# Patient Record
Sex: MALE | Race: BLACK OR AFRICAN AMERICAN | NOT HISPANIC OR LATINO | Employment: OTHER | URBAN - METROPOLITAN AREA
[De-identification: names, ages, dates, MRNs, and addresses within clinical notes are randomized per-mention and may not be internally consistent; named-entity substitution may affect disease eponyms.]

---

## 2017-06-21 ENCOUNTER — HISTORICAL (OUTPATIENT)
Dept: ADMINISTRATIVE | Facility: HOSPITAL | Age: 57
End: 2017-06-21

## 2017-06-21 LAB
ABS NEUT (OLG): 3.86 X10(3)/MCL (ref 2.1–9.2)
ALBUMIN SERPL-MCNC: 4.7 GM/DL (ref 3.4–5)
ALBUMIN/GLOB SERPL: 2 RATIO (ref 1–2)
ALP SERPL-CCNC: 131 UNIT/L (ref 20–120)
ALT SERPL-CCNC: 38 UNIT/L
APPEARANCE, UA: CLEAR
AST SERPL-CCNC: 24 UNIT/L
BACTERIA #/AREA URNS AUTO: ABNORMAL /[HPF]
BASOPHILS # BLD AUTO: 0.02 X10(3)/MCL
BASOPHILS NFR BLD AUTO: 0 % (ref 0–1)
BILIRUB SERPL-MCNC: 0.4 MG/DL
BILIRUB UR QL STRIP: NEGATIVE
BILIRUBIN DIRECT+TOT PNL SERPL-MCNC: 0.1 MG/DL
BILIRUBIN DIRECT+TOT PNL SERPL-MCNC: 0.3 MG/DL
BUN SERPL-MCNC: 26 MG/DL (ref 7–25)
CALCIUM SERPL-MCNC: 9.3 MG/DL (ref 8.4–10.3)
CHLORIDE SERPL-SCNC: 105 MMOL/L (ref 96–110)
CO2 SERPL-SCNC: 31 MMOL/L (ref 24–32)
COLOR UR: NORMAL
CREAT SERPL-MCNC: 1.23 MG/DL (ref 0.7–1.4)
CREAT UR-MCNC: 100.8 MG/DL
EOSINOPHIL # BLD AUTO: 0.18 X10(3)/MCL
EOSINOPHIL NFR BLD AUTO: 3 % (ref 0–5)
ERYTHROCYTE [DISTWIDTH] IN BLOOD BY AUTOMATED COUNT: 13.2 % (ref 11.5–14.5)
EST. AVERAGE GLUCOSE BLD GHB EST-MCNC: 111 MG/DL
GLOBULIN SER-MCNC: 3.1 GM/ML (ref 2.3–3.5)
GLUCOSE (UA): NORMAL
GLUCOSE SERPL-MCNC: 80 MG/DL (ref 65–99)
HAV IGM SERPL QL IA: NONREACTIVE
HBA1C MFR BLD: 5.5 % (ref 4.7–5.6)
HBV CORE IGM SERPL QL IA: NONREACTIVE
HBV SURFACE AG SERPL QL IA: NEGATIVE
HCT VFR BLD AUTO: 42.7 % (ref 40–51)
HCV AB SERPL QL IA: REACTIVE
HGB BLD-MCNC: 14.5 GM/DL (ref 13.5–17.5)
HGB UR QL STRIP: NEGATIVE
HYALINE CASTS #/AREA URNS LPF: ABNORMAL /[LPF]
IMM GRANULOCYTES # BLD AUTO: 0.01 10*3/UL
IMM GRANULOCYTES NFR BLD AUTO: 0 %
KETONES UR QL STRIP: NEGATIVE
LEUKOCYTE ESTERASE UR QL STRIP: NEGATIVE
LYMPHOCYTES # BLD AUTO: 1.73 X10(3)/MCL
LYMPHOCYTES NFR BLD AUTO: 28 % (ref 15–40)
MAGNESIUM SERPL-MCNC: 2 MG/DL (ref 1.5–2.6)
MCH RBC QN AUTO: 33.7 PG (ref 26–34)
MCHC RBC AUTO-ENTMCNC: 34 GM/DL (ref 31–37)
MCV RBC AUTO: 99.3 FL (ref 80–100)
MICROALBUMIN UR-MCNC: <2 MG/L (ref 0–19)
MICROALBUMIN/CREAT RATIO PNL UR: <2 MCG/MG CR (ref 0–29)
MONOCYTES # BLD AUTO: 0.45 X10(3)/MCL
MONOCYTES NFR BLD AUTO: 7 % (ref 4–12)
NEUTROPHILS # BLD AUTO: 3.86 X10(3)/MCL
NEUTROPHILS NFR BLD AUTO: 62 X10(3)/MCL
NITRITE UR QL STRIP: NEGATIVE
PH UR STRIP: 6 [PH] (ref 4.5–8)
PLATELET # BLD AUTO: 243 X10(3)/MCL (ref 130–400)
PMV BLD AUTO: 11 FL (ref 7.4–10.4)
POTASSIUM SERPL-SCNC: 4.5 MMOL/L (ref 3.6–5.2)
PROT SERPL-MCNC: 7.8 GM/DL (ref 6–8)
PROT UR QL STRIP: NEGATIVE
PSA SERPL-MCNC: 1 NG/ML (ref 0–4)
RBC # BLD AUTO: 4.3 X10(6)/MCL (ref 4.5–5.9)
RBC #/AREA URNS AUTO: ABNORMAL /[HPF]
SODIUM SERPL-SCNC: 140 MMOL/L (ref 135–146)
SP GR UR STRIP: 1.01 (ref 1–1.03)
SQUAMOUS #/AREA URNS LPF: ABNORMAL /[LPF]
T4 FREE SERPL-MCNC: 1.05 NG/DL (ref 0.6–1.15)
TSH SERPL-ACNC: 2.06 MIU/L (ref 0.5–5)
UROBILINOGEN UR STRIP-ACNC: NORMAL
WBC # SPEC AUTO: 6.2 X10(3)/MCL (ref 4.5–11)
WBC #/AREA URNS AUTO: ABNORMAL /HPF

## 2017-06-26 LAB
COLOR STL: NORMAL
CONSISTENCY STL: NORMAL
HEMOCCULT SP1 STL QL: NEGATIVE

## 2017-06-27 LAB
COLOR STL: NORMAL
CONSISTENCY STL: NORMAL
HEMOCCULT SP2 STL QL: NEGATIVE

## 2017-06-28 LAB
COLOR STL: NORMAL
CONSISTENCY STL: NORMAL

## 2017-06-29 ENCOUNTER — HISTORICAL (OUTPATIENT)
Dept: ADMINISTRATIVE | Facility: HOSPITAL | Age: 57
End: 2017-06-29

## 2017-06-29 LAB
INR PPP: 1.27 (ref 0.9–1.2)
PROTHROMBIN TIME: 15.7 SECOND(S) (ref 11.9–14.4)

## 2017-07-13 ENCOUNTER — HISTORICAL (OUTPATIENT)
Dept: ADMINISTRATIVE | Facility: HOSPITAL | Age: 57
End: 2017-07-13

## 2017-07-13 LAB
INR PPP: 1.31 (ref 0.9–1.2)
PROTHROMBIN TIME: 16.1 SECOND(S) (ref 11.9–14.4)

## 2017-08-08 ENCOUNTER — HISTORICAL (OUTPATIENT)
Dept: INTERNAL MEDICINE | Facility: CLINIC | Age: 57
End: 2017-08-08

## 2017-08-28 ENCOUNTER — HISTORICAL (OUTPATIENT)
Dept: RADIOLOGY | Facility: HOSPITAL | Age: 57
End: 2017-08-28

## 2017-08-29 ENCOUNTER — HISTORICAL (OUTPATIENT)
Dept: INTERNAL MEDICINE | Facility: CLINIC | Age: 57
End: 2017-08-29

## 2017-08-29 LAB
INR PPP: 1.46 (ref 0.9–1.2)
PROTHROMBIN TIME: 17.5 SECOND(S) (ref 11.9–14.4)

## 2018-01-09 ENCOUNTER — HISTORICAL (OUTPATIENT)
Dept: INTERNAL MEDICINE | Facility: CLINIC | Age: 58
End: 2018-01-09

## 2018-01-09 LAB
ABS NEUT (OLG): 3.6 X10(3)/MCL (ref 2.1–9.2)
ALBUMIN SERPL-MCNC: 4.3 GM/DL (ref 3.4–5)
ALBUMIN/GLOB SERPL: 1 RATIO (ref 1–2)
ALP SERPL-CCNC: 140 UNIT/L (ref 45–117)
ALT SERPL-CCNC: 22 UNIT/L (ref 12–78)
AST SERPL-CCNC: 16 UNIT/L (ref 15–37)
BASOPHILS # BLD AUTO: 0.04 X10(3)/MCL
BASOPHILS NFR BLD AUTO: 1 % (ref 0–1)
BILIRUB SERPL-MCNC: 0.6 MG/DL (ref 0.2–1)
BILIRUBIN DIRECT+TOT PNL SERPL-MCNC: 0.2 MG/DL
BILIRUBIN DIRECT+TOT PNL SERPL-MCNC: 0.4 MG/DL
BUN SERPL-MCNC: 14 MG/DL (ref 7–18)
CALCIUM SERPL-MCNC: 9.2 MG/DL (ref 8.5–10.1)
CHLORIDE SERPL-SCNC: 104 MMOL/L (ref 98–107)
CHOLEST SERPL-MCNC: 170 MG/DL
CHOLEST/HDLC SERPL: 3.7 {RATIO} (ref 0–5)
CO2 SERPL-SCNC: 31 MMOL/L (ref 21–32)
CREAT SERPL-MCNC: 1.2 MG/DL (ref 0.6–1.3)
EOSINOPHIL # BLD AUTO: 0.18 X10(3)/MCL
EOSINOPHIL NFR BLD AUTO: 3 % (ref 0–5)
ERYTHROCYTE [DISTWIDTH] IN BLOOD BY AUTOMATED COUNT: 13.6 % (ref 11.5–14.5)
EST. AVERAGE GLUCOSE BLD GHB EST-MCNC: 114 MG/DL
GLOBULIN SER-MCNC: 3.9 GM/ML (ref 2.3–3.5)
GLUCOSE SERPL-MCNC: 85 MG/DL (ref 74–106)
HBA1C MFR BLD: 5.6 % (ref 4.2–6.3)
HCT VFR BLD AUTO: 41.8 % (ref 40–51)
HDLC SERPL-MCNC: 46 MG/DL
HGB BLD-MCNC: 14.2 GM/DL (ref 13.5–17.5)
HIV 1+2 AB+HIV1 P24 AG SERPL QL IA: NONREACTIVE
IMM GRANULOCYTES # BLD AUTO: 0.01 10*3/UL
IMM GRANULOCYTES NFR BLD AUTO: 0 %
INR PPP: 1.17 (ref 0.9–1.2)
LDLC SERPL CALC-MCNC: 105 MG/DL (ref 0–130)
LYMPHOCYTES # BLD AUTO: 1.9 X10(3)/MCL
LYMPHOCYTES NFR BLD AUTO: 30 % (ref 15–40)
MCH RBC QN AUTO: 33.3 PG (ref 26–34)
MCHC RBC AUTO-ENTMCNC: 34 GM/DL (ref 31–37)
MCV RBC AUTO: 98.1 FL (ref 80–100)
MONOCYTES # BLD AUTO: 0.69 X10(3)/MCL
MONOCYTES NFR BLD AUTO: 11 % (ref 4–12)
NEUTROPHILS # BLD AUTO: 3.6 X10(3)/MCL
NEUTROPHILS NFR BLD AUTO: 56 X10(3)/MCL
PLATELET # BLD AUTO: 260 X10(3)/MCL (ref 130–400)
PMV BLD AUTO: 10.6 FL (ref 7.4–10.4)
POTASSIUM SERPL-SCNC: 4.1 MMOL/L (ref 3.5–5.1)
PROT SERPL-MCNC: 8.2 GM/DL (ref 6.4–8.2)
PROTHROMBIN TIME: 14.7 SECOND(S) (ref 11.9–14.4)
RBC # BLD AUTO: 4.26 X10(6)/MCL (ref 4.5–5.9)
SODIUM SERPL-SCNC: 142 MMOL/L (ref 136–145)
TRIGL SERPL-MCNC: 95 MG/DL
VLDLC SERPL CALC-MCNC: 19 MG/DL
WBC # SPEC AUTO: 6.4 X10(3)/MCL (ref 4.5–11)

## 2018-01-23 ENCOUNTER — HISTORICAL (OUTPATIENT)
Dept: INTERNAL MEDICINE | Facility: CLINIC | Age: 58
End: 2018-01-23

## 2018-01-23 LAB
INR PPP: 1.46 (ref 0.9–1.2)
PROTHROMBIN TIME: 17.5 SECOND(S) (ref 11.9–14.4)

## 2018-02-06 ENCOUNTER — HISTORICAL (OUTPATIENT)
Dept: INTERNAL MEDICINE | Facility: CLINIC | Age: 58
End: 2018-02-06

## 2018-02-06 LAB
ALBUMIN SERPL-MCNC: 3.8 GM/DL (ref 3.4–5)
ALBUMIN/GLOB SERPL: 1 RATIO (ref 1–2)
ALP SERPL-CCNC: 138 UNIT/L (ref 45–117)
ALT SERPL-CCNC: 29 UNIT/L (ref 12–78)
AST SERPL-CCNC: 21 UNIT/L (ref 15–37)
BILIRUB SERPL-MCNC: 0.4 MG/DL (ref 0.2–1)
BILIRUBIN DIRECT+TOT PNL SERPL-MCNC: 0.1 MG/DL
BILIRUBIN DIRECT+TOT PNL SERPL-MCNC: 0.3 MG/DL
BUN SERPL-MCNC: 18 MG/DL (ref 7–18)
CALCIUM SERPL-MCNC: 8.8 MG/DL (ref 8.5–10.1)
CHLORIDE SERPL-SCNC: 108 MMOL/L (ref 98–107)
CO2 SERPL-SCNC: 29 MMOL/L (ref 21–32)
CREAT SERPL-MCNC: 1.2 MG/DL (ref 0.6–1.3)
GLOBULIN SER-MCNC: 3.4 GM/ML (ref 2.3–3.5)
GLUCOSE SERPL-MCNC: 118 MG/DL (ref 74–106)
INR PPP: 1.5 (ref 0.9–1.2)
POTASSIUM SERPL-SCNC: 3.9 MMOL/L (ref 3.5–5.1)
PROT SERPL-MCNC: 7.2 GM/DL (ref 6.4–8.2)
PROTHROMBIN TIME: 17.2 SECOND(S) (ref 11.9–14.4)
SODIUM SERPL-SCNC: 142 MMOL/L (ref 136–145)

## 2018-02-20 ENCOUNTER — HISTORICAL (OUTPATIENT)
Dept: INTERNAL MEDICINE | Facility: CLINIC | Age: 58
End: 2018-02-20

## 2018-02-20 LAB
INR PPP: 1.58 (ref 0.9–1.2)
PROTHROMBIN TIME: 17.9 SECOND(S) (ref 11.9–14.4)

## 2018-03-27 LAB
ABS NEUT (OLG): 3.87 X10(3)/MCL (ref 2.1–9.2)
ALBUMIN SERPL-MCNC: 3.8 GM/DL (ref 3.4–5)
ALBUMIN/GLOB SERPL: 1 RATIO (ref 1–2)
ALP SERPL-CCNC: 130 UNIT/L (ref 45–117)
ALT SERPL-CCNC: 41 UNIT/L (ref 12–78)
AST SERPL-CCNC: 23 UNIT/L (ref 15–37)
BASOPHILS # BLD AUTO: 0.05 X10(3)/MCL
BASOPHILS NFR BLD AUTO: 1 %
BILIRUB SERPL-MCNC: 0.4 MG/DL (ref 0.2–1)
BILIRUBIN DIRECT+TOT PNL SERPL-MCNC: 0.1 MG/DL
BILIRUBIN DIRECT+TOT PNL SERPL-MCNC: 0.3 MG/DL
BUN SERPL-MCNC: 17 MG/DL (ref 7–18)
CALCIUM SERPL-MCNC: 8.3 MG/DL (ref 8.5–10.1)
CHLORIDE SERPL-SCNC: 110 MMOL/L (ref 98–107)
CO2 SERPL-SCNC: 30 MMOL/L (ref 21–32)
CREAT SERPL-MCNC: 1.4 MG/DL (ref 0.6–1.3)
EOSINOPHIL # BLD AUTO: 0.16 X10(3)/MCL
EOSINOPHIL NFR BLD AUTO: 2 %
ERYTHROCYTE [DISTWIDTH] IN BLOOD BY AUTOMATED COUNT: 13.3 % (ref 11.5–14.5)
FERRITIN SERPL-MCNC: 295.1 NG/ML (ref 26–388)
FOLATE SERPL-MCNC: 19.8 NG/ML (ref 3.1–17.5)
GLOBULIN SER-MCNC: 4.2 GM/ML (ref 2.3–3.5)
GLUCOSE SERPL-MCNC: 102 MG/DL (ref 74–106)
HCT VFR BLD AUTO: 39.2 % (ref 40–51)
HGB BLD-MCNC: 13.3 GM/DL (ref 13.5–17.5)
IMM GRANULOCYTES # BLD AUTO: 0.01 10*3/UL
IMM GRANULOCYTES NFR BLD AUTO: 0 %
INR PPP: 4.65 (ref 0.9–1.2)
IRON SATN MFR SERPL: 46.9 % (ref 15–50)
IRON SERPL-MCNC: 83 MCG/DL (ref 65–175)
LYMPHOCYTES # BLD AUTO: 2.58 X10(3)/MCL
LYMPHOCYTES NFR BLD AUTO: 36 % (ref 13–40)
MCH RBC QN AUTO: 33.4 PG (ref 26–34)
MCHC RBC AUTO-ENTMCNC: 33.9 GM/DL (ref 31–37)
MCV RBC AUTO: 98.5 FL (ref 80–100)
MONOCYTES # BLD AUTO: 0.6 X10(3)/MCL
MONOCYTES NFR BLD AUTO: 8 % (ref 4–12)
NEUTROPHILS # BLD AUTO: 3.87 X10(3)/MCL
NEUTROPHILS NFR BLD AUTO: 53 X10(3)/MCL
PLATELET # BLD AUTO: 273 X10(3)/MCL (ref 130–400)
PMV BLD AUTO: 10.2 FL (ref 7.4–10.4)
POTASSIUM SERPL-SCNC: 3.9 MMOL/L (ref 3.5–5.1)
PROT SERPL-MCNC: 8 GM/DL (ref 6.4–8.2)
PROTHROMBIN TIME: 41.9 SECOND(S) (ref 11.9–14.4)
RBC # BLD AUTO: 3.98 X10(6)/MCL (ref 4.5–5.9)
SODIUM SERPL-SCNC: 143 MMOL/L (ref 136–145)
TIBC SERPL-MCNC: 177 MCG/DL (ref 250–450)
TRANSFERRIN SERPL-MCNC: 212 MG/DL (ref 200–360)
VIT B12 SERPL-MCNC: 242 PG/ML (ref 193–986)
WBC # SPEC AUTO: 7.3 X10(3)/MCL (ref 4.5–11)

## 2018-03-28 ENCOUNTER — HISTORICAL (OUTPATIENT)
Dept: INTERNAL MEDICINE | Facility: CLINIC | Age: 58
End: 2018-03-28

## 2018-04-03 LAB
INR PPP: 1.36 (ref 0.9–1.2)
PROTHROMBIN TIME: 15.9 SECOND(S) (ref 11.9–14.4)

## 2018-04-17 ENCOUNTER — HISTORICAL (OUTPATIENT)
Dept: INTERNAL MEDICINE | Facility: CLINIC | Age: 58
End: 2018-04-17

## 2018-04-17 LAB
INR PPP: 1.25
INR PPP: 1.25 (ref 0.9–1.2)
PROTHROMBIN TIME: 14.9 SECOND(S) (ref 11.9–14.4)

## 2018-06-20 ENCOUNTER — HISTORICAL (OUTPATIENT)
Dept: INTERNAL MEDICINE | Facility: CLINIC | Age: 58
End: 2018-06-20

## 2018-06-20 LAB
ABS NEUT (OLG): 4.48 X10(3)/MCL (ref 2.1–9.2)
ALBUMIN SERPL-MCNC: 4 GM/DL (ref 3.4–5)
ALBUMIN/GLOB SERPL: 1 RATIO (ref 1–2)
ALP SERPL-CCNC: 136 UNIT/L (ref 45–117)
ALT SERPL-CCNC: 27 UNIT/L (ref 12–78)
AST SERPL-CCNC: 19 UNIT/L (ref 15–37)
BASOPHILS # BLD AUTO: 0.05 X10(3)/MCL
BASOPHILS NFR BLD AUTO: 1 %
BILIRUB SERPL-MCNC: 0.3 MG/DL (ref 0.2–1)
BILIRUBIN DIRECT+TOT PNL SERPL-MCNC: 0.1 MG/DL
BILIRUBIN DIRECT+TOT PNL SERPL-MCNC: 0.2 MG/DL
BUN SERPL-MCNC: 13 MG/DL (ref 7–18)
CALCIUM SERPL-MCNC: 8.5 MG/DL (ref 8.5–10.1)
CHLORIDE SERPL-SCNC: 110 MMOL/L (ref 98–107)
CO2 SERPL-SCNC: 29 MMOL/L (ref 21–32)
CREAT SERPL-MCNC: 1.4 MG/DL (ref 0.6–1.3)
EOSINOPHIL # BLD AUTO: 0.14 X10(3)/MCL
EOSINOPHIL NFR BLD AUTO: 2 %
ERYTHROCYTE [DISTWIDTH] IN BLOOD BY AUTOMATED COUNT: 13.9 % (ref 11.5–14.5)
GLOBULIN SER-MCNC: 3.1 GM/ML (ref 2.3–3.5)
GLUCOSE SERPL-MCNC: 98 MG/DL (ref 74–106)
HCT VFR BLD AUTO: 41.5 % (ref 40–51)
HGB BLD-MCNC: 13.6 GM/DL (ref 13.5–17.5)
IMM GRANULOCYTES # BLD AUTO: 0.02 10*3/UL
IMM GRANULOCYTES NFR BLD AUTO: 0 %
LYMPHOCYTES # BLD AUTO: 1.62 X10(3)/MCL
LYMPHOCYTES NFR BLD AUTO: 24 % (ref 13–40)
MCH RBC QN AUTO: 33.6 PG (ref 26–34)
MCHC RBC AUTO-ENTMCNC: 32.8 GM/DL (ref 31–37)
MCV RBC AUTO: 102.5 FL (ref 80–100)
MONOCYTES # BLD AUTO: 0.58 X10(3)/MCL
MONOCYTES NFR BLD AUTO: 8 % (ref 4–12)
NEUTROPHILS # BLD AUTO: 4.48 X10(3)/MCL
NEUTROPHILS NFR BLD AUTO: 65 X10(3)/MCL
PLATELET # BLD AUTO: 219 X10(3)/MCL (ref 130–400)
PMV BLD AUTO: 10.8 FL (ref 7.4–10.4)
POTASSIUM SERPL-SCNC: 4.2 MMOL/L (ref 3.5–5.1)
PROT SERPL-MCNC: 7.1 GM/DL (ref 6.4–8.2)
RBC # BLD AUTO: 4.05 X10(6)/MCL (ref 4.5–5.9)
SODIUM SERPL-SCNC: 145 MMOL/L (ref 136–145)
WBC # SPEC AUTO: 6.9 X10(3)/MCL (ref 4.5–11)

## 2018-07-03 ENCOUNTER — HISTORICAL (OUTPATIENT)
Dept: INTERNAL MEDICINE | Facility: CLINIC | Age: 58
End: 2018-07-03

## 2018-07-16 ENCOUNTER — HISTORICAL (OUTPATIENT)
Dept: RESPIRATORY THERAPY | Facility: HOSPITAL | Age: 58
End: 2018-07-16

## 2018-08-07 ENCOUNTER — HISTORICAL (OUTPATIENT)
Dept: CARDIOLOGY | Facility: CLINIC | Age: 58
End: 2018-08-07

## 2018-08-10 ENCOUNTER — HISTORICAL (OUTPATIENT)
Dept: CARDIOLOGY | Facility: HOSPITAL | Age: 58
End: 2018-08-10

## 2018-08-10 LAB
ABS NEUT (OLG): 4.66 X10(3)/MCL (ref 2.1–9.2)
APTT PPP: 30.1 SECOND(S) (ref 23.3–37)
BASOPHILS # BLD AUTO: 0.03 X10(3)/MCL
BASOPHILS NFR BLD AUTO: 0 %
BUN SERPL-MCNC: 31 MG/DL (ref 7–18)
CALCIUM SERPL-MCNC: 6.3 MG/DL (ref 8.5–10.1)
CHLORIDE SERPL-SCNC: 109 MMOL/L (ref 98–107)
CO2 SERPL-SCNC: 29 MMOL/L (ref 21–32)
CREAT SERPL-MCNC: 1.5 MG/DL (ref 0.6–1.3)
CREAT/UREA NIT SERPL: 21
EOSINOPHIL # BLD AUTO: 0.16 X10(3)/MCL
EOSINOPHIL NFR BLD AUTO: 2 %
ERYTHROCYTE [DISTWIDTH] IN BLOOD BY AUTOMATED COUNT: 13.4 % (ref 11.5–14.5)
GLUCOSE SERPL-MCNC: 89 MG/DL (ref 74–106)
HCT VFR BLD AUTO: 35.8 % (ref 40–51)
HGB BLD-MCNC: 12 GM/DL (ref 13.5–17.5)
IMM GRANULOCYTES # BLD AUTO: 0.02 10*3/UL
IMM GRANULOCYTES NFR BLD AUTO: 0 %
INR PPP: 1.17 (ref 0.9–1.2)
LYMPHOCYTES # BLD AUTO: 1.75 X10(3)/MCL
LYMPHOCYTES NFR BLD AUTO: 24 % (ref 13–40)
MCH RBC QN AUTO: 33.8 PG (ref 26–34)
MCHC RBC AUTO-ENTMCNC: 33.5 GM/DL (ref 31–37)
MCV RBC AUTO: 100.8 FL (ref 80–100)
MONOCYTES # BLD AUTO: 0.61 X10(3)/MCL
MONOCYTES NFR BLD AUTO: 8 % (ref 4–12)
NEUTROPHILS # BLD AUTO: 4.66 X10(3)/MCL
NEUTROPHILS NFR BLD AUTO: 64 X10(3)/MCL
PLATELET # BLD AUTO: 213 X10(3)/MCL (ref 130–400)
PMV BLD AUTO: 10.5 FL (ref 7.4–10.4)
POTASSIUM SERPL-SCNC: 4 MMOL/L (ref 3.5–5.1)
PROTHROMBIN TIME: 14.1 SECOND(S) (ref 11.9–14.4)
RBC # BLD AUTO: 3.55 X10(6)/MCL (ref 4.5–5.9)
SODIUM SERPL-SCNC: 145 MMOL/L (ref 136–145)
WBC # SPEC AUTO: 7.2 X10(3)/MCL (ref 4.5–11)

## 2018-11-13 ENCOUNTER — HISTORICAL (OUTPATIENT)
Dept: RADIOLOGY | Facility: HOSPITAL | Age: 58
End: 2018-11-13

## 2018-12-13 ENCOUNTER — HISTORICAL (OUTPATIENT)
Dept: INTERNAL MEDICINE | Facility: CLINIC | Age: 58
End: 2018-12-13

## 2019-02-07 ENCOUNTER — HISTORICAL (OUTPATIENT)
Dept: INTERNAL MEDICINE | Facility: CLINIC | Age: 59
End: 2019-02-07

## 2019-02-07 LAB
ABS NEUT (OLG): 2.65 X10(3)/MCL (ref 2.1–9.2)
ALBUMIN SERPL-MCNC: 4.3 GM/DL (ref 3.4–5)
ALBUMIN/GLOB SERPL: 1.2 RATIO (ref 1.1–2)
ALP SERPL-CCNC: 131 UNIT/L (ref 45–117)
ALT SERPL-CCNC: 22 UNIT/L (ref 12–78)
APPEARANCE, UA: CLEAR
AST SERPL-CCNC: 15 UNIT/L (ref 15–37)
BACTERIA #/AREA URNS AUTO: ABNORMAL /[HPF]
BASOPHILS # BLD AUTO: 0.05 X10(3)/MCL
BASOPHILS NFR BLD AUTO: 1 %
BILIRUB SERPL-MCNC: 0.6 MG/DL (ref 0.2–1)
BILIRUB UR QL STRIP: NEGATIVE
BILIRUBIN DIRECT+TOT PNL SERPL-MCNC: 0.2 MG/DL
BILIRUBIN DIRECT+TOT PNL SERPL-MCNC: 0.4 MG/DL
BUN SERPL-MCNC: 18 MG/DL (ref 7–18)
CALCIUM SERPL-MCNC: 8.9 MG/DL (ref 8.5–10.1)
CHLORIDE SERPL-SCNC: 104 MMOL/L (ref 98–107)
CHOLEST SERPL-MCNC: 163 MG/DL
CHOLEST/HDLC SERPL: 2.9 {RATIO} (ref 0–5)
CO2 SERPL-SCNC: 30 MMOL/L (ref 21–32)
COLOR UR: NORMAL
CREAT SERPL-MCNC: 1.4 MG/DL (ref 0.6–1.3)
CREAT UR-MCNC: 160 MG/DL
EOSINOPHIL # BLD AUTO: 0.17 10*3/UL
EOSINOPHIL NFR BLD AUTO: 3 %
ERYTHROCYTE [DISTWIDTH] IN BLOOD BY AUTOMATED COUNT: 12.9 % (ref 11.5–14.5)
EST. AVERAGE GLUCOSE BLD GHB EST-MCNC: 128 MG/DL
GLOBULIN SER-MCNC: 3.5 GM/ML (ref 2.3–3.5)
GLUCOSE (UA): NORMAL
GLUCOSE SERPL-MCNC: 57 MG/DL (ref 74–106)
HBA1C MFR BLD: 6.1 % (ref 4.2–6.3)
HCT VFR BLD AUTO: 43.2 % (ref 40–51)
HDLC SERPL-MCNC: 56 MG/DL
HGB BLD-MCNC: 14.5 GM/DL (ref 13.5–17.5)
HGB UR QL STRIP: NEGATIVE
HYALINE CASTS #/AREA URNS LPF: ABNORMAL /[LPF]
IMM GRANULOCYTES # BLD AUTO: 0.01 10*3/UL
IMM GRANULOCYTES NFR BLD AUTO: 0 %
KETONES UR QL STRIP: NEGATIVE
LDLC SERPL CALC-MCNC: 89 MG/DL (ref 0–130)
LEUKOCYTE ESTERASE UR QL STRIP: NEGATIVE
LYMPHOCYTES # BLD AUTO: 2.28 X10(3)/MCL
LYMPHOCYTES NFR BLD AUTO: 41 % (ref 13–40)
MCH RBC QN AUTO: 33 PG (ref 26–34)
MCHC RBC AUTO-ENTMCNC: 33.6 GM/DL (ref 31–37)
MCV RBC AUTO: 98.2 FL (ref 80–100)
MICROALBUMIN UR-MCNC: 10.3 MG/L (ref 0–19)
MICROALBUMIN/CREAT RATIO PNL UR: 6.4 MCG/MG CR (ref 0–29)
MONOCYTES # BLD AUTO: 0.45 X10(3)/MCL
MONOCYTES NFR BLD AUTO: 8 % (ref 4–12)
NEUTROPHILS # BLD AUTO: 2.65 X10(3)/MCL
NEUTROPHILS NFR BLD AUTO: 47 X10(3)/MCL
NITRITE UR QL STRIP: NEGATIVE
PH UR STRIP: 5.5 [PH] (ref 4.5–8)
PLATELET # BLD AUTO: 244 X10(3)/MCL (ref 130–400)
PMV BLD AUTO: 10.6 FL (ref 7.4–10.4)
POTASSIUM SERPL-SCNC: 3.4 MMOL/L (ref 3.5–5.1)
PROT SERPL-MCNC: 7.8 GM/DL (ref 6.4–8.2)
PROT UR QL STRIP: NEGATIVE
PSA SERPL-MCNC: 1.6 NG/ML
RBC # BLD AUTO: 4.4 X10(6)/MCL (ref 4.5–5.9)
RBC #/AREA URNS AUTO: ABNORMAL /[HPF]
SODIUM SERPL-SCNC: 138 MMOL/L (ref 136–145)
SP GR UR STRIP: 1.01 (ref 1–1.03)
SQUAMOUS #/AREA URNS LPF: ABNORMAL /[LPF]
T4 FREE SERPL-MCNC: 1.28 NG/DL (ref 0.76–1.46)
TRIGL SERPL-MCNC: 88 MG/DL
TSH SERPL-ACNC: 3.21 MIU/L (ref 0.36–3.74)
UROBILINOGEN UR STRIP-ACNC: NORMAL
VLDLC SERPL CALC-MCNC: 18 MG/DL
WBC # SPEC AUTO: 5.6 X10(3)/MCL (ref 4.5–11)
WBC #/AREA URNS AUTO: ABNORMAL /HPF

## 2019-03-19 ENCOUNTER — HISTORICAL (OUTPATIENT)
Dept: ADMINISTRATIVE | Facility: HOSPITAL | Age: 59
End: 2019-03-19

## 2019-08-02 ENCOUNTER — HISTORICAL (OUTPATIENT)
Dept: INTERNAL MEDICINE | Facility: CLINIC | Age: 59
End: 2019-08-02

## 2019-08-02 LAB
EST. AVERAGE GLUCOSE BLD GHB EST-MCNC: 105 MG/DL
HBA1C MFR BLD: 5.3 % (ref 4.2–6.3)

## 2019-08-23 ENCOUNTER — HISTORICAL (OUTPATIENT)
Dept: ADMINISTRATIVE | Facility: HOSPITAL | Age: 59
End: 2019-08-23

## 2019-08-23 LAB
ABS NEUT (OLG): 2.56 X10(3)/MCL (ref 2.1–9.2)
ALBUMIN SERPL-MCNC: 4.5 GM/DL (ref 3.4–5)
ALBUMIN/GLOB SERPL: 1.2 RATIO (ref 1.1–2)
ALP SERPL-CCNC: 156 UNIT/L (ref 45–117)
ALT SERPL-CCNC: 21 UNIT/L (ref 12–78)
AST SERPL-CCNC: 14 UNIT/L (ref 15–37)
BASOPHILS # BLD AUTO: 0.03 X10(3)/MCL
BASOPHILS NFR BLD AUTO: 1 %
BILIRUB SERPL-MCNC: 0.4 MG/DL (ref 0.2–1)
BILIRUBIN DIRECT+TOT PNL SERPL-MCNC: 0.2 MG/DL
BILIRUBIN DIRECT+TOT PNL SERPL-MCNC: 0.2 MG/DL
BUN SERPL-MCNC: 18 MG/DL (ref 7–18)
CALCIUM SERPL-MCNC: 9.1 MG/DL (ref 8.5–10.1)
CHLORIDE SERPL-SCNC: 107 MMOL/L (ref 98–107)
CO2 SERPL-SCNC: 30 MMOL/L (ref 21–32)
CREAT SERPL-MCNC: 1.2 MG/DL (ref 0.6–1.3)
CRP SERPL-MCNC: <0.3 MG/DL
EOSINOPHIL # BLD AUTO: 0.14 X10(3)/MCL
EOSINOPHIL NFR BLD AUTO: 3 %
ERYTHROCYTE [DISTWIDTH] IN BLOOD BY AUTOMATED COUNT: 13.4 % (ref 11.5–14.5)
ERYTHROCYTE [SEDIMENTATION RATE] IN BLOOD: 2 MM/HR (ref 0–15)
GLOBULIN SER-MCNC: 3.8 GM/ML (ref 2.3–3.5)
GLUCOSE SERPL-MCNC: 68 MG/DL (ref 74–106)
HBV CORE AB SERPL QL IA: NONREACTIVE
HBV CORE IGM SERPL QL IA: NONREACTIVE
HBV SURFACE AG SERPL QL IA: NEGATIVE
HCT VFR BLD AUTO: 45.1 % (ref 40–51)
HCV AB SERPL QL IA: REACTIVE
HGB BLD-MCNC: 14.7 GM/DL (ref 13.5–17.5)
IMM GRANULOCYTES # BLD AUTO: 0.01 10*3/UL
IMM GRANULOCYTES NFR BLD AUTO: 0 %
LYMPHOCYTES # BLD AUTO: 1.87 X10(3)/MCL
LYMPHOCYTES NFR BLD AUTO: 36 % (ref 13–40)
MCH RBC QN AUTO: 33.6 PG (ref 26–34)
MCHC RBC AUTO-ENTMCNC: 32.6 GM/DL (ref 31–37)
MCV RBC AUTO: 103.2 FL (ref 80–100)
MONOCYTES # BLD AUTO: 0.52 X10(3)/MCL
MONOCYTES NFR BLD AUTO: 10 % (ref 0–24)
NEG CONT SPOT COUNT: NORMAL
NEUTROPHILS # BLD AUTO: 2.56 X10(3)/MCL
NEUTROPHILS NFR BLD AUTO: 50 X10(3)/MCL
PANEL A SPOT COUNT: 2
PANEL B SPOT COUNT: 2
PLATELET # BLD AUTO: 253 X10(3)/MCL (ref 130–400)
PMV BLD AUTO: 10.3 FL (ref 7.4–10.4)
POS CONT SPOT COUNT: NORMAL
POTASSIUM SERPL-SCNC: 3.6 MMOL/L (ref 3.5–5.1)
PROT SERPL-MCNC: 8.3 GM/DL (ref 6.4–8.2)
RBC # BLD AUTO: 4.37 X10(6)/MCL (ref 4.5–5.9)
SODIUM SERPL-SCNC: 142 MMOL/L (ref 136–145)
T-SPOT.TB: NORMAL
WBC # SPEC AUTO: 5.1 X10(3)/MCL (ref 4.5–11)

## 2019-10-02 ENCOUNTER — HISTORICAL (OUTPATIENT)
Dept: RADIOLOGY | Facility: HOSPITAL | Age: 59
End: 2019-10-02

## 2019-12-12 ENCOUNTER — HISTORICAL (OUTPATIENT)
Dept: INTERNAL MEDICINE | Facility: CLINIC | Age: 59
End: 2019-12-12

## 2019-12-12 LAB
ABS NEUT (OLG): 3.47 X10(3)/MCL (ref 2.1–9.2)
ALBUMIN SERPL-MCNC: 3.8 GM/DL (ref 3.4–5)
ALBUMIN/GLOB SERPL: 1.2 RATIO (ref 1.1–2)
ALP SERPL-CCNC: 119 UNIT/L (ref 45–117)
ALT SERPL-CCNC: 22 UNIT/L (ref 12–78)
AST SERPL-CCNC: 14 UNIT/L (ref 15–37)
BASOPHILS # BLD AUTO: 0 X10(3)/MCL (ref 0–0.2)
BASOPHILS NFR BLD AUTO: 1 %
BILIRUB SERPL-MCNC: 0.4 MG/DL (ref 0.2–1)
BILIRUBIN DIRECT+TOT PNL SERPL-MCNC: 0.1 MG/DL (ref 0–0.2)
BILIRUBIN DIRECT+TOT PNL SERPL-MCNC: 0.3 MG/DL
BUN SERPL-MCNC: 23 MG/DL (ref 7–18)
CALCIUM SERPL-MCNC: 8.7 MG/DL (ref 8.5–10.1)
CHLORIDE SERPL-SCNC: 112 MMOL/L (ref 98–107)
CO2 SERPL-SCNC: 29 MMOL/L (ref 21–32)
CREAT SERPL-MCNC: 1.2 MG/DL (ref 0.6–1.3)
CRP SERPL-MCNC: <0.3 MG/DL
EOSINOPHIL # BLD AUTO: 0.2 X10(3)/MCL (ref 0–0.9)
EOSINOPHIL NFR BLD AUTO: 4 %
ERYTHROCYTE [DISTWIDTH] IN BLOOD BY AUTOMATED COUNT: 13.7 % (ref 11.5–14.5)
ERYTHROCYTE [SEDIMENTATION RATE] IN BLOOD: 2 MM/HR (ref 0–15)
GLOBULIN SER-MCNC: 3.3 GM/ML (ref 2.3–3.5)
GLUCOSE SERPL-MCNC: 101 MG/DL (ref 74–106)
HCT VFR BLD AUTO: 43.1 % (ref 40–51)
HGB BLD-MCNC: 14 GM/DL (ref 13.5–17.5)
IMM GRANULOCYTES # BLD AUTO: 0.02 10*3/UL
IMM GRANULOCYTES NFR BLD AUTO: 0 %
LYMPHOCYTES # BLD AUTO: 1.6 X10(3)/MCL (ref 0.6–4.6)
LYMPHOCYTES NFR BLD AUTO: 28 %
MCH RBC QN AUTO: 33.7 PG (ref 26–34)
MCHC RBC AUTO-ENTMCNC: 32.5 GM/DL (ref 31–37)
MCV RBC AUTO: 103.9 FL (ref 80–100)
MONOCYTES # BLD AUTO: 0.4 X10(3)/MCL (ref 0.1–1.3)
MONOCYTES NFR BLD AUTO: 7 %
NEUTROPHILS # BLD AUTO: 3.47 X10(3)/MCL (ref 2.1–9.2)
NEUTROPHILS NFR BLD AUTO: 61 %
PLATELET # BLD AUTO: 224 X10(3)/MCL (ref 130–400)
PMV BLD AUTO: 10.4 FL (ref 7.4–10.4)
POTASSIUM SERPL-SCNC: 4.2 MMOL/L (ref 3.5–5.1)
PROT SERPL-MCNC: 7.1 GM/DL (ref 6.4–8.2)
RBC # BLD AUTO: 4.15 X10(6)/MCL (ref 4.5–5.9)
SODIUM SERPL-SCNC: 143 MMOL/L (ref 136–145)
WBC # SPEC AUTO: 5.7 X10(3)/MCL (ref 4.5–11)

## 2020-02-11 ENCOUNTER — HISTORICAL (OUTPATIENT)
Dept: RADIOLOGY | Facility: HOSPITAL | Age: 60
End: 2020-02-11

## 2020-03-17 ENCOUNTER — HISTORICAL (OUTPATIENT)
Dept: INTERNAL MEDICINE | Facility: CLINIC | Age: 60
End: 2020-03-17

## 2020-03-17 LAB
ABS NEUT (OLG): 3.76 X10(3)/MCL (ref 2.1–9.2)
ALBUMIN SERPL-MCNC: 3.5 GM/DL (ref 3.4–5)
ALBUMIN/GLOB SERPL: 1.1 RATIO (ref 1.1–2)
ALP SERPL-CCNC: 152 UNIT/L (ref 45–117)
ALT SERPL-CCNC: 18 UNIT/L (ref 12–78)
APPEARANCE, UA: CLEAR
AST SERPL-CCNC: 11 UNIT/L (ref 15–37)
BACTERIA #/AREA URNS AUTO: ABNORMAL /HPF
BASOPHILS # BLD AUTO: 0 X10(3)/MCL (ref 0–0.2)
BASOPHILS NFR BLD AUTO: 1 %
BILIRUB SERPL-MCNC: 0.4 MG/DL (ref 0.2–1)
BILIRUB UR QL STRIP: NEGATIVE
BILIRUBIN DIRECT+TOT PNL SERPL-MCNC: 0.2 MG/DL
BILIRUBIN DIRECT+TOT PNL SERPL-MCNC: 0.2 MG/DL (ref 0–0.2)
BUN SERPL-MCNC: 19 MG/DL (ref 7–18)
CALCIUM SERPL-MCNC: 8 MG/DL (ref 8.5–10.1)
CHLORIDE SERPL-SCNC: 113 MMOL/L (ref 98–107)
CHOLEST SERPL-MCNC: 137 MG/DL
CHOLEST/HDLC SERPL: 2.2 {RATIO} (ref 0–5)
CO2 SERPL-SCNC: 29 MMOL/L (ref 21–32)
COLOR UR: YELLOW
CREAT SERPL-MCNC: 1.1 MG/DL (ref 0.6–1.3)
EOSINOPHIL # BLD AUTO: 0.2 X10(3)/MCL (ref 0–0.9)
EOSINOPHIL NFR BLD AUTO: 4 %
ERYTHROCYTE [DISTWIDTH] IN BLOOD BY AUTOMATED COUNT: 13.7 % (ref 11.5–14.5)
EST. AVERAGE GLUCOSE BLD GHB EST-MCNC: 103 MG/DL
GLOBULIN SER-MCNC: 3.1 GM/ML (ref 2.3–3.5)
GLUCOSE (UA): NEGATIVE
GLUCOSE SERPL-MCNC: 70 MG/DL (ref 74–106)
HBA1C MFR BLD: 5.2 % (ref 4.2–6.3)
HCT VFR BLD AUTO: 40.4 % (ref 40–51)
HDLC SERPL-MCNC: 62 MG/DL (ref 40–59)
HGB BLD-MCNC: 13.3 GM/DL (ref 13.5–17.5)
HGB UR QL STRIP: NEGATIVE
HYALINE CASTS #/AREA URNS LPF: ABNORMAL /LPF
IMM GRANULOCYTES # BLD AUTO: 0.01 10*3/UL
IMM GRANULOCYTES NFR BLD AUTO: 0 %
KETONES UR QL STRIP: ABNORMAL
LDLC SERPL CALC-MCNC: 63 MG/DL
LEUKOCYTE ESTERASE UR QL STRIP: NEGATIVE
LYMPHOCYTES # BLD AUTO: 1.6 X10(3)/MCL (ref 0.6–4.6)
LYMPHOCYTES NFR BLD AUTO: 25 %
MCH RBC QN AUTO: 33.8 PG (ref 26–34)
MCHC RBC AUTO-ENTMCNC: 32.9 GM/DL (ref 31–37)
MCV RBC AUTO: 102.8 FL (ref 80–100)
MONOCYTES # BLD AUTO: 0.5 X10(3)/MCL (ref 0.1–1.3)
MONOCYTES NFR BLD AUTO: 8 %
NEUTROPHILS # BLD AUTO: 3.76 X10(3)/MCL (ref 2.1–9.2)
NEUTROPHILS NFR BLD AUTO: 61 %
NITRITE UR QL STRIP: NEGATIVE
PH UR STRIP: 6 [PH] (ref 4.5–8)
PLATELET # BLD AUTO: 222 X10(3)/MCL (ref 130–400)
PMV BLD AUTO: 10.2 FL (ref 7.4–10.4)
POTASSIUM SERPL-SCNC: 4.1 MMOL/L (ref 3.5–5.1)
PROT SERPL-MCNC: 6.6 GM/DL (ref 6.4–8.2)
PROT UR QL STRIP: NEGATIVE
PSA SERPL-MCNC: 1.1 NG/ML
RBC # BLD AUTO: 3.93 X10(6)/MCL (ref 4.5–5.9)
RBC #/AREA URNS AUTO: ABNORMAL /HPF
SODIUM SERPL-SCNC: 144 MMOL/L (ref 136–145)
SP GR UR STRIP: 1.02 (ref 1–1.03)
SQUAMOUS #/AREA URNS LPF: ABNORMAL /LPF
T4 FREE SERPL-MCNC: 1.15 NG/DL (ref 0.76–1.46)
TRIGL SERPL-MCNC: 59 MG/DL
TSH SERPL-ACNC: 2.23 MIU/L (ref 0.36–3.74)
UROBILINOGEN UR STRIP-ACNC: NORMAL
VLDLC SERPL CALC-MCNC: 12 MG/DL
WBC # SPEC AUTO: 6.1 X10(3)/MCL (ref 4.5–11)
WBC #/AREA URNS AUTO: ABNORMAL /HPF

## 2020-08-31 ENCOUNTER — HISTORICAL (OUTPATIENT)
Dept: INTERNAL MEDICINE | Facility: CLINIC | Age: 60
End: 2020-08-31

## 2020-08-31 LAB
ABS NEUT (OLG): 3.78 X10(3)/MCL (ref 2.1–9.2)
ALBUMIN SERPL-MCNC: 4.2 GM/DL (ref 3.4–5)
ALBUMIN/GLOB SERPL: 1.1 RATIO (ref 1.1–2)
ALP SERPL-CCNC: 133 UNIT/L (ref 45–117)
ALT SERPL-CCNC: 23 UNIT/L (ref 12–78)
APPEARANCE, UA: CLEAR
AST SERPL-CCNC: 17 UNIT/L (ref 15–37)
BACTERIA #/AREA URNS AUTO: ABNORMAL /HPF
BASOPHILS # BLD AUTO: 0 X10(3)/MCL (ref 0–0.2)
BASOPHILS NFR BLD AUTO: 1 %
BILIRUB SERPL-MCNC: 0.3 MG/DL (ref 0.2–1)
BILIRUB UR QL STRIP: NEGATIVE
BILIRUBIN DIRECT+TOT PNL SERPL-MCNC: 0.1 MG/DL (ref 0–0.2)
BILIRUBIN DIRECT+TOT PNL SERPL-MCNC: 0.2 MG/DL
BUN SERPL-MCNC: 22 MG/DL (ref 7–18)
CALCIUM SERPL-MCNC: 8.8 MG/DL (ref 8.5–10.1)
CHLORIDE SERPL-SCNC: 108 MMOL/L (ref 98–107)
CO2 SERPL-SCNC: 24 MMOL/L (ref 21–32)
COLOR UR: NORMAL
CREAT SERPL-MCNC: 1.5 MG/DL (ref 0.6–1.3)
CREAT UR-MCNC: 173 MG/DL
EOSINOPHIL # BLD AUTO: 0.2 X10(3)/MCL (ref 0–0.9)
EOSINOPHIL NFR BLD AUTO: 3 %
ERYTHROCYTE [DISTWIDTH] IN BLOOD BY AUTOMATED COUNT: 13.5 % (ref 11.5–14.5)
EST. AVERAGE GLUCOSE BLD GHB EST-MCNC: 126 MG/DL
GLOBULIN SER-MCNC: 3.9 GM/ML (ref 2.3–3.5)
GLUCOSE (UA): NEGATIVE
GLUCOSE SERPL-MCNC: 99 MG/DL (ref 74–106)
HBA1C MFR BLD: 6 % (ref 4.2–6.3)
HCT VFR BLD AUTO: 42.5 % (ref 40–51)
HGB BLD-MCNC: 14.1 GM/DL (ref 13.5–17.5)
HGB UR QL STRIP: NEGATIVE
HYALINE CASTS #/AREA URNS LPF: ABNORMAL /LPF
IMM GRANULOCYTES # BLD AUTO: 0.02 10*3/UL
IMM GRANULOCYTES NFR BLD AUTO: 0 %
KETONES UR QL STRIP: NEGATIVE
LEUKOCYTE ESTERASE UR QL STRIP: NEGATIVE
LYMPHOCYTES # BLD AUTO: 3 X10(3)/MCL (ref 0.6–4.6)
LYMPHOCYTES NFR BLD AUTO: 39 %
MCH RBC QN AUTO: 32.8 PG (ref 26–34)
MCHC RBC AUTO-ENTMCNC: 33.2 GM/DL (ref 31–37)
MCV RBC AUTO: 98.8 FL (ref 80–100)
MICROALBUMIN UR-MCNC: 16.3 MG/L (ref 0–19)
MICROALBUMIN/CREAT RATIO PNL UR: 9.4 MCG/MG CR (ref 0–29)
MONOCYTES # BLD AUTO: 0.6 X10(3)/MCL (ref 0.1–1.3)
MONOCYTES NFR BLD AUTO: 8 %
NEUTROPHILS # BLD AUTO: 3.78 X10(3)/MCL (ref 2.1–9.2)
NEUTROPHILS NFR BLD AUTO: 49 %
NITRITE UR QL STRIP: NEGATIVE
PH UR STRIP: 5 [PH] (ref 4.5–8)
PLATELET # BLD AUTO: 290 X10(3)/MCL (ref 130–400)
PMV BLD AUTO: 10.6 FL (ref 7.4–10.4)
POTASSIUM SERPL-SCNC: 3.7 MMOL/L (ref 3.5–5.1)
PROT SERPL-MCNC: 8.1 GM/DL (ref 6.4–8.2)
PROT UR QL STRIP: NEGATIVE
RBC # BLD AUTO: 4.3 X10(6)/MCL (ref 4.5–5.9)
RBC #/AREA URNS AUTO: ABNORMAL /HPF
SODIUM SERPL-SCNC: 140 MMOL/L (ref 136–145)
SP GR UR STRIP: 1.02 (ref 1–1.03)
SQUAMOUS #/AREA URNS LPF: ABNORMAL /LPF
UROBILINOGEN UR STRIP-ACNC: NORMAL
WBC # SPEC AUTO: 7.7 X10(3)/MCL (ref 4.5–11)
WBC #/AREA URNS AUTO: ABNORMAL /HPF

## 2020-09-02 ENCOUNTER — HISTORICAL (OUTPATIENT)
Dept: RESPIRATORY THERAPY | Facility: HOSPITAL | Age: 60
End: 2020-09-02

## 2020-09-22 ENCOUNTER — HISTORICAL (OUTPATIENT)
Dept: ADMINISTRATIVE | Facility: HOSPITAL | Age: 60
End: 2020-09-22

## 2020-09-22 LAB — MAGNESIUM SERPL-MCNC: 2.4 MG/DL (ref 1.6–2.6)

## 2020-10-13 ENCOUNTER — HOSPITAL ENCOUNTER (OUTPATIENT)
Dept: MEDSURG UNIT | Facility: HOSPITAL | Age: 60
End: 2020-10-15
Attending: INTERNAL MEDICINE | Admitting: INTERNAL MEDICINE

## 2020-10-13 LAB
ABS NEUT (OLG): 7.72 X10(3)/MCL (ref 2.1–9.2)
ALBUMIN SERPL-MCNC: 4.1 GM/DL (ref 3.4–5)
ALBUMIN/GLOB SERPL: 1.1 RATIO (ref 1.1–2)
ALP SERPL-CCNC: 149 UNIT/L (ref 45–117)
ALT SERPL-CCNC: 25 UNIT/L (ref 12–78)
AST SERPL-CCNC: 9 UNIT/L (ref 15–37)
BASOPHILS # BLD AUTO: 0 X10(3)/MCL (ref 0–0.2)
BASOPHILS NFR BLD AUTO: 0 %
BILIRUB SERPL-MCNC: 0.5 MG/DL (ref 0.2–1)
BILIRUBIN DIRECT+TOT PNL SERPL-MCNC: 0.1 MG/DL (ref 0–0.2)
BILIRUBIN DIRECT+TOT PNL SERPL-MCNC: 0.4 MG/DL
BUN SERPL-MCNC: 17 MG/DL (ref 7–18)
CALCIUM SERPL-MCNC: 8.8 MG/DL (ref 8.5–10.1)
CHLORIDE SERPL-SCNC: 111 MMOL/L (ref 98–107)
CK MB SERPL-MCNC: 1.5 NG/ML (ref 1–3.6)
CK SERPL-CCNC: 81 UNIT/L (ref 39–308)
CO2 SERPL-SCNC: 28 MMOL/L (ref 21–32)
CREAT SERPL-MCNC: 1.2 MG/DL (ref 0.6–1.3)
EOSINOPHIL # BLD AUTO: 0.2 X10(3)/MCL (ref 0–0.9)
EOSINOPHIL NFR BLD AUTO: 3 %
ERYTHROCYTE [DISTWIDTH] IN BLOOD BY AUTOMATED COUNT: 13.6 % (ref 11.5–14.5)
GLOBULIN SER-MCNC: 3.9 GM/ML (ref 2.3–3.5)
GLUCOSE SERPL-MCNC: 104 MG/DL (ref 74–106)
HCO3 UR-SCNC: 22.2 MMOL/L (ref 22–26)
HCO3 UR-SCNC: 24.9 MMOL/L (ref 22–26)
HCT VFR BLD AUTO: 43.8 % (ref 40–51)
HGB BLD-MCNC: 14.2 GM/DL (ref 13.5–17.5)
IMM GRANULOCYTES # BLD AUTO: 0.02 10*3/UL
IMM GRANULOCYTES NFR BLD AUTO: 0 %
LYMPHOCYTES # BLD AUTO: 1.2 X10(3)/MCL (ref 0.6–4.6)
LYMPHOCYTES NFR BLD AUTO: 12 %
MCH RBC QN AUTO: 32.7 PG (ref 26–34)
MCHC RBC AUTO-ENTMCNC: 32.4 GM/DL (ref 31–37)
MCV RBC AUTO: 100.9 FL (ref 80–100)
MONOCYTES # BLD AUTO: 0.6 X10(3)/MCL (ref 0.1–1.3)
MONOCYTES NFR BLD AUTO: 6 %
NEUTROPHILS # BLD AUTO: 7.72 X10(3)/MCL (ref 2.1–9.2)
NEUTROPHILS NFR BLD AUTO: 79 %
O2 HGB ARTERIAL: 90.3 % (ref 94–100)
O2 HGB ARTERIAL: 90.3 % (ref 94–100)
PCO2 BLDA: 35 MMHG (ref 35–45)
PCO2 BLDA: 44 MMHG (ref 35–45)
PH SMN: 7.36 [PH] (ref 7.35–7.45)
PH SMN: 7.41 [PH] (ref 7.35–7.45)
PLATELET # BLD AUTO: 248 X10(3)/MCL (ref 130–400)
PMV BLD AUTO: 10.7 FL (ref 7.4–10.4)
PO2 BLDA: 60 MMHG (ref 80–100)
PO2 BLDA: 61 MMHG (ref 80–100)
POC ALLENS TEST: POSITIVE
POC ALLENS TEST: POSITIVE
POC BE: -0.8 (ref -2–2)
POC BE: -1.8 (ref -2–2)
POC CO HGB: 2 %
POC CO HGB: 2.3 %
POC CO2: 23.3 MMOL/L (ref 22–26)
POC CO2: 26.3 MMOL/L (ref 22–26)
POC MET HGB: 0.2 % (ref 0.4–1.5)
POC MET HGB: 0.9 % (ref 0.4–1.5)
POC SAMPLESOURCE: ABNORMAL
POC SAMPLESOURCE: ABNORMAL
POC SATURATED O2: 92.7 %
POC SATURATED O2: 93 %
POC SITE: ABNORMAL
POC SITE: ABNORMAL
POC THB: 14.6 GM/DL (ref 12–16)
POC THB: 15.3 GM/DL (ref 12–16)
POC TREATMENT: ABNORMAL
POC TREATMENT: ABNORMAL
POTASSIUM SERPL-SCNC: 4 MMOL/L (ref 3.5–5.1)
PROT SERPL-MCNC: 8 GM/DL (ref 6.4–8.2)
RBC # BLD AUTO: 4.34 X10(6)/MCL (ref 4.5–5.9)
SARS-COV-2 AG RESP QL IA.RAPID: NEGATIVE
SETTING 1 ABG: ABNORMAL
SETTING 1 ABG: ABNORMAL
SODIUM SERPL-SCNC: 142 MMOL/L (ref 136–145)
TROPONIN I SERPL-MCNC: <0.015 NG/ML (ref 0–0.05)
WBC # SPEC AUTO: 9.8 X10(3)/MCL (ref 4.5–11)

## 2020-10-14 LAB
ABS NEUT (OLG): 12.01 X10(3)/MCL (ref 2.1–9.2)
ALBUMIN SERPL-MCNC: 3.7 GM/DL (ref 3.4–5)
ALBUMIN/GLOB SERPL: 1 RATIO (ref 1.1–2)
ALP SERPL-CCNC: 121 UNIT/L (ref 45–117)
ALT SERPL-CCNC: 19 UNIT/L (ref 12–78)
ANISOCYTOSIS BLD QL SMEAR: ABNORMAL
AST SERPL-CCNC: 7 UNIT/L (ref 15–37)
BASOPHILS NFR BLD MANUAL: 0 %
BILIRUB SERPL-MCNC: 0.4 MG/DL (ref 0.2–1)
BILIRUBIN DIRECT+TOT PNL SERPL-MCNC: 0.2 MG/DL
BILIRUBIN DIRECT+TOT PNL SERPL-MCNC: 0.2 MG/DL (ref 0–0.2)
BUN SERPL-MCNC: 18 MG/DL (ref 7–18)
CALCIUM SERPL-MCNC: 8.9 MG/DL (ref 8.5–10.1)
CHLORIDE SERPL-SCNC: 110 MMOL/L (ref 98–107)
CHOLEST SERPL-MCNC: 145 MG/DL
CHOLEST/HDLC SERPL: 2.5 {RATIO} (ref 0–5)
CO2 SERPL-SCNC: 25 MMOL/L (ref 21–32)
CREAT SERPL-MCNC: 1.1 MG/DL (ref 0.6–1.3)
EOSINOPHIL NFR BLD MANUAL: 0 %
ERYTHROCYTE [DISTWIDTH] IN BLOOD BY AUTOMATED COUNT: 13.5 % (ref 11.5–14.5)
EST. AVERAGE GLUCOSE BLD GHB EST-MCNC: 123 MG/DL
GLOBULIN SER-MCNC: 3.7 GM/ML (ref 2.3–3.5)
GLUCOSE SERPL-MCNC: 149 MG/DL (ref 74–106)
GRANULOCYTES NFR BLD MANUAL: 94 % (ref 43–75)
HBA1C MFR BLD: 5.9 % (ref 4.2–6.3)
HCT VFR BLD AUTO: 39.5 % (ref 40–51)
HDLC SERPL-MCNC: 57 MG/DL (ref 40–59)
HGB BLD-MCNC: 13.2 GM/DL (ref 13.5–17.5)
LDLC SERPL CALC-MCNC: 82 MG/DL
LYMPHOCYTES NFR BLD MANUAL: 4 % (ref 20.5–51.1)
MCH RBC QN AUTO: 33.3 PG (ref 26–34)
MCHC RBC AUTO-ENTMCNC: 33.4 GM/DL (ref 31–37)
MCV RBC AUTO: 99.7 FL (ref 80–100)
MONOCYTES NFR BLD MANUAL: 1 % (ref 2–9)
NEUTS BAND NFR BLD MANUAL: 1 % (ref 0–10)
PLATELET # BLD AUTO: 241 X10(3)/MCL (ref 130–400)
PLATELET # BLD EST: NORMAL 10*3/UL
PMV BLD AUTO: 11 FL (ref 7.4–10.4)
POLYCHROMASIA BLD QL SMEAR: ABNORMAL
POTASSIUM SERPL-SCNC: 3.9 MMOL/L (ref 3.5–5.1)
PROT SERPL-MCNC: 7.4 GM/DL (ref 6.4–8.2)
RBC # BLD AUTO: 3.96 X10(6)/MCL (ref 4.5–5.9)
RBC MORPH BLD: ABNORMAL
SODIUM SERPL-SCNC: 141 MMOL/L (ref 136–145)
TRIGL SERPL-MCNC: 32 MG/DL
VLDLC SERPL CALC-MCNC: 6 MG/DL
WBC # SPEC AUTO: 13.1 X10(3)/MCL (ref 4.5–11)

## 2020-10-15 LAB
ABS NEUT (OLG): 15.11 X10(3)/MCL (ref 2.1–9.2)
ALBUMIN SERPL-MCNC: 3.4 GM/DL (ref 3.4–5)
ALBUMIN/GLOB SERPL: 1 RATIO (ref 1.1–2)
ALP SERPL-CCNC: 126 UNIT/L (ref 45–117)
ALT SERPL-CCNC: 19 UNIT/L (ref 12–78)
AST SERPL-CCNC: 13 UNIT/L (ref 15–37)
BASOPHILS # BLD AUTO: 0 X10(3)/MCL (ref 0–0.2)
BASOPHILS NFR BLD AUTO: 0 %
BILIRUB SERPL-MCNC: 0.2 MG/DL (ref 0.2–1)
BILIRUBIN DIRECT+TOT PNL SERPL-MCNC: 0.1 MG/DL
BILIRUBIN DIRECT+TOT PNL SERPL-MCNC: 0.1 MG/DL (ref 0–0.2)
BUN SERPL-MCNC: 26 MG/DL (ref 7–18)
CALCIUM SERPL-MCNC: 9 MG/DL (ref 8.5–10.1)
CHLORIDE SERPL-SCNC: 110 MMOL/L (ref 98–107)
CO2 SERPL-SCNC: 27 MMOL/L (ref 21–32)
CREAT SERPL-MCNC: 1.1 MG/DL (ref 0.6–1.3)
ERYTHROCYTE [DISTWIDTH] IN BLOOD BY AUTOMATED COUNT: 14 % (ref 11.5–14.5)
GLOBULIN SER-MCNC: 3.5 GM/ML (ref 2.3–3.5)
GLUCOSE SERPL-MCNC: 140 MG/DL (ref 74–106)
HCT VFR BLD AUTO: 37.5 % (ref 40–51)
HGB BLD-MCNC: 12.2 GM/DL (ref 13.5–17.5)
IMM GRANULOCYTES # BLD AUTO: 0.12 10*3/UL
IMM GRANULOCYTES NFR BLD AUTO: 1 %
LYMPHOCYTES # BLD AUTO: 0.7 X10(3)/MCL (ref 0.6–4.6)
LYMPHOCYTES NFR BLD AUTO: 4 %
MCH RBC QN AUTO: 33.2 PG (ref 26–34)
MCHC RBC AUTO-ENTMCNC: 32.5 GM/DL (ref 31–37)
MCV RBC AUTO: 102.2 FL (ref 80–100)
MONOCYTES # BLD AUTO: 0.3 X10(3)/MCL (ref 0.1–1.3)
MONOCYTES NFR BLD AUTO: 2 %
NEUTROPHILS # BLD AUTO: 15.11 X10(3)/MCL (ref 2.1–9.2)
NEUTROPHILS NFR BLD AUTO: 93 %
PLATELET # BLD AUTO: 239 X10(3)/MCL (ref 130–400)
PMV BLD AUTO: 10.9 FL (ref 7.4–10.4)
POTASSIUM SERPL-SCNC: 4.5 MMOL/L (ref 3.5–5.1)
PROT SERPL-MCNC: 6.9 GM/DL (ref 6.4–8.2)
RBC # BLD AUTO: 3.67 X10(6)/MCL (ref 4.5–5.9)
SODIUM SERPL-SCNC: 142 MMOL/L (ref 136–145)
WBC # SPEC AUTO: 16.3 X10(3)/MCL (ref 4.5–11)

## 2020-11-25 ENCOUNTER — HISTORICAL (OUTPATIENT)
Dept: RADIOLOGY | Facility: HOSPITAL | Age: 60
End: 2020-11-25

## 2020-12-02 ENCOUNTER — HISTORICAL (OUTPATIENT)
Dept: INTERNAL MEDICINE | Facility: CLINIC | Age: 60
End: 2020-12-02

## 2020-12-02 LAB
ABS NEUT (OLG): 3.26 X10(3)/MCL (ref 2.1–9.2)
ALBUMIN SERPL-MCNC: 4.4 GM/DL (ref 3.4–4.8)
ALBUMIN/GLOB SERPL: 1.4 RATIO (ref 1.1–2)
ALP SERPL-CCNC: 106 UNIT/L (ref 40–150)
ALT SERPL-CCNC: 15 UNIT/L (ref 0–55)
AST SERPL-CCNC: 15 UNIT/L (ref 5–34)
BASOPHILS # BLD AUTO: 0 X10(3)/MCL (ref 0–0.2)
BASOPHILS NFR BLD AUTO: 1 %
BILIRUB SERPL-MCNC: 0.7 MG/DL
BILIRUBIN DIRECT+TOT PNL SERPL-MCNC: 0.2 MG/DL (ref 0–0.5)
BILIRUBIN DIRECT+TOT PNL SERPL-MCNC: 0.5 MG/DL (ref 0–0.8)
BUN SERPL-MCNC: 21 MG/DL (ref 8.4–25.7)
CALCIUM SERPL-MCNC: 9.3 MG/DL (ref 8.8–10)
CHLORIDE SERPL-SCNC: 104 MMOL/L (ref 98–107)
CO2 SERPL-SCNC: 27 MMOL/L (ref 23–31)
CREAT SERPL-MCNC: 1.32 MG/DL (ref 0.73–1.18)
EOSINOPHIL # BLD AUTO: 0.3 X10(3)/MCL (ref 0–0.9)
EOSINOPHIL NFR BLD AUTO: 4 %
ERYTHROCYTE [DISTWIDTH] IN BLOOD BY AUTOMATED COUNT: 13.1 % (ref 11.5–14.5)
GLOBULIN SER-MCNC: 3.1 GM/DL (ref 2.4–3.5)
GLUCOSE SERPL-MCNC: 113 MG/DL (ref 82–115)
HCT VFR BLD AUTO: 43.9 % (ref 40–51)
HGB BLD-MCNC: 14.4 GM/DL (ref 13.5–17.5)
IMM GRANULOCYTES # BLD AUTO: 0.01 10*3/UL
IMM GRANULOCYTES NFR BLD AUTO: 0 %
LYMPHOCYTES # BLD AUTO: 2 X10(3)/MCL (ref 0.6–4.6)
LYMPHOCYTES NFR BLD AUTO: 32 %
MCH RBC QN AUTO: 32.7 PG (ref 26–34)
MCHC RBC AUTO-ENTMCNC: 32.8 GM/DL (ref 31–37)
MCV RBC AUTO: 99.8 FL (ref 80–100)
MONOCYTES # BLD AUTO: 0.6 X10(3)/MCL (ref 0.1–1.3)
MONOCYTES NFR BLD AUTO: 10 %
NEUTROPHILS # BLD AUTO: 3.26 X10(3)/MCL (ref 2.1–9.2)
NEUTROPHILS NFR BLD AUTO: 52 %
PLATELET # BLD AUTO: 280 X10(3)/MCL (ref 130–400)
PMV BLD AUTO: 10.7 FL (ref 7.4–10.4)
POTASSIUM SERPL-SCNC: 4 MMOL/L (ref 3.5–5.1)
PROT SERPL-MCNC: 7.5 GM/DL (ref 5.8–7.6)
RBC # BLD AUTO: 4.4 X10(6)/MCL (ref 4.5–5.9)
SODIUM SERPL-SCNC: 139 MMOL/L (ref 136–145)
WBC # SPEC AUTO: 6.2 X10(3)/MCL (ref 4.5–11)

## 2021-03-02 ENCOUNTER — HISTORICAL (OUTPATIENT)
Dept: RADIOLOGY | Facility: HOSPITAL | Age: 61
End: 2021-03-02

## 2021-03-02 LAB
ABS NEUT (OLG): 4.41 X10(3)/MCL (ref 2.1–9.2)
ALBUMIN SERPL-MCNC: 4.4 GM/DL (ref 3.4–4.8)
ALBUMIN/GLOB SERPL: 1.4 RATIO (ref 1.1–2)
ALP SERPL-CCNC: 131 UNIT/L (ref 40–150)
ALT SERPL-CCNC: 16 UNIT/L (ref 0–55)
AST SERPL-CCNC: 16 UNIT/L (ref 5–34)
BASOPHILS # BLD AUTO: 0 X10(3)/MCL (ref 0–0.2)
BASOPHILS NFR BLD AUTO: 1 %
BILIRUB SERPL-MCNC: 0.6 MG/DL
BILIRUBIN DIRECT+TOT PNL SERPL-MCNC: 0.3 MG/DL (ref 0–0.5)
BILIRUBIN DIRECT+TOT PNL SERPL-MCNC: 0.3 MG/DL (ref 0–0.8)
BUN SERPL-MCNC: 22.9 MG/DL (ref 8.4–25.7)
BUN SERPL-MCNC: 23.5 MG/DL (ref 8.4–25.7)
CALCIUM SERPL-MCNC: 9.4 MG/DL (ref 8.8–10)
CALCIUM SERPL-MCNC: 9.4 MG/DL (ref 8.8–10)
CHLORIDE SERPL-SCNC: 106 MMOL/L (ref 98–107)
CHLORIDE SERPL-SCNC: 107 MMOL/L (ref 98–107)
CHOLEST SERPL-MCNC: 166 MG/DL
CHOLEST/HDLC SERPL: 3 {RATIO} (ref 0–5)
CO2 SERPL-SCNC: 27 MMOL/L (ref 23–31)
CO2 SERPL-SCNC: 27 MMOL/L (ref 23–31)
CREAT SERPL-MCNC: 1.94 MG/DL (ref 0.73–1.18)
CREAT SERPL-MCNC: 1.95 MG/DL (ref 0.73–1.18)
CREAT/UREA NIT SERPL: 12
EOSINOPHIL # BLD AUTO: 0.2 X10(3)/MCL (ref 0–0.9)
EOSINOPHIL NFR BLD AUTO: 3 %
ERYTHROCYTE [DISTWIDTH] IN BLOOD BY AUTOMATED COUNT: 13.4 % (ref 11.5–14.5)
EST. AVERAGE GLUCOSE BLD GHB EST-MCNC: 108.3 MG/DL
GLOBULIN SER-MCNC: 3.2 GM/DL (ref 2.4–3.5)
GLUCOSE SERPL-MCNC: 117 MG/DL (ref 82–115)
GLUCOSE SERPL-MCNC: 117 MG/DL (ref 82–115)
HBA1C MFR BLD: 5.4 %
HCT VFR BLD AUTO: 45.7 % (ref 40–51)
HDLC SERPL-MCNC: 48 MG/DL (ref 35–60)
HGB BLD-MCNC: 15.1 GM/DL (ref 13.5–17.5)
IMM GRANULOCYTES # BLD AUTO: 0.02 10*3/UL
IMM GRANULOCYTES NFR BLD AUTO: 0 %
LDLC SERPL CALC-MCNC: 96 MG/DL (ref 50–140)
LYMPHOCYTES # BLD AUTO: 2.1 X10(3)/MCL (ref 0.6–4.6)
LYMPHOCYTES NFR BLD AUTO: 28 %
MCH RBC QN AUTO: 32.9 PG (ref 26–34)
MCHC RBC AUTO-ENTMCNC: 33 GM/DL (ref 31–37)
MCV RBC AUTO: 99.6 FL (ref 80–100)
MONOCYTES # BLD AUTO: 0.6 X10(3)/MCL (ref 0.1–1.3)
MONOCYTES NFR BLD AUTO: 8 %
NEUTROPHILS # BLD AUTO: 4.41 X10(3)/MCL (ref 2.1–9.2)
NEUTROPHILS NFR BLD AUTO: 60 %
PLATELET # BLD AUTO: 284 X10(3)/MCL (ref 130–400)
PMV BLD AUTO: 10.3 FL (ref 7.4–10.4)
POTASSIUM SERPL-SCNC: 4.8 MMOL/L (ref 3.5–5.1)
POTASSIUM SERPL-SCNC: 4.8 MMOL/L (ref 3.5–5.1)
PROT SERPL-MCNC: 7.6 GM/DL (ref 5.8–7.6)
RBC # BLD AUTO: 4.59 X10(6)/MCL (ref 4.5–5.9)
SODIUM SERPL-SCNC: 143 MMOL/L (ref 136–145)
SODIUM SERPL-SCNC: 144 MMOL/L (ref 136–145)
T4 FREE SERPL-MCNC: 1.16 NG/DL (ref 0.7–1.48)
TRIGL SERPL-MCNC: 109 MG/DL (ref 34–140)
TSH SERPL-ACNC: 2.27 UIU/ML (ref 0.35–4.94)
VLDLC SERPL CALC-MCNC: 22 MG/DL
WBC # SPEC AUTO: 7.3 X10(3)/MCL (ref 4.5–11)

## 2021-05-10 ENCOUNTER — HISTORICAL (OUTPATIENT)
Dept: NEPHROLOGY | Facility: CLINIC | Age: 61
End: 2021-05-10

## 2021-05-10 LAB
APPEARANCE, UA: CLEAR
BACTERIA #/AREA URNS AUTO: ABNORMAL /HPF
BILIRUB UR QL STRIP: NEGATIVE
COLOR UR: YELLOW
CREAT UR-MCNC: 176.5 MG/DL (ref 58–161)
DEPRECATED CALCIDIOL+CALCIFEROL SERPL-MC: 22.6 NG/ML (ref 30–80)
GLUCOSE (UA): NEGATIVE
HGB UR QL STRIP: NEGATIVE
HYALINE CASTS #/AREA URNS LPF: ABNORMAL /LPF
KETONES UR QL STRIP: NEGATIVE
LEUKOCYTE ESTERASE UR QL STRIP: NEGATIVE
NITRITE UR QL STRIP: NEGATIVE
PH UR STRIP: 5.5 [PH] (ref 4.5–8)
PROT UR QL STRIP: NEGATIVE
PROT UR STRIP-MCNC: 7.5 MG/DL
PROT/CREAT UR-RTO: 42.5 MG/GM CR
RBC #/AREA URNS AUTO: ABNORMAL /HPF
SP GR UR STRIP: 1.02 (ref 1–1.03)
SQUAMOUS #/AREA URNS LPF: ABNORMAL /LPF
URATE SERPL-MCNC: 9.2 MG/DL (ref 3.5–7.2)
UROBILINOGEN UR STRIP-ACNC: 2 MG/DL
WBC #/AREA URNS AUTO: ABNORMAL /HPF

## 2021-05-17 ENCOUNTER — HISTORICAL (OUTPATIENT)
Dept: ADMINISTRATIVE | Facility: HOSPITAL | Age: 61
End: 2021-05-17

## 2021-05-17 LAB
ALBUMIN SERPL-MCNC: 4.4 GM/DL (ref 3.4–4.8)
ALBUMIN/GLOB SERPL: 1.3 RATIO (ref 1.1–2)
ALP SERPL-CCNC: 146 UNIT/L (ref 40–150)
ALT SERPL-CCNC: 14 UNIT/L (ref 0–55)
AST SERPL-CCNC: 15 UNIT/L (ref 5–34)
BILIRUB SERPL-MCNC: 0.5 MG/DL
BILIRUBIN DIRECT+TOT PNL SERPL-MCNC: 0.2 MG/DL (ref 0–0.8)
BILIRUBIN DIRECT+TOT PNL SERPL-MCNC: 0.3 MG/DL (ref 0–0.5)
BUN SERPL-MCNC: 15.5 MG/DL (ref 8.4–25.7)
CALCIUM SERPL-MCNC: 9.7 MG/DL (ref 8.8–10)
CHLORIDE SERPL-SCNC: 105 MMOL/L (ref 98–107)
CO2 SERPL-SCNC: 27 MMOL/L (ref 23–31)
CREAT SERPL-MCNC: 1.45 MG/DL (ref 0.73–1.18)
GLOBULIN SER-MCNC: 3.4 GM/DL (ref 2.4–3.5)
GLUCOSE SERPL-MCNC: 87 MG/DL (ref 82–115)
POTASSIUM SERPL-SCNC: 3.9 MMOL/L (ref 3.5–5.1)
PROT SERPL-MCNC: 7.8 GM/DL (ref 5.8–7.6)
SODIUM SERPL-SCNC: 141 MMOL/L (ref 136–145)
URATE SERPL-MCNC: 8.2 MG/DL (ref 3.5–7.2)

## 2021-06-11 ENCOUNTER — HISTORICAL (OUTPATIENT)
Dept: RADIOLOGY | Facility: HOSPITAL | Age: 61
End: 2021-06-11

## 2021-08-25 ENCOUNTER — HISTORICAL (OUTPATIENT)
Dept: NEPHROLOGY | Facility: CLINIC | Age: 61
End: 2021-08-25

## 2021-08-25 LAB
ALBUMIN SERPL-MCNC: 4.3 GM/DL (ref 3.4–4.8)
ALBUMIN/GLOB SERPL: 1.3 RATIO (ref 1.1–2)
ALP SERPL-CCNC: 119 UNIT/L (ref 40–150)
ALT SERPL-CCNC: 17 UNIT/L (ref 0–55)
APPEARANCE, UA: CLEAR
AST SERPL-CCNC: 19 UNIT/L (ref 5–34)
BACTERIA #/AREA URNS AUTO: ABNORMAL /HPF
BILIRUB SERPL-MCNC: 0.4 MG/DL
BILIRUB UR QL STRIP: NEGATIVE
BILIRUBIN DIRECT+TOT PNL SERPL-MCNC: 0.2 MG/DL (ref 0–0.5)
BILIRUBIN DIRECT+TOT PNL SERPL-MCNC: 0.2 MG/DL (ref 0–0.8)
BUN SERPL-MCNC: 10.7 MG/DL (ref 8.4–25.7)
CALCIUM SERPL-MCNC: 9.7 MG/DL (ref 8.8–10)
CHLORIDE SERPL-SCNC: 108 MMOL/L (ref 98–107)
CO2 SERPL-SCNC: 23 MMOL/L (ref 23–31)
COLOR UR: NORMAL
CREAT SERPL-MCNC: 1.45 MG/DL (ref 0.73–1.18)
CREAT UR-MCNC: 130.9 MG/DL (ref 58–161)
CREAT UR-MCNC: 130.9 MG/DL (ref 58–161)
EST. AVERAGE GLUCOSE BLD GHB EST-MCNC: 99.7 MG/DL
GLOBULIN SER-MCNC: 3.3 GM/DL (ref 2.4–3.5)
GLUCOSE (UA): NEGATIVE
GLUCOSE SERPL-MCNC: 84 MG/DL (ref 82–115)
HBA1C MFR BLD: 5.1 %
HGB UR QL STRIP: NEGATIVE
HYALINE CASTS #/AREA URNS LPF: ABNORMAL /LPF
KETONES UR QL STRIP: NEGATIVE
LEUKOCYTE ESTERASE UR QL STRIP: NEGATIVE
MICROALBUMIN UR-MCNC: 9 MG/L
MICROALBUMIN/CREAT RATIO PNL UR: 6.9 MG/GM CR (ref 0–30)
NITRITE UR QL STRIP: NEGATIVE
PH UR STRIP: 5 [PH] (ref 4.5–8)
POTASSIUM SERPL-SCNC: 3.8 MMOL/L (ref 3.5–5.1)
PROT SERPL-MCNC: 7.6 GM/DL (ref 5.8–7.6)
PROT UR QL STRIP: NEGATIVE
PROT UR STRIP-MCNC: <6.8 MG/DL
PROT/CREAT UR-RTO: <51.9 MG/GM CR
PSA SERPL-MCNC: 1.87 NG/ML
RBC #/AREA URNS AUTO: ABNORMAL /HPF
SODIUM SERPL-SCNC: 143 MMOL/L (ref 136–145)
SP GR UR STRIP: 1.01 (ref 1–1.03)
SQUAMOUS #/AREA URNS LPF: ABNORMAL /LPF
UROBILINOGEN UR STRIP-ACNC: NORMAL
WBC #/AREA URNS AUTO: ABNORMAL /HPF

## 2021-10-01 ENCOUNTER — HISTORICAL (OUTPATIENT)
Dept: RADIOLOGY | Facility: HOSPITAL | Age: 61
End: 2021-10-01

## 2021-12-08 ENCOUNTER — HISTORICAL (OUTPATIENT)
Dept: RADIOLOGY | Facility: HOSPITAL | Age: 61
End: 2021-12-08

## 2022-03-02 ENCOUNTER — HISTORICAL (OUTPATIENT)
Dept: INTERNAL MEDICINE | Facility: CLINIC | Age: 62
End: 2022-03-02

## 2022-03-02 LAB
BUN SERPL-MCNC: 17.8 MG/DL (ref 8.4–25.7)
CALCIUM SERPL-MCNC: 9 MG/DL (ref 8.7–10.5)
CHLORIDE SERPL-SCNC: 109 MMOL/L (ref 98–107)
CO2 SERPL-SCNC: 23 MMOL/L (ref 23–31)
CREAT SERPL-MCNC: 1.33 MG/DL (ref 0.73–1.18)
CREAT/UREA NIT SERPL: 13
DEPRECATED CALCIDIOL+CALCIFEROL SERPL-MC: 29.7 NG/ML (ref 30–80)
EST. AVERAGE GLUCOSE BLD GHB EST-MCNC: 102.5 MG/DL
GLUCOSE SERPL-MCNC: 118 MG/DL (ref 82–115)
HBA1C MFR BLD: 5.2 %
HEMOLYSIS INTERF INDEX SERPL-ACNC: 6
ICTERIC INTERF INDEX SERPL-ACNC: 0
LIPEMIC INTERF INDEX SERPL-ACNC: 10
POTASSIUM SERPL-SCNC: 3.7 MMOL/L (ref 3.5–5.1)
SODIUM SERPL-SCNC: 140 MMOL/L (ref 136–145)

## 2022-03-08 LAB
LEFT EYE DM RETINOPATHY: NEGATIVE
RIGHT EYE DM RETINOPATHY: NEGATIVE

## 2022-04-10 ENCOUNTER — HISTORICAL (OUTPATIENT)
Dept: ADMINISTRATIVE | Facility: HOSPITAL | Age: 62
End: 2022-04-10
Payer: MEDICAID

## 2022-04-12 ENCOUNTER — HISTORICAL (OUTPATIENT)
Dept: WOUND CARE | Facility: HOSPITAL | Age: 62
End: 2022-04-12

## 2022-04-12 LAB
BUN SERPL-MCNC: 16.8 MG/DL (ref 8.4–25.7)
CALCIUM SERPL-MCNC: 9.6 MG/DL (ref 8.7–10.5)
CHLORIDE SERPL-SCNC: 105 MMOL/L (ref 98–107)
CO2 SERPL-SCNC: 27 MMOL/L (ref 23–31)
CREAT SERPL-MCNC: 1.62 MG/DL (ref 0.73–1.18)
CREAT/UREA NIT SERPL: 10
GLUCOSE SERPL-MCNC: 91 MG/DL (ref 82–115)
HEMOLYSIS INTERF INDEX SERPL-ACNC: 4
HEMOLYSIS INTERF INDEX SERPL-ACNC: 4
ICTERIC INTERF INDEX SERPL-ACNC: 0
LIPEMIC INTERF INDEX SERPL-ACNC: 20
MAGNESIUM SERPL-MCNC: 2.3 MG/DL (ref 1.6–2.6)
POTASSIUM SERPL-SCNC: 3.8 MMOL/L (ref 3.5–5.1)
SODIUM SERPL-SCNC: 140 MMOL/L (ref 136–145)

## 2022-04-25 VITALS
BODY MASS INDEX: 28.22 KG/M2 | DIASTOLIC BLOOD PRESSURE: 76 MMHG | HEIGHT: 73 IN | OXYGEN SATURATION: 100 % | WEIGHT: 212.94 LBS | SYSTOLIC BLOOD PRESSURE: 105 MMHG

## 2022-04-28 ENCOUNTER — HISTORICAL (OUTPATIENT)
Dept: RADIOLOGY | Facility: HOSPITAL | Age: 62
End: 2022-04-28
Payer: MEDICAID

## 2022-04-28 ENCOUNTER — HISTORICAL (OUTPATIENT)
Dept: ADMINISTRATIVE | Facility: HOSPITAL | Age: 62
End: 2022-04-28
Payer: MEDICAID

## 2022-04-30 NOTE — ED PROVIDER NOTES
Patient:   Ezra Zhang            MRN: [id number]            FIN: [id number]               Age:   59 years     Sex:  Male     :  1960   Associated Diagnoses:   COPD with acute exacerbation; Hypoxemia; Hypertension; Tobacco abuse   Author:   Quita ROSALES, Khari Newman      Basic Information   Time seen: Date & time 10/13/2020 11:22:00.   History source: Patient.   Arrival mode: Private vehicle.   History limitation: None.      History of Present Illness   The patient presents with difficulty breathing.  He has an underlying history of COPD, coronary disease, previous MI, implantable AICD/ pacer.  He arrives by private vehicle, his sister has driven to the ER for worsening dyspnea.  Recent complaints of sinus drip and congestion with clear postnasal drip, now increasing dyspnea at rest despite reported good compliance with home medications, metered-dose inhalers, and nebulizers.  He is not on oxygen at home.  At the time of presentation to triage, room air oxygen saturation 85% with moderate respiratory distress and tight expiratory wheezing, taken straight to patient room and evaluation and treatment begun promptly.  During the initial evaluation, his oxygen saturation is seen to gradually improved to 95% on supplemental O2.  He reports no recent use of steroids and no recent hospitalization for COPD, although he has been hospitalized for COPD in the past. No other complaints..        Review of Systems   Constitutional symptoms:  Negative except as documented in HPI, no fever, no chills, no weakness.    Skin symptoms:  Negative except as documented in HPI, no rash, no breakdown.    Eye symptoms:  Negative except as documented in HPI, no recent vision problems, no pain.    ENMT symptoms:  Sinus pain, Mild sinus pain/ congestion/ clear post nasal drip reported, no ear pain, no sore throat.    Respiratory symptoms:  Shortness of breath, wheezing, See HPI.    Cardiovascular symptoms:  Negative except  as documented in HPI, no chest pain, no palpitations, no syncope.    Gastrointestinal symptoms:  Negative except as documented in HPI, no abdominal pain, no nausea, no vomiting, no diarrhea.    Genitourinary symptoms:  Negative except as documented in HPI, no dysuria, no hematuria.    Musculoskeletal symptoms:  Negative except as documented in HPI, no Muscle pain, no Joint pain.    Neurologic symptoms:  Negative except as documented in HPI, no altered level of consciousness, no weakness.    Psychiatric symptoms:  Negative except as documented in HPI, no anxiety, no depression.    Endocrine symptoms:  Negative except as documented in HPI, no polyuria, no polydipsia.    Hematologic/Lymphatic symptoms:  Negative except as documented in HPI, bleeding tendency negative, bruising tendency negative.    Allergy/immunologic symptoms:  Negative except as documented in HPI, no recurrent infections, no impaired immunity.              Additional review of systems information: All other systems reviewed and otherwise negative, All systems reviewed as documented in chart.      Health Status   Allergies:    Allergic Reactions (Selected)  No Known Medication Allergies,    Allergies (1) Active Reaction  No Known Medication Allergies None Documented  .   Medications:  (Selected)   Prescriptions  Prescribed  Accucheck blood glucose meter kit: Accucheck blood glucose meter kit, See Instructions, Check CBG once daily and keep log., # 1 kit(s), 0 Refill(s), Pharmacy: [facility], 188, cm, Height/Length Dosing, 06/22/20 8:25:00 CDT, 87.6, kg, Weight Dosing, 06/22/20 8:25:00 CDT  Accucheck blood glucose test strips and lancets: Accucheck blood glucose test strips and lancets, See Instructions, Check CBG once daily and keep log., # 100 EA, 11 Refill(s), Pharmacy: [facility], 188, cm, Height/Length Dosing, 06/22/20 8:25:00 CDT, 87.6, kg, Weight Dosing, 06/22/20 8:25:00 CDT...  DuoNeb 0.5 mg-2.5 mg/3 mL inhalation solution: 3 mL,  INH, QID, # 60 EA, 6 Refill(s), Pharmacy: [facility], 188, cm, Height/Length Dosing, 06/22/20 8:25:00 CDT, 87.6, kg, Weight Dosing, 06/22/20 8:25:00 CDT  Eliquis 5 mg oral tablet: 5 mg = 1 tab(s), Oral, BID, # 60 tab(s), 11 Refill(s), Pharmacy: [facility], 188, cm, Height/Length Dosing, 09/22/20 9:46:00 CDT, 91.8, kg, Weight Dosing, 09/22/20 9:46:00 CDT  Flonase 50 mcg/inh nasal spray: 1 spray(s), Nasal, BID, # 16 gm, 6 Refill(s), Pharmacy: [facility], 188, cm, Height/Length Dosing, 09/02/20 7:50:00 CDT, 89, kg, Weight Dosing, 09/02/20 7:50:00 CDT  Nebulizer: Nebulizer, See Instructions, Nebulizer for COPD and SOB., # 1 EA, 0 Refill(s)  Promethazine with Codeine 6.25 mg-10 mg/5 mL oral syrup: 5 mL, Oral, q4hr, PRN PRN for cough, # 240 mL, 1 Refill(s), Pharmacy: [facility], 188, cm, Height/Length Dosing, 09/02/20 7:50:00 CDT, 89, kg, Weight Dosing, 09/02/20 7:50:00 CDT  Rolator: Rolator, See Instructions, For HFrEF with EF of 25%. Shortness of breath., # 1 EA, 0 Refill(s)  Ventolin HFA 90 mcg/inh inhalation aerosol: 1 puff(s), INH, q6hr, PRN PRN for wheezing, # 1 EA, 6 Refill(s), Pharmacy: [facility], 188, cm, Height/Length Dosing, 06/22/20 8:25:00 CDT, 87.6, kg, Weight Dosing, 06/22/20 8:25:00 CDT  Zyrtec 10 mg oral tablet: 10 mg = 1 tab(s), Oral, Daily, # 30 tab(s), 6 Refill(s), Pharmacy: [facility], 188, cm, Height/Length Dosing, 09/02/20 7:50:00 CDT, 89, kg, Weight Dosing, 09/02/20 7:50:00 CDT  acetaminophen/butalbital/caffeine 325 mg-50 mg-40 mg oral tablet: 2 tab(s), Oral, q6hr, PRN PRN as needed, not to exceed 6 tablets/day  not to exceed 4000 mg acetaminophen per day, # 30 tab(s), 0 Refill(s), Pharmacy: [facility]  albuterol-ipratropium 2.5 mg-0.5 mg/3 mL inhalation solution: See Instructions, USE ONE AMPULE IN NEBULIZER THREE TIMES DAILY AS NEEDED FOR SHORTNESS OF BREATH OR WHEEZING, # 180 mL, 3 Refill(s), Pharmacy: [facility], 188, cm, Height/Length  Dosing, 09/02/20 7:50:00 CDT, 89, kg, Weight Dosing, 09/02/20 7:50...  amiodarone 200 mg oral Tab: 200 mg = 1 tab(s), Oral, Daily, # 30 tab(s), 6 Refill(s), Pharmacy: [facility], 188, cm, Height/Length Dosing, 09/22/20 9:46:00 CDT, 91.8, kg, Weight Dosing, 09/22/20 9:46:00 CDT  atorvastatin 10 mg oral tablet: 10 mg = 1 tab(s), Oral, Daily, # 30 tab(s), 6 Refill(s), Pharmacy: [facility], 188, cm, Height/Length Dosing, 09/22/20 9:46:00 CDT, 91.8, kg, Weight Dosing, 09/22/20 9:46:00 CDT  formoterol-mometasone 5 mcg-200 mcg/inh inhalation aerosol: 2 puff(s), INH, BID, # 13 gm, 11 Refill(s), Pharmacy: [facility], 188, cm, Height/Length Dosing, 09/02/20 7:50:00 CDT, 89, kg, Weight Dosing, 09/02/20 7:50:00 CDT  furosemide 20 mg oral tablet: 20 mg = 1 tab(s), Oral, Daily, Increase to 40 mg po daily if wt gain > 2 lbs per day then resume 20 mg dosage daily., # 90 tab(s), 3 Refill(s), Pharmacy: [facility], 188, cm, Height/Length Dosing, 09/22/20 9:46:00 CDT, 91.8, kg, Weight Dosing, 0...  glipiZIDE 5 mg oral tablet: 5 mg = 1 tab(s), Oral, Daily, # 30 tab(s), 11 Refill(s), Pharmacy: [facility]  lisinopril 5 mg oral tablet: 5 mg = 1 tab(s), Oral, Daily, # 30 tab(s), 3 Refill(s), Pharmacy: [facility], 188, cm, Height/Length Dosing, 09/02/20 7:50:00 CDT, 89, kg, Weight Dosing, 09/02/20 7:50:00 CDT  metoprolol succinate 25 mg oral tablet extended release: 25 mg = 1 tab(s), Oral, Daily, # 30 tab(s), 6 Refill(s), Pharmacy: [facility], 188, cm, Height/Length Dosing, 09/22/20 9:46:00 CDT, 91.8, kg, Weight Dosing, 09/22/20 9:46:00 CDT  nitroglycerin 0.4 mg sublingual TAB: See Instructions, PRN chest pain, 1 tab(s) SL q5min 3 dose(s)  If chest pain not relieved see medical attention., # 50 tab(s), 3 Refill(s), Pharmacy: [facility]  Documented Medications  Documented  betamethasone-clotrimazole 0.05%-1% topical cream: 1 [place], TOP, BID  hydroxychloroquine 200 mg oral  tablet: 200 mg = 1 tab(s), Oral, Daily.      Past Medical/ Family/ Social History   Medical history:    Active  Hypertension (28998791): Onset on 1/1/2009 at 48 years.  COPD (09206042): Onset on 1/1/2008 at 47 years.  Diabetes mellitus (730226989): Onset on 1/1/2008 at 47 years.  Resolved  Emphysema ([id number]):  Resolved.  Afib ([id number]):  Resolved.  Hepatitis C ([id number]):  Resolved.  Bronchitis ([id number]):  Resolved..   Surgical history:    Transesophageal Echo (for Surgery) (None) on 2/1/2017 at 56 Years.  Comments:  2/1/2017 8:46 Dennise Barboza RN  auto-populated from documented surgical case  Venogram Left Extremity (None) on 3/2/2015 at 54 Years.  Comments:  3/5/2015 8:20 Dennise Barboza RN  auto-populated from documented surgical case  Insertion Single/Dual Jeanine Pulse Generator  Leads (None) on 3/2/2015 at 54 Years.  Comments:  3/9/2015 9:25 Mesha Atwood RN  auto-populated from documented surgical case  Coronary Artery Angiography (None) on 9/19/2014 at 53 Years.  Comments:  12/30/2014 9:50 Dennise Barboza RN  auto-populated from documented surgical case  Arm ([id number]).  Angiogram ([id number])..   Family history:    Congestive heart disease.  Mother  Cataract.  Mother  Brother  Hypertension.  Father  Hyperglycemia.  Brother  Glaucoma.  Mother  Brother  Peripheral vascular disease.  Mother  .   Social history:    Social & Psychosocial Habits    Alcohol  06/22/2020  Use: Current    Type: Beer    Frequency: 1-2 times per month    Employment/School  06/22/2020  Status: Unemployed    Exercise  06/22/2020  Times per week: 1-2 times/week    Exercise type: Walking    Home/Environment  06/22/2020  Lives with: Alone    Living situation: Home/Independent    Home equipment: Glucose monitoring, Walker/Cane, nebulizer machine    Nutrition/Health  06/22/2020  Home Diet Low sodium    Appetite Good    Sexual  01/02/2020  What is your current gender identity? (Check all that apply) Identifies  as male    Substance Use  06/22/2020  Use: Never    Tobacco  09/02/2020  Use: 5-9 cigarettes (between 1    Patient Wants Consult For Cessation Counseling No    10/08/2020  Use: 4 or less cigarettes(less    Type: Cigarettes    Patient Wants Consult For Cessation Counseling No    10/13/2020  Use: 4 or less cigarettes(less    Patient Wants Consult For Cessation Counseling No    Abuse/Neglect  09/02/2020  SHX Any signs of abuse or neglect No    Feels unsafe at home: No    Safe place to go: Yes    10/08/2020  SHX Any signs of abuse or neglect No    Feels unsafe at home: No    Safe place to go: Yes    10/13/2020  SHX Any signs of abuse or neglect No    Feels unsafe at home: No    Safe place to go: Yes    Spiritual/Cultural  08/02/2019  Religion Preference [employer]    Financial/Legal Situation  09/02/2020  Sources of Income None      09/02/2020  Branch of  Never in   .   Problem list:    Active Problems (14)  Afib   Allergic rhinitis   CHF (congestive heart failure)(  Confirmed  )   Chronic back pain   Chronic systolic congestive heart failure, NYHA class 1   CKD (chronic kidney disease)   COPD   Diabetes mellitus   Hx of hepatitis C   Hypertension   Recurrent sinusitis   Rheumatoid arthritis   SOB (shortness of breath)(  Confirmed  )   Tobacco user   .      Physical Examination               Vital Signs   Vital Signs   10/13/2020 11:28 CDT     SpO2                      95 %                             Oxygen Therapy            Nasal cannula                             Oxygen Flow Rate          2 L/min    10/13/2020 11:15 CDT     Temperature Oral          36.5 DegC                             Temperature Oral (calculated)             97.70 DegF                             Peripheral Pulse Rate     85 bpm                             Respiratory Rate          27 br/min  HI                             SpO2                      85 %                             Oxygen Therapy            Room air                              Systolic Blood Pressure   182 mmHg  HI                             Diastolic Blood Pressure  114 mmHg  HI  .      No qualifying data available.               Per nurse's notes.   Oxygen saturation.   General:  Alert, moderate distress.    Skin:  Warm, dry, normal for ethnicity.    Head:  Normocephalic, atraumatic.    Neck:  Supple, trachea midline, no tenderness.    Eye:  Pupils are equal, round and reactive to light, extraocular movements are intact, normal conjunctiva.    Ears, nose, mouth and throat:  Oral mucosa moist, no pharyngeal erythema or exudate.    Cardiovascular:  Regular rate and rhythm, No murmur, Normal peripheral perfusion, No edema, Left upper chest AICD/ pacer site looks normal.    Respiratory:  Breath sounds are equal, Symmetrical chest wall expansion, Moderate dyspnea with mild tachypnea, moderate respiratory distress and moderately prolonged expiratory phase.  Diffuse, tight, symmetric bilateral expiratory wheezing.  Mild diffuse symmetric inspiratory wheezing as well.  No rales..    Chest wall:  No tenderness, No deformity.    Back:  Nontender, Normal range of motion, Normal alignment.    Musculoskeletal:  Normal ROM, normal strength, no tenderness, no swelling, no deformity.    Gastrointestinal:  Soft, Nontender, Non distended, Normal bowel sounds.    Neurological:  Alert and oriented to person, place, time, and situation, No focal neurological deficit observed, CN II-XII intact, normal motor observed, normal speech observed.    Lymphatics:  No lymphadenopathy.   Psychiatric:  Cooperative, appropriate mood & affect, normal judgment.       Medical Decision Making   Differential Diagnosis:  Pneumonia, bronchitis, congestive heart failure, chronic obstructive pulmonary disease, pulmonary edema.    Documents reviewed:  Emergency department nurses' notes, emergency department records, prior records.    Orders  Launch Orders   Laboratory:  ABG Adult RT (Order): Stat collect,  Arterial Blood, 10/13/2020 11:25 CDT, Once, Stop date 10/13/2020 11:25 CDT  SARS-CoV-2 by PCR (Order): Now collect, Nasopharyngeal Swab, 10/13/2020 11:25 CDT, Nurse collect  Troponin-I (Order): Stat collect, 10/13/2020 11:23 CDT, Blood, Lab Collect, 10/13/2020 11:23 CDT  CPK (Order): Stat collect, 10/13/2020 11:23 CDT, Blood, Lab Collect, 10/13/2020 11:23 CDT  CMP (Order): Stat collect, 10/13/2020 11:23 CDT, Blood, Lab Collect, 10/13/2020 11:23 CDT  CK MB (Order): Stat collect, 10/13/2020 11:23 CDT, Blood, Lab Collect, 10/13/2020 11:23 CDT  CBC w/ Auto Diff (Order): Stat collect, 10/13/2020 11:23 CDT, Blood, Once, Stop date 10/13/2020 11:23 CDT, Lab Collect, 10/13/2020 11:23 CDT  BNP-Pro (Order): Stat collect, 10/13/2020 11:23 CDT, Blood, Lab Collect, 10/13/2020 11:23 CDT  Patient Care:  Saline Lock Insert (Order): 10/13/2020 11:23 CDT  Pulse Oximetry (Order): 10/13/2020 11:23 CDT  Cardiac Monitoring (Order): 10/13/2020 11:23 CDT, Constant Order  Pharmacy:  Solumedrol IV push / IM (Order): 125 mg, form: Injection, IV, Once, first dose 10/13/2020 11:25 CDT, stop date 10/13/2020 11:25 CDT, STAT  DuoNeb (Order): 3 mL, form: Soln, NEB, Once, first dose 10/13/2020 11:24 CDT, stop date 10/13/2020 11:24 CDT  DuoNeb (Order): 3 mL, form: Soln, NEB, Once, first dose 10/13/2020 11:24 CDT, stop date 10/13/2020 11:24 CDT  DuoNeb (Order): 3 mL, form: Soln, NEB, Once, first dose 10/13/2020 11:24 CDT, stop date 10/13/2020 11:24 CDT  aspirin 325 mg oral tablet (Order): 325 mg, form: Tab, Oral, Once, first dose 10/13/2020 11:23 CDT, stop date 10/13/2020 11:23 CDT, STAT, 4 chew tab = 5 grains  Radiology:  CXR 1 View (Order): Stat, 10/13/2020 11:23 CDT, Dyspnea, None, Patient Bed, Patient Has IV?, Rad Type, Not Scheduled  Cardiology:  EKG (Order): 10/13/2020 11:23 CDT, Stat, Patient Bed, Patient Has IV, Standard Precautions, NOW, -1, -1, 10/13/2020 11:23 CDT  Respiratory Therapy:  Oxygen Therapy (Order): 10/13/2020 11:23 CDT, 3, Nasal  Cannula, CM Oxygen, Launch Orders   Consults:  [facility] ED Consult Internal Medicine (Order): 10/13/2020 13:20 CDT, COPD admit.    Cardiac monitor:  Time 10/13/2020 11:27:00, Rate 85, normal sinus rhythm.    Electrocardiogram:  Time 10/13/2020 11:20:00, rate 85, normal sinus rhythm, No ST-T changes, no ectopy, normal DE & QRS intervals, EP Interp.    Results review:      Reexamination/ Reevaluation   Time: 10/13/2020 12:27:00 .   Vital signs   results included from flowsheet : Vital Signs   10/13/2020 12:00 CDT     Peripheral Pulse Rate     83 bpm                             Heart Rate Monitored      82 bpm                             Respiratory Rate          25 br/min  HI                             SpO2                      93 %                             Oxygen Therapy            Nasal cannula                             Oxygen Flow Rate          2 L/min                             Systolic Blood Pressure   144 mmHg  HI                             Diastolic Blood Pressure  110 mmHg  HI                             Mean Arterial Pressure, Cuff              121 mmHg    10/13/2020 11:42 CDT     Peripheral Pulse Rate     83 bpm                             Respiratory Rate          30 br/min  HI                             SpO2                      96 %    10/13/2020 11:28 CDT     SpO2                      95 %                             Oxygen Therapy            Nasal cannula                             Oxygen Flow Rate          2 L/min    10/13/2020 11:24 CDT     Peripheral Pulse Rate     83 bpm                             Heart Rate Monitored      83 bpm                             Respiratory Rate          25 br/min  HI                             SpO2                      94 %                             Oxygen Therapy            Nasal cannula                             Oxygen Flow Rate          2 L/min                             Systolic Blood Pressure   190 mmHg  HI                             Diastolic Blood  Pressure  139 mmHg  HI     Course: improving.   Assessment: exam unchanged.   Notes: He reports mild improvement in symptoms, still maintains sinus rhythm, blood pressure improving gradually, expiratory wheezing and lung exam not much changed.  Await further data, anticipate admission.  Oxygen increased from 2 to 3 L nasal cannula., Counseled re: quitting smoking..      Impression and Plan   Diagnosis   COPD with acute exacerbation (FWN44-WB J44.1)   Hypoxemia (DAS44-LI R09.02)   Hypertension (OZM35-DA I10)   Tobacco abuse (XWR48-VD Z72.0)      Calls-Consults   -  10/13/2020 13:20:00 , Discussed with Dr. Trevizo - Int. Med. to see and admit.    Plan   Disposition: Admit time  10/13/2020 13:20:00, Place in Observation Telemetry Unit, Int. med..
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
## 2022-05-03 NOTE — HISTORICAL [facility] CERNER
This is a historical note converted from [facility] Antonio. Formatting and pictures may have been removed.  Please reference Jose Antonio for original formatting and attached multimedia. Admit and Discharge Dates  Admit Date: 10/13/2020  Discharge Date: 10/15/2020  Physicians  Attending Physician - Fidencio ROSALES, Dayami MARTINEZ  Admitting Physician - Fidencio ROSALES, Dayami MARTINEZ  Primary Care Physician - Shaw NP, Dayami MARTINEZ  Discharge Diagnosis  1.?COPD with acute exacerbation?J44.1  2.?SOB - Shortness of breath?[id number]  3.?Hypoxemia?R09.02  4.?CHF (congestive heart failure)(  Confirmed  )?I50.22  Allergic rhinitis?J30.9  Hypertension?I10  Tobacco abuse?Z72.0  Surgical Procedures  No procedures recorded for this visit.  Immunizations  10/15/2020 - influenza virus vaccine, inactivated?  Admission Information  59-year-old gentleman with a past medical history significant for severe COPD not on home oxygen, atrial fibrillation s/p LASHELL cardioversion (February 2017), heart failure with reduced ejection fraction (EF 30%, 2017) s/p AICD, HTN, DMII, CKD stage III and RA. ?Patient presents to [facility] ED for worsening shortness of breath ~ 4 days. ?Since patient lost power due to [place], he has been unable to give himself neb treatments at home; administers 3-5 times daily at home at baseline. Furthermore, his home is hot without air conditioning, and this makes it harder for him to breathe. ?Patient reports not being on home oxygen, never been intubated for COPD exacerbation. ?Confesses being admitted to the hospital for COPD exacerbations every year. Professes medication compliance, and following up regularly with his PCP ([facility] NP) and cardiologist. He endorses sinus congestion/sinus pain, postnasal drip, cough with white/clear sputum production and shortness of breath. Denies fevers, chills, nausea, vomiting, diarrhea, chest pain, heart palpitations, myalgias.  Hospital Course  Patient was admitted for hypoxic respiratory  distress and COPD exacerbation.? PFTs from September 2020 showed severe obstruction with significant bronchodilator response.? Chest x-ray was unremarkable on admission and is described below.? Leukocytosis increased throughout admission secondary to steroid use, patient remained afebrile and denied fevers chills or purulent sputum production.? He received DuoNebs every 4 hours scheduled and as needed and received Advair twice daily.? Methylprednisolone 80 mg was given every 8 hours and doxycycline 100 mg twice daily was started.? He required nasal cannula during admission anywhere from 2 to 4 L.? ABGs on 3 L nasal cannula showed pH 7.41, PCO2 35, PO2 60, bicarb 22.2, 32% FiO2.? PT evaluated the patient on the 14th and he was satting 97% on room air while sitting, on room air standing during ambulation session he was satting 85%.? On 2 L of nasal cannula standing during ambulation with 23% with recovery 97% after negative ambulation session.? Prescription was sent for home oxygen in case management was working on obtaining it until patient was walked again on day of discharge and patient ambulated on room air for 300 feet and maintain sats at 95% with no signs of fatigue or shortness of breath per PT.? Case management was notified that patient no longer requires home oxygen.? For patients cough, postnasal drip, sinus headache, congestion who gave Zyrtec and guaifenesin and Tylenol.? For his atrial fibrillation heart failure hypertension hyperlipidemia a repeat echo was performed which showed EF of 55% and continued home medications including lisinopril, metoprolol, Eliquis, amiodarone, Lasix, atorvastatin.? For diabetes we held his home glipizide and we started low-dose insulin sliding scale.? With CBG checks AC at bedtime.? A1c was checked and it was 5.9 on 10/14/2020.? Glucose goal was 140-180 while inpatient and range from 140s to 160s overnight on day prior to discharge.? Creatinine was monitored and nephrotoxic  drugs and medications were avoided for his CKD.? He is on a diabetic, cardiac, low-sodium diet with daily multivitamins.? On his home Eliquis 5 mg twice daily for DVT prophylaxis.? He received 3 days of doxycycline 100 mg twice daily inpatient and was prescribed 2 more days at time of discharge.? Was also prescribed a Medrol Dosepak at discharge.  Significant Findings  Hypotensive and tachypneic on admission.? WBC trended up secondary to methylprednisolone use.  ?  Chest x-ray 1 view on October 13, 2020 showed no acute thoracic abnormality.? No osseous abnormality.? Vasculature unremarkable.? Trachea midline no consolidation or significant effusion.  ?  Echocardiogram on 10/14/2020 showed EF 55%.? Defibrillator wire in right ventricle.  Time Spent on discharge  Greater than 30 minutes  Objective  Vitals & Measurements  T:?36.4? ?C (Oral)? TMIN:?36.3? ?C (Oral)? TMAX:?36.6? ?C (Oral)? HR:?76(Peripheral)? HR:?85(Monitored)? RR:?20? BP:?115/75? SpO2:?99%?  Physical Exam  Gen: well-nourished, well-developed, in no acute distress, overweight?-American male laying with?head of bed elevated, wearing nasal cannula, speaking full sentences  HEENT: Normocephalic. ?Atraumatic. ?PERRL, EMOI. Oral mucosa benign. Mucus membranes moist.  Neck: No JVD or carotid bruits. No thyromegaly. No lymphadenopathy.  Heart: RRR, no murmurs, gallops, clicks or rubs.  Lungs:?Wheezing improved from yesterday.?No crackles. Wearing nasal cannula. ?Normal work of breathing. Chest rise symmetrical on inspiration. ?No accessory muscle use.  Abd: Soft, non-tender, non-distended and without guarding. No organomegaly. No obvious masses. Bowel sounds present x4 quadrants.  Extremities: Radial and pedal pulses 2+ bilaterally, no LE edema.  MSK: No obvious deformities. Moves all extremities purposefully.  Neuro: Responds well to commands. Alert and oriented x3. ?5/5 strength?in bilateral upper and lower extremity.? Cranial nerves grossly intact.  ?Sensation grossly intact.  Skin: Warm, dry and without rashes.  Patient Discharge Condition  Patient was clinically, medically, and hemodynamically stable at time of discharge.  Discharge Disposition  Patient was discharged to home.? He was prescribed a Medrol Dosepak,?guaifenesin?as needed for cough/congestion, doxycycline 100 mg twice daily?for 2 days,?and Advair.  ?  [facility] Pulm clinic 1-2 weeks  [facility] IM postwards; Firm [facility] Salvador Tran MD, Oct 19, 2020  Report to Emergency Department if symptoms return or worsen   Discharge Medication Reconciliation  Prescribed  doxycycline (doxycycline hyclate 100 mg oral capsule)?100 mg, Oral, BID  fluticasone-salmeterol (fluticasone-salmeterol 100 mcg-50 mcg inhalation powder)?1 puff(s), INH, BID  guaiFENesin (guaiFENesin 400 mg oral tablet)?See Instructions, PRN for congestion  methylPREDNISolone (Medrol Dosepak 4 mg oral tablet)?1 tab(s), Oral, Daily  Continue  Misc Prescription (Accucheck blood glucose meter kit)?See Instructions  Misc Prescription (Accucheck blood glucose test strips and lancets)?See Instructions  Misc Prescription (Home Oxygen and Concentrator)?See Instructions  Misc Prescription (Nebulizer)?See Instructions  Misc Prescription (Rolator)?See Instructions  albuterol (Ventolin HFA 90 mcg/inh inhalation aerosol)?1 puff(s), INH, q6hr, PRN for wheezing  albuterol-ipratropium (DuoNeb 0.5 mg-2.5 mg/3 mL inhalation solution)?3 mL, INH, QID  amiodarone (amiodarone 200 mg oral Tab)?200 mg, Oral, Daily  apixaban (Eliquis 5 mg oral tablet)?5 mg, Oral, BID  atorvastatin (atorvastatin 10 mg oral tablet)?10 mg, Oral, Daily  cetirizine (Zyrtec 10 mg oral tablet)?10 mg, Oral, Daily  codeine-promethazine (codeine-promethazine 10 mg-6.25 mg/5 mL oral syrup)?5 mL, Oral, q6hr, PRN as needed for cough  fluticasone nasal (Flonase 50 mcg/inh nasal spray)?1 spray(s), Nasal, BID  formoterol-mometasone (formoterol-mometasone 5 mcg-200 mcg/inh inhalation aerosol)?2 puff(s), INH,  BID  furosemide (furosemide 20 mg oral tablet)?20 mg, Oral, Daily  glipiZIDE (glipiZIDE 5 mg oral tablet)?5 mg, Oral, Daily  lisinopril (lisinopril 5 mg oral tablet)?5 mg, Oral, Daily  metoprolol (metoprolol succinate 25 mg oral tablet extended release)?25 mg, Oral, Daily  nitroglycerin (nitroglycerin 0.4 mg sublingual TAB)?See Instructions, PRN chest pain  Discontinue  acetaminophen/butalbital/caffeine (acetaminophen/butalbital/caffeine 325 mg-50 mg-40 mg oral tablet)?2 tab(s), Oral, q6hr, PRN as needed  betamethasone-clotrimazole topical (betamethasone-clotrimazole 0.05%-1% topical cream)?1 [place], TOP, BID  codeine-promethazine (Promethazine with Codeine 6.25 mg-10 mg/5 mL oral syrup)?5 mL, Oral, q4hr, PRN for cough  hydroxychloroquine (hydroxychloroquine 200 mg oral tablet)?200 mg, Oral, Daily  Education and Orders Provided  Heart Failure  Chronic Obstructive Pulmonary Disease, Easy-to-Read  Home Oxygen Use, Adult  Discharge - 10/15/20 13:00:00 CDT, Home?  Follow up  [facility] Pulm clinic 1-2 weeks  [facility] IM postwards; [facility] Salvador Tran MD, Oct 19, 2020  Report to Emergency Department if symptoms return or worsen      I was present with the Resident during discharge day management.  ???  [x ] I discussed the case with the Resident and agree with the discharge plan as above.  [ ] I discussed the case with the Resident and agree with the discharge plan as above except:  ???  Time spent on discharge [35 ] minutes

## 2022-05-03 NOTE — HISTORICAL [facility]ERNER
This is a historical note converted from Cerc[employer]. Formatting and pictures may have been removed.  Please reference Cercandi for original formatting and attached multimedia. Chief Complaint  New Patient  History of Present Illness  ????? Mr. Zhang is a 57 y/o male who presents for Rheumatology consultation w reported hx of RA, dx 2016. He has a positive RF of 59 by [facility], and negative JOLLY. He has a PMH of Asthma/COPD, CHF, DM2, Hep C--tx in 1987, HLD, HTN, A-fib, and CKD. He reports his joint symptoms started ~2008-9. Reports shoulders, hands, wrists, elbows are worst. Has chronic back pain which is creating?difficulty?in differentiating his pattern of joint pain. He cannot say any time of day that is worse than the other, cannot quantify morning stiffness. Reports pain everywhere, all day long. Is having sleeping difficulties.?Has tried hot and cold compresses as well as?prednisone tapers and cannot say if it gave?any relief.?Reports joints?swell and get painful for a few days--grabbing his right wrist.?He does use a rolling walker at times due to the radiculopathy from the back. Had recent xrays of the spine that show degenerative changes and has been referred for PT-to begin next Tuesday.  ?   ROS: joint pain and chronic stiffness, cough w/ COPD, fatigue. Denies eye inflammation, shortness of breath, denies fever, chills, night sweats, weight loss, rash, Raynauds, sicca, lymphadenopathy, seizures, blood clots, ulcers, sun sensitivity.  ?   Social Hx: Social ETOH, TBO two cigarettes on the weekends. Denies drug use.  FH AI dz: mom and niece w/ RA.  Review of Systems  General: Denies chills and fever  Ophthalmologic: Denies discharge. Denies flashes of light in visual field  ENT: Denies decreased sense of smell. Denies nosebleeds.?  Endocrine: Denies excessive sweating. Denies weight loss  Respiratory: Denies hemoptysis. Denies tuberculosis.  Cardiovascular: Denies cyanosis. Denies rheumatic  fever.  Gastrointestinal: Denies difficulty swallowing. Denies hematemesis.  Hematology: Denies bleeding problems. Denies recent transfusion.  Peripheral vascular: Denies absent pulses in feet. Denies ulceration of the feet  Skin: Denies blistering of skin. Denies skin cancer.  Neurologic denies seizures. Denies stroke.  Psychiatric: Denies eating disorder. Denies loss of appetite.?  Physical Exam  Vitals & Measurements  T:?36.6? ?C (Oral)? HR:?57(Peripheral)? BP:?91/62?  HT:?188?cm? WT:?77.3?kg? BMI:?21.87?  General examination: Alert, pleasant, in no acute distress.  Head: Normocephalic, atraumatic.  Eyes: Conjunctiva clear, upper eyelids normal, lower eyelids normal.  Oral cavity: Mucosa moist, no lesions, fair dentition.  Throat: No exudate, no erythema.  Neck/thyroid: Neck supple, no lymphadenopathy, no thyroid nodules  Skin: No suspicious lesions, warm and dry.  Heart: S1, S2 normal, regular rate and rhythm. No JVD.  Lungs: Clear to auscultation, no accessory muscle use.  Musculoskeletal:?Bony changes in the?MCPs.?FROM. No synovitis. No deformities. Ambulates well without walker. Soft tissue sensitivities.  Extremities: No clubbing or cyanosis.  Neurologic: Alert and oriented, cranial nerves II through XII grossly intact. Muscle strength grossly intact, 5/5 throughout. Sensation grossly intact.  Psych: Cognitive function intact, mood/affect full range.?  ?  (11/13/2018 07:16 CST CT Thorax W/O Cont. LD Lung Ca Screening)  ?  Radiology Report  CT Lung Cancer Screening  ?  REASON FOR EXAM: [id number]  ?  TECHNIQUE: Low dose noncontrast CT imaging of the chest. Axial,  coronal and sagittal images are reviewed. Dose length product is 87  mGycm. Automatic exposure control, adjustment of mA/kV or iterative  reconstruction technique was used to reduce radiation.  ?  COMPARISON: No relevant studies available at the time of dictation.  ?  FINDINGS:  ?  Lung nodules: No suspicious nodules are seen.?  ?  Lung parenchyma:  There is mild centrilobular emphysema. There is some  scarring at the right lung base.  ?  Pleura: No effusions.  ?  Heart: No cardiomegaly. Left-sided AICD noted.  ?  Lymph nodes: No adenopathy.  ?  Upper Abdomen: No acute findings.  ?  Bones: There are mild degenerative changes of the spine.  ?  IMPRESSION:?  ?  Lung-RADS 1: Negative?  ?  Continue annual screening with LDCT in 12 months.  ?  Mild centrilobular emphysema.  ?  ? [1] (02/13/2019 08:52 CST XR Chest 2 Views)  History:  Dyspnea  ?  Reference:  18 April 2018  ?  Findings:  Frontal and lateral views of the chest were obtained. The heart is not  enlarged. There is stable left subclavian AICD. Lungs appear clear.  There is no pneumothorax or significant effusion.  ?  Impression:?  No acute cardiopulmonary abnormality.  ?  ?  Signature Line  Electronically Signed By: Khari Chao MD  Date/Time Signed: 02/13/2019 09:02  ?  ? [2] (03/19/2019 10:49 CDT XR Spine Lumbar 2 or 3 Views)  ?  Radiology Report  Lumbar spine 3 views.  ?  HISTORY: Back pain.  ?  FINDINGS: Examination reveals 5 nonrib-bearing vertebral bodies there  is a rotatory scoliosis with maximum curvature at the level of L3.  ?  Significant degenerative changes identified with narrowing and a  vacuum disc phenomenon at the L5-S1 level some narrowing is also  identified at L3/L4 marginal osteophytes are identified at multiple  levels.  ?  There are some degenerative changes of the posterior elements at L4/L5  and L5-S1 no acute fractures or dislocations identified no other bony  pathology seen.  ?  IMPRESSION: Degenerative changes  ?  ?  PFT? 7/16/18: Very severe airflow obstruction w/good response to bronchodilators. Elevated TLC suggests hyperinflation. Mild gas exchange impairment.  DLCO 26.3, predicted 67%  FEV1 1.19, predicted 32%  FVC 2.63, predicted 50%  TLC 9.55, predticed 125%  Assessment/Plan  1. Positive RF.?Patient presents w/reported hx of RA dx?~2016?w/elevated RF 59 (<15) by  [facility]. He reports generalized joint pain and stiffness that started 2008-9. Does admit to hands, wrists, elbows and shoulders being worst when he is able to somewhat differentiate from chronic back pain and radiculopathy. Has tried prednisone, heat, and cold and cannot say they gave much relief. Will check labs w/ [facility] and [facility] and check films of the hands and feet. Also, CBC, CMP, ESR, and CRP. He has multiple comorbidities such as CHF, CKD that will?limit use of MTX and TNFi.??  2. Chronic back pain w/ radiculopathy. Recent films show degenerative changes. Has been referred for PT--to begin next week.  3.?CHF.?EF 25% 2/1/17.  4. CKD. Scr 1.40-1.50. Will monitor.  5.?HTN. Controlled on current regimen. Continue.  6.?Afib. S/P?LASHELL and cardioversion 2/2017 on Eliquis. S/P ICD placement.  7. COPD.?PFT? 7/16/18: Very severe airflow obstruction w/good response to bronchodilators. Elevated TLC suggests hyperinflation. Mild gas exchange impairment.  8. DM. A1c 5.3 on 8/2/19.  9. Hx of Hep C, 1987. Reportedly treated. Will check PCR.  10. TBO. Report intermittent use. Encouraged cessation.  11. High Risk Medication Use. Tspot, Hep serologies w/Hep C PCR. CXR.  12. Vaccine. Tdap 2016, pneuovax 2012, flur 2015. Update at next visit.  ?  Labs and Xrays today. RTC 2 months.  > Today I spent 60 minutes in face-to-face time with the patient, 50% in direct counseling and coordination of care. We discussed the potential diagnoses, therapeutic options, treatment plan and what to expect in the future from this condition.  Referrals  Clinic Follow up, *Est. 10/24/19 8:00:00 CDT, Order for future visit, Arthritis, [facility]   Problem List/Past Medical History  Ongoing  Allergic rhinitis  CHF (congestive heart failure)(  Confirmed  )  Chronic systolic congestive heart failure, NYHA class 1  COPD  Diabetes mellitus  Hypertension  Recurrent sinusitis  SOB (shortness of breath)(  Confirmed  )  Tobacco  user  Historical  Afib  Bronchitis  Emphysema  Hepatitis C  Procedure/Surgical History  Insertion of Defibrillator Generator into Chest Subcutaneous Tissue and Fascia, Open Approach (08/10/2018)  Removal of Cardiac Rhythm Related Device from Trunk Subcutaneous Tissue and Fascia, Open Approach (08/10/2018)  Removal of implantable defibrillator pulse generator with replacement of implantable defibrillator pulse generator; single lead system (08/10/2018)  Transesophageal Echo (for Surgery) (None) (02/01/2017)  Ultrasonography of Heart with Aorta, Transesophageal (02/01/2017)  CARDIOVERTER-DEFIBRILLATOR, DUAL CHAMBER (IMPLANTABLE) (03/02/2015)  Implantation or replacement of automatic cardioverter/defibrillator, total system [AICD] (03/02/2015)  Insertion of Antimicrobial Envelope (03/02/2015)  Insertion or replacement of permanent implantable defibrillator system, with transvenous lead(s), single or dual chamber (03/02/2015)  Insertion Single/Dual Jeanine Pulse Generator Leads (None) (03/02/2015)  PACEMAKER, DUAL CHAMBER, RATE-RESPONSIVE (IMPLANTABLE) (03/02/2015)  Venogram Left Extremity (None) (03/02/2015)  Cardioversion, elective, electrical conversion of arrhythmia; external. (12/22/2014)  Other electric countershock of heart (12/22/2014)  Coronary arteriography using a single catheter (09/19/2014)  Coronary Artery Angiography (None) (09/19/2014)  Left heart cardiac catheterization (09/19/2014)  Angiogram  Arm   Medications  acetaminophen/butalbital/caffeine 325 mg-50 mg-40 mg oral tablet, 2 tab(s), Oral, q6hr, PRN,? ?Not taking  albuterol-ipratropium 2.5 mg-0.5 mg/3 mL inhalation solution, See Instructions, 3 refills  amiodarone 200 mg oral Tab, 200 mg= 1 tab(s), Oral, Daily, 2 refills  atorvastatin 10 mg oral tablet, 10 mg= 1 tab(s), Oral, Daily, 11 refills  Coreg 12.5 mg oral tablet, 12.5 mg= 1 tab(s), Oral, BIDWMeal, 3 refills  DuoNeb 0.5 mg-2.5 mg/3 mL inhalation solution, 3 mL, INH, QID, 4 refills  Eliquis 5 mg  oral tablet, 5 mg= 1 tab(s), Oral, BID, 11 refills  Flonase 50 mcg/inh nasal spray, 1 spray(s), Nasal, BID, 6 refills  formoterol-mometasone 5 mcg-200 mcg/inh inhalation aerosol, 2 puff(s), INH, BID, 11 refills  furosemide 20 mg oral tablet, 20 mg= 1 tab(s), Oral, Daily, 3 refills  glipiZIDE 5 mg oral tablet, 5 mg= 1 tab(s), Oral, Daily, 11 refills  ipratropium 500 mcg/2.5 mL inhalation solution, 500 mcg= 2.5 mL, NEB, TID, PRN, 6 refills  lisinopril 5 mg oral tablet, 5 mg= 1 tab(s), Oral, Daily, 11 refills  Nebulizer, See Instructions  nitroglycerin 0.4 mg sublingual TAB, See Instructions, PRN, 3 refills  Promethazine with Codeine 6.25 mg-10 mg/5 mL oral syrup, 5 mL, Oral, q4hr, PRN  Rolator, See Instructions  Ventolin HFA 90 mcg/inh inhalation aerosol, 1 puff(s), INH, q6hr, PRN, 3 refills  Zyrtec 10 mg oral tablet, 10 mg= 1 tab(s), Oral, Daily, 6 refills  Allergies  No Known Medication Allergies  Social History  Abuse/Neglect  No, No, Yes, 08/02/2019  Alcohol  Never, 12/21/2015  Employment/School  Unemployed, Highest education level: High school., 03/27/2015  Exercise  Exercise frequency: 5-6 times/week. Self assessment: Poor condition. Exercise type: Walking., 03/27/2015  Home/Environment  Lives with Mother. Living situation: Home/Independent. Home equipment: Glucose monitoring, Walker/Cane, Wheelchair. Alcohol abuse in household: No. Substance abuse in household: No. Smoker in household: Yes. Injuries/Abuse/Neglect in household: No. Feels unsafe at home: No. Safe place to go: Yes. Agency(s)/Others notified: No. Family/Friends available for support: Yes. Concern for family members at home: Yes. Major illness in household: Yes. Financial concerns: No., 02/20/2017  Nutrition/Health  Diabetic, Wants to lose weight: No. Sleeping concerns: Yes. Feels highly stressed: Yes., 03/27/2015  Spiritual/Cultural  Holiness, 08/02/2019  Substance Use  Never, 02/07/2019  Never, 12/21/2015  Tobacco  5-9 cigarettes (between 1/4 to  1/2 pack)/day in last 30 days, No, 08/23/2019  Family History  Cataract.: Mother and Brother.  Congestive heart disease.: Mother.  Glaucoma.: Mother and Brother.  Hyperglycemia.: Brother.  Hypertension.: Father.  Peripheral vascular disease.: Mother.  Immunizations  Vaccine Date Status Comments   tetanus/diphtheria/pertussis, acel(Tdap) 12/21/2016 Given    influenza virus vaccine, inactivated 12/21/2015 Given TOLERATED WELL   pneumococcal vacc 10/11/2012 Recorded    Health Maintenance  Health Maintenance  ???Pending?(in the next year)  ??? ??OverDue  ??? ? ? ?COPD Maintenance-Spirometry due??and every?  ??? ? ? ?Coronary Artery Disease Maintenance-Lipid Lowering Therapy due??and every?  ??? ? ? ?Functional Assessment due??01/01/19??and every 1??year(s)  ??? ? ? ?HF-LVEF due??02/01/19??and every 2??year(s)  ??? ??Due?  ??? ? ? ?COPD Maintenance-Pulmonary Rehab Education due??08/23/19??and every 1??year(s)  ??? ? ? ?HF-Ejection Fraction Past 13 Months if Hospitalized due??08/23/19??and every 1??year(s)  ??? ? ? ?HF-Fluid Restriction/Low Sodium Diet Education due??08/23/19??Variable frequency  ??? ? ? ?Influenza Vaccine due??08/23/19??and every?  ??? ??Due In Future?  ??? ? ? ?Diabetes Maintenance-Foot Exam not due until??10/05/19??and every 1??year(s)  ??? ? ? ?Lung Cancer Screening not due until??11/13/19??and every 1??year(s)  ??? ? ? ?Colorectal Screening not due until??12/13/19??and every 1??year(s)  ??? ? ? ?Alcohol Misuse Screening not due until??01/01/20??and every 1??year(s)  ??? ? ? ?Obesity Screening not due until??01/01/20??and every 1??year(s)  ??? ? ? ?Smoking Cessation not due until??01/01/20??and every 1??year(s)  ??? ? ? ?Diabetes Maintenance-Fasting Lipid Profile not due until??02/07/20??and every 1??year(s)  ??? ? ? ?Diabetes Maintenance-Microalbumin not due until??02/07/20??and every 1??year(s)  ??? ? ? ?Diabetes Maintenance-Urine Dipstick not due until??02/07/20??and every 1??year(s)  ??? ? ?  ?Hypertension Management-Education not due until??02/07/20??and every 1??year(s)  ??? ? ? ?Hypertension Management-BMP not due until??02/13/20??and every 1??year(s)  ??? ? ? ?Diabetes Maintenance-Serum Creatinine not due until??02/13/20??and every 1??year(s)  ??? ? ? ?COPD Management-Oxygen Assessment not due until??02/13/20??and every 1??year(s)  ??? ? ? ?Diabetes Maintenance-Eye Exam not due until??02/18/20??and every 1??year(s)  ??? ? ? ?Depression Screening not due until??08/01/20??and every 1??year(s)  ??? ? ? ?Diabetes Maintenance-HgbA1c not due until??08/01/20??and every 1??year(s)  ??? ? ? ?HF-Heart Failure Education not due until??08/01/20??and every 1??year(s)  ??? ? ? ?COPD Management-COPD Medications Prescribed not due until??08/02/20??and every 1??year(s)  ??? ? ? ?Aspirin Therapy for CVD Prevention not due until??08/02/20??and every 1??year(s)  ??? ? ? ?HF-ACEI/ARB Prescribed if Clinically Indicated not due until??08/13/20??and every 1??year(s)  ??? ? ? ?Blood Pressure Screening not due until??08/22/20??and every 1??year(s)  ??? ? ? ?Body Mass Index Check not due until??08/22/20??and every 1??year(s)  ??? ? ? ?Hypertension Management-Blood Pressure not due until??08/22/20??and every 1??year(s)  ???Satisfied?(in the past 1 year)  ??? ??Satisfied?  ??? ? ? ?ADL Screening on??08/23/19.??Satisfied by Rossana Barros LPN  ??? ? ? ?Alcohol Misuse Screening on??02/07/19.??Satisfied by Dayami Squires NP  ??? ? ? ?Aspirin Therapy for CVD Prevention on??08/02/19.??Satisfied by Dayami Squires NP??Reason: Expectation Satisfied Elsewhere  ??? ? ? ?Blood Pressure Screening on??08/23/19.??Satisfied by Rossana Barros LPN  ??? ? ? ?Body Mass Index Check on??08/23/19.??Satisfied by Rossana Barros LPN  ??? ? ? ?COPD Management-COPD Medications Prescribed on??08/02/19.??Satisfied by Dayami Squires NP  ??? ? ? ?COPD Management-Oxygen Assessment on??02/13/19.??Satisfied by Tony HAMMER, Em Perez  ??? ? ?  ?Colorectal Screening on??12/13/18.??Satisfied by Yamilet Min  ??? ? ? ?Coronary Artery Disease Maintenance-Lipid Lowering Therapy on??02/07/19.??Satisfied by Dayami Squires NP.  ??? ? ? ?Depression Screening on??08/02/19.??Satisfied by Allison Nelson LPN  ??? ? ? ?Diabetes Maintenance-HgbA1c on??08/02/19.??Satisfied by Gwendolyn James  ??? ? ? ?Diabetes Maintenance-Eye Exam on??02/18/19.??Satisfied by Tom Cerda MD  ??? ? ? ?Diabetes Maintenance-Serum Creatinine on??02/13/19.??Satisfied by Areli Luciano  ??? ? ? ?Diabetes Maintenance-Fasting Lipid Profile on??02/07/19.??Satisfied by Areli Luciano  ??? ? ? ?Diabetes Maintenance-Microalbumin on??02/07/19.??Satisfied by Areli Luciano  ??? ? ? ?Diabetes Maintenance-Urine Dipstick on??02/07/19.??Satisfied by Glenis Zelaya  ??? ? ? ?Diabetes Maintenance-Foot Exam on??10/05/18.??Satisfied by Dayami Squires NP.  ??? ? ? ?Diabetes Screening on??08/02/19.??Satisfied by Gwendolyn James  ??? ? ? ?HF-ACEI/ARB Prescribed if Clinically Indicated on??08/13/19.??Satisfied by Gaviota Campos  ??? ? ? ?HF-Beta Blocker Prescribed if Clinically Indicated on??05/10/19.??Satisfied by Gaviota Campos  ??? ? ? ?Hypertension Management-Blood Pressure on??08/23/19.??Satisfied by Rossana Barros LPN  ??? ? ? ?Hypertension Management-BMP on??02/13/19.??Satisfied by Areli Luciano  ??? ? ? ?Hypertension Management-Education on??02/07/19.??Satisfied by Dayami Squires NP.??Reason: Expectation Satisfied Elsewhere  ??? ? ? ?Influenza Vaccine on??10/05/18.??Satisfied by Allison Nelson LPN  ??? ? ? ?Lipid Screening on??02/07/19.??Satisfied by Areli Luciano  ??? ? ? ?Lung Cancer Screening on??11/13/18.??Satisfied by Juan Easton RT, Facundo JOHNS  ??? ? ? ?Obesity Screening on??08/23/19.??Satisfied by Rossana Barros LPN  ??? ? ? ?Smoking Cessation on??08/02/19.??Satisfied by Shaw BLACK, Dayami SALAS??Reason: Expectation  Satisfied Elsewhere  ??? ? ? ?Smoking Cessation (Coronary Artery Disease) on??09/18/18.??Satisfied by Ana Doan RN  ??? ? ? ?Smoking Cessation (Diabetes) on??09/18/18.??Satisfied by Ana Doan RN  ??? ??Refused?  ??? ? ? ?Influenza Vaccine on??10/05/18.??Recorded by Shaw BLACK, Dayami SALAS??Reason: Patient Refuses  ?     [1]?CT Thorax W/O Cont. LD Lung Ca Screening; Khari Chao MD 11/13/2018 07:16 CST  [2]?XR Chest 2 Views; Khari Chao MD 02/13/2019 08:52 CST

## 2022-05-03 NOTE — HISTORICAL [facility] CERNER
This is a historical note converted from [employer]. Formatting and pictures may have been removed.  Please reference [employer] for original formatting and attached multimedia. Chief Complaint  had device check this morning here for f/u last seen 8/2019 denies chest pain has DONALDSON  no questions  History of Present Illness  This is a 59-year-old male with?a?past medical history of atrial fibrillation status post LASHELL and cardioversion in February 2017 on Eliquis, nonischemic cardiomyopathy with EF 25%?s/p ICD, COPD, hepatitis C, diabetes mellitus type II, and CKD stage III presents for?follow up and ongoing care. ??Patient had his ICD generator changed in August 2018 due to recall.?The patient was last seen in clinic in August 2019 in which he endorsed stable dyspnea upon exertion? and occasional COPD flareups. Device interrogation today 9.22.20 revealed?34 episodes of NSVT?since April 2019, with normal function device with no changes noted.? Today the patient presents in clinic with continued?complaints of stable dyspnea upon exertion that has not progressed or worsened.? He also continues to endorse?occasional COPD flareups, and?intermittent allergy symptoms. ?He denies any exertional chest pain, orthopnea, PND?peripheral edema, dizziness or syncope.? He states that overall?he has been feeling okay since his last clinic visit.? Denies any ADL limitation.? He reports compliance with current medication regimen. ?He continues to smoke approximately 2 packs of cigarettes per week and states he is not interested in quitting at this time. ?He is also requesting cardiac refills.  ?  Left heart cath- no CAD (2014)  Review of Systems  Constitutional: negative for fever,chills, sweats, weakness, fatigue, decreased activity?????  Eye: negative  ENMT: negative  Respiratory:?DONALDSON, occasional?COPD flare up?  Cardiovascular: negative  Gastrointestinal: negative?for nausea, vomiting, abdominal pain, constipation, diarrhea  Genitourinary:  negative  Endocrine: negative  Musculoskeletal: negative  Integumentary: negative  Neurologic: negative  Psychiatric: negative  All Other ROS: negative  Physical Exam  Vitals & Measurements  T:?36.8? ?C (Oral)? HR:?59(Peripheral)? RR:?20? BP:?117/83? SpO2:?93%? HT:?188.00?cm? WT:?91.8?kg? WT:?91.8?kg? WT:?91.800?kg?  General: alert and oriented/no acute distress  Eye: EOMI/normal conjunctiva  HENT: normocephalic/moist oral mucosa  Neck: supple/nontender/no carotid bruit/no JVD  Respiratory: lungs CTA/nonlabored respirations/BS equal/symmetrical expansion/no chest wall tenderness  Cardiovascular: normal rate/normal rhythm/no murmur/normal peripheral perfusion/no edema  Gastrointestinal: soft/nontender/nondistended  Musculoskeletal: normal ROM/normal strength  Integumentary: warm/dry/pink/intact  Neurologic: alert/oriented/normal sensory/no focal deficits  Psychiatric: cooperative/appropriate mood and affect/normal judgment  Assessment/Plan  Chronic Systolic HF (EF 30% per Echo 2017)/ NICMO s/p ICD NYHA Class II - s/p ICD implant  Reports stable DONALDSON that has not worsened. Denies CP or ADL limitations  Device interrogation -revealed?34 episodes of NSVT?since April 2019, with normal function device with no changes noted. (9.22.20)  Euvolemic, warm, and dry on exam today  Continue GDMT: Lisinopril, and Lasix. ?Discontinue carvedilol. Start?Metoprolol succinate?25 mg p.o. daily for better rate control and episodes of NSVT  Nurse visit in 2 weeks for BP/HR  Mag level today  Counseled on the importance of a 2 g sodium diet  Continue routine ICD interrogations  ?   Atrial Fibrillation - s/p LASHELL/cardioversion 2/17  -EKG today -sinus bradycardia.  Carvedilol discontinued. ?Start metoprolol succinate  Continue Amiodarone 200mg daily - continue Amio monitoring - thyroid function normal (March 2020), CT Chest completed 2019. Instructed to keep routine eye exams  Continue with Eliquis?for CVA?prophylaxis. ?Denies any adverse  bleeding issues  ?   HTN  Well controlled? - continue?, Lisinopril, and Lasix.  Counseled on low-sodium diet  ?   Tobacco Use  Continues to smoke approximately 2 packs cigarettes per week and is not interested in quitting at this time. ?Patient reports failed success with smoking cessation program  Counseled on the importance of smoking cessation  ?   Mag level today  Nurse visit in 2 weeks BP/HR  Follow up in Cardiology Clinic in 6 months with CMP/FLP  Continue with routine ICD interrogations  Continue to follow up with PCP as directed   Problem List/Past Medical History  Ongoing  Afib  Allergic rhinitis  CHF (congestive heart failure)(  Confirmed  )  Chronic back pain  Chronic systolic congestive heart failure, NYHA class 1  CKD (chronic kidney disease)  COPD  Diabetes mellitus  Hx of hepatitis C  Hypertension  Recurrent sinusitis  Rheumatoid arthritis  SOB (shortness of breath)(  Confirmed  )  Tobacco user  Historical  Afib  Bronchitis  Emphysema  Hepatitis C  Procedure/Surgical History  Insertion of Defibrillator Generator into Chest Subcutaneous Tissue and Fascia, Open Approach (08/10/2018)  Removal of Cardiac Rhythm Related Device from Trunk Subcutaneous Tissue and Fascia, Open Approach (08/10/2018)  Removal of implantable defibrillator pulse generator with replacement of implantable defibrillator pulse generator; single lead system (08/10/2018)  Transesophageal Echo (for Surgery) (None) (02/01/2017)  Ultrasonography of Heart with Aorta, Transesophageal (02/01/2017)  CARDIOVERTER-DEFIBRILLATOR, DUAL CHAMBER (IMPLANTABLE) (03/02/2015)  Implantation or replacement of automatic cardioverter/defibrillator, total system [AICD] (03/02/2015)  Insertion of Antimicrobial Envelope (03/02/2015)  Insertion or replacement of permanent implantable defibrillator system, with transvenous lead(s), single or dual chamber (03/02/2015)  Insertion Single/Dual Jeanine Pulse Generator Leads (None) (03/02/2015)  PACEMAKER, DUAL  CHAMBER, RATE-RESPONSIVE (IMPLANTABLE) (03/02/2015)  Venogram Left Extremity (None) (03/02/2015)  Cardioversion, elective, electrical conversion of arrhythmia; external. (12/22/2014)  Other electric countershock of heart (12/22/2014)  Coronary arteriography using a single catheter (09/19/2014)  Coronary Artery Angiography (None) (09/19/2014)  Left heart cardiac catheterization (09/19/2014)  Angiogram  Arm   Medications  Accucheck blood glucose meter kit, See Instructions  Accucheck blood glucose test strips and lancets, See Instructions, 11 refills  acetaminophen/butalbital/caffeine 325 mg-50 mg-40 mg oral tablet, 2 tab(s), Oral, q6hr, PRN,? ?Not taking  albuterol-ipratropium 2.5 mg-0.5 mg/3 mL inhalation solution, See Instructions, 3 refills  amiodarone 200 mg oral Tab, 200 mg= 1 tab(s), Oral, Daily, 6 refills  atorvastatin 10 mg oral tablet, 10 mg= 1 tab(s), Oral, Daily, 6 refills,? ?Unable to obtain  betamethasone-clotrimazole 0.05%-1% topical cream, 1 [place], TOP, BID,? ?Not taking  Coreg 12.5 mg oral tablet, 12.5 mg= 1 tab(s), Oral, BIDWMeal, 6 refills  DuoNeb 0.5 mg-2.5 mg/3 mL inhalation solution, 3 mL, INH, QID, 6 refills  Eliquis 5 mg oral tablet, 5 mg= 1 tab(s), Oral, BID, 11 refills  Flonase 50 mcg/inh nasal spray, 1 spray(s), Nasal, BID, 6 refills  formoterol-mometasone 5 mcg-200 mcg/inh inhalation aerosol, 2 puff(s), INH, BID, 11 refills  furosemide 20 mg oral tablet, 20 mg= 1 tab(s), Oral, Daily, 3 refills  glipiZIDE 5 mg oral tablet, 5 mg= 1 tab(s), Oral, Daily, 11 refills  hydroxychloroquine 200 mg oral tablet, 200 mg= 1 tab(s), Oral, Daily,? ?Not taking  lisinopril 5 mg oral tablet, 5 mg= 1 tab(s), Oral, Daily, 3 refills  Nebulizer, See Instructions  nitroglycerin 0.4 mg sublingual TAB, See Instructions, PRN, 3 refills  Promethazine with Codeine 6.25 mg-10 mg/5 mL oral syrup, 5 mL, Oral, q4hr, PRN, 1 refills  Rolator, See Instructions  Ventolin HFA 90 mcg/inh inhalation aerosol, 1 puff(s), INH, q6hr,  PRN, 6 refills  Zyrtec 10 mg oral tablet, 10 mg= 1 tab(s), Oral, Daily, 6 refills  Allergies  No Known Medication Allergies  Social History  Abuse/Neglect  No, No, Yes, 09/02/2020  No, No, Yes, 06/22/2020  Alcohol  Current, Beer, 1-2 times per month, 06/22/2020  Employment/School  Unemployed, 06/22/2020  Exercise  Exercise frequency: 1-2 times/week. Exercise type: Walking., 06/22/2020  Financial/Legal Situation  None, 09/02/2020  Home/Environment  Lives with Alone. Living situation: Home/Independent. Glucose monitoring, Walker/Cane, nebulizer machine, 06/22/2020    Never in , 09/02/2020  Nutrition/Health  Low sodium, Good, 06/22/2020  Sexual  Gender Identity Identifies as male., 01/02/2020  Spiritual/Cultural  Buddhist, 08/02/2019  Substance Use  Never, 06/22/2020  Tobacco  5-9 cigarettes (between 1/4 to 1/2 pack)/day in last 30 days, No, 09/02/2020  4 or less cigarettes(less than 1/4 pack)/day in last 30 days, Cigarettes, No, 06/22/2020  Family History  Cataract.: Mother and Brother.  Congestive heart disease.: Mother.  Glaucoma.: Mother and Brother.  Hyperglycemia.: Brother.  Hypertension.: Father.  Peripheral vascular disease.: Mother.  Health Maintenance  Health Maintenance  ???Pending?(in the next year)  ??? ??OverDue  ??? ? ? ?COPD Maintenance-Spirometry due??and every?  ??? ? ? ?Diabetes Maintenance-Microalbumin due??and every?  ??? ? ? ?Influenza Vaccine due??and every?  ??? ? ? ?HF-LVEF due??02/01/18??and every 1??year(s)  ??? ? ? ?Lung Cancer Screening due??11/13/19??and every 1??year(s)  ??? ? ? ?Functional Assessment due??01/02/20??and every 1??year(s)  ??? ??Due?  ??? ? ? ?COPD Maintenance-Pulmonary Rehab Education due??09/22/20??and every 1??year(s)  ??? ? ? ?HF-Ejection Fraction Past 13 Months if Hospitalized due??09/22/20??and every 1??year(s)  ??? ? ? ?HF-Fluid Restriction/Low Sodium Diet Education due??09/22/20??and every 1??year(s)  ??? ? ? ?Zoster Vaccine due??09/22/20??and  every?  ??? ??Due In Future?  ??? ? ? ?Colorectal Screening not due until??12/11/20??and every 1??year(s)  ??? ? ? ?Diabetes Maintenance-Foot Exam not due until??12/19/20??and every 1??year(s)  ??? ? ? ?Diabetes Maintenance-Eye Exam not due until??01/01/21??and every 1??year(s)  ??? ? ? ?Obesity Screening not due until??01/01/21??and every 1??year(s)  ??? ? ? ?Smoking Cessation not due until??01/01/21??and every 1??year(s)  ??? ? ? ?Alcohol Misuse Screening not due until??01/02/21??and every 1??year(s)  ??? ? ? ?Diabetes Maintenance-Fasting Lipid Profile not due until??03/17/21??and every 1??year(s)  ??? ? ? ?Hypertension Management-Education not due until??06/22/21??and every 1??year(s)  ??? ? ? ?Diabetes Maintenance-HgbA1c not due until??08/31/21??and every 1??year(s)  ??? ? ? ?Hypertension Management-BMP not due until??08/31/21??and every 1??year(s)  ??? ? ? ?Diabetes Maintenance-Serum Creatinine not due until??08/31/21??and every 1??year(s)  ??? ? ? ?Depression Screening not due until??09/02/21??and every 1??year(s)  ??? ? ? ?HF-Heart Failure Education not due until??09/02/21??and every 1??year(s)  ??? ? ? ?ADL Screening not due until??09/02/21??and every 1??year(s)  ??? ? ? ?HF-ACEI/ARB Prescribed if Clinically Indicated not due until??09/02/21??and every 1??year(s)  ??? ? ? ?COPD Management-COPD Medications Prescribed not due until??09/02/21??and every 1??year(s)  ??? ? ? ?Aspirin Therapy for CVD Prevention not due until??09/02/21??and every 1??year(s)  ???Satisfied?(in the past 1 year)  ??? ??Satisfied?  ??? ? ? ?ADL Screening on??09/02/20.??Satisfied by Omar DOWNEY, Allison Doherty  ??? ? ? ?Alcohol Misuse Screening on??06/22/20.??Satisfied by Dayami Squires NP  ??? ? ? ?Aspirin Therapy for CVD Prevention on??09/02/20.??Satisfied by Dayami Squires NP??Reason: Expectation Satisfied Elsewhere  ??? ? ? ?Blood Pressure Screening on??09/22/20.??Satisfied by Ramesh HAMMER, Madhuri KNOX  ??? ? ? ?Body Mass Index  Check on??09/22/20.??Satisfied by Madhuri Chandler RN  ??? ? ? ?COPD Maintenance-Spirometry on??09/02/20.??Satisfied by Jessie Jang  ??? ? ? ?COPD Management-Oxygen Assessment on??09/22/20.??Satisfied by Madhuri Chandler RN  ??? ? ? ?COPD Management-COPD Medications Prescribed on??09/02/20.??Satisfied by Dayami Squires NP  ??? ? ? ?Colorectal Screening on??12/12/19.??Satisfied by Renea Tillman.  ??? ? ? ?Depression Screening on??09/02/20.??Satisfied by Allison Nelson LPN  ??? ? ? ?Diabetes Maintenance-HgbA1c on??08/31/20.??Satisfied by Stacy Bowers  ??? ? ? ?Diabetes Maintenance-Microalbumin on??08/31/20.??Satisfied by Stacy Bowers  ??? ? ? ?Diabetes Maintenance-Serum Creatinine on??08/31/20.??Satisfied by Stacy Bowers  ??? ? ? ?Diabetes Maintenance-Fasting Lipid Profile on??03/17/20.??Satisfied by Veronica Crane  ??? ? ? ?Diabetes Maintenance-Foot Exam on??12/20/19.??Satisfied by Dayami Squires NP  ??? ? ? ?Diabetes Maintenance-Eye Exam on??10/01/19.??Satisfied by Polly Mcpherson  ??? ? ? ?Diabetes Screening on??08/31/20.??Satisfied by Stacy Bowers  ??? ? ? ?HF-ACEI/ARB Prescribed if Clinically Indicated on??09/02/20.??Satisfied by Dayami Squires NP  ??? ? ? ?HF-Beta Blocker Prescribed if Clinically Indicated on??06/18/20.??Satisfied by Gaviota Campos  ??? ? ? ?Hypertension Management-Blood Pressure on??09/22/20.??Satisfied by Madhuri Chandler RN  ??? ? ? ?Hypertension Management-BMP on??08/31/20.??Satisfied by Stacy Bowers  ??? ? ? ?Hypertension Management-Education on??06/22/20.??Satisfied by Shaw BLACK, Dayami MARTINEZ??Reason: Expectation Satisfied Elsewhere  ??? ? ? ?Influenza Vaccine on??12/20/19.??Satisfied by Allison Nelson LPN  ??? ? ? ?Lipid Screening on??03/17/20.??Satisfied by Veronica Crane  ??? ? ? ?Obesity Screening on??09/22/20.??Satisfied by Ramesh HAMMER, Madhuri KNOX  ??? ? ? ?Smoking  Cessation on??06/22/20.??Satisfied by Shaw BLACK, Dayami MARTINEZ??Reason: Expectation Satisfied Elsewhere  ??? ??Refused?  ??? ? ? ?Zoster Vaccine on??09/02/20.??Recorded by Shaw BLACK, Dayami MARTINEZ??Reason: Patient Refuses  ?

## 2022-05-03 NOTE — HISTORICAL [facility]ER
This is a historical note converted from [employer]. Formatting and pictures may have been removed.  Please reference [employer] for original formatting and attached multimedia. Chief Complaint  sinus allergies  History of Present Illness  57 y/o male here for f/u. Pt has hx Asthma/COPD, CHF, DM2, Hep C, HLD, HTN, A-fib, CKD. Pt followed in Cardio . [1] Pt states he has been having HAs to frontal sinus area X 3 weeks. States he has Flonase nasal spray but only uses it as needed. Pt states he has been having chronic sinus issues for years.  Review of Systems  All negative except: See HPI  Physical Exam  Vitals & Measurements  T:?36.6? ?C ?(Oral)? HR:?51?(Peripheral)? BP:?129/88? HT:?188?cm? HT:?188?cm? WT:?88?kg? WT:?88?kg? BMI:?24.9?  General: Alert and oriented, No acute distress.  Eye: Pupils are equal, round and reactive to light, Extraocular movements are intact.  HENT: Normocephalic.  Neck: Supple, Non-tender, No carotid bruit, No lymphadenopathy.  Respiratory: Lungs are clear to auscultation, Respirations are non-labored, Breath sounds are equal, Symmetrical chest wall expansion.  Cardiovascular: Normal rate, Regular rhythm, No murmur.  Gastrointestinal: Soft, Non-tender, Non-distended, Normal bowel sounds.  Musculoskeletal: Normal range of motion.  Integumentary: Warm, Dry, Intact.  Neurologic: No focal deficits, Cranial Nerves II-XII are grossly intact.  Assessment/Plan  A-fib  ??Pt followed in Cardio . Keep appts. [2]  Ordered:  Clinic Follow up, *Est. 12/21/17 3:00:00 CST, in 6 months with DALTON Squires, Order for future visit, A-fib  Anemia  COPD with asthma  Chronic kidney disease (CKD)  Diabetes mellitus type 2, controlled  Elevated serum creatinine  HLD (hyperlipidemia)  HTN (hyperte...  Office/Outpatient Visit Level 4 Established [id number] PC, A-fib  Anemia  COPD with asthma  Chronic kidney disease (CKD)  Diabetes mellitus type 2, controlled, [facility], 06/21/17 9:26:00 CDT  ?  Anemia  ?Resolved.  Will monitor. [3]  Ordered:  CBC w/ Auto Diff, Routine collect, *Est. 07/05/17 3:00:00 CDT, Blood, Order for future visit, *Est. Stop date 07/05/17 3:00:00 CDT, Lab Collect, Anemia, 2, week(s), In Approximately, 06/21/17 9:07:00 CDT  Clinic Follow up, *Est. 12/21/17 3:00:00 CST, in 6 months with DALTON Squires, Order for future visit, A-fib  Anemia  COPD with asthma  Chronic kidney disease (CKD)  Diabetes mellitus type 2, controlled  Elevated serum creatinine  HLD (hyperlipidemia)  HTN (hyperte...  Office/Outpatient Visit Level 4 Established [id number] PC, A-fib  Anemia  COPD with asthma  Chronic kidney disease (CKD)  Diabetes mellitus type 2, controlled, [facility] Int [facility] C, 06/21/17 9:26:00 CDT  ?  Chronic kidney disease (CKD)  ??Low protein low salt diet, avoid NSAIDs, drink plenty of water, maintain good BP control to prevent progression. [4]  Ordered:  Clinic Follow up, *Est. 12/21/17 3:00:00 CST, in 6 months with DALTON Squires, Order for future visit, A-fib  Anemia  COPD with asthma  Chronic kidney disease (CKD)  Diabetes mellitus type 2, controlled  Elevated serum creatinine  HLD (hyperlipidemia)  HTN (hyperte...  Office/Outpatient Visit Level 4 Established [id number] PC, A-fib  Anemia  COPD with asthma  Chronic kidney disease (CKD)  Diabetes mellitus type 2, controlled, [facility] Int Med C, 06/21/17 9:26:00 CDT  ?  COPD with asthma  ??PT UTD with PFT done 1-18-16 mild obstruction with positive response to BD, MVV is c/w FEV1, DLCO WNL.. PT UTD with CXR- done 3-18-17 WNL. Cont meds as prescribed. [5]  Ordered:  Clinic Follow up, *Est. 12/21/17 3:00:00 CST, in 6 months with DALTON Squires, Order for future visit, A-fib  Anemia  COPD with asthma  Chronic kidney disease (CKD)  Diabetes mellitus type 2, controlled  Elevated serum creatinine  HLD (hyperlipidemia)  HTN (hyperte...  Office/Outpatient Visit Level 4 Established 10780 PC, A-fib  Anemia  COPD with asthma  Chronic kidney disease (CKD)  Diabetes  mellitus type 2, controlled, [facility] Int Med C, 06/21/17 9:26:00 CDT  ?  Diabetes mellitus type 2, controlled  ?ADA diet and exercise. A1c 5.8. Cont DM med as prescribed. PT UTD with DM eye exam- done 4-21-17. PT UTD with pneumovax. DM foot exam done 12-21-16.  Ordered:  Clinic Follow up, *Est. 12/21/17 3:00:00 CST, in 6 months with DALTON Squires, Order for future visit, A-fib  Anemia  COPD with asthma  Chronic kidney disease (CKD)  Diabetes mellitus type 2, controlled  Elevated serum creatinine  HLD (hyperlipidemia)  HTN (hyperte...  Hemoglobin A1C [facility], Routine collect, *Est. 07/05/17 3:00:00 CDT, Blood, Order for future visit, *Est. Stop date 07/05/17 3:00:00 CDT, Lab Collect, Diabetes mellitus type 2, controlled, 2, week(s), In Approximately, 06/21/17 9:07:00 CDT  Microalbumin Level Urine, Routine collect, Urine, Order for future visit, *Est. 07/05/17 3:00:00 CDT, *Est. Stop date 07/05/17 3:00:00 CDT, Nurse collect, Diabetes mellitus type 2, controlled, day(s), 2, week(s), In Approximately, 06/21/17 9:07:00 CDT  Office/Outpatient Visit Level 4 Established 17621 PC, A-fib  Anemia  COPD with asthma  Chronic kidney disease (CKD)  Diabetes mellitus type 2, controlled, [facility] Int Med C, 06/21/17 9:26:00 CDT  ?  Elevated serum creatinine  ?Resolved. Will monitor. Drink plenty of water. Maintain good BP control. [6]  Ordered:  Clinic Follow up, *Est. 12/21/17 3:00:00 CST, in 6 months with DALTON Squires, Order for future visit, A-fib  Anemia  COPD with asthma  Chronic kidney disease (CKD)  Diabetes mellitus type 2, controlled  Elevated serum creatinine  HLD (hyperlipidemia)  HTN (hyperte...  ?  Headache  ?RX Amoxicillin 875 mg 1 tab po BID X 7 days. CT sinus in 1 week. If abnormal will refer to ENT.  Ordered:  CT Sinus 1 Plane, Routine, *Est. 07/05/17 3:00:00 CDT, Headaches, None, Stretcher, Patient Has IV?, Patient on Oxygen?, Rad Type, Order for future visit, Headache, Schedule this test, [facility]  [facility], 2, week(s), In Approximately, *Est. 07/05/17 3:00:00...  ?  Hepatitis C  ?HCV undetected. Abd US done 8/ 2016- WNL. [7]  Ordered:  Clinic Follow up, *Est. 12/21/17 3:00:00 CST, in 6 months with DALTON Squires, Order for future visit, A-fib  Anemia  COPD with asthma  Chronic kidney disease (CKD)  Diabetes mellitus type 2, controlled  Elevated serum creatinine  HLD (hyperlipidemia)  HTN (hyperte...  Comprehensive Metabolic Panel, Routine collect, *Est. 07/05/17 3:00:00 CDT, Blood, Order for future visit, *Est. Stop date 07/05/17 3:00:00 CDT, Lab Collect, Hepatitis C  HLD (hyperlipidemia), 2, week(s), In Approximately, 06/21/17 9:07:00 CDT  Hepatitis C Virus RNA Quant [id number], Routine collect, *Est. 07/05/17 3:00:00 CDT, Blood, Order for future visit, *Est. Stop date 07/05/17 3:00:00 CDT, Lab Collect, Hepatitis C, 2, week(s), In Approximately, 06/21/17 9:07:00 CDT  Hepatitis Panel [facility], Routine collect, *Est. 07/05/17 3:00:00 CDT, Blood, Order for future visit, *Est. Stop date 07/05/17 3:00:00 CDT, Lab Collect, Hepatitis C, 2, week(s), In Approximately, 06/21/17 9:07:00 CDT  ?  HLD (hyperlipidemia)  ?Low fat diet and exercise. Cont med as prescribed. [8]  Ordered:  Clinic Follow up, *Est. 12/21/17 3:00:00 CST, in 6 months with DALTON Squires, Order for future visit, A-fib  Anemia  COPD with asthma  Chronic kidney disease (CKD)  Diabetes mellitus type 2, controlled  Elevated serum creatinine  HLD (hyperlipidemia)  HTN (hyperte...  Comprehensive Metabolic Panel, Routine collect, *Est. 07/05/17 3:00:00 CDT, Blood, Order for future visit, *Est. Stop date 07/05/17 3:00:00 CDT, Lab Collect, Hepatitis C  HLD (hyperlipidemia), 2, week(s), In Approximately, 06/21/17 9:07:00 CDT  ?  HTN (hypertension)  ?Low fat low salt diet and exercise. Cont med as prescribed. [9]  Ordered:  Clinic Follow up, *Est. 12/21/17 3:00:00 CST, in 6 months with DALTON Squires, Order for future visit, A-fib  Anemia   COPD with asthma  Chronic kidney disease (CKD)  Diabetes mellitus type 2, controlled  Elevated serum creatinine  HLD (hyperlipidemia)  HTN (hyperte...  Urinalysis with Microscopic if Indicated, Routine collect, Urine, Order for future visit, *Est. 07/05/17 3:00:00 CDT, *Est. Stop date 07/05/17 3:00:00 CDT, Nurse collect, HTN (hypertension), day(s), 2, week(s), In Approximately, 06/21/17 9:07:00 CDT  ?  Joint pain  ?Encouraged Tylenol OTC as prescribed per package prn pain. [10]  Ordered:  Clinic Follow up, *Est. 12/21/17 3:00:00 CST, in 6 months with DALTON Squires, Order for future visit, A-fib  Anemia  COPD with asthma  Chronic kidney disease (CKD)  Diabetes mellitus type 2, controlled  Elevated serum creatinine  HLD (hyperlipidemia)  HTN (hyperte...  ?  Well adult exam  ?Labs in 2 weeks. FOBT X 3 NA.  Ordered:  Occult Blood Stool #2 Screening, Routine collect, *Est. 07/21/17 3:00:00 CDT, Stool, Order for future visit, *Est. Stop date 07/21/17 3:00:00 CDT, Nurse collect, Well adult exam, day(s), 1, month(s), In Approximately, 06/21/17 9:07:00 CDT  Occult Blood Stool #3 Screening, Routine collect, *Est. 07/21/17 3:00:00 CDT, Stool, Order for future visit, *Est. Stop date 07/21/17 3:00:00 CDT, Nurse collect, Well adult exam, day(s), 1, month(s), In Approximately, 06/21/17 9:07:00 CDT  Occult Blood Stool Screening Test, Routine collect, *Est. 07/21/17 3:00:00 CDT, Stool, Order for future visit, *Est. Stop date 07/21/17 3:00:00 CDT, Nurse collect, Well adult exam, day(s), 1, month(s), In Approximately, 06/21/17 9:08:00 CDT  Prostatic Specific Antigen Screening Test, Routine collect, *Est. 07/05/17 3:00:00 CDT, Blood, Order for future visit, *Est. Stop date 07/05/17 3:00:00 CDT, Lab Collect, Well adult exam, 2, week(s), In Approximately, 06/21/17 9:07:00 CDT  ?  Orders:  amoxicillin, 875 mg = 1 tab(s), Oral, BID, X 7 day(s), # 14 tab(s), 0 Refill(s), Pharmacy: [id number]  RTC in 6 months.   Problem  List/Past Medical History  CHF (congestive heart failure)(  Confirmed  )  COPD  Diabetes mellitus  Hypertension  SOB (shortness of breath)(  Confirmed  )  Historical  Afib  Bronchitis  Emphysema  Hepatitis C  Procedure/Surgical History  Transesophageal Echo (for Surgery) (None) (02/01/2017), Ultrasonography of Heart with Aorta, Transesophageal (02/01/2017), CARDIOVERTER-DEFIBRILLATOR, DUAL CHAMBER (IMPLANTABLE) (03/02/2015), Implantation or replacement of automatic cardioverter/defibrillator, total system [AICD] (03/02/2015), Insertion of Antimicrobial Envelope (03/02/2015), Insertion or replacement of permanent implantable defibrillator system, with transvenous lead(s), single or dual chamber (03/02/2015), Insertion Single/Dual Jeanine Pulse Generator Leads (None) (03/02/2015), PACEMAKER, DUAL CHAMBER, RATE-RESPONSIVE (IMPLANTABLE) (03/02/2015), Venogram Left Extremity (None) (03/02/2015), Cardioversion, elective, electrical conversion of arrhythmia; external. (12/22/2014), Other electric countershock of heart (12/22/2014), Coronary arteriography using a single catheter (09/19/2014), Coronary Artery Angiography (None) (09/19/2014), Left heart cardiac catheterization (09/19/2014), Angiogram, Arm.  Medications  amiodarone 200 mg oral Tab, 200 mg, 1 tab(s), Oral, BID  atorvastatin 10 mg oral tablet, 10 mg, 1 tab(s), Oral, Daily  Coreg 12.5 mg oral tablet, 12.5 mg, 1 tab(s), Oral, BIDWMeal, 11 refills  Dulera 100 mcg-5 mcg/inh inhalation aerosol, 2 puff(s), INH, BID, 6 refills  DuoNeb inhalation solution, 3 mL, INH, TID, PRN, 3 refills  furosemide 40 mg oral tablet, 20 mg, 0.5 tab(s), Oral, Daily, 3 refills  glipiZIDE 5 mg oral tablet, 5 mg, 1 tab(s), Oral, Daily  ipratropium 0.02% inhalation solution, 500 mcg, 2.5 mL, NEB, TID, PRN, 6 refills  LISINOPRIL 2.5MG TAB, 2.5 mg, 1 tab(s), Oral, Daily  lisinopril 5 mg oral tablet, 5 mg, 1 tab(s), Oral, Daily, 3 refills  loratadine 10 mg oral capsule, 10 mg, 1 cap(s), Oral,  Daily, 3 refills,? ?Not taking  Nebulizer, See Instructions  nitroglycerin 0.4 mg sublingual TAB, See Instructions, PRN, 3 refills  Rolator, See Instructions  Ventolin HFA 90 mcg/inh inhalation aerosol, 1 puff(s), INH, q6hr, PRN, 6 refills  warfarin 2.5 mg oral tablet, 2.5 mg, 1 tab(s), Oral, At Bedtime  warfarin 2.5 mg oral tablet, 2.5 mg, 1 tab(s), Oral, At Bedtime, 1 refills  Allergies  No Known Medication Allergies  Social History  Alcohol  Never  Employment/School  Unemployed, Highest education level: High school.  Exercise  Exercise frequency: 5-6 times/week. Self assessment: Poor condition. Exercise type: Walking.  Home/Environment  Lives with Mother. Living situation: Home/Independent. Home equipment: Glucose monitoring, Walker/Cane, Wheelchair. Alcohol abuse in household: No. Substance abuse in household: No. Smoker in household: Yes. Injuries/Abuse/Neglect in household: No. Feels unsafe at home: No. Safe place to go: Yes. Agency(s)/Others notified: No. Family/Friends available for support: Yes. Concern for family members at home: Yes. Major illness in household: Yes. Financial concerns: No.  Nutrition/Health  Diabetic, Wants to lose weight: No. Sleeping concerns: Yes. Feels highly stressed: Yes.  Substance Abuse  Never  Tobacco  Former smoker, Cigarettes, Previous treatment: None. Ready to change: No. Household tobacco concerns: No.  Family History  Cataract.: Mother and Brother.  Congestive heart disease.: Mother.  Glaucoma.: Mother and Brother.  Hyperglycemia.: Brother.  Hypertension.: Father.  Peripheral vascular disease.: Mother.     [1]?Office Visit Note; Dayami Squires NP 12/21/2016 07:36 CST  [2]?Office Visit Note; Dayami Squires NP 12/21/2016 07:36 CST  [3]?Office Visit Note; Dayami Squires NP 12/21/2016 07:36 CST  [4]?Office Visit Note; Dayami Squires NP 12/21/2016 07:36 CST  [5]?Office Visit Note; Dayami Squires NP 12/21/2016 07:36 CST  [6]?Office Visit Note; Shaw BLACK,  Dayami SALAS 12/21/2016 07:36 CST  [7]?Office Visit Note; Shaw BLACK, Dayami SALAS 12/21/2016 07:36 CST  [8]?Office Visit Note; Shaw BLACK, Dayami SALAS 12/21/2016 07:36 CST  [9]?Office Visit Note; Dayami Squires NP 12/21/2016 07:36 CST  [10]?Office Visit Note; Shaw BLACK, Dayami SALAS 12/21/2016 07:36 CST

## 2022-05-18 ENCOUNTER — TELEPHONE (OUTPATIENT)
Dept: NEPHROLOGY | Facility: CLINIC | Age: 62
End: 2022-05-18
Payer: MEDICAID

## 2022-05-19 RX ORDER — LISINOPRIL 5 MG/1
5 TABLET ORAL DAILY
Qty: 90 TABLET | Refills: 3 | Status: SHIPPED | OUTPATIENT
Start: 2022-05-19 | End: 2022-06-08 | Stop reason: SDUPTHER

## 2022-06-07 PROBLEM — N32.89 BLADDER WALL THICKENING: Status: ACTIVE | Noted: 2022-06-07

## 2022-06-07 PROBLEM — K42.9 UMBILICAL HERNIA: Status: ACTIVE | Noted: 2022-06-07

## 2022-06-07 PROBLEM — N28.1 RENAL CYST, RIGHT: Status: ACTIVE | Noted: 2022-06-07

## 2022-06-07 PROBLEM — B07.0 PLANTAR WART: Status: ACTIVE | Noted: 2022-06-07

## 2022-06-07 PROBLEM — Z13.6 SCREENING FOR HYPERTENSION: Status: ACTIVE | Noted: 2022-06-07

## 2022-06-07 PROBLEM — M54.10 RADICULOPATHY: Status: ACTIVE | Noted: 2022-06-07

## 2022-06-07 PROBLEM — E78.5 HLD (HYPERLIPIDEMIA): Status: ACTIVE | Noted: 2022-06-07

## 2022-06-07 PROBLEM — R79.89 ELEVATED SERUM CREATININE: Status: ACTIVE | Noted: 2022-06-07

## 2022-06-07 PROBLEM — M25.50 JOINT PAIN: Status: ACTIVE | Noted: 2022-06-07

## 2022-06-07 PROBLEM — B18.2 CHRONIC HEPATITIS C VIRUS INFECTION: Status: ACTIVE | Noted: 2022-06-07

## 2022-06-07 PROBLEM — Z71.6 TOBACCO ABUSE COUNSELING: Status: ACTIVE | Noted: 2022-06-07

## 2022-06-07 PROBLEM — M06.9 RHEUMATOID ARTHRITIS: Status: ACTIVE | Noted: 2022-06-07

## 2022-06-07 PROBLEM — I48.91 A-FIB: Status: ACTIVE | Noted: 2022-06-07

## 2022-06-07 PROBLEM — Z00.00 WELL ADULT EXAM: Status: ACTIVE | Noted: 2022-06-07

## 2022-06-07 PROBLEM — N13.30 HYDROURETERONEPHROSIS: Status: ACTIVE | Noted: 2022-06-07

## 2022-06-07 PROBLEM — M54.9 CHRONIC BACK PAIN: Status: ACTIVE | Noted: 2022-06-07

## 2022-06-07 PROBLEM — J44.89 ASTHMA WITH COPD: Status: ACTIVE | Noted: 2022-06-07

## 2022-06-07 PROBLEM — J30.9 AR (ALLERGIC RHINITIS): Status: ACTIVE | Noted: 2022-06-07

## 2022-06-07 PROBLEM — D64.9 ANEMIA: Status: ACTIVE | Noted: 2022-06-07

## 2022-06-07 PROBLEM — G89.29 CHRONIC BACK PAIN: Status: ACTIVE | Noted: 2022-06-07

## 2022-06-07 PROBLEM — R51.9 HA (HEADACHE): Status: ACTIVE | Noted: 2022-06-07

## 2022-06-07 PROBLEM — E11.9 CONTROLLED TYPE 2 DIABETES MELLITUS WITHOUT COMPLICATION, WITHOUT LONG-TERM CURRENT USE OF INSULIN: Status: ACTIVE | Noted: 2022-06-07

## 2022-06-07 PROBLEM — N18.9 CKD (CHRONIC KIDNEY DISEASE): Status: ACTIVE | Noted: 2022-06-07

## 2022-06-08 ENCOUNTER — OFFICE VISIT (OUTPATIENT)
Dept: INTERNAL MEDICINE | Facility: CLINIC | Age: 62
End: 2022-06-08
Payer: MEDICAID

## 2022-06-08 VITALS
SYSTOLIC BLOOD PRESSURE: 120 MMHG | RESPIRATION RATE: 18 BRPM | HEART RATE: 76 BPM | TEMPERATURE: 98 F | WEIGHT: 206 LBS | DIASTOLIC BLOOD PRESSURE: 81 MMHG | HEIGHT: 73 IN | BODY MASS INDEX: 27.3 KG/M2

## 2022-06-08 DIAGNOSIS — D64.9 ANEMIA, UNSPECIFIED TYPE: ICD-10-CM

## 2022-06-08 DIAGNOSIS — E11.9 CONTROLLED TYPE 2 DIABETES MELLITUS WITHOUT COMPLICATION, WITHOUT LONG-TERM CURRENT USE OF INSULIN: ICD-10-CM

## 2022-06-08 DIAGNOSIS — J44.89 ASTHMA WITH COPD: ICD-10-CM

## 2022-06-08 DIAGNOSIS — Z00.00 WELL ADULT EXAM: ICD-10-CM

## 2022-06-08 DIAGNOSIS — Z12.11 COLON CANCER SCREENING: Primary | ICD-10-CM

## 2022-06-08 DIAGNOSIS — Z71.6 TOBACCO ABUSE COUNSELING: ICD-10-CM

## 2022-06-08 PROCEDURE — 1159F MED LIST DOCD IN RCRD: CPT | Mod: CPTII,,, | Performed by: NURSE PRACTITIONER

## 2022-06-08 PROCEDURE — 3008F BODY MASS INDEX DOCD: CPT | Mod: CPTII,,, | Performed by: NURSE PRACTITIONER

## 2022-06-08 PROCEDURE — 1160F RVW MEDS BY RX/DR IN RCRD: CPT | Mod: CPTII,,, | Performed by: NURSE PRACTITIONER

## 2022-06-08 PROCEDURE — 3079F PR MOST RECENT DIASTOLIC BLOOD PRESSURE 80-89 MM HG: ICD-10-PCS | Mod: CPTII,,, | Performed by: NURSE PRACTITIONER

## 2022-06-08 PROCEDURE — 4010F ACE/ARB THERAPY RXD/TAKEN: CPT | Mod: CPTII,,, | Performed by: NURSE PRACTITIONER

## 2022-06-08 PROCEDURE — 99214 OFFICE O/P EST MOD 30 MIN: CPT | Mod: S$PBB,,, | Performed by: NURSE PRACTITIONER

## 2022-06-08 PROCEDURE — 99215 OFFICE O/P EST HI 40 MIN: CPT | Mod: PBBFAC | Performed by: NURSE PRACTITIONER

## 2022-06-08 PROCEDURE — 4010F PR ACE/ARB THEARPY RXD/TAKEN: ICD-10-PCS | Mod: CPTII,,, | Performed by: NURSE PRACTITIONER

## 2022-06-08 PROCEDURE — 3079F DIAST BP 80-89 MM HG: CPT | Mod: CPTII,,, | Performed by: NURSE PRACTITIONER

## 2022-06-08 PROCEDURE — 1160F PR REVIEW ALL MEDS BY PRESCRIBER/CLIN PHARMACIST DOCUMENTED: ICD-10-PCS | Mod: CPTII,,, | Performed by: NURSE PRACTITIONER

## 2022-06-08 PROCEDURE — 3074F PR MOST RECENT SYSTOLIC BLOOD PRESSURE < 130 MM HG: ICD-10-PCS | Mod: CPTII,,, | Performed by: NURSE PRACTITIONER

## 2022-06-08 PROCEDURE — 99214 PR OFFICE/OUTPT VISIT, EST, LEVL IV, 30-39 MIN: ICD-10-PCS | Mod: S$PBB,,, | Performed by: NURSE PRACTITIONER

## 2022-06-08 PROCEDURE — 3074F SYST BP LT 130 MM HG: CPT | Mod: CPTII,,, | Performed by: NURSE PRACTITIONER

## 2022-06-08 PROCEDURE — 1159F PR MEDICATION LIST DOCUMENTED IN MEDICAL RECORD: ICD-10-PCS | Mod: CPTII,,, | Performed by: NURSE PRACTITIONER

## 2022-06-08 PROCEDURE — 3008F PR BODY MASS INDEX (BMI) DOCUMENTED: ICD-10-PCS | Mod: CPTII,,, | Performed by: NURSE PRACTITIONER

## 2022-06-08 RX ORDER — LISINOPRIL 5 MG/1
5 TABLET ORAL DAILY
Qty: 90 TABLET | Refills: 3 | Status: SHIPPED | OUTPATIENT
Start: 2022-06-08 | End: 2023-01-03 | Stop reason: SDUPTHER

## 2022-06-08 RX ORDER — METOPROLOL SUCCINATE 50 MG/1
50 TABLET, EXTENDED RELEASE ORAL
COMMUNITY
Start: 2022-04-05 | End: 2022-07-19 | Stop reason: SDUPTHER

## 2022-06-08 RX ORDER — FLUTICASONE PROPIONATE AND SALMETEROL XINAFOATE 115; 21 UG/1; UG/1
AEROSOL, METERED RESPIRATORY (INHALATION)
COMMUNITY
Start: 2022-01-09 | End: 2022-06-08 | Stop reason: SDUPTHER

## 2022-06-08 RX ORDER — CETIRIZINE HYDROCHLORIDE 10 MG/1
10 TABLET ORAL DAILY
COMMUNITY
Start: 2022-05-17 | End: 2022-07-11 | Stop reason: SDUPTHER

## 2022-06-08 RX ORDER — FLUTICASONE PROPIONATE AND SALMETEROL XINAFOATE 115; 21 UG/1; UG/1
2 AEROSOL, METERED RESPIRATORY (INHALATION) EVERY 12 HOURS
Qty: 12 G | Refills: 3 | Status: SHIPPED | OUTPATIENT
Start: 2022-06-08 | End: 2022-09-13 | Stop reason: SDUPTHER

## 2022-06-08 RX ORDER — PROMETHAZINE HYDROCHLORIDE AND CODEINE PHOSPHATE 6.25; 1 MG/5ML; MG/5ML
5 SOLUTION ORAL EVERY 6 HOURS
COMMUNITY
Start: 2022-04-01 | End: 2022-06-08 | Stop reason: SDUPTHER

## 2022-06-08 RX ORDER — IPRATROPIUM BROMIDE AND ALBUTEROL SULFATE 2.5; .5 MG/3ML; MG/3ML
SOLUTION RESPIRATORY (INHALATION) 4 TIMES DAILY
COMMUNITY
Start: 2022-05-17 | End: 2022-08-26 | Stop reason: SDUPTHER

## 2022-06-08 RX ORDER — BLOOD SUGAR DIAGNOSTIC
STRIP MISCELLANEOUS
COMMUNITY
Start: 2022-03-08

## 2022-06-08 RX ORDER — APIXABAN 5 MG/1
5 TABLET, FILM COATED ORAL 2 TIMES DAILY
COMMUNITY
Start: 2022-02-18 | End: 2023-02-16 | Stop reason: SDUPTHER

## 2022-06-08 RX ORDER — PROMETHAZINE HYDROCHLORIDE AND CODEINE PHOSPHATE 6.25; 1 MG/5ML; MG/5ML
5 SOLUTION ORAL EVERY 6 HOURS PRN
Qty: 240 ML | Refills: 1 | Status: SHIPPED | OUTPATIENT
Start: 2022-06-08 | End: 2022-07-11 | Stop reason: SDUPTHER

## 2022-06-08 RX ORDER — CLOTRIMAZOLE AND BETAMETHASONE DIPROPIONATE 10; .64 MG/G; MG/G
2 CREAM TOPICAL 2 TIMES DAILY
COMMUNITY
Start: 2022-02-18

## 2022-06-08 RX ORDER — AMIODARONE HYDROCHLORIDE 200 MG/1
200 TABLET ORAL DAILY
COMMUNITY
Start: 2022-05-17 | End: 2022-10-04

## 2022-06-08 RX ORDER — GLIPIZIDE 5 MG/1
5 TABLET ORAL DAILY
COMMUNITY
Start: 2022-03-08 | End: 2023-06-11 | Stop reason: SDUPTHER

## 2022-06-08 RX ORDER — FLUTICASONE PROPIONATE 50 MCG
SPRAY, SUSPENSION (ML) NASAL
COMMUNITY
Start: 2021-09-08 | End: 2022-06-08 | Stop reason: SDUPTHER

## 2022-06-08 RX ORDER — FLUTICASONE PROPIONATE 50 MCG
2 SPRAY, SUSPENSION (ML) NASAL DAILY
Qty: 18 G | Refills: 6 | Status: SHIPPED | OUTPATIENT
Start: 2022-06-08 | End: 2022-12-13 | Stop reason: SDUPTHER

## 2022-06-08 RX ORDER — TAMSULOSIN HYDROCHLORIDE 0.4 MG/1
1 CAPSULE ORAL DAILY
COMMUNITY
Start: 2022-03-14 | End: 2023-05-31

## 2022-06-08 RX ORDER — ATORVASTATIN CALCIUM 10 MG/1
10 TABLET, FILM COATED ORAL DAILY
COMMUNITY
Start: 2022-02-18 | End: 2022-07-19 | Stop reason: SDUPTHER

## 2022-06-08 RX ORDER — FUROSEMIDE 20 MG/1
TABLET ORAL
COMMUNITY
Start: 2022-04-26 | End: 2022-07-11 | Stop reason: SDUPTHER

## 2022-06-08 RX ORDER — LANCETS
EACH MISCELLANEOUS DAILY
COMMUNITY
Start: 2022-03-08

## 2022-06-08 NOTE — PROGRESS NOTES
[facility]  [facility]     Patient Name: Ezra Zhang   : 1960  MRN:[id number]     Review of patient's allergies indicates:  No Known Allergies   Medication List with Changes/Refills   Current Medications    ACCU-CHEK FASTCLIX LANCET DRUM MISC    Apply topically once daily.    ACCU-CHEK GUIDE TEST STRIPS STRP    USE AS DIRECTED once daily (keep log)    ALBUTEROL-IPRATROPIUM (DUO-NEB) 2.5 MG-0.5 MG/3 ML NEBULIZER SOLUTION    Take by nebulization 4 (four) times daily.    AMIODARONE (PACERONE) 200 MG TAB    Take 200 mg by mouth once daily.    ATORVASTATIN (LIPITOR) 10 MG TABLET    Take 10 mg by mouth once daily.    CETIRIZINE (ZYRTEC) 10 MG TABLET    Take 10 mg by mouth once daily.    CLOTRIMAZOLE-BETAMETHASONE 1-0.05% (LOTRISONE) CREAM    Apply 2 g topically 2 (two) times daily.    ELIQUIS 5 MG TAB    Take 5 mg by mouth 2 (two) times daily.    FLUTICASONE PROPION-SALMETEROL 115-21 MCG/DOSE (ADVAIR HFA) 115-21 MCG/ACTUATION HFAA INHALER    Inhale into the lungs.    FLUTICASONE PROPIONATE (FLONASE ALLERGY RELIEF) 50 MCG/ACTUATION NASAL SPRAY    by Nasal route.    FUROSEMIDE (LASIX) 20 MG TABLET    Take by mouth.    GLIPIZIDE (GLUCOTROL) 5 MG TABLET    Take 5 mg by mouth once daily.    LANCETS-BLO[facility]IPS [facility]      Accucheck blood glucose test strips and lancets, See Instructions, Check CBG once daily and keep log., # 100 EA, 11 Refill(s), Pharmacy: [facility], 185, cm, Height/Length Dosing, 22 8:10:00 CST, 96, kg, Weight Dosing, 22 8:10:00 CST    LISINOPRIL (PRINIVIL,ZESTRIL) 5 MG TABLET    Take 1 tablet (5 mg total) by mouth once daily.    METOPROLOL SUCCINATE (TOPROL-XL) 50 MG 24 HR TABLET    Take 50 mg by mouth.    PROMETHAZINE-CODEINE 6.25-10 MG/5 ML (PHENERGAN WITH CODEINE) 6.25-10 MG/5 ML SYRUP    Take 5 mLs by mouth every 6 (six) hours.    TAMSULOSIN (FLOMAX) 0.4 MG CAP    Take 1 capsule by mouth once daily.        CC:  Chief Complaint    Patient presents with    Follow-up        HPI  62 y/o male for f/u. Pt has hx Asthma/COPD, CHF, DM2, Hep C, HLD, HTN, A-fib, CKD, Hx Hep C. Pt followed in Cardio cl. Pt did not have labs prior to appt.           ROS  Review of Systems   Constitutional: Negative.    HENT: Negative.    Eyes: Negative.    Respiratory: Negative.    Cardiovascular: Negative.    Gastrointestinal: Negative.    Endocrine: Negative.    Genitourinary: Negative.    Musculoskeletal: Negative.    Integumentary:  Negative.   Allergic/Immunologic: Negative.    Neurological: Negative.    Hematological: Negative.    Psychiatric/Behavioral: Negative.         Physical Examination:  Vitals:    06/08/22 0751   BP: 120/81   Pulse: 76   Resp: 18   Temp: 97.9 °F (36.6 °C)          Physical Exam  Vitals reviewed.   Constitutional:       Appearance: Normal appearance. He is normal weight.   HENT:      Head: Normocephalic.   Cardiovascular:      Rate and Rhythm: Normal rate and regular rhythm.      Pulses: Normal pulses.      Heart sounds: Normal heart sounds.   Pulmonary:      Effort: Pulmonary effort is normal.      Breath sounds: Normal breath sounds.   Abdominal:      General: Abdomen is flat.      Palpations: Abdomen is soft.   Musculoskeletal:         General: Normal range of motion.      Cervical back: Normal range of motion.   Skin:     General: Skin is warm and dry.   Neurological:      Mental Status: He is alert.   Psychiatric:         Mood and Affect: Mood normal.            Labs/Imaging:  Reviewed    Assessment/Plan:  1. Anemia, unspecified type  Labs in 2 weeks.     2. Controlled type 2 diabetes mellitus without complication, without long-term current use of insulin  ADA diet and exercise. A1c 5.2 controlled. Cont DM meds as prescribed. DM foot 3-8-22. DM eye 4-1-22. Urine microalbumin 8-25-21.      3. Well adult exam  Labs in 2 weeks.     4. Colon cancer screening  Cologuard in 2 weeks.    5. Tobacco abuse counseling   Encouraged smoking  cessation. Education provided. Pt last CT Thorax done in 2018- pt was followed by lung CA screening program but has not had further appt. Refer to Lung CA screening program for eval and mgmt.     6. Asthma with COPD  PFT and CXR due in 3 months. Cont med as prescribed. Avoid triggers.       RTC in 1 month telemed for lab results.

## 2022-07-11 ENCOUNTER — OFFICE VISIT (OUTPATIENT)
Dept: INTERNAL MEDICINE | Facility: CLINIC | Age: 62
End: 2022-07-11
Payer: MEDICAID

## 2022-07-11 VITALS — HEART RATE: 70 BPM | DIASTOLIC BLOOD PRESSURE: 88 MMHG | SYSTOLIC BLOOD PRESSURE: 133 MMHG

## 2022-07-11 DIAGNOSIS — B19.20 HEPATITIS C VIRUS INFECTION WITHOUT HEPATIC COMA, UNSPECIFIED CHRONICITY: ICD-10-CM

## 2022-07-11 DIAGNOSIS — E11.9 CONTROLLED TYPE 2 DIABETES MELLITUS WITHOUT COMPLICATION, WITHOUT LONG-TERM CURRENT USE OF INSULIN: ICD-10-CM

## 2022-07-11 DIAGNOSIS — Z13.6 SCREENING FOR HYPERTENSION: ICD-10-CM

## 2022-07-11 DIAGNOSIS — J44.89 ASTHMA WITH COPD: Primary | ICD-10-CM

## 2022-07-11 DIAGNOSIS — Z12.5 PROSTATE CANCER SCREENING: ICD-10-CM

## 2022-07-11 PROCEDURE — 3079F DIAST BP 80-89 MM HG: CPT | Mod: CPTII,95,, | Performed by: NURSE PRACTITIONER

## 2022-07-11 PROCEDURE — 99214 OFFICE O/P EST MOD 30 MIN: CPT | Mod: 95,,, | Performed by: NURSE PRACTITIONER

## 2022-07-11 PROCEDURE — 1159F MED LIST DOCD IN RCRD: CPT | Mod: CPTII,95,, | Performed by: NURSE PRACTITIONER

## 2022-07-11 PROCEDURE — 1159F PR MEDICATION LIST DOCUMENTED IN MEDICAL RECORD: ICD-10-PCS | Mod: CPTII,95,, | Performed by: NURSE PRACTITIONER

## 2022-07-11 PROCEDURE — 1160F RVW MEDS BY RX/DR IN RCRD: CPT | Mod: CPTII,95,, | Performed by: NURSE PRACTITIONER

## 2022-07-11 PROCEDURE — 4010F ACE/ARB THERAPY RXD/TAKEN: CPT | Mod: CPTII,95,, | Performed by: NURSE PRACTITIONER

## 2022-07-11 PROCEDURE — 4010F PR ACE/ARB THEARPY RXD/TAKEN: ICD-10-PCS | Mod: CPTII,95,, | Performed by: NURSE PRACTITIONER

## 2022-07-11 PROCEDURE — 3075F PR MOST RECENT SYSTOLIC BLOOD PRESS GE 130-139MM HG: ICD-10-PCS | Mod: CPTII,95,, | Performed by: NURSE PRACTITIONER

## 2022-07-11 PROCEDURE — 1160F PR REVIEW ALL MEDS BY PRESCRIBER/CLIN PHARMACIST DOCUMENTED: ICD-10-PCS | Mod: CPTII,95,, | Performed by: NURSE PRACTITIONER

## 2022-07-11 PROCEDURE — 3079F PR MOST RECENT DIASTOLIC BLOOD PRESSURE 80-89 MM HG: ICD-10-PCS | Mod: CPTII,95,, | Performed by: NURSE PRACTITIONER

## 2022-07-11 PROCEDURE — 3075F SYST BP GE 130 - 139MM HG: CPT | Mod: CPTII,95,, | Performed by: NURSE PRACTITIONER

## 2022-07-11 PROCEDURE — 99214 PR OFFICE/OUTPT VISIT, EST, LEVL IV, 30-39 MIN: ICD-10-PCS | Mod: 95,,, | Performed by: NURSE PRACTITIONER

## 2022-07-11 RX ORDER — CETIRIZINE HYDROCHLORIDE 10 MG/1
10 TABLET ORAL DAILY
Qty: 30 TABLET | Refills: 3 | Status: SHIPPED | OUTPATIENT
Start: 2022-07-11 | End: 2022-09-13 | Stop reason: SDUPTHER

## 2022-07-11 RX ORDER — FUROSEMIDE 20 MG/1
20 TABLET ORAL DAILY
Qty: 30 TABLET | Refills: 1 | Status: SHIPPED | OUTPATIENT
Start: 2022-07-11 | End: 2022-09-26 | Stop reason: SDUPTHER

## 2022-07-11 RX ORDER — PROMETHAZINE HYDROCHLORIDE AND CODEINE PHOSPHATE 6.25; 1 MG/5ML; MG/5ML
5 SOLUTION ORAL EVERY 6 HOURS PRN
Qty: 240 ML | Refills: 1 | Status: SHIPPED | OUTPATIENT
Start: 2022-07-11 | End: 2022-09-13 | Stop reason: SDUPTHER

## 2022-07-11 NOTE — PROGRESS NOTES
Internal Medicine Clinic  [facility]lin     Patient Name: Ezra Zhang   : 1960  MRN:[id number]     Review of patient's allergies indicates:  No Known Allergies   Medication List with Changes/Refills   Current Medications    ACCU-CHEK FASTCLIX LANCET DRUM MISC    Apply topically once daily.    ACCU-CHEK GUIDE TEST STRIPS STRP    USE AS DIRECTED once daily (keep log)    ALBUTEROL-IPRATROPIUM (DUO-NEB) 2.5 MG-0.5 MG/3 ML NEBULIZER SOLUTION    Take by nebulization 4 (four) times daily.    AMIODARONE (PACERONE) 200 MG TAB    Take 200 mg by mouth once daily.    ATORVASTATIN (LIPITOR) 10 MG TABLET    Take 10 mg by mouth once daily.    CETIRIZINE (ZYRTEC) 10 MG TABLET    Take 10 mg by mouth once daily.    CLOTRIMAZOLE-BETAMETHASONE 1-0.05% (LOTRISONE) CREAM    Apply 2 g topically 2 (two) times daily.    ELIQUIS 5 MG TAB    Take 5 mg by mouth 2 (two) times daily.    FLUTICASONE PROPION-SALMETEROL 115-21 MCG/DOSE (ADVAIR HFA) 115-21 MCG/ACTUATION HFAA INHALER    Inhale 2 puffs into the lungs every 12 (twelve) hours.    FLUTICASONE PROPIONATE (FLONASE ALLERGY RELIEF) 50 MCG/ACTUATION NASAL SPRAY    2 sprays (100 mcg total) by Each Nostril route Daily.    FUROSEMIDE (LASIX) 20 MG TABLET    Take by mouth.    GLIPIZIDE (GLUCOTROL) 5 MG TABLET    Take 5 mg by mouth once daily.    LANC[facility]      Accucheck blood glucose test strips and lancets, See Instructions, Check CBG once daily and keep log., # 100 EA, 11 Refill(s), Pharmacy: [facility], 185, cm, Height/Length Dosing, 22 8:10:00 CST, 96, kg, Weight Dosing, 22 8:10:00 CST    LISINOPRIL (PRINIVIL,ZESTRIL) 5 MG TABLET    Take 1 tablet (5 mg total) by mouth once daily.    METOPROLOL SUCCINATE (TOPROL-XL) 50 MG 24 HR TABLET    Take 50 mg by mouth.    PROMETHAZINE-CODEINE 6.25-10 MG/5 ML (PHENERGAN WITH CODEINE) 6.25-10 MG/5 ML SYRUP    Take 5 mLs by mouth every 6 (six) hours as needed for Cough.    TAMSULOSIN  (FLOMAX) 0.4 MG CAP    Take 1 capsule by mouth once daily.        CC:  Chief Complaint   Patient presents with    Follow-up    Medication Refill     Lasix, Zyrtec, Cough syrup        HPI  62 y/o male for f/u. Pt has hx Asthma/COPD, CHF, DM2, Hep C, HLD, HTN, A-fib, CKD, Hx Hep C. Pt followed in Cardio cl. Pt did not have labs prior to appt- 2nd time.           ROS  Review of Systems   Constitutional: Negative.    HENT: Negative.    Eyes: Negative.    Respiratory: Negative.    Cardiovascular: Negative.    Gastrointestinal: Negative.    Endocrine: Negative.    Genitourinary: Negative.    Musculoskeletal: Negative.    Integumentary:  Negative.   Allergic/Immunologic: Negative.    Neurological: Negative.    Hematological: Negative.    Psychiatric/Behavioral: Negative.         Physical Examination:  Vitals:    07/11/22 1348   BP: 133/88   Pulse: 70          Physical Exam  Pulmonary:      Effort: Pulmonary effort is normal.   Neurological:      Mental Status: He is alert.   Psychiatric:         Mood and Affect: Mood normal.            Labs/Imaging:  Reviewed    Assessment/Plan:  1. Asthma with COPD  PFT done 9-2-20 FEV1/FVC is reduced, TLC is normal, DLCO is reduced, Significant BD response, severe obstruction. PT UTD with CXR- done 10-13-20 WNL. Cont meds as prescribed. Pt UTD with pneumovax.    2. Controlled type 2 diabetes mellitus without complication, without long-term current use of insulin   ADA diet and exercise. A1c 5.2 up from 5.1. Cont DM med as prescribed. A1c in 2 weeks. DM eye exam- done 3-8-22. PT UTD with pneumovax. DM foot exam done3-8-22. Urine Microalbumin 8-25-21. Pt has bilat great toe callous laterally- pt has appt next month with [facility]- keep appt.        RTC in 1 month telemed for lab results. This is 2nd time pt is schedule due to not having labs done prior to appt.     Established Patient - Audio Only Telehealth Visit     The patient location is: home  The chief complaint  leading to consultation is: follow up  Visit type: Virtual visit with audio only (telephone)  Total time spent with patient: 34 minutes       The reason for the audio only service rather than synchronous audio and video virtual visit was related to technical difficulties or patient preference/necessity.     Each patient to whom I provide medical services by telemedicine is:  (1) informed of the relationship between the physician and patient and the respective role of any other health care provider with respect to management of the patient; and (2) notified that they may decline to receive medical services by telemedicine and may withdraw from such care at any time. Patient verbally consented to receive this service via voice-only telephone call.       HPI: See Above   Assessment and plan:  See Above                      This service was not originating from a related E/M service provided within the previous 7 days nor will  to an E/M service or procedure within the next 24 hours or my soonest available appointment.  Prevailing standard of care was able to be met in this audio-only visit.

## 2022-07-17 LAB — NONINV COLON CA DNA+OCC BLD SCRN STL QL: NEGATIVE

## 2022-07-19 ENCOUNTER — CLINICAL SUPPORT (OUTPATIENT)
Dept: CARDIOLOGY | Facility: CLINIC | Age: 62
End: 2022-07-19
Payer: MEDICAID

## 2022-07-19 ENCOUNTER — OFFICE VISIT (OUTPATIENT)
Dept: CARDIOLOGY | Facility: CLINIC | Age: 62
End: 2022-07-19
Payer: MEDICAID

## 2022-07-19 VITALS
RESPIRATION RATE: 20 BRPM | WEIGHT: 210.56 LBS | HEIGHT: 73 IN | BODY MASS INDEX: 27.91 KG/M2 | SYSTOLIC BLOOD PRESSURE: 124 MMHG | HEART RATE: 59 BPM | TEMPERATURE: 98 F | DIASTOLIC BLOOD PRESSURE: 84 MMHG | OXYGEN SATURATION: 100 %

## 2022-07-19 DIAGNOSIS — I48.91 ATRIAL FIBRILLATION, UNSPECIFIED TYPE: ICD-10-CM

## 2022-07-19 DIAGNOSIS — N18.9 CHRONIC KIDNEY DISEASE, UNSPECIFIED CKD STAGE: ICD-10-CM

## 2022-07-19 DIAGNOSIS — R53.83 FATIGUE, UNSPECIFIED TYPE: ICD-10-CM

## 2022-07-19 DIAGNOSIS — F17.200 SMOKING: ICD-10-CM

## 2022-07-19 DIAGNOSIS — I10 HYPERTENSION, UNSPECIFIED TYPE: ICD-10-CM

## 2022-07-19 DIAGNOSIS — E78.5 HYPERLIPIDEMIA, UNSPECIFIED HYPERLIPIDEMIA TYPE: ICD-10-CM

## 2022-07-19 DIAGNOSIS — I48.0 PAROXYSMAL ATRIAL FIBRILLATION: Primary | ICD-10-CM

## 2022-07-19 DIAGNOSIS — E11.9 CONTROLLED TYPE 2 DIABETES MELLITUS WITHOUT COMPLICATION, WITHOUT LONG-TERM CURRENT USE OF INSULIN: ICD-10-CM

## 2022-07-19 DIAGNOSIS — I42.8 NON-ISCHEMIC CARDIOMYOPATHY: ICD-10-CM

## 2022-07-19 PROBLEM — Z13.6 SCREENING FOR HYPERTENSION: Status: RESOLVED | Noted: 2022-06-07 | Resolved: 2022-07-19

## 2022-07-19 PROBLEM — Z71.6 TOBACCO ABUSE COUNSELING: Status: RESOLVED | Noted: 2022-06-07 | Resolved: 2022-07-19

## 2022-07-19 PROCEDURE — 99214 OFFICE O/P EST MOD 30 MIN: CPT | Mod: PBBFAC | Performed by: INTERNAL MEDICINE

## 2022-07-19 PROCEDURE — 93282 PRGRMG EVAL IMPLANTABLE DFB: CPT | Mod: PBBFAC

## 2022-07-19 RX ORDER — IBUPROFEN 200 MG
1 TABLET ORAL DAILY
Qty: 28 PATCH | Refills: 2 | Status: ON HOLD | OUTPATIENT
Start: 2022-07-19 | End: 2023-12-03 | Stop reason: HOSPADM

## 2022-07-19 RX ORDER — DIPHENHYDRAMINE HCL 25 MG
4 CAPSULE ORAL
Qty: 170 EACH | Refills: 3 | Status: ON HOLD | OUTPATIENT
Start: 2022-07-19 | End: 2023-12-03 | Stop reason: HOSPADM

## 2022-07-19 RX ORDER — ATORVASTATIN CALCIUM 40 MG/1
40 TABLET, FILM COATED ORAL DAILY
Qty: 90 TABLET | Refills: 3 | Status: SHIPPED | OUTPATIENT
Start: 2022-07-19 | End: 2022-10-04 | Stop reason: SDUPTHER

## 2022-07-19 RX ORDER — METOPROLOL SUCCINATE 50 MG/1
50 TABLET, EXTENDED RELEASE ORAL 2 TIMES DAILY
Qty: 180 TABLET | Refills: 3 | Status: SHIPPED | OUTPATIENT
Start: 2022-07-19 | End: 2023-01-03 | Stop reason: SDUPTHER

## 2022-07-19 NOTE — PROGRESS NOTES
Chief Complaint   Patient presents with    had device check today MD request visit denies chest pain/s       This is a 61-year-old male with atrial fibrillation status post LASHELL and cardioversion in February 2017 on Eliquis, nonischemic cardiomyopathy with recovered EF s/p ICD, COPD, hepatitis C, diabetes mellitus type II, and CKD stage III who presents for routine follow up and ongoing care. Patient had his ICD generator changed in August 2018 due to recall. Device interrogation today revealed normal device function, all testing within normal limits. Episodes appear to be ST/SVT versus SVT. Of note, the patient reports that he has been out of his amiodarone for approximately 2 months. Latest echocardiogram completed in October 2020 revealed an ejection fraction of 55%. (see report below).    Pt presented for device interrogation. Pt's device interrogation showed episodes of atrial fibrillation with RVR. Pt also reporting intermittent fatigue and decreased activity. He also notes palpitations and lies down when he feels it.   He denies shortness of breath on exertion. Does yard work and also denies chest pain.  He is compliant with eliquis and denies bleeding. He is now smoking 1/4-1/3 pack of cig/day.       TTE 10/14/2020: LVEF 55%    Left heart cath- no CAD (2014)      Review of Systems   Constitutional: negative for fever,chills, sweats, weakness, fatigue, decreased activity   Eye: negative for blurry vision, vision change  ENMT: negative for sore throat, nasal congestion  Respiratory: negative for shortness of breath, cough, wheezing  Cardiovascular: negative for chest pain, dyspnea on exertion, orthopnea, PND, lower extremity edema, palpitations, claudication  Gastrointestinal: negative for nausea, vomiting, abdominal pain, constipation, diarrhea, blood in stool  Genitourinary: negative for hematuria, nocturia, dysuria  Endocrine: negative for abnormal thirst, temperature  Musculoskeletal: negative for back  pain, joint pain  Integumentary: negative for abnormal mole  Neurologic: negative for weakness, numbness, headaches, shooting pain  Hematology: negative for easy bruising, easy bleeding  Allergy: negative for watery eyes, sneezing  Psychiatric: negative for loss of interest, anxiety, stress      Physical Exam Vitals & Measurements   Vitals:    07/19/22 0941   BP: 124/84   Pulse: (!) 59   Resp: 20   Temp: 97.9 °F (36.6 °C)       General: alert and oriented/no acute distress  Eye: EOMI/normal conjunctiva/no xanthelasma  HENT: normocephalic/moist oral mucosa  Neck: supple/nontender/no carotid bruit  Respiratory: lungs CTA/nonlabored respirations/BS equal/symmetrical expansion/no  chest wall tenderness  Cardiovascular: tachycardiac, regular/no murmur/normal peripheral perfusion/no  edema/no JVD  Gastrointestinal: soft/nontender  Musculoskeletal: normal ROM  Integumentary: warm/dry/pink/intact  Neurologic: alert/oriented/normal sensory/no focal deficits  Psychiatric: cooperative/appropriate mood and affect/normal judgment      Current Outpatient Medications:     albuterol-ipratropium (DUO-NEB) 2.5 mg-0.5 mg/3 mL nebulizer solution, Take by nebulization 4 (four) times daily., Disp: , Rfl:     amiodarone (PACERONE) 200 MG Tab, Take 200 mg by mouth once daily., Disp: , Rfl:     atorvastatin (LIPITOR) 10 MG tablet, Take 10 mg by mouth once daily., Disp: , Rfl:     cetirizine (ZYRTEC) 10 MG tablet, Take 1 tablet (10 mg total) by mouth once daily., Disp: 30 tablet, Rfl: 3    ELIQUIS 5 mg Tab, Take 5 mg by mouth 2 (two) times daily., Disp: , Rfl:     fluticasone propion-salmeterol 115-21 mcg/dose (ADVAIR HFA) 115-21 mcg/actuation HFAA inhaler, Inhale 2 puffs into the lungs every 12 (twelve) hours., Disp: 12 g, Rfl: 3    fluticasone propionate (FLONASE ALLERGY RELIEF) 50 mcg/actuation nasal spray, 2 sprays (100 mcg total) by Each Nostril route Daily., Disp: 18 g, Rfl: 6    furosemide (LASIX) 20 MG tablet, Take 1 tablet  (20 mg total) by mouth once daily. Refilled per Cardio orders., Disp: 30 tablet, Rfl: 1    glipiZIDE (GLUCOTROL) 5 MG tablet, Take 5 mg by mouth once daily., Disp: , Rfl:     lisinopriL (PRINIVIL,ZESTRIL) 5 MG tablet, Take 1 tablet (5 mg total) by mouth once daily., Disp: 90 tablet, Rfl: 3    metoprolol succinate (TOPROL-XL) 50 MG 24 hr tablet, Take 50 mg by mouth., Disp: , Rfl:     promethazine-codeine 6.25-10 mg/5 ml (PHENERGAN WITH CODEINE) 6.25-10 mg/5 mL syrup, Take 5 mLs by mouth every 6 (six) hours as needed for Cough., Disp: 240 mL, Rfl: 1    tamsulosin (FLOMAX) 0.4 mg Cap, Take 1 capsule by mouth once daily., Disp: , Rfl:     ACCU-CHEK FASTCLIX LANCET DRUM Misc, Apply topically once daily., Disp: , Rfl:     ACCU-CHEK GUIDE TEST STRIPS Strp, USE AS DIRECTED once daily (keep log), Disp: , Rfl:     clotrimazole-betamethasone 1-0.05% (LOTRISONE) cream, Apply 2 g topically 2 (two) times daily., Disp: , Rfl:     lancets-blood glucose strips 30 gauge Cmpk,  Accucheck blood glucose test strips and lancets, See Instructions, Check CBG once daily and keep log., # 100 EA, 11 Refill(s), Pharmacy: [facility], 185, cm, Height/Length Dosing, 03/08/22 8:10:00 CST, 96, kg, Weight Dosing, 03/08/22 8:10:00 CST, Disp: , Rfl:       Assessment/Plan   Chronic Systolic HF (recovered)/ NICMO s/p ICD NYHA Class II - s/p ICD implant  - LVEF- 55% per TTE 10.14.20  - Reports stable DONALDSON but currently complaining of fatigue on certain days, I think it is related to afib with RVR as below  - Euvolemic, warm on exam  - Continue GDMT: Lisinopril 5, Lasix, will uptitrate Metoprolol succinate   - Counseled on the importance of a 2 g sodium diet  - Continue routine ICD interrogations      Paroxysmal Atrial Fibrillation/flutter - s/p LASHELL/cardioversion 2/17  Pt's device interrogation showed episodes of atrial fibrillation with RVR. Pt also reporting intermittent fatigue and decreased activity  EKG today showed Atrial flutter with  HR 135bpm  Will increase toprol to 50mg bid and plan for DCCV (no indication for LASHELL as pt is compliant with eliquis)  Continue amiodarone for now. continue Amio monitoring: CXR wnl in 10/2020. Last eye exam- 4.4.22, Has upcoming Thyroid levels.  - Continue with Eliquis for CVA prophylaxis. Denies any adverse bleeding issues  Will obtain echocardiogram for further evaluation of fatigue    HTN  - At goal /84  - continue current regimen  - Counseled on low-sodium diet    Hyperlipidemia  LDL 96 in 3/2021  Goal < 70 given DM  Will increase lipitor to 40mg    Tobacco Use  was smoking 1-2 cigarettes per day but now smoking 1/4-1/3 pack per day.  Discussed deleterious effects of smoking and how smoking decreases lifespan.  Counseled on importance of smoking cessation  Pt tried smoking cessation program in the past  Will prescribe NRT    Leg pain/numbness  - Reports leg pain/numbness with ambulation or rest  - AMARILYS ordered on last visit, not done yet    COPD  DM   CKD III  - Continue management per PCP          EKG  DCCV  Increase toprol 50mg bid  NRT  Increase lipitor  Continue with routine ICD interrogations  Continue to follow up with PCP as directed

## 2022-08-03 ENCOUNTER — HOSPITAL ENCOUNTER (OUTPATIENT)
Dept: CARDIOLOGY | Facility: HOSPITAL | Age: 62
Discharge: HOME | End: 2022-08-03
Attending: INTERNAL MEDICINE
Payer: MEDICAID

## 2022-08-03 DIAGNOSIS — I42.8 NON-ISCHEMIC CARDIOMYOPATHY: Primary | ICD-10-CM

## 2022-08-03 DIAGNOSIS — I48.0 PAROXYSMAL ATRIAL FIBRILLATION: ICD-10-CM

## 2022-08-03 PROCEDURE — 93005 ELECTROCARDIOGRAM TRACING: CPT

## 2022-08-03 NOTE — NURSING
Pt arrived for cardioversion. EKG performed, result: Sinus Bradycardia. EKG reviewed by Dr. Jackman. Procedure cancelled, follow up scheduled, given to pt.

## 2022-08-04 ENCOUNTER — HOSPITAL ENCOUNTER (OUTPATIENT)
Dept: RADIOLOGY | Facility: HOSPITAL | Age: 62
Discharge: HOME | End: 2022-08-04
Attending: NURSE PRACTITIONER
Payer: MEDICAID

## 2022-08-04 DIAGNOSIS — M79.606 LEG PAIN: ICD-10-CM

## 2022-08-04 PROCEDURE — 93922 UPR/L XTREMITY ART 2 LEVELS: CPT

## 2022-08-06 LAB
LEFT ABI: 1.11
LEFT ARM BP: 152 MMHG
LEFT DORSALIS PEDIS: 156 MMHG
LEFT POSTERIOR TIBIAL: 169 MMHG
LEFT TBI: 0.76
LEFT TOE PRESSURE: 116 MMHG
RIGHT ABI: 1.1
RIGHT ARM BP: 140 MMHG
RIGHT DORSALIS PEDIS: 162 MMHG
RIGHT POSTERIOR TIBIAL: 167 MMHG
RIGHT TBI: 0.82
RIGHT TOE PRESSURE: 125 MMHG

## 2022-08-11 DIAGNOSIS — N18.9 CHRONIC KIDNEY DISEASE, UNSPECIFIED CKD STAGE: Primary | ICD-10-CM

## 2022-08-26 ENCOUNTER — HOSPITAL ENCOUNTER (EMERGENCY)
Facility: HOSPITAL | Age: 62
Discharge: HOME | End: 2022-08-26
Attending: INTERNAL MEDICINE
Payer: MEDICAID

## 2022-08-26 VITALS
RESPIRATION RATE: 21 BRPM | TEMPERATURE: 99 F | SYSTOLIC BLOOD PRESSURE: 145 MMHG | OXYGEN SATURATION: 96 % | DIASTOLIC BLOOD PRESSURE: 101 MMHG | HEART RATE: 85 BPM | BODY MASS INDEX: 28.05 KG/M2 | HEIGHT: 73 IN | WEIGHT: 211.63 LBS

## 2022-08-26 DIAGNOSIS — R05.8 PRODUCTIVE COUGH: Primary | ICD-10-CM

## 2022-08-26 DIAGNOSIS — U07.1 COVID-19 VIRUS INFECTION: ICD-10-CM

## 2022-08-26 DIAGNOSIS — R05.9 COUGH: ICD-10-CM

## 2022-08-26 LAB
FLUAV AG UPPER RESP QL IA.RAPID: NOT DETECTED
FLUBV AG UPPER RESP QL IA.RAPID: NOT DETECTED
SARS-COV-2 RNA RESP QL NAA+PROBE: DETECTED

## 2022-08-26 PROCEDURE — 99284 EMERGENCY DEPT VISIT MOD MDM: CPT | Mod: 25

## 2022-08-26 PROCEDURE — 94640 AIRWAY INHALATION TREATMENT: CPT

## 2022-08-26 PROCEDURE — 87636 SARSCOV2 & INF A&B AMP PRB: CPT | Performed by: INTERNAL MEDICINE

## 2022-08-26 PROCEDURE — 25000242 PHARM REV CODE 250 ALT 637 W/ HCPCS

## 2022-08-26 RX ORDER — PROMETHAZINE HYDROCHLORIDE AND DEXTROMETHORPHAN HYDROBROMIDE 6.25; 15 MG/5ML; MG/5ML
5 SYRUP ORAL EVERY 4 HOURS PRN
Qty: 118 ML | Refills: 0 | Status: SHIPPED | OUTPATIENT
Start: 2022-08-26 | End: 2022-09-05

## 2022-08-26 RX ORDER — IPRATROPIUM BROMIDE AND ALBUTEROL SULFATE 2.5; .5 MG/3ML; MG/3ML
3 SOLUTION RESPIRATORY (INHALATION) EVERY 6 HOURS PRN
Qty: 75 ML | Refills: 6 | Status: SHIPPED | OUTPATIENT
Start: 2022-08-26 | End: 2022-12-13 | Stop reason: SDUPTHER

## 2022-08-26 RX ORDER — IPRATROPIUM BROMIDE AND ALBUTEROL SULFATE 2.5; .5 MG/3ML; MG/3ML
3 SOLUTION RESPIRATORY (INHALATION)
Status: COMPLETED | OUTPATIENT
Start: 2022-08-26 | End: 2022-08-26

## 2022-08-26 RX ADMIN — IPRATROPIUM BROMIDE AND ALBUTEROL SULFATE 3 ML: .5; 3 SOLUTION RESPIRATORY (INHALATION) at 05:08

## 2022-08-26 NOTE — ED PROVIDER NOTES
Encounter Date: 8/26/2022       History     Chief Complaint   Patient presents with    Chills    Headache    Cough     CO COUGH CONGESTION, BODY ACHES , SINUS DRIP FOR A COUPLE OF DAYS , STATES HAS HA CURRENTLY BUT DID NOT HAVE ANY TYLENOL LEFT AT HOME TO TAKE , NAD ,SAT 98 RESP UNLABORED . AUDIBLE  CONGESTION      Ezra Zhang is a 62 yo AAM who presents to the ED today for productive cough and headache. PMHx of asthma w/ COPD, a fib, heart failure, CKD stage 3, chronic Hep C infection, dyslipidemia, DM2, and HTN. Symptoms started yesterday, worsening productive cough w/ persistent nasal drip + frontal pressure headache. Endorses trouble breathing w/ increased effort. Has abdominal muscular soreness due to continuous coughing.  Experienced intermittent chills running down arms and 1 episode of sweating last night. Has not measured temperature. Feels tired and weak but still has good appetite. Pt does not work, hasn't been around large groups, or been around anyone with similar symptoms. 2/2 Initial Covid vaccine done in 4/2021, has not received booster vaccine yet. Denies bloody sputum, chest pain, radiating arm pain, loss of conciousness, diarrhea, nausea, vomiting and blood in BM/urine.     Pt is taking all Rx on current medication list as directed, took all of medications this morning. Was on his last Duo cylinder before he came to the ED today, usually uses 3-4x day if needed, requires refills before his next scheduled PCP apt. Also uses Advair 2-3x day if needed. Being followed by [facility], Cardiology and Nephrology. Pt usually smokes 1.5ppd but, has not smoked in the last 4 days. Was using Chantix but has caused nausea recently and stopped. No known allergies.           The history is provided by the patient.     Review of patient's allergies indicates:  No Known Allergies  Past Medical History:   Diagnosis Date    Atrial fibrillation     CKD (chronic kidney disease)     DM (diabetes mellitus)      Dyslipidemia     Heart failure, unspecified     Smoking      Past Surgical History:   Procedure Laterality Date    CARDIOVERSION      coronary arteriograph      INSERT / REPLACE / REMOVE PACEMAKER      left  heart catherization       Family History   Problem Relation Age of Onset    Heart failure Mother     Cataracts Mother     Heart disease Mother     Glaucoma Mother     Peripheral vascular disease Mother     Hypertension Father      Social History     Tobacco Use    Smoking status: Current Some Day Smoker     Packs/day: 1.50    Smokeless tobacco: Never Used   Substance Use Topics    Alcohol use: Not Currently    Drug use: Never     Review of Systems   Constitutional: Positive for chills, diaphoresis and fatigue. Negative for appetite change.   HENT: Positive for congestion, facial swelling, postnasal drip, rhinorrhea and sinus pressure. Negative for drooling, ear discharge, ear pain, sneezing, sore throat, tinnitus, trouble swallowing and voice change.    Eyes: Negative for photophobia, pain and visual disturbance.   Respiratory: Positive for cough, shortness of breath and wheezing. Negative for apnea, choking, chest tightness and stridor.    Cardiovascular: Negative for chest pain, palpitations and leg swelling.   Gastrointestinal: Negative for abdominal distention, anal bleeding, blood in stool, constipation, diarrhea, nausea and vomiting.   Skin: Negative for rash.   Allergic/Immunologic: Negative for food allergies.   Neurological: Positive for weakness and headaches. Negative for dizziness, syncope, facial asymmetry, light-headedness and numbness.   All other systems reviewed and are negative.      Physical Exam     Initial Vitals [08/26/22 1602]   BP Pulse Resp Temp SpO2   (!) 149/99 84 18 98.8 °F (37.1 °C) 99 %      MAP       --         Physical Exam    Nursing note and vitals reviewed.  Constitutional: He appears well-developed and well-nourished. He is not diaphoretic. He is cooperative.   Non-toxic appearance. He has a sickly appearance. He appears distressed.   HENT:   Head: Normocephalic and atraumatic.   Bilateral periorbital edema, just along lateral aspect. R>L.    Eyes: Conjunctivae are normal. Right eye exhibits normal extraocular motion. Left eye exhibits normal extraocular motion. Pupils are equal.   Neck: Phonation normal. No stridor present. No hepatojugular reflux and no JVD present.   Cardiovascular: Normal rate, regular rhythm, normal heart sounds and normal pulses.   No murmur heard.  Pulmonary/Chest: Accessory muscle usage present. He is in respiratory distress. He has wheezes.   Abdominal: Abdomen is soft. Bowel sounds are normal. There is no rebound and no guarding.   Musculoskeletal:      Cervical back: No edema or rigidity. No muscular tenderness.     Lymphadenopathy:        Head (right side): No occipital adenopathy present.        Head (left side): No occipital adenopathy present.        Right cervical: No superficial cervical and no posterior cervical adenopathy present.       Left cervical: No superficial cervical and no posterior cervical adenopathy present.   Neurological: He is alert and oriented to person, place, and time. No sensory deficit. GCS score is 15. GCS eye subscore is 4. GCS verbal subscore is 5. GCS motor subscore is 6.   Skin: Skin is warm and dry. Capillary refill takes less than 2 seconds. No rash noted.   Psychiatric: His behavior is normal. Judgment and thought content normal. He is not actively hallucinating. He is attentive.         ED Course   Procedures  Labs Reviewed   COVID/FLU A&B PCR - Abnormal; Notable for the following components:       Result Value    SARS-CoV-2 PCR Detected (*)     All other components within normal limits          Imaging Results          X-Ray Chest PA And Lateral (Final result)  Result time 08/26/22 17:17:14    Final result by Ana Mccord MD (08/26/22 17:17:14)                 Impression:      No acute  cardiopulmonary abnormality.      Electronically signed by: Ana Mccord  Date:    08/26/2022  Time:    17:17             Narrative:    EXAMINATION:  XR CHEST PA AND LATERAL    CLINICAL HISTORY:  Cough, unspecified    TECHNIQUE:  Two views of the chest    COMPARISON:  10/13/2020    FINDINGS:  LINES AND TUBES: Cardiac defibrillator device is in place via left subclavian approach with lead overlying the right ventricle.    MEDIASTINUM AND RADHA: Cardiac silhouette is at the upper limits of normal.    LUNGS: No lobar consolidation. No edema.    PLEURA:No pleural effusion. No pneumothorax.    BONES: No acute osseous abnormality.                              X-Rays:   Independently Interpreted Readings:   Other Readings:  CXR: Pacemaker, 1 lead,  visualized. No evidence of consolidation to suggest pneumonia. No pulmonary edema visualized.     Influenza/Covid rapid test results pending.     Medications   albuterol-ipratropium 2.5 mg-0.5 mg/3 mL nebulizer solution 3 mL (3 mLs Nebulization Given 8/26/22 1152)                Attending Attestation:   Physician Attestation Statement for Resident:  As the supervising MD   Physician Attestation Statement: I have personally seen and examined this patient.   I agree with the above history. -:   As the supervising MD I agree with the above PE.    As the supervising MD I agree with the above treatment, course, plan, and disposition.  I have reviewed and agree with the residents interpretation of the following: x-rays and lab data.  I have reviewed the following: old records at this facility.            Attending ED Notes:   I performed a face to face evaluation of the patient Ezra Zhang and my history reveal cough, congestion and chills since yesterday the physical exam was remarkable for mild wheezing.  I reviewed the history, physical exam and diagnostic studies performed by the resident Dr. Land and I concur with the diagnosis and assessment. This case was  initially evaluated by Dr. Sams  under my supervision and I agree with the documentation and plan.    Total teaching time: 12 min        ED Course as of 08/26/22 1846   Fri Aug 26, 2022   1821 SARS-CoV2 (COVID-19) Qualitative PCR(!): Detected [ER]   1827 X-Ray Chest PA And Lateral  No acute cardiopulmonary abnormality. [ER]      ED Course User Index  [ER] GISELL Marie          Pt stable, vaccinated for COVID, Not hypoxic or in distress after neb x 1.   Pt not eligible for Pavlobid due to Amiodarone.       Reassessed pt at this time. Pt states his condition has improved at this time. Discussed with pt all pertinent ED information and results. Discussed pt dx of COVID infection and plan of tx. Gave pt all f/u and return to the ED instructions. All questions and concerns were addressed at this time. Pt expresses understanding of information and instructions, and is comfortable with plan to discharge. Pt is stable for discharge.         Clinical Impression:   Final diagnoses:  [R05.8] Productive cough (Primary)  [U07.1] COVID-19 virus infection          ED Disposition Condition    Discharge Stable        ED Prescriptions     Medication Sig Dispense Start Date End Date Auth. Provider    albuterol-ipratropium (DUO-NEB) 2.5 mg-0.5 mg/3 mL nebulizer solution Take 3 mLs by nebulization every 6 (six) hours as needed for Wheezing. 75 mL 8/26/2022  Victorino Stinson MD    promethazine-dextromethorphan (PROMETHAZINE-DM) 6.25-15 mg/5 mL Syrp Take 5 mLs by mouth every 4 (four) hours as needed (cough). 118 mL 8/26/2022 9/5/2022 Victorino Stinson MD        Follow-up Information     Follow up With Specialties Details Why Contact Info    Dayami Squires, [facility] In 1 week  [address] [facility] [address]  [phone]      [facility] Emergency Medicine Go to  If symptoms worsen [address] [facility] [address] [phone]           Victorino  HECTOR Stinson MD  08/26/22 1840       Victorino Stinson MD  08/26/22 1843       Victorino Stinson MD  08/26/22 1846       Victorino Stinson MD  08/26/22 1846

## 2022-08-31 ENCOUNTER — HOSPITAL ENCOUNTER (OUTPATIENT)
Dept: CARDIOLOGY | Facility: HOSPITAL | Age: 62
Discharge: HOME | End: 2022-08-31
Attending: INTERNAL MEDICINE
Payer: MEDICAID

## 2022-08-31 DIAGNOSIS — N18.9 CHRONIC KIDNEY DISEASE, UNSPECIFIED CKD STAGE: Primary | ICD-10-CM

## 2022-08-31 DIAGNOSIS — I48.0 PAROXYSMAL ATRIAL FIBRILLATION: ICD-10-CM

## 2022-08-31 PROBLEM — I50.22 CHRONIC SYSTOLIC HEART FAILURE: Status: ACTIVE | Noted: 2022-08-31

## 2022-08-31 PROBLEM — J32.9 RECURRENT SINUSITIS: Status: ACTIVE | Noted: 2022-08-31

## 2022-08-31 PROBLEM — R06.02 SHORTNESS OF BREATH: Status: ACTIVE | Noted: 2022-08-31

## 2022-08-31 PROBLEM — Z86.19 HISTORY OF HEPATITIS C: Status: ACTIVE | Noted: 2022-08-31

## 2022-08-31 LAB
AV INDEX (PROSTH): 0.59
AV MEAN GRADIENT: 2 MMHG
AV PEAK GRADIENT: 3 MMHG
AV VALVE AREA: 1.91 CM2
AV VELOCITY RATIO: 0.65
CV ECHO LV RWT: 0.54 CM
DOP CALC AO PEAK VEL: 0.92 M/S
DOP CALC AO VTI: 20.2 CM
DOP CALC LVOT AREA: 3.2 CM2
DOP CALC LVOT DIAMETER: 2.03 CM
DOP CALC LVOT PEAK VEL: 0.6 M/S
DOP CALC LVOT STROKE VOLUME: 38.5 CM3
DOP CALC MV VTI: 12.7 CM
DOP CALCLVOT PEAK VEL VTI: 11.9 CM
E WAVE DECELERATION TIME: 165.64 MSEC
E/A RATIO: 1.27
ECHO LV POSTERIOR WALL: 1.25 CM (ref 0.6–1.1)
EJECTION FRACTION: 60 %
FRACTIONAL SHORTENING: 32 % (ref 28–44)
HR MV ECHO: 58 BPM
INTERVENTRICULAR SEPTUM: 1.21 CM (ref 0.6–1.1)
LEFT ATRIUM SIZE: 4.03 CM
LEFT ATRIUM VOLUME MOD: 83 CM3
LEFT INTERNAL DIMENSION IN SYSTOLE: 3.17 CM (ref 2.1–4)
LEFT VENTRICLE DIASTOLIC VOLUME: 98.9 ML
LEFT VENTRICLE SYSTOLIC VOLUME: 39.91 ML
LEFT VENTRICULAR INTERNAL DIMENSION IN DIASTOLE: 4.63 CM (ref 3.5–6)
LEFT VENTRICULAR MASS: 214.55 G
LV LATERAL E/E' RATIO: 4.2 M/S
LVOT MG: 0.79 MMHG
LVOT MV: 0.42 CM/S
MV MEAN GRADIENT: 0 MMHG
MV PEAK A VEL: 0.33 M/S
MV PEAK E VEL: 0.42 M/S
MV PEAK GRADIENT: 1 MMHG
MV VALVE AREA BY CONTINUITY EQUATION: 3.03 CM2
PISA MRMAX VEL: 4.81 M/S
PISA TR MAX VEL: 2.28 M/S
RA PRESSURE: 3 MMHG
RA WIDTH: 5.1 CM
TDI LATERAL: 0.1 M/S
TR MAX PG: 21 MMHG
TRICUSPID ANNULAR PLANE SYSTOLIC EXCURSION: 2.16 CM
TV REST PULMONARY ARTERY PRESSURE: 24 MMHG

## 2022-08-31 PROCEDURE — 93306 TTE W/DOPPLER COMPLETE: CPT

## 2022-09-06 ENCOUNTER — HOSPITAL ENCOUNTER (OUTPATIENT)
Dept: PULMONOLOGY | Facility: HOSPITAL | Age: 62
Discharge: HOME | End: 2022-09-06
Attending: NURSE PRACTITIONER
Payer: MEDICAID

## 2022-09-06 DIAGNOSIS — J44.89 ASTHMA WITH COPD: ICD-10-CM

## 2022-09-06 LAB
DLCO SINGLE BREATH LLN: 25.2
DLCO SINGLE BREATH PRE REF: 51.1 %
DLCO SINGLE BREATH REF: 32.13
DLCOC SBVA LLN: 2.99
DLCOC SBVA REF: 4.05
DLCOC SINGLE BREATH LLN: 25.2
DLCOC SINGLE BREATH REF: 32.13
DLCOVA LLN: 2.99
DLCOVA PRE REF: 60.7 %
DLCOVA PRE: 2.46 ML/(MIN*MMHG*L) (ref 2.99–5.1)
DLCOVA REF: 4.05
ERV LLN: -16448.73
ERV PRE REF: 60.6 %
ERV REF: 1.27
FEF 25 75 CHG: 27.6 %
FEF 25 75 LLN: 1.09
FEF 25 75 POST REF: 46.1 %
FEF 25 75 PRE REF: 36.1 %
FEF 25 75 REF: 2.7
FET100 CHG: -5.9 %
FEV1 CHG: 9.5 %
FEV1 FVC CHG: 5.9 %
FEV1 FVC LLN: 65
FEV1 FVC POST REF: 78.8 %
FEV1 FVC PRE REF: 74.4 %
FEV1 FVC REF: 77
FEV1 LLN: 2.4
FEV1 POST REF: 71.4 %
FEV1 PRE REF: 65.2 %
FEV1 REF: 3.36
FRCPLETH LLN: 2.88
FRCPLETH PREREF: 134.9 %
FRCPLETH REF: 3.87
FVC CHG: 3.4 %
FVC LLN: 3.23
FVC POST REF: 90.3 %
FVC PRE REF: 87.4 %
FVC REF: 4.38
IVC PRE: 4.61 L (ref 3.23–5.55)
IVC SINGLE BREATH LLN: 3.23
IVC SINGLE BREATH PRE REF: 105.2 %
IVC SINGLE BREATH REF: 4.38
MVV LLN: 129
MVV PRE REF: 49.8 %
MVV REF: 152
PEF CHG: 6.4 %
PEF LLN: 6.39
PEF POST REF: 45.8 %
PEF PRE REF: 43 %
PEF REF: 9.35
POST FEF 25 75: 1.25 L/S (ref 1.09–5.04)
POST FET 100: 7.66 SEC
POST FEV1 FVC: 60.64 % (ref 65.12–87.4)
POST FEV1: 2.4 L (ref 2.4–4.26)
POST FVC: 3.96 L (ref 3.23–5.55)
POST PEF: 4.28 L/S (ref 6.39–12.32)
PRE DLCO: 16.42 ML/(MIN*MMHG) (ref 25.2–39.06)
PRE ERV: 0.77 L (ref -16448.73–16451.27)
PRE FEF 25 75: 0.98 L/S (ref 1.09–5.04)
PRE FET 100: 8.14 SEC
PRE FEV1 FVC: 57.26 % (ref 65.12–87.4)
PRE FEV1: 2.19 L (ref 2.4–4.26)
PRE FRC PL: 5.21 L (ref 2.88–4.85)
PRE FVC: 3.83 L (ref 3.23–5.55)
PRE MVV: 75.66 L/MIN (ref 129.01–174.54)
PRE PEF: 4.02 L/S (ref 6.39–12.32)
PRE RV: 4.45 L (ref 1.92–3.27)
PRE TLC: 8.38 L (ref 6.79–9.09)
RAW LLN: 3.06
RAW PRE REF: 128 %
RAW PRE: 3.92 CMH2O*S/L (ref 3.06–3.06)
RAW REF: 3.06
RV LLN: 1.92
RV PRE REF: 171.2 %
RV REF: 2.6
RVTLC LLN: 29
RVTLC PRE REF: 139.1 %
RVTLC PRE: 53.04 % (ref 29.16–47.12)
RVTLC REF: 38
TLC LLN: 6.79
TLC PRE REF: 105.6 %
TLC REF: 7.94
VA PRE: 6.68 L (ref 7.79–7.79)
VA SINGLE BREATH LLN: 7.79
VA SINGLE BREATH PRE REF: 85.8 %
VA SINGLE BREATH REF: 7.79
VC LLN: 3.23
VC PRE REF: 89.9 %
VC PRE: 3.94 L (ref 3.23–5.55)
VC REF: 4.38

## 2022-09-06 PROCEDURE — 94729 DIFFUSING CAPACITY: CPT

## 2022-09-06 PROCEDURE — 94726 PLETHYSMOGRAPHY LUNG VOLUMES: CPT

## 2022-09-06 PROCEDURE — 94640 AIRWAY INHALATION TREATMENT: CPT

## 2022-09-06 PROCEDURE — 94060 EVALUATION OF WHEEZING: CPT

## 2022-09-06 RX ORDER — ALBUTEROL SULFATE 0.83 MG/ML
2.5 SOLUTION RESPIRATORY (INHALATION)
Status: COMPLETED | OUTPATIENT
Start: 2022-09-06 | End: 2022-09-06

## 2022-09-06 RX ORDER — IPRATROPIUM BROMIDE 0.5 MG/2.5ML
0.5 SOLUTION RESPIRATORY (INHALATION)
Status: COMPLETED | OUTPATIENT
Start: 2022-09-06 | End: 2022-09-06

## 2022-09-06 RX ADMIN — ALBUTEROL SULFATE 2.5 MG: 0.83 SOLUTION RESPIRATORY (INHALATION) at 09:09

## 2022-09-06 RX ADMIN — IPRATROPIUM BROMIDE 0.5 MG: 0.5 SOLUTION RESPIRATORY (INHALATION) at 09:09

## 2022-09-12 ENCOUNTER — LAB VISIT (OUTPATIENT)
Dept: LAB | Facility: HOSPITAL | Age: 62
End: 2022-09-12
Attending: NURSE PRACTITIONER
Payer: MEDICAID

## 2022-09-12 DIAGNOSIS — N18.9 CHRONIC KIDNEY DISEASE, UNSPECIFIED CKD STAGE: ICD-10-CM

## 2022-09-12 PROBLEM — Z00.00 WELL ADULT EXAM: Status: RESOLVED | Noted: 2022-06-07 | Resolved: 2022-09-12

## 2022-09-12 LAB
ALBUMIN SERPL-MCNC: 4.1 GM/DL (ref 3.4–4.8)
ALBUMIN/GLOB SERPL: 1.3 RATIO (ref 1.1–2)
ALP SERPL-CCNC: 124 UNIT/L (ref 40–150)
ALT SERPL-CCNC: 20 UNIT/L (ref 0–55)
AST SERPL-CCNC: 21 UNIT/L (ref 5–34)
BILIRUBIN DIRECT+TOT PNL SERPL-MCNC: 0.5 MG/DL
BUN SERPL-MCNC: 22.5 MG/DL (ref 8.4–25.7)
CALCIUM SERPL-MCNC: 9.3 MG/DL (ref 8.8–10)
CHLORIDE SERPL-SCNC: 107 MMOL/L (ref 98–107)
CO2 SERPL-SCNC: 28 MMOL/L (ref 23–31)
CREAT SERPL-MCNC: 1.79 MG/DL (ref 0.73–1.18)
GFR SERPLBLD CREATININE-BSD FMLA CKD-EPI: 42 MLS/MIN/1.73/M2
GLOBULIN SER-MCNC: 3.1 GM/DL (ref 2.4–3.5)
GLUCOSE SERPL-MCNC: 113 MG/DL (ref 82–115)
POTASSIUM SERPL-SCNC: 4.3 MMOL/L (ref 3.5–5.1)
PROT SERPL-MCNC: 7.2 GM/DL (ref 5.8–7.6)
SODIUM SERPL-SCNC: 143 MMOL/L (ref 136–145)

## 2022-09-12 PROCEDURE — 36415 COLL VENOUS BLD VENIPUNCTURE: CPT

## 2022-09-12 PROCEDURE — 80053 COMPREHEN METABOLIC PANEL: CPT

## 2022-09-13 ENCOUNTER — OFFICE VISIT (OUTPATIENT)
Dept: INTERNAL MEDICINE | Facility: CLINIC | Age: 62
End: 2022-09-13
Payer: MEDICAID

## 2022-09-13 VITALS
WEIGHT: 214 LBS | SYSTOLIC BLOOD PRESSURE: 136 MMHG | BODY MASS INDEX: 28.36 KG/M2 | HEART RATE: 66 BPM | DIASTOLIC BLOOD PRESSURE: 88 MMHG | RESPIRATION RATE: 18 BRPM | HEIGHT: 73 IN | TEMPERATURE: 98 F

## 2022-09-13 DIAGNOSIS — J44.89 ASTHMA WITH COPD: Primary | ICD-10-CM

## 2022-09-13 DIAGNOSIS — Z12.5 PROSTATE CANCER SCREENING: ICD-10-CM

## 2022-09-13 DIAGNOSIS — Z12.11 COLON CANCER SCREENING: ICD-10-CM

## 2022-09-13 DIAGNOSIS — D64.9 ANEMIA, UNSPECIFIED TYPE: ICD-10-CM

## 2022-09-13 DIAGNOSIS — E55.9 VITAMIN D DEFICIENCY: ICD-10-CM

## 2022-09-13 DIAGNOSIS — Z00.00 WELL ADULT EXAM: ICD-10-CM

## 2022-09-13 DIAGNOSIS — N40.1 BENIGN PROSTATIC HYPERPLASIA WITH URINARY HESITANCY: Primary | ICD-10-CM

## 2022-09-13 DIAGNOSIS — E11.9 CONTROLLED TYPE 2 DIABETES MELLITUS WITHOUT COMPLICATION, WITHOUT LONG-TERM CURRENT USE OF INSULIN: ICD-10-CM

## 2022-09-13 DIAGNOSIS — K21.9 GASTROESOPHAGEAL REFLUX DISEASE, UNSPECIFIED WHETHER ESOPHAGITIS PRESENT: ICD-10-CM

## 2022-09-13 DIAGNOSIS — R39.11 BENIGN PROSTATIC HYPERPLASIA WITH URINARY HESITANCY: Primary | ICD-10-CM

## 2022-09-13 PROCEDURE — 1159F PR MEDICATION LIST DOCUMENTED IN MEDICAL RECORD: ICD-10-PCS | Mod: CPTII,,, | Performed by: NURSE PRACTITIONER

## 2022-09-13 PROCEDURE — 99215 OFFICE O/P EST HI 40 MIN: CPT | Mod: PBBFAC | Performed by: NURSE PRACTITIONER

## 2022-09-13 PROCEDURE — 3075F SYST BP GE 130 - 139MM HG: CPT | Mod: CPTII,,, | Performed by: NURSE PRACTITIONER

## 2022-09-13 PROCEDURE — 1159F MED LIST DOCD IN RCRD: CPT | Mod: CPTII,,, | Performed by: NURSE PRACTITIONER

## 2022-09-13 PROCEDURE — 1160F RVW MEDS BY RX/DR IN RCRD: CPT | Mod: CPTII,,, | Performed by: NURSE PRACTITIONER

## 2022-09-13 PROCEDURE — 3066F NEPHROPATHY DOC TX: CPT | Mod: CPTII,,, | Performed by: NURSE PRACTITIONER

## 2022-09-13 PROCEDURE — 3079F PR MOST RECENT DIASTOLIC BLOOD PRESSURE 80-89 MM HG: ICD-10-PCS | Mod: CPTII,,, | Performed by: NURSE PRACTITIONER

## 2022-09-13 PROCEDURE — 4010F PR ACE/ARB THEARPY RXD/TAKEN: ICD-10-PCS | Mod: CPTII,,, | Performed by: NURSE PRACTITIONER

## 2022-09-13 PROCEDURE — 4010F ACE/ARB THERAPY RXD/TAKEN: CPT | Mod: CPTII,,, | Performed by: NURSE PRACTITIONER

## 2022-09-13 PROCEDURE — 3008F PR BODY MASS INDEX (BMI) DOCUMENTED: ICD-10-PCS | Mod: CPTII,,, | Performed by: NURSE PRACTITIONER

## 2022-09-13 PROCEDURE — 3008F BODY MASS INDEX DOCD: CPT | Mod: CPTII,,, | Performed by: NURSE PRACTITIONER

## 2022-09-13 PROCEDURE — 99214 OFFICE O/P EST MOD 30 MIN: CPT | Mod: S$PBB,,, | Performed by: NURSE PRACTITIONER

## 2022-09-13 PROCEDURE — 3075F PR MOST RECENT SYSTOLIC BLOOD PRESS GE 130-139MM HG: ICD-10-PCS | Mod: CPTII,,, | Performed by: NURSE PRACTITIONER

## 2022-09-13 PROCEDURE — 3066F PR DOCUMENTATION OF TREATMENT FOR NEPHROPATHY: ICD-10-PCS | Mod: CPTII,,, | Performed by: NURSE PRACTITIONER

## 2022-09-13 PROCEDURE — 1160F PR REVIEW ALL MEDS BY PRESCRIBER/CLIN PHARMACIST DOCUMENTED: ICD-10-PCS | Mod: CPTII,,, | Performed by: NURSE PRACTITIONER

## 2022-09-13 PROCEDURE — 99214 PR OFFICE/OUTPT VISIT, EST, LEVL IV, 30-39 MIN: ICD-10-PCS | Mod: S$PBB,,, | Performed by: NURSE PRACTITIONER

## 2022-09-13 PROCEDURE — 3079F DIAST BP 80-89 MM HG: CPT | Mod: CPTII,,, | Performed by: NURSE PRACTITIONER

## 2022-09-13 RX ORDER — PROMETHAZINE HYDROCHLORIDE AND CODEINE PHOSPHATE 6.25; 1 MG/5ML; MG/5ML
5 SOLUTION ORAL EVERY 6 HOURS PRN
Qty: 240 ML | Refills: 1 | Status: SHIPPED | OUTPATIENT
Start: 2022-09-13 | End: 2022-12-13 | Stop reason: SDUPTHER

## 2022-09-13 RX ORDER — FLUTICASONE PROPIONATE AND SALMETEROL XINAFOATE 115; 21 UG/1; UG/1
2 AEROSOL, METERED RESPIRATORY (INHALATION) EVERY 12 HOURS
Qty: 12 G | Refills: 6 | Status: SHIPPED | OUTPATIENT
Start: 2022-09-13 | End: 2023-03-14 | Stop reason: SDUPTHER

## 2022-09-13 RX ORDER — FAMOTIDINE 20 MG/1
20 TABLET, FILM COATED ORAL 2 TIMES DAILY
Qty: 60 TABLET | Refills: 11 | Status: SHIPPED | OUTPATIENT
Start: 2022-09-13 | End: 2023-09-18 | Stop reason: SDUPTHER

## 2022-09-13 RX ORDER — CETIRIZINE HYDROCHLORIDE 10 MG/1
10 TABLET ORAL DAILY
Qty: 30 TABLET | Refills: 6 | Status: SHIPPED | OUTPATIENT
Start: 2022-09-13 | End: 2023-03-14 | Stop reason: SDUPTHER

## 2022-09-13 NOTE — PROGRESS NOTES
Chief Complaint: No chief complaint on file.      HPI: Patient is a 61-year-old male referred to Urology for PSA and CT results.  Patient treated in the past for chronic hydroureteronephrosis, renal cysts, BPH patient currently on Flomax 0.4 mg p.o. daily.  On last visit patient complaining of decreased urine stream hesitancy and mild ED. Patient today urine stream improved, denies hesitancy, straining, interruption of stream, dribbling, frequency, nocturia. Creatinine fluctuating now at 1.79, CT shows severe hydroureteronephrosis to the UVJ.     Allergies:  Review of patient's allergies indicates:  No Known Allergies    Medications:  Current Outpatient Medications   Medication Sig Dispense Refill    ACCU-CHEK FASTCLIX LANCET DRUM Misc Apply topically once daily.      ACCU-CHEK GUIDE TEST STRIPS Strp USE AS DIRECTED once daily (keep log)      albuterol-ipratropium (DUO-NEB) 2.5 mg-0.5 mg/3 mL nebulizer solution Take 3 mLs by nebulization every 6 (six) hours as needed for Wheezing. 75 mL 6    amiodarone (PACERONE) 200 MG Tab Take 200 mg by mouth once daily.      atorvastatin (LIPITOR) 40 MG tablet Take 1 tablet (40 mg total) by mouth once daily. 90 tablet 3    cetirizine (ZYRTEC) 10 MG tablet Take 1 tablet (10 mg total) by mouth once daily. 30 tablet 3    clotrimazole-betamethasone 1-0.05% (LOTRISONE) cream Apply 2 g topically 2 (two) times daily.      ELIQUIS 5 mg Tab Take 5 mg by mouth 2 (two) times daily.      fluticasone propion-salmeterol 115-21 mcg/dose (ADVAIR HFA) 115-21 mcg/actuation HFAA inhaler Inhale 2 puffs into the lungs every 12 (twelve) hours. 12 g 3    fluticasone propionate (FLONASE ALLERGY RELIEF) 50 mcg/actuation nasal spray 2 sprays (100 mcg total) by Each Nostril route Daily. 18 g 6    furosemide (LASIX) 20 MG tablet Take 1 tablet (20 mg total) by mouth once daily. Refilled per Cardio orders. 30 tablet 1    glipiZIDE (GLUCOTROL) 5 MG tablet Take 5 mg by mouth once daily.      lancets-blood  glucose strips [facility]   Accucheck blood glucose test strips and lancets, See Instructions, Check CBG once daily and keep log., # 100 EA, 11 Refill(s), Pharmacy: [facility], 185, cm, Height/Length Dosing, 03/08/22 8:10:00 CST, 96, kg, Weight Dosing, 03/08/22 8:10:00 CST      lisinopriL (PRINIVIL,ZESTRIL) 5 MG tablet Take 1 tablet (5 mg total) by mouth once daily. 90 tablet 3    metoprolol succinate (TOPROL-XL) 50 MG 24 hr tablet Take 1 tablet (50 mg total) by mouth 2 (two) times daily. 180 tablet 3    nicotine (NICODERM CQ) 14 mg/24 hr Place 1 patch onto the skin once daily. 28 patch 2    nicotine polacrilex (NICORETTE) 4 MG Gum Take 1 each (4 mg total) by mouth as needed (smoking urge). 170 each 3    promethazine-codeine 6.25-10 mg/5 ml (PHENERGAN WITH CODEINE) 6.25-10 mg/5 mL syrup Take 5 mLs by mouth every 6 (six) hours as needed for Cough. 240 mL 1    tamsulosin (FLOMAX) 0.4 mg Cap Take 1 capsule by mouth once daily.       No current facility-administered medications for this visit.       Review of Systems:  General: No fever, chills, fatigability, or weight loss.  Skin: No rashes, itching, or changes in color or texture of skin.  Chest: Denies DONALDSON, cyanosis, wheezing, cough, and sputum production.  Abdomen: Appetite fine. No weight loss. Denies diarrhea, abdominal pain, hematemesis, or blood in stool.  Musculoskeletal: No joint stiffness or swelling. Denies back pain.  : As above.  All other review of systems negative.    PMH:  Past Medical History:   Diagnosis Date    Atrial fibrillation     CKD (chronic kidney disease)     DM (diabetes mellitus)     Dyslipidemia     Heart failure, unspecified     Smoking        PSH:  Past Surgical History:   Procedure Laterality Date    CARDIOVERSION      coronary arteriograph      INSERT / REPLACE / REMOVE PACEMAKER      left  heart catherization         FamHx:  Family History   Problem Relation Age of Onset    Heart failure Mother     Cataracts Mother      Heart disease Mother     Glaucoma Mother     Peripheral vascular disease Mother     Hypertension Father        SocHx:  Social History     Socioeconomic History    Marital status: Single   Tobacco Use    Smoking status: Some Days     Packs/day: 1.50     Types: Cigarettes    Smokeless tobacco: Never   Substance and Sexual Activity    Alcohol use: Not Currently    Drug use: Never       Physical Exam:  There were no vitals filed for this visit.  General: A&Ox3, no apparent distress, no deformities  Neck: No masses, normal thyroid  Lungs: CTA patrick, no use of accessory muscles  Heart: RRR, no arrhythmias  Abdomen: Soft, NT, ND, no masses, no hernias, no hepatosplenomegaly  Lymphatic: Neck and groin nodes negative  Skin: The skin is warm and dry. No jaundice.  Ext: No c/c/e.          Labs: PSA pending      Imaging: CT Abd./pelvis W W/O on 04/24/2022: No evidence of radiodense urolithiasis, focal renal or intraureteral mass, or other clear cause for hematuria. Grossly stable appearance of chronic severe left ureteral, dilatation, which could be secondary to stable congenital versus acquired abnormality at the level of the UVJ. Multiple bilateral renal cysts are of unchanged size and  appearance, consistent with benign etiology (Bosniak 1); no specific imaging follow-up recommended.      Impression: BPH, Hydrouternephrosis, Renal Cyst      Plan: Refer to [facility] for Eval. & Treatment for severe hydroureteronephrosis to the UVJ. RTC 6 months with PSA, continue Flomax. Instructed patient on Timed voiding every 2-3 hours, double voiding, avoiding bladder irritants, such as alcohol, citrus foods & drinks, caffeine drinks energy drinks, spicy foods, sodas, increase water intake.

## 2022-09-13 NOTE — PROGRESS NOTES
Internal Medicine Clinic  [facility]     Patient Name: Ezra Zhang   : 1960  MRN:[id number]     Review of patient's allergies indicates:  No Known Allergies   Medication List with Changes/Refills   Current Medications    ACCU-CHEK FASTCLIX LANCET DRUM MISC    Apply topically once daily.    ACCU-CHEK GUIDE TEST STRIPS STRP    USE AS DIRECTED once daily (keep log)    ALBUTEROL-IPRATROPIUM (DUO-NEB) 2.5 MG-0.5 MG/3 ML NEBULIZER SOLUTION    Take 3 mLs by nebulization every 6 (six) hours as needed for Wheezing.    AMIODARONE (PACERONE) 200 MG TAB    Take 200 mg by mouth once daily.    ATORVASTATIN (LIPITOR) 40 MG TABLET    Take 1 tablet (40 mg total) by mouth once daily.    CETIRIZINE (ZYRTEC) 10 MG TABLET    Take 1 tablet (10 mg total) by mouth once daily.    CLOTRIMAZOLE-BETAMETHASONE 1-0.05% (LOTRISONE) CREAM    Apply 2 g topically 2 (two) times daily.    ELIQUIS 5 MG TAB    Take 5 mg by mouth 2 (two) times daily.    FLUTICASONE PROPION-SALMETEROL 115-21 MCG/DOSE (ADVAIR HFA) 115-21 MCG/ACTUATION HFAA INHALER    Inhale 2 puffs into the lungs every 12 (twelve) hours.    FLUTICASONE PROPIONATE (FLONASE ALLERGY RELIEF) 50 MCG/ACTUATION NASAL SPRAY    2 sprays (100 mcg total) by Each Nostril route Daily.    FUROSEMIDE (LASIX) 20 MG TABLET    Take 1 tablet (20 mg total) by mouth once daily. Refilled per Cardio orders.    GLIPIZIDE (GLUCOTROL) 5 MG TABLET    Take 5 mg by mouth once daily.    LANCETS-BLOOD GLUCOSE STRIPS 30 [facility]GE [facility]      Accucheck blood glucose test strips and lancets, See Instructions, Check CBG once daily and keep log., # 100 EA, 11 Refill(s), Pharmacy: [facility], 185, cm, Height/Length Dosing, 22 8:10:00 CST, 96, kg, Weight Dosing, 22 8:10:00 CST    LISINOPRIL (PRINIVIL,ZESTRIL) 5 MG TABLET    Take 1 tablet (5 mg total) by mouth once daily.    METOPROLOL SUCCINATE (TOPROL-XL) 50 MG 24 HR TABLET    Take 1 tablet (50 mg total) by mouth 2 (two) times daily.     NICOTINE (NICODERM CQ) 14 MG/24 HR    Place 1 patch onto the skin once daily.    NICOTINE POLACRILEX (NICORETTE) 4 MG GUM    Take 1 each (4 mg total) by mouth as needed (smoking urge).    PROMETHAZINE-CODEINE 6.25-10 MG/5 ML (PHENERGAN WITH CODEINE) 6.25-10 MG/5 ML SYRUP    Take 5 mLs by mouth every 6 (six) hours as needed for Cough.    TAMSULOSIN (FLOMAX) 0.4 MG CAP    Take 1 capsule by mouth once daily.        CC:  Chief Complaint   Patient presents with    lab results        HPI  60 y/o male for f/u lab results. Pt has hx Asthma/COPD, CHF, DM2, Hep C, HLD, HTN, A-fib, CKD, Hx Hep C. Pt followed in Cardio cl.            ROS  Review of Systems   Constitutional: Negative.    HENT: Negative.     Eyes: Negative.    Respiratory: Negative.     Cardiovascular: Negative.    Gastrointestinal: Negative.    Endocrine: Negative.    Genitourinary: Negative.    Musculoskeletal: Negative.    Integumentary:  Negative.   Allergic/Immunologic: Negative.    Neurological: Negative.    Hematological: Negative.    Psychiatric/Behavioral: Negative.        Physical Examination:  Vitals:    09/13/22 0753   BP: 136/88   Pulse: 66   Resp: 18   Temp: 98 °F (36.7 °C)          Physical Exam  Vitals reviewed.   Constitutional:       Appearance: Normal appearance. He is normal weight.   HENT:      Head: Normocephalic.   Cardiovascular:      Rate and Rhythm: Normal rate and regular rhythm.      Pulses: Normal pulses.      Heart sounds: Normal heart sounds.   Pulmonary:      Effort: Pulmonary effort is normal.      Breath sounds: Normal breath sounds.   Abdominal:      General: Abdomen is flat.      Palpations: Abdomen is soft.   Musculoskeletal:         General: Normal range of motion.      Cervical back: Normal range of motion.   Skin:     General: Skin is warm and dry.   Neurological:      Mental Status: He is alert.   Psychiatric:         Mood and Affect: Mood normal.          Labs/Imaging:  Reviewed    Assessment/Plan:  1. Asthma with  COPD  PFT done 9-6-22 results pending.  UTD with CXR- done 8-26-22 WNL. Cont meds as prescribed. Pt UTD with pneumovax.    2. Controlled type 2 diabetes mellitus without complication, without long-term current use of insulin  ADA diet and exercise. A1c 5.2. Cont DM med as prescribed. A1c in 3 months. DM eye exam 3-8-22 no retinopathy. PT UTD with pneumovax. DM foot exam done 3-8-22. Urine Microalbumin 8-25-21. Urine microalbumin in 3 months. Pt followed by [facility] foot and ankle for bilat great toe callous- keep appt.    3. Anemia, unspecified type  Cbc in 3 months.    4. Prostate cancer screening  PSA in 3 months.    5. Colon cancer screening  Cologuard done 7-6-22 neg results- due next 7/2025.    6. Vitamin D deficiency   Vit D level 27.9. Cont Vit D supplement as prescribed.     7. Well adult  Labs in 3 months. PSA in 3 months. Cologuard done 7-6-22 negative results ( due 7/2025).    8. GERD  Pt c/o reflux and request med for symptoms relief. Informed medicaid does not cover PPI. Will RX Pepcid 20 mg 1 tab po BID. Avoid triggers.       RTC in 3 months with labs 1 week prior to appt.

## 2022-09-14 ENCOUNTER — OFFICE VISIT (OUTPATIENT)
Dept: UROLOGY | Facility: CLINIC | Age: 62
End: 2022-09-14
Payer: MEDICAID

## 2022-09-14 VITALS
WEIGHT: 211 LBS | RESPIRATION RATE: 18 BRPM | HEART RATE: 71 BPM | SYSTOLIC BLOOD PRESSURE: 148 MMHG | HEIGHT: 73 IN | OXYGEN SATURATION: 99 % | DIASTOLIC BLOOD PRESSURE: 92 MMHG | BODY MASS INDEX: 27.96 KG/M2 | TEMPERATURE: 98 F

## 2022-09-14 DIAGNOSIS — N40.1 BENIGN PROSTATIC HYPERPLASIA WITH URINARY FREQUENCY: Primary | ICD-10-CM

## 2022-09-14 DIAGNOSIS — R35.0 BENIGN PROSTATIC HYPERPLASIA WITH URINARY FREQUENCY: Primary | ICD-10-CM

## 2022-09-14 DIAGNOSIS — N13.30 HYDROURETERONEPHROSIS: ICD-10-CM

## 2022-09-14 PROCEDURE — 3066F PR DOCUMENTATION OF TREATMENT FOR NEPHROPATHY: ICD-10-PCS | Mod: CPTII,,, | Performed by: NURSE PRACTITIONER

## 2022-09-14 PROCEDURE — 99213 PR OFFICE/OUTPT VISIT, EST, LEVL III, 20-29 MIN: ICD-10-PCS | Mod: S$PBB,,, | Performed by: NURSE PRACTITIONER

## 2022-09-14 PROCEDURE — 1160F PR REVIEW ALL MEDS BY PRESCRIBER/CLIN PHARMACIST DOCUMENTED: ICD-10-PCS | Mod: CPTII,,, | Performed by: NURSE PRACTITIONER

## 2022-09-14 PROCEDURE — 1160F RVW MEDS BY RX/DR IN RCRD: CPT | Mod: CPTII,,, | Performed by: NURSE PRACTITIONER

## 2022-09-14 PROCEDURE — 3077F SYST BP >= 140 MM HG: CPT | Mod: CPTII,,, | Performed by: NURSE PRACTITIONER

## 2022-09-14 PROCEDURE — 3066F NEPHROPATHY DOC TX: CPT | Mod: CPTII,,, | Performed by: NURSE PRACTITIONER

## 2022-09-14 PROCEDURE — 3080F PR MOST RECENT DIASTOLIC BLOOD PRESSURE >= 90 MM HG: ICD-10-PCS | Mod: CPTII,,, | Performed by: NURSE PRACTITIONER

## 2022-09-14 PROCEDURE — 99213 OFFICE O/P EST LOW 20 MIN: CPT | Mod: S$PBB,,, | Performed by: NURSE PRACTITIONER

## 2022-09-14 PROCEDURE — 3008F BODY MASS INDEX DOCD: CPT | Mod: CPTII,,, | Performed by: NURSE PRACTITIONER

## 2022-09-14 PROCEDURE — 4010F ACE/ARB THERAPY RXD/TAKEN: CPT | Mod: CPTII,,, | Performed by: NURSE PRACTITIONER

## 2022-09-14 PROCEDURE — 99215 OFFICE O/P EST HI 40 MIN: CPT | Mod: PBBFAC | Performed by: NURSE PRACTITIONER

## 2022-09-14 PROCEDURE — 1159F PR MEDICATION LIST DOCUMENTED IN MEDICAL RECORD: ICD-10-PCS | Mod: CPTII,,, | Performed by: NURSE PRACTITIONER

## 2022-09-14 PROCEDURE — 1159F MED LIST DOCD IN RCRD: CPT | Mod: CPTII,,, | Performed by: NURSE PRACTITIONER

## 2022-09-14 PROCEDURE — 3008F PR BODY MASS INDEX (BMI) DOCUMENTED: ICD-10-PCS | Mod: CPTII,,, | Performed by: NURSE PRACTITIONER

## 2022-09-14 PROCEDURE — 3077F PR MOST RECENT SYSTOLIC BLOOD PRESSURE >= 140 MM HG: ICD-10-PCS | Mod: CPTII,,, | Performed by: NURSE PRACTITIONER

## 2022-09-14 PROCEDURE — 4010F PR ACE/ARB THEARPY RXD/TAKEN: ICD-10-PCS | Mod: CPTII,,, | Performed by: NURSE PRACTITIONER

## 2022-09-14 PROCEDURE — 3080F DIAST BP >= 90 MM HG: CPT | Mod: CPTII,,, | Performed by: NURSE PRACTITIONER

## 2022-09-14 NOTE — PATIENT INSTRUCTIONS
Pt seen by Destin BLACK. Pt will be referred to [facility]. RTC 6 months after [facility] appointment. Pt education given both written and verbal. TM

## 2022-09-15 ENCOUNTER — TELEPHONE (OUTPATIENT)
Dept: UROLOGY | Facility: CLINIC | Age: 62
End: 2022-09-15
Payer: MEDICAID

## 2022-09-26 ENCOUNTER — OFFICE VISIT (OUTPATIENT)
Dept: NEPHROLOGY | Facility: CLINIC | Age: 62
End: 2022-09-26
Payer: MEDICAID

## 2022-09-26 VITALS
HEART RATE: 67 BPM | BODY MASS INDEX: 27.49 KG/M2 | HEIGHT: 73 IN | WEIGHT: 207.38 LBS | TEMPERATURE: 98 F | OXYGEN SATURATION: 99 % | DIASTOLIC BLOOD PRESSURE: 76 MMHG | RESPIRATION RATE: 18 BRPM | SYSTOLIC BLOOD PRESSURE: 112 MMHG

## 2022-09-26 DIAGNOSIS — N30.00 ACUTE CYSTITIS WITHOUT HEMATURIA: ICD-10-CM

## 2022-09-26 DIAGNOSIS — N13.30 HYDRONEPHROSIS OF LEFT KIDNEY: ICD-10-CM

## 2022-09-26 DIAGNOSIS — N18.32 CKD STAGE G3B/A1, GFR 30-44 AND ALBUMIN CREATININE RATIO <30 MG/G: Primary | ICD-10-CM

## 2022-09-26 DIAGNOSIS — Z23 FLU VACCINE NEED: ICD-10-CM

## 2022-09-26 DIAGNOSIS — N13.30 HYDRONEPHROSIS, UNSPECIFIED HYDRONEPHROSIS TYPE: ICD-10-CM

## 2022-09-26 DIAGNOSIS — I10 PRIMARY HYPERTENSION: ICD-10-CM

## 2022-09-26 DIAGNOSIS — N13.1 HYDRONEPHROSIS WITH URETERAL STRICTURE, NOT ELSEWHERE CLASSIFIED: ICD-10-CM

## 2022-09-26 PROCEDURE — 4010F PR ACE/ARB THEARPY RXD/TAKEN: ICD-10-PCS | Mod: CPTII,,, | Performed by: NURSE PRACTITIONER

## 2022-09-26 PROCEDURE — 3074F SYST BP LT 130 MM HG: CPT | Mod: CPTII,,, | Performed by: NURSE PRACTITIONER

## 2022-09-26 PROCEDURE — 1160F RVW MEDS BY RX/DR IN RCRD: CPT | Mod: CPTII,,, | Performed by: NURSE PRACTITIONER

## 2022-09-26 PROCEDURE — 1159F PR MEDICATION LIST DOCUMENTED IN MEDICAL RECORD: ICD-10-PCS | Mod: CPTII,,, | Performed by: NURSE PRACTITIONER

## 2022-09-26 PROCEDURE — 1159F MED LIST DOCD IN RCRD: CPT | Mod: CPTII,,, | Performed by: NURSE PRACTITIONER

## 2022-09-26 PROCEDURE — 3066F PR DOCUMENTATION OF TREATMENT FOR NEPHROPATHY: ICD-10-PCS | Mod: CPTII,,, | Performed by: NURSE PRACTITIONER

## 2022-09-26 PROCEDURE — 3078F PR MOST RECENT DIASTOLIC BLOOD PRESSURE < 80 MM HG: ICD-10-PCS | Mod: CPTII,,, | Performed by: NURSE PRACTITIONER

## 2022-09-26 PROCEDURE — 3078F DIAST BP <80 MM HG: CPT | Mod: CPTII,,, | Performed by: NURSE PRACTITIONER

## 2022-09-26 PROCEDURE — 3066F NEPHROPATHY DOC TX: CPT | Mod: CPTII,,, | Performed by: NURSE PRACTITIONER

## 2022-09-26 PROCEDURE — 3074F PR MOST RECENT SYSTOLIC BLOOD PRESSURE < 130 MM HG: ICD-10-PCS | Mod: CPTII,,, | Performed by: NURSE PRACTITIONER

## 2022-09-26 PROCEDURE — 99214 PR OFFICE/OUTPT VISIT, EST, LEVL IV, 30-39 MIN: ICD-10-PCS | Mod: S$PBB,,, | Performed by: NURSE PRACTITIONER

## 2022-09-26 PROCEDURE — 3008F BODY MASS INDEX DOCD: CPT | Mod: CPTII,,, | Performed by: NURSE PRACTITIONER

## 2022-09-26 PROCEDURE — 3008F PR BODY MASS INDEX (BMI) DOCUMENTED: ICD-10-PCS | Mod: CPTII,,, | Performed by: NURSE PRACTITIONER

## 2022-09-26 PROCEDURE — 90686 IIV4 VACC NO PRSV 0.5 ML IM: CPT | Mod: PBBFAC

## 2022-09-26 PROCEDURE — 1160F PR REVIEW ALL MEDS BY PRESCRIBER/CLIN PHARMACIST DOCUMENTED: ICD-10-PCS | Mod: CPTII,,, | Performed by: NURSE PRACTITIONER

## 2022-09-26 PROCEDURE — 99215 OFFICE O/P EST HI 40 MIN: CPT | Mod: PBBFAC | Performed by: NURSE PRACTITIONER

## 2022-09-26 PROCEDURE — 4010F ACE/ARB THERAPY RXD/TAKEN: CPT | Mod: CPTII,,, | Performed by: NURSE PRACTITIONER

## 2022-09-26 PROCEDURE — 99214 OFFICE O/P EST MOD 30 MIN: CPT | Mod: S$PBB,,, | Performed by: NURSE PRACTITIONER

## 2022-09-26 RX ORDER — FUROSEMIDE 20 MG/1
20 TABLET ORAL DAILY
Qty: 90 TABLET | Refills: 1 | Status: SHIPPED | OUTPATIENT
Start: 2022-09-26 | End: 2023-01-03 | Stop reason: SDUPTHER

## 2022-09-26 NOTE — PROGRESS NOTES
VSS, patient tolerated Influenza vaccine well, Left deltoid; advised of possible pain and to call clinic with any issues.

## 2022-09-26 NOTE — PROGRESS NOTES
"[facility]  Nephrology Clinic Note    Chief complaint: Chronic Kidney Disease (RTC, took meds, would like influenza vaccine)    History of present illness:   Ezra Zhang is a 61 y.o. Black or  male with past medical history of CKD, DM (diagnosed in 2008), heart failure with reduced ejection fraction, treated hepatitis C, dyslipidemia, atrial fibrillation, left hydronephrosis (followed by Urology Clinic), and smoking. Patient presents for follow-up appointment in nephrology clinic today, denies complaints.    Review of Systems  12 point review of systems conducted, negative except as stated in the history of present illness.    Allergies: Patient has No Known Allergies.     Past Medical History:  has a past medical history of Atrial fibrillation, CKD (chronic kidney disease), DM (diabetes mellitus), Dyslipidemia, Heart failure, unspecified, and Smoking.    Procedure History:  has a past surgical history that includes coronary arteriograph; left  heart catherization; Insert / replace / remove pacemaker; and Cardioversion.    Family History: family history includes Cataracts in his mother; Glaucoma in his mother; Heart disease in his mother; Heart failure in his mother; Hypertension in his father; Peripheral vascular disease in his mother.    Social History:  reports that he has been smoking cigarettes. He has a 30.00 pack-year smoking history. He has never used smokeless tobacco. He reports that he does not currently use alcohol. He reports that he does not use drugs.    Physical exam  /76 (BP Location: Left arm, Patient Position: Sitting, BP Method: Large (Automatic))   Pulse 67   Temp 97.9 °F (36.6 °C) (Oral)   Resp 18   Ht 6' 0.84" (1.85 m)   Wt 94.1 kg (207 lb 6.4 oz)   SpO2 99%   BMI 27.49 kg/m²   General appearance: Patient is in no acute distress.  Skin: No rashes or wounds.  HEENT: PERRLA, EOMI, no scleral icterus, no JVD. Neck is supple.  Chest: " Respirations are unlabored. Lungs sounds are clear.   Heart: S1, S2.   Abdomen: Benign.  : Deferred.  Extremities: No edema, peripheral pulses are palpable.   Neuro: No focal deficits.     Home Medications:    Current Outpatient Medications:     ACCU-CHEK FASTCLIX LANCET DRUM Misc, Apply topically once daily., Disp: , Rfl:     ACCU-CHEK GUIDE TEST STRIPS Strp, USE AS DIRECTED once daily (keep log), Disp: , Rfl:     albuterol-ipratropium (DUO-NEB) 2.5 mg-0.5 mg/3 mL nebulizer solution, Take 3 mLs by nebulization every 6 (six) hours as needed for Wheezing., Disp: 75 mL, Rfl: 6    amiodarone (PACERONE) 200 MG Tab, Take 200 mg by mouth once daily., Disp: , Rfl:     atorvastatin (LIPITOR) 40 MG tablet, Take 1 tablet (40 mg total) by mouth once daily., Disp: 90 tablet, Rfl: 3    cetirizine (ZYRTEC) 10 MG tablet, Take 1 tablet (10 mg total) by mouth once daily., Disp: 30 tablet, Rfl: 6    clotrimazole-betamethasone 1-0.05% (LOTRISONE) cream, Apply 2 g topically 2 (two) times daily., Disp: , Rfl:     ELIQUIS 5 mg Tab, Take 5 mg by mouth 2 (two) times daily., Disp: , Rfl:     famotidine (PEPCID) 20 MG tablet, Take 1 tablet (20 mg total) by mouth 2 (two) times daily., Disp: 60 tablet, Rfl: 11    fluticasone propion-salmeterol 115-21 mcg/dose (ADVAIR HFA) 115-21 mcg/actuation HFAA inhaler, Inhale 2 puffs into the lungs every 12 (twelve) hours., Disp: 12 g, Rfl: 6    fluticasone propionate (FLONASE ALLERGY RELIEF) 50 mcg/actuation nasal spray, 2 sprays (100 mcg total) by Each Nostril route Daily., Disp: 18 g, Rfl: 6    glipiZIDE (GLUCOTROL) 5 MG tablet, Take 5 mg by mouth once daily., Disp: , Rfl:     lancets-blood glucose strips 30 gauge Cmpk,  Accucheck blood glucose test strips and lancets, See Instructions, Check CBG once daily and keep log., # 100 EA, 11 Refill(s), Pharmacy: [facility], 185, cm, Height/Length Dosing, 03/08/22 8:10:00 CST, 96, kg, Weight Dosing, 03/08/22 8:10:00 CST, Disp: , Rfl:     lisinopriL  (PRINIVIL,ZESTRIL) 5 MG tablet, Take 1 tablet (5 mg total) by mouth once daily., Disp: 90 tablet, Rfl: 3    metoprolol succinate (TOPROL-XL) 50 MG 24 hr tablet, Take 1 tablet (50 mg total) by mouth 2 (two) times daily., Disp: 180 tablet, Rfl: 3    nicotine (NICODERM CQ) 14 mg/24 hr, Place 1 patch onto the skin once daily., Disp: 28 patch, Rfl: 2    nicotine polacrilex (NICORETTE) 4 MG Gum, Take 1 each (4 mg total) by mouth as needed (smoking urge)., Disp: 170 each, Rfl: 3    promethazine-codeine 6.25-10 mg/5 ml (PHENERGAN WITH CODEINE) 6.25-10 mg/5 mL syrup, Take 5 mLs by mouth every 6 (six) hours as needed for Cough., Disp: 240 mL, Rfl: 1    tamsulosin (FLOMAX) 0.4 mg Cap, Take 1 capsule by mouth once daily., Disp: , Rfl:     furosemide (LASIX) 20 MG tablet, Take 1 tablet (20 mg total) by mouth once daily. Refilled per Cardio orders., Disp: 90 tablet, Rfl: 1    Laboratory data  Lab Results   Component Value Date     09/12/2022    K 4.3 09/12/2022    CHLORIDE 107 09/12/2022    CO2 28 09/12/2022    BUN 22.5 09/12/2022    CREATININE 1.79 (H) 09/12/2022    GLUCOSE 113 09/12/2022    CALCIUM 9.3 09/12/2022    ALBUMIN 4.1 09/12/2022    MG 2.30 04/12/2022    ALKPHOS 124 09/12/2022    ALT 20 09/12/2022    BILIDIR 0.2 08/25/2021    IBILI 0.20 08/25/2021    DZHSXPSH17OW 27.9 (L) 09/06/2022    HGBA1C 5.2 03/02/2022    HGB 14.5 09/06/2022    HCT 44.2 09/06/2022     09/06/2022    WBC 6.7 09/06/2022    IRON 83 03/27/2018    TIBC 177 (L) 03/27/2018    LABIRON 46.9 03/27/2018    TRANS 212.0 03/27/2018    FERRITIN 295.1 03/27/2018    LABURIC 8.2 (H) 05/17/2021     Lab Results   Component Value Date    HIV Nonreactive 01/09/2018    HEPCAB Reactive (A) 08/23/2019    HEPBSURFAG Negative 08/23/2019    HEPBCAB Nonreactive 08/23/2019      Urine studies:  Lab Results   Component Value Date    COLORUA Light-Yellow (A) 09/12/2022    APPEARANCEUA Clear 09/12/2022    SGUA 1.015 09/12/2022    PHUA 5.5 09/12/2022    PROTEINUA  Negative 09/12/2022    GLUCOSEUA Normal 09/12/2022    KETONESUA Negative 09/12/2022    BLOODUA Negative 09/12/2022    NITRITESUA Negative 09/12/2022    LEUKOCYTESUR Negative 09/12/2022    RBCUA 0-5 09/12/2022    WBCUA 0-5 09/12/2022    BACTERIA None Seen 09/12/2022    SQEPUA Trace (A) 09/12/2022    HYALINECASTS 0-2 (A) 09/12/2022    CREATRANDUR 126.6 09/12/2022    PROTEINURINE 7.8 09/12/2022       Imaging  CT Abdomen and Pelvis W W/O Contrast 4/28/2022:  Images were reviewed in soft tissue, lung, and bone windows.  Exam quality: adequate  Lines/tubes: Right ventricular ICD lead is again partially visualized, unremarkable appearance.  The included lung bases, cardiac chambers, and regional vascular structures are without evidence of acute process or new focal abnormality. No development of pericardial or pleural effusion. Scattered aortoiliac mural calcification is similar to the prior study. The gallbladder and bile ducts, portal vein, and liver parenchyma are  unremarkable. No suspicious abnormality of the pancreas, spleen, or adrenal glands.  Bilateral renal enhancement and contrast excretion are temporally symmetric. Multiple homogeneous, nonenhancing cystic foci are again scattered throughout the bilateral renal parenchyma, with no new or enlarging solid mass or complex cyst identified. There is no evidence of radiodense urolithiasis. As before, there is moderate dilatation of the left central collecting system, as well as similar severely dilated course of the ipsilateral ureter. There is similar gradual tapering to nondilated distal left ureteral appearance just at the level of the UVJ. No significant right hydronephrosis versus or ureteral dilatation is identified. The urinary bladder is mildly dilated, with no focal mural  irregularity or convincing intraluminal filling defect. The prostate is of similar size and diffuse heterogeneity, with no convincing focal lesion identified.    The included lower  esophagus and the stomach are unremarkable. No evidence of high-grade mechanical bowel obstruction or focal gastrointestinal lesion. There is no free intra-abdominal air or fluid. No drainable collections.   Uncomplicated fat-containing umbilical hernia is similar in the interval. There is no evidence of acute or new focal abnormality through the body wall subcutaneous changes tissues or musculature. Degenerative alterations of the included spinal column and bony pelvis are again appreciated, with no acute cortical displacement or evidence  of destructive focal lesion. There are similar early to mid stage changes of bilateral femoral head avascular necrosis, without gross cortical collapse.     IMPRESSION     1. No evidence of radiodense urolithiasis, focal renal or intraureteral mass, or other clear cause for hematuria.    2. Grossly stable appearance of chronic severe left ureteral dilatation, which could be secondary to stable congenital versus acquired abnormality at the level of the UVJ.    3. Multiple bilateral renal cysts are of unchanged size and appearance, consistent with benign etiology (Bosniak 1); no specific imaging follow-up recommended.    4. Additional chronic secondary details discussed above, similar to 2 March, 2021 appearance.  Electronically Signed By: Xavier Wisdom MD  Date/Time Signed: 04/28/2022 16:03    Impression and plan    CKD stage G3b/A1, GFR 30-44 and albumin creatinine ratio <30 mg/g  Renal indices are relatively stable, there is no significant proteinuria. Continue renal sparing activities and periodic monitoring. Continue renal sparing activities:    -Follow low sodium diet (2 grams a day)  -Control high blood pressure ( goal BP < 130/80, please record BP at home every day and bring log to next office visit)  -Exercise at least 30 minutes a day, 5 days a week.  -Maintain healthy weight.  -Decrease or stop alcohol use  -Do not smoke  -Stay well hydrated  -Receive Pneumovax,  Flu, and HBV vaccines if indicated.  -Do not take NSAIDs (Ibuprofen, Naproxen, Aleve, Advil, Toradol, Mobic), may take only Tylenol as needed for pain/headaches.  -Take cholesterol-lowering medications as prescribed (LDL goal <100)    Handout on treatment options for kidney disease provided.  F/U with PCP as scheduled  [facility] with routine labs in 2 months    Flu vaccine need  -     Influenza - Quadrivalent *Preferred* (6 months+) (PF)  Will give flu vaccine today.     Primary hypertension  Blood pressure is at goal, continue current antihypertensive regimen and 2 g a day dietary sodium restriction.    BMI 27.0-27.9,adult  Lifestyle and dietary interventions discussed, patient counseled on weight loss using portion control, non sedentary lifestyle, low-carbohydrate/low fat diet.    Hydronephrosis, unspecified hydronephrosis type  -     NM Renal Scan with LASIX; Future; Expected date: 10/10/2022  Patient has family history of kidney disease and anatomic abnormalities of kidneys and urinary tract.  Heritable disease is likely, he is amenable to genetic testing, will order Renasight panel today.    He is followed by [facility] urology clinic and has been referred to [facility]. Will order MAG3 scan to determine whether left kidney still has any significant residual function.     UTI  Will treat with Cipro.     Other orders  -     furosemide (LASIX) 20 MG tablet; Take 1 tablet (20 mg total) by mouth once daily. Refilled per Cardio orders.  Dispense: 90 tablet; Refill: 1         9/26/2022  Nadira Buchanan NP  [facility]

## 2022-10-03 ENCOUNTER — DOCUMENTATION ONLY (OUTPATIENT)
Dept: ADMINISTRATIVE | Facility: HOSPITAL | Age: 62
End: 2022-10-03
Payer: MEDICAID

## 2022-10-04 ENCOUNTER — OFFICE VISIT (OUTPATIENT)
Dept: CARDIOLOGY | Facility: CLINIC | Age: 62
End: 2022-10-04
Payer: MEDICAID

## 2022-10-04 ENCOUNTER — CLINICAL SUPPORT (OUTPATIENT)
Dept: CARDIOLOGY | Facility: CLINIC | Age: 62
End: 2022-10-04
Payer: MEDICAID

## 2022-10-04 VITALS
TEMPERATURE: 98 F | WEIGHT: 210.13 LBS | HEIGHT: 73 IN | SYSTOLIC BLOOD PRESSURE: 130 MMHG | BODY MASS INDEX: 27.85 KG/M2 | OXYGEN SATURATION: 99 % | DIASTOLIC BLOOD PRESSURE: 85 MMHG | RESPIRATION RATE: 20 BRPM | HEART RATE: 64 BPM

## 2022-10-04 DIAGNOSIS — E11.9 CONTROLLED TYPE 2 DIABETES MELLITUS WITHOUT COMPLICATION, WITHOUT LONG-TERM CURRENT USE OF INSULIN: ICD-10-CM

## 2022-10-04 DIAGNOSIS — I48.0 PAROXYSMAL ATRIAL FIBRILLATION: Primary | ICD-10-CM

## 2022-10-04 DIAGNOSIS — I10 PRIMARY HYPERTENSION: ICD-10-CM

## 2022-10-04 DIAGNOSIS — E78.5 HYPERLIPIDEMIA, UNSPECIFIED HYPERLIPIDEMIA TYPE: ICD-10-CM

## 2022-10-04 DIAGNOSIS — I48.91 ATRIAL FIBRILLATION, UNSPECIFIED TYPE: Primary | ICD-10-CM

## 2022-10-04 DIAGNOSIS — I42.8 NON-ISCHEMIC CARDIOMYOPATHY: ICD-10-CM

## 2022-10-04 PROCEDURE — 99215 OFFICE O/P EST HI 40 MIN: CPT | Mod: PBBFAC | Performed by: INTERNAL MEDICINE

## 2022-10-04 PROCEDURE — 99211 OFF/OP EST MAY X REQ PHY/QHP: CPT | Mod: PBBFAC,27

## 2022-10-04 PROCEDURE — 93282 PRGRMG EVAL IMPLANTABLE DFB: CPT | Mod: PBBFAC

## 2022-10-04 RX ORDER — ATORVASTATIN CALCIUM 80 MG/1
80 TABLET, FILM COATED ORAL DAILY
Qty: 90 TABLET | Refills: 3 | Status: SHIPPED | OUTPATIENT
Start: 2022-10-04 | End: 2024-01-02 | Stop reason: SDUPTHER

## 2022-10-04 NOTE — PROGRESS NOTES
Chief Complaint   Patient presents with    f/u denies chest pain or sob       This is a 61-year-old male with atrial fibrillation status post LASHELL and cardioversion in February 2017 on Eliquis, nonischemic cardiomyopathy with recovered EF s/p ICD, COPD, hepatitis C, diabetes mellitus type II, and CKD stage III who presents for routine follow up and ongoing care. Patient had his ICD generator changed in August 2018 due to recall.     During last visit pt presented for device interrogation that showed episodes of atrial fibrillation with RVR. Pt also reported intermittent fatigue, palpitations and decreased activity.  Metoprolol was increased to 50 mg b.i.d. and decision was to proceed with a cardioversion.  On day of cardioversion August 3, 2022, patient was in sinus rhythm and procedure was canceled.    Today patient reports feeling well he reports better energy.  He continues to do yd work without any chest pain or shortness of breath on exertion.  He denies any palpitations.  He quit smoking following last visit in July 2022.   He is compliant with eliquis and denies bleeding.     Results for orders placed during the hospital encounter of 08/31/22    Echo    Interpretation Summary  · The left ventricle is normal in size with moderate concentric hypertrophy and normal systolic function.  · The estimated ejection fraction is 60%.  · Indeterminate left ventricular diastolic function.  · Normal right ventricular size with normal right ventricular systolic function.  · Moderate left atrial enlargement.  · Mild right atrial enlargement.  · Mild mitral regurgitation.  · There is no pulmonary hypertension.  · The estimated PA systolic pressure is 24 mmHg.  · Normal central venous pressure (3 mmHg).      TTE 10/14/2020: LVEF 55%    Left heart cath- no CAD (2014)      Review of Systems   Constitutional: negative for fever,chills, sweats, weakness, fatigue, decreased activity   Eye: negative for blurry vision, vision  change  ENMT: negative for sore throat, nasal congestion  Respiratory: negative for shortness of breath, cough, wheezing  Cardiovascular: negative for chest pain, dyspnea on exertion, orthopnea, PND, lower extremity edema, palpitations, claudication  Gastrointestinal: negative for nausea, vomiting, abdominal pain, constipation, diarrhea, blood in stool  Genitourinary: negative for hematuria, nocturia, dysuria  Endocrine: negative for abnormal thirst, temperature  Musculoskeletal: negative for back pain, joint pain  Integumentary: negative for abnormal mole  Neurologic: negative for weakness, numbness, headaches, shooting pain  Hematology: negative for easy bruising, easy bleeding  Allergy: negative for watery eyes, sneezing  Psychiatric: negative for loss of interest, anxiety, stress      Physical Exam Vitals & Measurements   Vitals:    10/04/22 0837   BP: 130/85   Pulse: 64   Resp: 20   Temp: 98.1 °F (36.7 °C)       General: alert and oriented/no acute distress  Eye: EOMI/normal conjunctiva/no xanthelasma  HENT: normocephalic/moist oral mucosa  Neck: supple/nontender/no carotid bruit  Respiratory: lungs CTA/nonlabored respirations/BS equal/symmetrical expansion/no  chest wall tenderness  Cardiovascular:  Irregularly irregular/no murmur/normal peripheral perfusion/no  edema/no JVD  Gastrointestinal: soft/nontender  Musculoskeletal: normal ROM  Integumentary: warm/dry/pink/intact  Neurologic: alert/oriented/normal sensory/no focal deficits  Psychiatric: cooperative/appropriate mood and affect/normal judgment      Current Outpatient Medications:     albuterol-ipratropium (DUO-NEB) 2.5 mg-0.5 mg/3 mL nebulizer solution, Take 3 mLs by nebulization every 6 (six) hours as needed for Wheezing., Disp: 75 mL, Rfl: 6    amiodarone (PACERONE) 200 MG Tab, Take 200 mg by mouth once daily., Disp: , Rfl:     atorvastatin (LIPITOR) 40 MG tablet, Take 1 tablet (40 mg total) by mouth once daily., Disp: 90 tablet, Rfl: 3    cetirizine  (ZYRTEC) 10 MG tablet, Take 1 tablet (10 mg total) by mouth once daily., Disp: 30 tablet, Rfl: 6    ELIQUIS 5 mg Tab, Take 5 mg by mouth 2 (two) times daily., Disp: , Rfl:     famotidine (PEPCID) 20 MG tablet, Take 1 tablet (20 mg total) by mouth 2 (two) times daily., Disp: 60 tablet, Rfl: 11    fluticasone propion-salmeterol 115-21 mcg/dose (ADVAIR HFA) 115-21 mcg/actuation HFAA inhaler, Inhale 2 puffs into the lungs every 12 (twelve) hours., Disp: 12 g, Rfl: 6    fluticasone propionate (FLONASE ALLERGY RELIEF) 50 mcg/actuation nasal spray, 2 sprays (100 mcg total) by Each Nostril route Daily., Disp: 18 g, Rfl: 6    furosemide (LASIX) 20 MG tablet, Take 1 tablet (20 mg total) by mouth once daily. Refilled per Cardio orders., Disp: 90 tablet, Rfl: 1    glipiZIDE (GLUCOTROL) 5 MG tablet, Take 5 mg by mouth once daily., Disp: , Rfl:     lisinopriL (PRINIVIL,ZESTRIL) 5 MG tablet, Take 1 tablet (5 mg total) by mouth once daily., Disp: 90 tablet, Rfl: 3    metoprolol succinate (TOPROL-XL) 50 MG 24 hr tablet, Take 1 tablet (50 mg total) by mouth 2 (two) times daily., Disp: 180 tablet, Rfl: 3    nicotine (NICODERM CQ) 14 mg/24 hr, Place 1 patch onto the skin once daily., Disp: 28 patch, Rfl: 2    nicotine polacrilex (NICORETTE) 4 MG Gum, Take 1 each (4 mg total) by mouth as needed (smoking urge)., Disp: 170 each, Rfl: 3    promethazine-codeine 6.25-10 mg/5 ml (PHENERGAN WITH CODEINE) 6.25-10 mg/5 mL syrup, Take 5 mLs by mouth every 6 (six) hours as needed for Cough., Disp: 240 mL, Rfl: 1    tamsulosin (FLOMAX) 0.4 mg Cap, Take 1 capsule by mouth once daily., Disp: , Rfl:     ACCU-CHEK FASTCLIX LANCET DRUM Misc, Apply topically once daily., Disp: , Rfl:     ACCU-CHEK GUIDE TEST STRIPS Strp, USE AS DIRECTED once daily (keep log), Disp: , Rfl:     clotrimazole-betamethasone 1-0.05% (LOTRISONE) cream, Apply 2 g topically 2 (two) times daily., Disp: , Rfl:     lancets-blood glucose strips 30 gauge Cmpk,  Accucheck blood glucose  test strips and lancets, See Instructions, Check CBG once daily and keep log., # 100 EA, 11 Refill(s), Pharmacy: [facility], 185, cm, Height/Length Dosing, 03/08/22 8:10:00 CST, 96, kg, Weight Dosing, 03/08/22 8:10:00 CST, Disp: , Rfl:       Assessment/Plan  Paroxysmal Atrial Fibrillation/flutter - s/p LASHELL/cardioversion 2/17  Pt's device interrogation in July 2022 showed episodes of atrial fibrillation with RVR. Pt also reporting intermittent fatigue and decreased activity.  Toprol was increased to 50 mg b.i.d. and plan was for cardioversion.    On day of procedure patient was in sinus rhythm and procedure was canceled.  Patient is in AFib today but with good rate controlled and he reports feeling much better.  Suggest stopping amiodarone due to known toxicity and as he is in AFib despite it.    Patient reports that in the past he stopped amiodarone and started experiencing right-sided sharp arm pain but he is willing to try to stop it again.  - Continue with Eliquis for CVA prophylaxis. Denies any adverse bleeding issues      Chronic Systolic HF (recovered)/ NICMO s/p ICD NYHA Class II - s/p ICD implant  - LVEF- 55% per TTE 10.14.20  Repeat echo in 8/31/2022 shows preserved EF  - Euvolemic, warm on exam  - Continue GDMT: Lisinopril 5, Lasix,  Metoprolol succinate   - Counseled on the importance of a 2 g sodium diet  - Continue routine ICD interrogations      HTN  - At goal /85  - continue current regimen  - Counseled on low-sodium diet    Hyperlipidemia  LDL 96 in 3/2021 with repeat 88 in 9/2022 after increase lipitor to 80  Goal < 70 given DM  Will increase lipitor to 80mg  FLP in 2 months    Tobacco Use  Pt quit smoking in 7/2022  Continues to use nicotine patch and gum  Congratulated him and encouraged to remain abstinent    Leg pain/numbness  - Reports leg pain/numbness with ambulation or rest  - AMARILYS ordered on last visit, not done yet    COPD  DM   CKD III  - Continue management per PCP      Stop  amio  Increase lipitor  FLP   F/U in 3-4 months  Continue with routine ICD interrogations  Continue to follow up with PCP as directed

## 2022-10-14 ENCOUNTER — HOSPITAL ENCOUNTER (OUTPATIENT)
Dept: RADIOLOGY | Facility: HOSPITAL | Age: 62
Discharge: HOME | End: 2022-10-14
Attending: NURSE PRACTITIONER
Payer: MEDICAID

## 2022-10-14 DIAGNOSIS — N13.1 HYDRONEPHROSIS WITH URETERAL STRICTURE, NOT ELSEWHERE CLASSIFIED: ICD-10-CM

## 2022-10-14 DIAGNOSIS — N13.30 HYDRONEPHROSIS, UNSPECIFIED HYDRONEPHROSIS TYPE: ICD-10-CM

## 2022-10-14 PROCEDURE — 78708 K FLOW/FUNCT IMAGE W/DRUG: CPT | Mod: TC

## 2022-10-14 RX ORDER — FUROSEMIDE 10 MG/ML
INJECTION INTRAMUSCULAR; INTRAVENOUS
Status: DISPENSED
Start: 2022-10-14 | End: 2022-10-14

## 2022-11-25 RX ORDER — AMIODARONE HYDROCHLORIDE 200 MG/1
TABLET ORAL
COMMUNITY
Start: 2022-10-11 | End: 2023-01-03

## 2022-11-28 ENCOUNTER — LAB VISIT (OUTPATIENT)
Dept: LAB | Facility: HOSPITAL | Age: 62
End: 2022-11-28
Attending: NURSE PRACTITIONER
Payer: MEDICAID

## 2022-11-28 DIAGNOSIS — N18.32 CKD STAGE G3B/A1, GFR 30-44 AND ALBUMIN CREATININE RATIO <30 MG/G: ICD-10-CM

## 2022-11-28 LAB
ALBUMIN SERPL-MCNC: 4.3 GM/DL (ref 3.4–4.8)
ALBUMIN/GLOB SERPL: 1.5 RATIO (ref 1.1–2)
ALP SERPL-CCNC: 111 UNIT/L (ref 40–150)
ALT SERPL-CCNC: 51 UNIT/L (ref 0–55)
APPEARANCE UR: CLEAR
AST SERPL-CCNC: 40 UNIT/L (ref 5–34)
BACTERIA #/AREA URNS AUTO: ABNORMAL /HPF
BILIRUB UR QL STRIP.AUTO: NEGATIVE MG/DL
BILIRUBIN DIRECT+TOT PNL SERPL-MCNC: 0.6 MG/DL
BUN SERPL-MCNC: 15.9 MG/DL (ref 8.4–25.7)
CALCIUM SERPL-MCNC: 9.5 MG/DL (ref 8.8–10)
CHLORIDE SERPL-SCNC: 109 MMOL/L (ref 98–107)
CO2 SERPL-SCNC: 23 MMOL/L (ref 23–31)
COLOR UR AUTO: COLORLESS
CREAT SERPL-MCNC: 1.46 MG/DL (ref 0.73–1.18)
CREAT UR-MCNC: 50.1 MG/DL (ref 63–166)
GFR SERPLBLD CREATININE-BSD FMLA CKD-EPI: 54 MLS/MIN/1.73/M2
GLOBULIN SER-MCNC: 2.9 GM/DL (ref 2.4–3.5)
GLUCOSE SERPL-MCNC: 77 MG/DL (ref 82–115)
GLUCOSE UR QL STRIP.AUTO: NORMAL MG/DL
KETONES UR QL STRIP.AUTO: NEGATIVE MG/DL
LEUKOCYTE ESTERASE UR QL STRIP.AUTO: NEGATIVE UNIT/L
NITRITE UR QL STRIP.AUTO: NEGATIVE
PH UR STRIP.AUTO: 5 [PH]
POTASSIUM SERPL-SCNC: 4.5 MMOL/L (ref 3.5–5.1)
PROT SERPL-MCNC: 7.2 GM/DL (ref 5.8–7.6)
PROT UR QL STRIP.AUTO: NEGATIVE MG/DL
PROT UR STRIP-MCNC: 7.1 MG/DL
RBC #/AREA URNS AUTO: ABNORMAL /HPF
RBC UR QL AUTO: NEGATIVE UNIT/L
SODIUM SERPL-SCNC: 141 MMOL/L (ref 136–145)
SP GR UR STRIP.AUTO: 1.01
SQUAMOUS #/AREA URNS LPF: ABNORMAL /HPF
URINE PROTEIN/CREATININE RATIO (OHS): 0.1
UROBILINOGEN UR STRIP-ACNC: NORMAL MG/DL
WBC #/AREA URNS AUTO: ABNORMAL /HPF

## 2022-11-28 PROCEDURE — 80053 COMPREHEN METABOLIC PANEL: CPT

## 2022-11-28 PROCEDURE — 82570 ASSAY OF URINE CREATININE: CPT

## 2022-11-28 PROCEDURE — 81001 URINALYSIS AUTO W/SCOPE: CPT

## 2022-11-28 PROCEDURE — 36415 COLL VENOUS BLD VENIPUNCTURE: CPT

## 2022-11-30 ENCOUNTER — PATIENT MESSAGE (OUTPATIENT)
Dept: ANESTHESIOLOGY | Facility: HOSPITAL | Age: 62
End: 2022-11-30
Payer: MEDICAID

## 2022-11-30 ENCOUNTER — OFFICE VISIT (OUTPATIENT)
Dept: NEPHROLOGY | Facility: CLINIC | Age: 62
End: 2022-11-30
Payer: MEDICAID

## 2022-11-30 ENCOUNTER — LAB VISIT (OUTPATIENT)
Dept: LAB | Facility: HOSPITAL | Age: 62
End: 2022-11-30
Attending: NURSE PRACTITIONER
Payer: MEDICAID

## 2022-11-30 VITALS
HEART RATE: 66 BPM | OXYGEN SATURATION: 100 % | BODY MASS INDEX: 28.98 KG/M2 | SYSTOLIC BLOOD PRESSURE: 138 MMHG | DIASTOLIC BLOOD PRESSURE: 84 MMHG | TEMPERATURE: 98 F | WEIGHT: 218.69 LBS | HEIGHT: 73 IN | RESPIRATION RATE: 18 BRPM

## 2022-11-30 DIAGNOSIS — D64.9 ANEMIA, UNSPECIFIED TYPE: ICD-10-CM

## 2022-11-30 DIAGNOSIS — I48.20 CHRONIC ATRIAL FIBRILLATION: ICD-10-CM

## 2022-11-30 DIAGNOSIS — N18.31 STAGE 3A CHRONIC KIDNEY DISEASE: Primary | ICD-10-CM

## 2022-11-30 DIAGNOSIS — N13.30 HYDRONEPHROSIS OF LEFT KIDNEY: ICD-10-CM

## 2022-11-30 DIAGNOSIS — Z12.5 PROSTATE CANCER SCREENING: ICD-10-CM

## 2022-11-30 DIAGNOSIS — E11.9 CONTROLLED TYPE 2 DIABETES MELLITUS WITHOUT COMPLICATION, WITHOUT LONG-TERM CURRENT USE OF INSULIN: ICD-10-CM

## 2022-11-30 DIAGNOSIS — I10 PRIMARY HYPERTENSION: ICD-10-CM

## 2022-11-30 DIAGNOSIS — I42.8 NON-ISCHEMIC CARDIOMYOPATHY: ICD-10-CM

## 2022-11-30 DIAGNOSIS — E55.9 VITAMIN D DEFICIENCY: ICD-10-CM

## 2022-11-30 LAB
ANION GAP SERPL CALC-SCNC: 8 MEQ/L
APTT PPP: 35.2 SECONDS
BASOPHILS # BLD AUTO: 0.04 X10(3)/MCL (ref 0–0.2)
BASOPHILS NFR BLD AUTO: 0.6 %
BUN SERPL-MCNC: 15.8 MG/DL (ref 8.4–25.7)
CALCIUM SERPL-MCNC: 9.7 MG/DL (ref 8.8–10)
CHLORIDE SERPL-SCNC: 109 MMOL/L (ref 98–107)
CO2 SERPL-SCNC: 27 MMOL/L (ref 23–31)
CREAT SERPL-MCNC: 1.41 MG/DL (ref 0.73–1.18)
CREAT UR-MCNC: 51.5 MG/DL (ref 63–166)
CREAT/UREA NIT SERPL: 11
DEPRECATED CALCIDIOL+CALCIFEROL SERPL-MC: 26.6 NG/ML (ref 30–80)
EOSINOPHIL # BLD AUTO: 0.09 X10(3)/MCL (ref 0–0.9)
EOSINOPHIL NFR BLD AUTO: 1.3 %
ERYTHROCYTE [DISTWIDTH] IN BLOOD BY AUTOMATED COUNT: 14.2 % (ref 11.5–17)
EST. AVERAGE GLUCOSE BLD GHB EST-MCNC: 105.4 MG/DL
GFR SERPLBLD CREATININE-BSD FMLA CKD-EPI: 56 MLS/MIN/1.73/M2
GLUCOSE SERPL-MCNC: 99 MG/DL (ref 82–115)
HBA1C MFR BLD: 5.3 %
HCT VFR BLD AUTO: 43.8 % (ref 42–52)
HGB BLD-MCNC: 14.8 GM/DL (ref 14–18)
IMM GRANULOCYTES # BLD AUTO: 0.01 X10(3)/MCL (ref 0–0.04)
IMM GRANULOCYTES NFR BLD AUTO: 0.1 %
LYMPHOCYTES # BLD AUTO: 2.11 X10(3)/MCL (ref 0.6–4.6)
LYMPHOCYTES NFR BLD AUTO: 30.6 %
MAGNESIUM SERPL-MCNC: 2.2 MG/DL (ref 1.6–2.6)
MCH RBC QN AUTO: 33.8 PG (ref 27–31)
MCHC RBC AUTO-ENTMCNC: 33.8 MG/DL (ref 33–36)
MCV RBC AUTO: 100 FL (ref 80–94)
MICROALBUMIN UR-MCNC: 22.3 UG/ML
MICROALBUMIN/CREAT RATIO PNL UR: 43.3 MG/GM CR (ref 0–30)
MONOCYTES # BLD AUTO: 0.53 X10(3)/MCL (ref 0.1–1.3)
MONOCYTES NFR BLD AUTO: 7.7 %
NEUTROPHILS # BLD AUTO: 4.1 X10(3)/MCL (ref 2.1–9.2)
NEUTROPHILS NFR BLD AUTO: 59.7 %
NRBC BLD AUTO-RTO: 0 %
PLATELET # BLD AUTO: 285 X10(3)/MCL (ref 130–400)
PMV BLD AUTO: 10.6 FL (ref 7.4–10.4)
POTASSIUM SERPL-SCNC: 4.3 MMOL/L (ref 3.5–5.1)
PSA SERPL-MCNC: 1.46 NG/ML
RBC # BLD AUTO: 4.38 X10(6)/MCL (ref 4.7–6.1)
SODIUM SERPL-SCNC: 144 MMOL/L (ref 136–145)
WBC # SPEC AUTO: 6.9 X10(3)/MCL (ref 4.5–11.5)

## 2022-11-30 PROCEDURE — 99214 OFFICE O/P EST MOD 30 MIN: CPT | Mod: S$PBB,,, | Performed by: NURSE PRACTITIONER

## 2022-11-30 PROCEDURE — 3066F PR DOCUMENTATION OF TREATMENT FOR NEPHROPATHY: ICD-10-PCS | Mod: CPTII,,, | Performed by: NURSE PRACTITIONER

## 2022-11-30 PROCEDURE — 83036 HEMOGLOBIN GLYCOSYLATED A1C: CPT

## 2022-11-30 PROCEDURE — 3079F DIAST BP 80-89 MM HG: CPT | Mod: CPTII,,, | Performed by: NURSE PRACTITIONER

## 2022-11-30 PROCEDURE — 85025 COMPLETE CBC W/AUTO DIFF WBC: CPT

## 2022-11-30 PROCEDURE — 1159F MED LIST DOCD IN RCRD: CPT | Mod: CPTII,,, | Performed by: NURSE PRACTITIONER

## 2022-11-30 PROCEDURE — 1160F RVW MEDS BY RX/DR IN RCRD: CPT | Mod: CPTII,,, | Performed by: NURSE PRACTITIONER

## 2022-11-30 PROCEDURE — 3066F NEPHROPATHY DOC TX: CPT | Mod: CPTII,,, | Performed by: NURSE PRACTITIONER

## 2022-11-30 PROCEDURE — 83735 ASSAY OF MAGNESIUM: CPT

## 2022-11-30 PROCEDURE — 1160F PR REVIEW ALL MEDS BY PRESCRIBER/CLIN PHARMACIST DOCUMENTED: ICD-10-PCS | Mod: CPTII,,, | Performed by: NURSE PRACTITIONER

## 2022-11-30 PROCEDURE — 3008F BODY MASS INDEX DOCD: CPT | Mod: CPTII,,, | Performed by: NURSE PRACTITIONER

## 2022-11-30 PROCEDURE — 80048 BASIC METABOLIC PNL TOTAL CA: CPT

## 2022-11-30 PROCEDURE — 82306 VITAMIN D 25 HYDROXY: CPT

## 2022-11-30 PROCEDURE — 3060F POS MICROALBUMINURIA REV: CPT | Mod: CPTII,,, | Performed by: NURSE PRACTITIONER

## 2022-11-30 PROCEDURE — 84153 ASSAY OF PSA TOTAL: CPT

## 2022-11-30 PROCEDURE — 4010F PR ACE/ARB THEARPY RXD/TAKEN: ICD-10-PCS | Mod: CPTII,,, | Performed by: NURSE PRACTITIONER

## 2022-11-30 PROCEDURE — 85730 THROMBOPLASTIN TIME PARTIAL: CPT

## 2022-11-30 PROCEDURE — 4010F ACE/ARB THERAPY RXD/TAKEN: CPT | Mod: CPTII,,, | Performed by: NURSE PRACTITIONER

## 2022-11-30 PROCEDURE — 3008F PR BODY MASS INDEX (BMI) DOCUMENTED: ICD-10-PCS | Mod: CPTII,,, | Performed by: NURSE PRACTITIONER

## 2022-11-30 PROCEDURE — 82043 UR ALBUMIN QUANTITATIVE: CPT

## 2022-11-30 PROCEDURE — 99214 PR OFFICE/OUTPT VISIT, EST, LEVL IV, 30-39 MIN: ICD-10-PCS | Mod: S$PBB,,, | Performed by: NURSE PRACTITIONER

## 2022-11-30 PROCEDURE — 3075F PR MOST RECENT SYSTOLIC BLOOD PRESS GE 130-139MM HG: ICD-10-PCS | Mod: CPTII,,, | Performed by: NURSE PRACTITIONER

## 2022-11-30 PROCEDURE — 3075F SYST BP GE 130 - 139MM HG: CPT | Mod: CPTII,,, | Performed by: NURSE PRACTITIONER

## 2022-11-30 PROCEDURE — 1159F PR MEDICATION LIST DOCUMENTED IN MEDICAL RECORD: ICD-10-PCS | Mod: CPTII,,, | Performed by: NURSE PRACTITIONER

## 2022-11-30 PROCEDURE — 36415 COLL VENOUS BLD VENIPUNCTURE: CPT

## 2022-11-30 PROCEDURE — 3079F PR MOST RECENT DIASTOLIC BLOOD PRESSURE 80-89 MM HG: ICD-10-PCS | Mod: CPTII,,, | Performed by: NURSE PRACTITIONER

## 2022-11-30 PROCEDURE — 99215 OFFICE O/P EST HI 40 MIN: CPT | Mod: PBBFAC | Performed by: NURSE PRACTITIONER

## 2022-11-30 PROCEDURE — 3060F PR POS MICROALBUMINURIA RESULT DOCUMENTED/REVIEW: ICD-10-PCS | Mod: CPTII,,, | Performed by: NURSE PRACTITIONER

## 2022-12-01 ENCOUNTER — TELEPHONE (OUTPATIENT)
Dept: NEPHROLOGY | Facility: CLINIC | Age: 62
End: 2022-12-01
Payer: MEDICAID

## 2022-12-01 DIAGNOSIS — N99.89 URINARY ANASTOMOTIC STRICTURE: Primary | ICD-10-CM

## 2022-12-01 NOTE — TELEPHONE ENCOUNTER
Francisco,   This patient has persistent left hydro, [place] scan on 10/14/22 showed distal ureteral stricture versus partial obstruction. Is this something that has to be addressed here or does the patient need a referral to [facility]?     Thank you!

## 2022-12-13 ENCOUNTER — OFFICE VISIT (OUTPATIENT)
Dept: INTERNAL MEDICINE | Facility: CLINIC | Age: 62
End: 2022-12-13
Payer: MEDICAID

## 2022-12-13 VITALS
DIASTOLIC BLOOD PRESSURE: 84 MMHG | SYSTOLIC BLOOD PRESSURE: 142 MMHG | HEIGHT: 72 IN | WEIGHT: 213 LBS | HEART RATE: 80 BPM | RESPIRATION RATE: 18 BRPM | TEMPERATURE: 98 F | BODY MASS INDEX: 28.85 KG/M2

## 2022-12-13 DIAGNOSIS — J44.89 ASTHMA WITH COPD: ICD-10-CM

## 2022-12-13 DIAGNOSIS — Z00.00 WELL ADULT EXAM: ICD-10-CM

## 2022-12-13 DIAGNOSIS — I10 PRIMARY HYPERTENSION: ICD-10-CM

## 2022-12-13 DIAGNOSIS — M54.16 LUMBAR BACK PAIN WITH RADICULOPATHY AFFECTING LOWER EXTREMITY: ICD-10-CM

## 2022-12-13 DIAGNOSIS — E55.9 VITAMIN D DEFICIENCY: ICD-10-CM

## 2022-12-13 DIAGNOSIS — E78.00 PURE HYPERCHOLESTEROLEMIA: Primary | ICD-10-CM

## 2022-12-13 DIAGNOSIS — E11.9 CONTROLLED TYPE 2 DIABETES MELLITUS WITHOUT COMPLICATION, WITHOUT LONG-TERM CURRENT USE OF INSULIN: ICD-10-CM

## 2022-12-13 PROCEDURE — 3079F PR MOST RECENT DIASTOLIC BLOOD PRESSURE 80-89 MM HG: ICD-10-PCS | Mod: CPTII,,, | Performed by: NURSE PRACTITIONER

## 2022-12-13 PROCEDURE — 4010F PR ACE/ARB THEARPY RXD/TAKEN: ICD-10-PCS | Mod: CPTII,,, | Performed by: NURSE PRACTITIONER

## 2022-12-13 PROCEDURE — 1159F MED LIST DOCD IN RCRD: CPT | Mod: CPTII,,, | Performed by: NURSE PRACTITIONER

## 2022-12-13 PROCEDURE — 3060F PR POS MICROALBUMINURIA RESULT DOCUMENTED/REVIEW: ICD-10-PCS | Mod: CPTII,,, | Performed by: NURSE PRACTITIONER

## 2022-12-13 PROCEDURE — 99214 OFFICE O/P EST MOD 30 MIN: CPT | Mod: S$PBB,,, | Performed by: NURSE PRACTITIONER

## 2022-12-13 PROCEDURE — 4010F ACE/ARB THERAPY RXD/TAKEN: CPT | Mod: CPTII,,, | Performed by: NURSE PRACTITIONER

## 2022-12-13 PROCEDURE — 3008F PR BODY MASS INDEX (BMI) DOCUMENTED: ICD-10-PCS | Mod: CPTII,,, | Performed by: NURSE PRACTITIONER

## 2022-12-13 PROCEDURE — 99214 PR OFFICE/OUTPT VISIT, EST, LEVL IV, 30-39 MIN: ICD-10-PCS | Mod: S$PBB,,, | Performed by: NURSE PRACTITIONER

## 2022-12-13 PROCEDURE — 3008F BODY MASS INDEX DOCD: CPT | Mod: CPTII,,, | Performed by: NURSE PRACTITIONER

## 2022-12-13 PROCEDURE — 3077F PR MOST RECENT SYSTOLIC BLOOD PRESSURE >= 140 MM HG: ICD-10-PCS | Mod: CPTII,,, | Performed by: NURSE PRACTITIONER

## 2022-12-13 PROCEDURE — 1160F RVW MEDS BY RX/DR IN RCRD: CPT | Mod: CPTII,,, | Performed by: NURSE PRACTITIONER

## 2022-12-13 PROCEDURE — 3066F NEPHROPATHY DOC TX: CPT | Mod: CPTII,,, | Performed by: NURSE PRACTITIONER

## 2022-12-13 PROCEDURE — 3077F SYST BP >= 140 MM HG: CPT | Mod: CPTII,,, | Performed by: NURSE PRACTITIONER

## 2022-12-13 PROCEDURE — 99213 OFFICE O/P EST LOW 20 MIN: CPT | Mod: PBBFAC | Performed by: NURSE PRACTITIONER

## 2022-12-13 PROCEDURE — 3060F POS MICROALBUMINURIA REV: CPT | Mod: CPTII,,, | Performed by: NURSE PRACTITIONER

## 2022-12-13 PROCEDURE — 1160F PR REVIEW ALL MEDS BY PRESCRIBER/CLIN PHARMACIST DOCUMENTED: ICD-10-PCS | Mod: CPTII,,, | Performed by: NURSE PRACTITIONER

## 2022-12-13 PROCEDURE — 1159F PR MEDICATION LIST DOCUMENTED IN MEDICAL RECORD: ICD-10-PCS | Mod: CPTII,,, | Performed by: NURSE PRACTITIONER

## 2022-12-13 PROCEDURE — 3066F PR DOCUMENTATION OF TREATMENT FOR NEPHROPATHY: ICD-10-PCS | Mod: CPTII,,, | Performed by: NURSE PRACTITIONER

## 2022-12-13 PROCEDURE — 3079F DIAST BP 80-89 MM HG: CPT | Mod: CPTII,,, | Performed by: NURSE PRACTITIONER

## 2022-12-13 RX ORDER — PROMETHAZINE HYDROCHLORIDE AND CODEINE PHOSPHATE 6.25; 1 MG/5ML; MG/5ML
5 SOLUTION ORAL EVERY 6 HOURS PRN
Qty: 240 ML | Refills: 1 | Status: SHIPPED | OUTPATIENT
Start: 2022-12-13 | End: 2023-03-14 | Stop reason: SDUPTHER

## 2022-12-13 RX ORDER — FLUTICASONE PROPIONATE 50 MCG
2 SPRAY, SUSPENSION (ML) NASAL DAILY
Qty: 18 G | Refills: 6 | Status: SHIPPED | OUTPATIENT
Start: 2022-12-13 | End: 2023-03-14 | Stop reason: SDUPTHER

## 2022-12-13 RX ORDER — IPRATROPIUM BROMIDE AND ALBUTEROL SULFATE 2.5; .5 MG/3ML; MG/3ML
3 SOLUTION RESPIRATORY (INHALATION) EVERY 6 HOURS PRN
Qty: 75 ML | Refills: 6 | Status: SHIPPED | OUTPATIENT
Start: 2022-12-13 | End: 2023-03-14

## 2022-12-13 NOTE — PROGRESS NOTES
[facility]  [facility]     Patient Name: Ezra Zhang   : 1960  MRN:[id number]     Review of patient's allergies indicates:  No Known Allergies   Medication List with Changes/Refills   Current Medications    ACCU-CHEK FASTCLIX LANCET DRUM MISC    Apply topically once daily.    ACCU-CHEK GUIDE TEST STRIPS STRP    USE AS DIRECTED once daily (keep log)    ALBUTEROL-IPRATROPIUM (DUO-NEB) 2.5 MG-0.5 MG/3 ML NEBULIZER SOLUTION    Take 3 mLs by nebulization every 6 (six) hours as needed for Wheezing.    AMIODARONE (PACERONE) 200 MG TAB        ATORVASTATIN (LIPITOR) 80 MG TABLET    Take 1 tablet (80 mg total) by mouth once daily.    CETIRIZINE (ZYRTEC) 10 MG TABLET    Take 1 tablet (10 mg total) by mouth once daily.    CLOTRIMAZOLE-BETAMETHASONE 1-0.05% (LOTRISONE) CREAM    Apply 2 g topically 2 (two) times daily.    ELIQUIS 5 MG TAB    Take 5 mg by mouth 2 (two) times daily.    FAMOTIDINE (PEPCID) 20 MG TABLET    Take 1 tablet (20 mg total) by mouth 2 (two) times daily.    FLUTICASONE PROPION-SALMETEROL 115-21 MCG/DOSE (ADVAIR HFA) 115-21 MCG/ACTUATION HFAA INHALER    Inhale 2 puffs into the lungs every 12 (twelve) hours.    FLUTICASONE PROPIONATE (FLONASE ALLERGY RELIEF) 50 MCG/ACTUATION NASAL SPRAY    2 sprays (100 mcg total) by Each Nostril route Daily.    FUROSEMIDE (LASIX) 20 MG TABLET    Take 1 tablet (20 mg total) by mouth once daily. Refilled per Cardio orders.    GLIPIZIDE (GLUCOTROL) 5 MG TABLET    Take 5 mg by mouth once daily.    LANCETS-BLOOD GLUCOSE STRIPS 30 GAUGE CMPK      Accucheck blood glucose test strips and lancets, See Instructions, Check CBG once daily and keep log., # 100 EA, 11 Refill(s), Pharmacy: [facility], 185, cm, Height/Length Dosing, 22 8:10:00 CST, 96, kg, Weight Dosing, 22 8:10:00 CST    LISINOPRIL (PRINIVIL,ZESTRIL) 5 MG TABLET    Take 1 tablet (5 mg total) by mouth once daily.    METOPROLOL SUCCINATE (TOPROL-XL) 50 MG 24 HR TABLET     Take 1 tablet (50 mg total) by mouth 2 (two) times daily.    NICOTINE (NICODERM CQ) 14 MG/24 HR    Place 1 patch onto the skin once daily.    NICOTINE POLACRILEX (NICORETTE) 4 MG GUM    Take 1 each (4 mg total) by mouth as needed (smoking urge).    PROMETHAZINE-CODEINE 6.25-10 MG/5 ML (PHENERGAN WITH CODEINE) 6.25-10 MG/5 ML SYRUP    Take 5 mLs by mouth every 6 (six) hours as needed for Cough.    TAMSULOSIN (FLOMAX) 0.4 MG CAP    Take 1 capsule by mouth once daily.        CC:  Chief Complaint   Patient presents with    lab results        HPI  63 y/o male for f/u lab results. Pt has hx Asthma/COPD, CHF, DM2, Hep C, HLD, HTN, A-fib, CKD, Hx Hep C. Pt followed in Cardio cl.            ROS  Review of Systems   Constitutional: Negative.    HENT: Negative.     Eyes: Negative.    Respiratory: Negative.     Cardiovascular: Negative.    Gastrointestinal: Negative.    Endocrine: Negative.    Genitourinary: Negative.    Musculoskeletal: Negative.    Integumentary:  Negative.   Allergic/Immunologic: Negative.    Neurological: Negative.    Hematological: Negative.    Psychiatric/Behavioral: Negative.        Physical Examination:  Vitals:    12/13/22 0846   BP: (!) 142/84   Pulse:    Resp:    Temp:           Physical Exam  Vitals reviewed.   Constitutional:       Appearance: Normal appearance. He is normal weight.   HENT:      Head: Normocephalic.   Cardiovascular:      Rate and Rhythm: Normal rate and regular rhythm.      Pulses: Normal pulses.      Heart sounds: Normal heart sounds.   Pulmonary:      Effort: Pulmonary effort is normal.      Breath sounds: Normal breath sounds.   Abdominal:      General: Abdomen is flat.      Palpations: Abdomen is soft.   Musculoskeletal:         General: Normal range of motion.      Cervical back: Normal range of motion.   Skin:     General: Skin is warm and dry.   Neurological:      Mental Status: He is alert.   Psychiatric:         Mood and Affect: Mood normal.           Labs/Imaging:  Reviewed    Assessment/Plan:  1. Pure hypercholesterolemia  Low fat diet and exercise. Cont Atorvastatin as prescribed.     2. Primary hypertension  BP elevated today due to almost getting in MVA this am. BP has been controlled on prior visit. Low fat low salt diet and exercise. Cont Lisinopril, , Metoprolol as prescribed.     3. Vitamin D deficiency  Vit D level in 3 months. Cont OTC Vit D 3 as prescribed.     4. Controlled type 2 diabetes mellitus without complication, without long-term current use of insulin  A1c 5.3. ADA diet and exercise. Cont Glipizide as prescribed. A1c in 6 months. Urine microalbumin 11-30-22, DM eye exam 3-8-22 no retinopathy, DM foot 3-8-22.    5. Well adult exam   Labs 3 months. UTD PSA. UTD Cologuard 7-6-22 neg due 7/2025.     6. Low back pain  Pt attended PT  with slight improvement. Pt states low back pain is worsening and having radiculopathy down bilat legs. Will get MRI L-spine in 2 weeks. Prior XR L-spine done 10-1-21 showing:  Reason For Exam  Radiculopathy    Radiology Report  EXAMINATION  XR Spine Lumbar 2 or 3 Views     INDICATION  Radiculopathy     Comparison: 3/19/2019     FINDINGS  There are 5 nonrib-bearing lumbar-type vertebral bodies. There is a  rightward curvature of the lumbar spine centered at L2 with minimal  retrolisthesis of L2 over L3. The vertebral body heights are  maintained. There is moderate disc height loss throughout the lumbar  spine with multilevel marginal osteophyte formation, slightly  progressed. There is mild facet arthropathy, worse in the lower lumbar  spine. The soft tissues are unremarkable.        IMPRESSION  1.  No acute abnormality identified.  2.  Multilevel degenerative changes of the lumbar spine, slightly  progressed.    Signature Line  Electronically Signed By: Rosenda Mathias MD  Date/Time Signed: 10/01/2021 12:30      This document has an image    Result type: XR Spine Lumbar 2 or 3 Views  Result  date: October 01, 2021 10:48 CDT  Result status: Auth (Verified)  Result title: XR Spine Lumbar 2 or 3 Views  Performed by: Rosenda Mathias MD on October 01, 2021 12:29 CDT  Verified by: Rosenda Mathias MD on October 01, 2021 12:30 CDT  Encounter info: [id number], [facility], Outpatient, 10/1/2021 - 10/1/2021    CT ordered due to pacemaker/Debrillator.         RTC in 3 months with labs 1 week prior to appt.

## 2022-12-19 PROBLEM — Z00.00 WELL ADULT EXAM: Status: RESOLVED | Noted: 2022-06-07 | Resolved: 2022-12-19

## 2023-01-03 ENCOUNTER — OFFICE VISIT (OUTPATIENT)
Dept: CARDIOLOGY | Facility: CLINIC | Age: 63
End: 2023-01-03
Payer: MEDICAID

## 2023-01-03 ENCOUNTER — CLINICAL SUPPORT (OUTPATIENT)
Dept: CARDIOLOGY | Facility: CLINIC | Age: 63
End: 2023-01-03
Payer: MEDICAID

## 2023-01-03 VITALS
BODY MASS INDEX: 27.29 KG/M2 | OXYGEN SATURATION: 100 % | HEART RATE: 101 BPM | TEMPERATURE: 98 F | HEIGHT: 74 IN | RESPIRATION RATE: 18 BRPM | WEIGHT: 212.63 LBS | SYSTOLIC BLOOD PRESSURE: 132 MMHG | DIASTOLIC BLOOD PRESSURE: 98 MMHG

## 2023-01-03 DIAGNOSIS — I48.0 PAROXYSMAL ATRIAL FIBRILLATION: Primary | ICD-10-CM

## 2023-01-03 DIAGNOSIS — Z95.810 AICD (AUTOMATIC CARDIOVERTER/DEFIBRILLATOR) PRESENT: Primary | ICD-10-CM

## 2023-01-03 PROCEDURE — 93282 PRGRMG EVAL IMPLANTABLE DFB: CPT | Mod: PBBFAC | Performed by: INTERNAL MEDICINE

## 2023-01-03 PROCEDURE — 99211 OFF/OP EST MAY X REQ PHY/QHP: CPT | Mod: PBBFAC,27

## 2023-01-03 PROCEDURE — 99215 OFFICE O/P EST HI 40 MIN: CPT | Mod: PBBFAC | Performed by: INTERNAL MEDICINE

## 2023-01-03 RX ORDER — FUROSEMIDE 20 MG/1
20 TABLET ORAL DAILY
Qty: 90 TABLET | Refills: 3 | Status: SHIPPED | OUTPATIENT
Start: 2023-01-03 | End: 2023-02-16 | Stop reason: SDUPTHER

## 2023-01-03 RX ORDER — LISINOPRIL 10 MG/1
10 TABLET ORAL DAILY
Qty: 90 TABLET | Refills: 3 | Status: SHIPPED | OUTPATIENT
Start: 2023-01-03 | End: 2023-02-16 | Stop reason: ALTCHOICE

## 2023-01-03 RX ORDER — METOPROLOL SUCCINATE 100 MG/1
100 TABLET, EXTENDED RELEASE ORAL 2 TIMES DAILY
Qty: 180 TABLET | Refills: 3 | Status: ON HOLD | OUTPATIENT
Start: 2023-01-03 | End: 2023-01-28 | Stop reason: HOSPADM

## 2023-01-03 NOTE — PROGRESS NOTES
Formerly [facility]  Cardiology Clinic            CHIEF COMPLAINT:   Chief Complaint   Patient presents with    3 month follow up      Complaints of occasional SOB and palpitations                    Review of patient's allergies indicates:  No Known Allergies                                       HPI:  Ezra Zhang 62 y.o. male with past medical history of heart failure with reduced ejection fraction with EF of 60%, paroxysmal AFib, hypertension, COPD, diabetes mellitus, and CKD who presents to the clinic for three-month follow-up.  Patient also had device interrogation today which showed 2 episodes of AFib with RVR.  His only complaint today is he has shortness of breath which is chronic however has been more frequent in the past 4-5 months.  No other associated symptoms.  He denies chest pain, fever, chills, nausea, vomiting, diarrhea, cough, numbness, weakness, tingling, jaw pain, diaphoresis, palpitations, and any other symptoms.  Patient reports compliancy with all medications.                                                                                                                                                                                                                                                                                                                                                                                                                                                                                      CARDIAC TESTING:  Results for orders placed during the hospital encounter of 08/31/22    Echo    Interpretation Summary  · The left ventricle is normal in size with moderate concentric hypertrophy and normal systolic function.  · The estimated ejection fraction is 60%.  · Indeterminate left ventricular diastolic function.  · Normal right ventricular size with normal right ventricular systolic function.  · Moderate left atrial enlargement.  · Mild right atrial  enlargement.  · Mild mitral regurgitation.  · There is no pulmonary hypertension.  · The estimated PA systolic pressure is 24 mmHg.  · Normal central venous pressure (3 mmHg).    No results found for this or any previous visit.     No results found for this or any previous visit.       Patient Active Problem List   Diagnosis    A-fib    Anemia    AR (allergic rhinitis)    Asthma with COPD    Bladder wall thickening    Chronic back pain    CKD (chronic kidney disease)    Controlled type 2 diabetes mellitus without complication, without long-term current use of insulin    Elevated serum creatinine    HA (headache)    Chronic hepatitis C virus infection    HLD (hyperlipidemia)    Hydroureteronephrosis    Joint pain    Plantar wart    Rheumatoid arthritis    Radiculopathy    Renal cyst, right    Umbilical hernia    Colon cancer screening    Hypertension    Smoking    Non-ischemic cardiomyopathy    History of hepatitis C    Chronic systolic heart failure    Diabetes mellitus    Recurrent sinusitis    Shortness of breath    Prostate cancer screening    Vitamin D deficiency    GERD (gastroesophageal reflux disease)     Past Surgical History:   Procedure Laterality Date    CARDIOVERSION      coronary arteriograph      INSERT / REPLACE / REMOVE PACEMAKER      left  heart catherization       Social History     Socioeconomic History    Marital status: Single   Tobacco Use    Smoking status: Some Days     Packs/day: 0.25     Years: 20.00     Pack years: 5.00     Types: Cigarettes    Smokeless tobacco: Never    Tobacco comments:     Taking patches   Substance and Sexual Activity    Alcohol use: Yes     Alcohol/week: 1.0 standard drink     Types: 1 Cans of beer per week     Comment: not often    Drug use: Never        Family History   Problem Relation Age of Onset    Heart failure Mother     Cataracts Mother     Heart disease Mother     Glaucoma Mother     Peripheral vascular disease Mother     Hypertension Father           Current Outpatient Medications:     albuterol-ipratropium (DUO-NEB) 2.5 mg-0.5 mg/3 mL nebulizer solution, Take 3 mLs by nebulization every 6 (six) hours as needed for Wheezing., Disp: 75 mL, Rfl: 6    atorvastatin (LIPITOR) 80 MG tablet, Take 1 tablet (80 mg total) by mouth once daily., Disp: 90 tablet, Rfl: 3    cetirizine (ZYRTEC) 10 MG tablet, Take 1 tablet (10 mg total) by mouth once daily., Disp: 30 tablet, Rfl: 6    clotrimazole-betamethasone 1-0.05% (LOTRISONE) cream, Apply 2 g topically 2 (two) times daily., Disp: , Rfl:     ELIQUIS 5 mg Tab, Take 5 mg by mouth 2 (two) times daily., Disp: , Rfl:     famotidine (PEPCID) 20 MG tablet, Take 1 tablet (20 mg total) by mouth 2 (two) times daily., Disp: 60 tablet, Rfl: 11    fluticasone propion-salmeterol 115-21 mcg/dose (ADVAIR HFA) 115-21 mcg/actuation HFAA inhaler, Inhale 2 puffs into the lungs every 12 (twelve) hours., Disp: 12 g, Rfl: 6    fluticasone propionate (FLONASE ALLERGY RELIEF) 50 mcg/actuation nasal spray, 2 sprays (100 mcg total) by Each Nostril route Daily., Disp: 18 g, Rfl: 6    glipiZIDE (GLUCOTROL) 5 MG tablet, Take 5 mg by mouth once daily., Disp: , Rfl:     nicotine (NICODERM CQ) 14 mg/24 hr, Place 1 patch onto the skin once daily., Disp: 28 patch, Rfl: 2    nicotine polacrilex (NICORETTE) 4 MG Gum, Take 1 each (4 mg total) by mouth as needed (smoking urge)., Disp: 170 each, Rfl: 3    promethazine-codeine 6.25-10 mg/5 ml (PHENERGAN WITH CODEINE) 6.25-10 mg/5 mL syrup, Take 5 mLs by mouth every 6 (six) hours as needed for Cough., Disp: 240 mL, Rfl: 1    ACCU-CHEK FASTCLIX LANCET [place] [facility] [place] Apply topically once daily., Disp: , Rfl:     ACCU-CHEK GUIDE TEST STRIPS Strp, USE AS DIRECTED once daily (keep log), Disp: , Rfl:     furosemide (LASIX) 20 MG tablet, Take 1 tablet (20 mg total) by mouth once daily. Refilled per Cardio orders., Disp: 90 tablet, Rfl: 3    lancets-blood glucose strips 30 gauge Cmpk,  Accucheck blood glucose test  "strips and lancets, See Instructions, Check CBG once daily and keep log., # 100 EA, 11 Refill(s), Pharmacy: [facility], 185, cm, Height/Length Dosing, 03/08/22 8:10:00 CST, 96, kg, Weight Dosing, 03/08/22 8:10:00 CST, Disp: , Rfl:     lisinopriL 10 MG tablet, Take 1 tablet (10 mg total) by mouth once daily., Disp: 90 tablet, Rfl: 3    metoprolol succinate (TOPROL-XL) 100 MG 24 hr tablet, Take 1 tablet (100 mg total) by mouth 2 (two) times daily., Disp: 180 tablet, Rfl: 3    tamsulosin (FLOMAX) 0.4 mg Cap, Take 1 capsule by mouth once daily., Disp: , Rfl:      ROS:                                                                                                                                                                             Review of Systems   Constitutional:  Negative for chills and fever.   HENT:  Negative for ear pain and hearing loss.    Eyes:  Negative for redness.   Respiratory:  Positive for shortness of breath. Negative for cough and sputum production.    Cardiovascular:  Negative for chest pain, orthopnea and leg swelling.   Gastrointestinal:  Negative for abdominal pain, nausea and vomiting.   Genitourinary:  Negative for dysuria and hematuria.   Musculoskeletal:  Negative for back pain.   Neurological:  Negative for dizziness and headaches.      Blood pressure (!) 132/98, pulse 101, temperature 98.1 °F (36.7 °C), resp. rate 18, height 6' 2.02" (1.88 m), weight 96.4 kg (212 lb 9.6 oz), SpO2 100 %.   PE:  Physical Exam  Constitutional:       General: He is not in acute distress.     Appearance: Normal appearance. He is not ill-appearing or toxic-appearing.   HENT:      Head: Normocephalic and atraumatic.      Right Ear: Tympanic membrane normal.      Left Ear: Tympanic membrane normal.      Nose: Nose normal.      Mouth/Throat:      Mouth: Mucous membranes are moist.      Pharynx: Oropharynx is clear. No oropharyngeal exudate or posterior oropharyngeal erythema.   Eyes:      Extraocular " Movements: Extraocular movements intact.      Conjunctiva/sclera: Conjunctivae normal.      Pupils: Pupils are equal, round, and reactive to light.   Cardiovascular:      Rate and Rhythm: Tachycardia present. Rhythm irregular.      Pulses: Normal pulses.      Heart sounds: Normal heart sounds. No murmur heard.    No friction rub. No gallop.   Pulmonary:      Effort: Pulmonary effort is normal. No respiratory distress.      Breath sounds: Normal breath sounds. No wheezing or rales.   Abdominal:      General: Bowel sounds are normal. There is no distension.      Palpations: Abdomen is soft.      Tenderness: There is no abdominal tenderness. There is no guarding or rebound.   Musculoskeletal:         General: Normal range of motion.      Cervical back: Normal range of motion and neck supple.      Right lower leg: No edema.      Left lower leg: No edema.   Skin:     General: Skin is warm.      Capillary Refill: Capillary refill takes less than 2 seconds.   Neurological:      General: No focal deficit present.      Mental Status: He is alert and oriented to person, place, and time.          ASSESSMENT/PLAN:  Paroxysmal Atrial Fibrillation/flutter - s/p LASHELL/cardioversion 2/17  Pt's device interrogation today showed episodes of atrial fibrillation with RVR. Pt also reporting intermittent fatigue and decreased activity.  Toprol increased to 100mg  Patient is in AFib today but with good rate controlled and he reports feeling much better  - Continue with Eliquis for CVA prophylaxis. Denies any adverse bleeding issues       Chronic Systolic HF (recovered)/ NICMO s/p ICD NYHA Class II - s/p ICD implant  - LVEF- 60% per ECHO on 8/2022  Repeat echo in 8/31/2022 shows preserved EF  - Euvolemic, warm on exam  - increase lisinopril to 10 mg from 5 mg  - Continue GDMT: Lisinopril , Lasix,  Metoprolol succinate   - Counseled on the importance of a 2 g sodium diet  - Continue routine ICD interrogations    HTN  - BP is elevated at  140/120s; will uptitrate nlood pressure medications  - continue current regimen  - Counseled on low-sodium diet     Hyperlipidemia  Continue Lipitor 80mg    Tobacco Use  Pt quit smoking in 7/2022  Continues to use nicotine patch and gum  Congratulated him and encouraged to remain abstinent    COPD  DM   CKD III  - Continue management per PCP    Aristeo Novoa MD  [facility] Internal Medicine, PGY-1

## 2023-01-03 NOTE — PATIENT INSTRUCTIONS
Return to clinic in 3-4 months  Keep Device appointment  Increase Lisinopril to 10 mg daily  Increase Metoprolol XL to 100 mg 2 x a day

## 2023-01-04 NOTE — PROGRESS NOTES
Cardiology attending addendum  Patient was seen and examined with Dr. Aristeo Novoa. Agree with plan as outlined above. Pt had device interrogation today that showed 2 episodes of NSVT that appear to be Afib with RVR. Overall pt reports feeling better and denies significant fatigue or palpitations. Will increase toprol to 100mg bid.  Pt is smoking a few cigarettes a day and strongly encouraged him to quit. LDL not at goal but pt already maxed out on lipitor and does not want to add zetia.    Mandi Garza MD  Cardiology[facility]

## 2023-01-18 DIAGNOSIS — M54.16 LUMBAR BACK PAIN WITH RADICULOPATHY AFFECTING LEFT LOWER EXTREMITY: Primary | ICD-10-CM

## 2023-01-18 NOTE — PROGRESS NOTES
Pt's insurance denied CT L-spine. Pt needs to complete 6 week of PT first before approval of CT. Refer to [facility] in [place] per pt request for 6 weeks therapy for Lumbar back pain with radiculopathy. Please fax orders to [facility].

## 2023-01-25 ENCOUNTER — HOSPITAL ENCOUNTER (INPATIENT)
Facility: HOSPITAL | Age: 63
LOS: 3 days | Discharge: HOME | DRG: 280 | End: 2023-01-28
Attending: FAMILY MEDICINE | Admitting: INTERNAL MEDICINE
Payer: MEDICAID

## 2023-01-25 DIAGNOSIS — I48.91 ATRIAL FIBRILLATION WITH RVR: ICD-10-CM

## 2023-01-25 DIAGNOSIS — R06.02 SHORTNESS OF BREATH: ICD-10-CM

## 2023-01-25 DIAGNOSIS — I50.9 ACUTE ON CHRONIC CONGESTIVE HEART FAILURE, UNSPECIFIED HEART FAILURE TYPE: ICD-10-CM

## 2023-01-25 DIAGNOSIS — I48.91 ATRIAL FIBRILLATION: Primary | ICD-10-CM

## 2023-01-25 DIAGNOSIS — E11.9 TYPE 2 DIABETES MELLITUS WITHOUT COMPLICATION, WITHOUT LONG-TERM CURRENT USE OF INSULIN: ICD-10-CM

## 2023-01-25 DIAGNOSIS — R06.02 SOB (SHORTNESS OF BREATH): ICD-10-CM

## 2023-01-25 LAB
ALBUMIN SERPL-MCNC: 4.1 G/DL (ref 3.4–4.8)
ALBUMIN/GLOB SERPL: 1.5 RATIO (ref 1.1–2)
ALP SERPL-CCNC: 110 UNIT/L (ref 40–150)
ALT SERPL-CCNC: 50 UNIT/L (ref 0–55)
ANION GAP SERPL CALC-SCNC: 10 MEQ/L
APPEARANCE UR: CLEAR
AST SERPL-CCNC: 26 UNIT/L (ref 5–34)
AV INDEX (PROSTH): 0.54
AV MEAN GRADIENT: 2 MMHG
AV PEAK GRADIENT: 4 MMHG
AV VALVE AREA: 2.43 CM2
AV VELOCITY RATIO: 0.59
BACTERIA #/AREA URNS AUTO: ABNORMAL /HPF
BASOPHILS # BLD AUTO: 0.04 X10(3)/MCL (ref 0–0.2)
BASOPHILS NFR BLD AUTO: 0.5 %
BILIRUB UR QL STRIP.AUTO: NEGATIVE MG/DL
BILIRUBIN DIRECT+TOT PNL SERPL-MCNC: 1.1 MG/DL
BNP BLD-MCNC: 1197.6 PG/ML
BSA FOR ECHO PROCEDURE: 2.27 M2
BUN SERPL-MCNC: 23.7 MG/DL (ref 8.4–25.7)
BUN SERPL-MCNC: 24.2 MG/DL (ref 8.4–25.7)
CALCIUM SERPL-MCNC: 9.4 MG/DL (ref 8.8–10)
CALCIUM SERPL-MCNC: 9.5 MG/DL (ref 8.8–10)
CHLORIDE SERPL-SCNC: 107 MMOL/L (ref 98–107)
CHLORIDE SERPL-SCNC: 110 MMOL/L (ref 98–107)
CO2 SERPL-SCNC: 26 MMOL/L (ref 23–31)
CO2 SERPL-SCNC: 27 MMOL/L (ref 23–31)
COLOR UR AUTO: COLORLESS
CREAT SERPL-MCNC: 1.53 MG/DL (ref 0.73–1.18)
CREAT SERPL-MCNC: 1.63 MG/DL (ref 0.73–1.18)
CREAT/UREA NIT SERPL: 16
CV ECHO LV RWT: 0.37 CM
DOP CALC AO PEAK VEL: 1.03 M/S
DOP CALC AO VTI: 18.6 CM
DOP CALC LVOT AREA: 4.5 CM2
DOP CALC LVOT DIAMETER: 2.4 CM
DOP CALC LVOT PEAK VEL: 0.61 M/S
DOP CALC LVOT STROKE VOLUME: 45.22 CM3
DOP CALC MV VTI: 8.9 CM
DOP CALCLVOT PEAK VEL VTI: 10 CM
E WAVE DECELERATION TIME: 162 MSEC
E/A RATIO: 1.44
E/E' RATIO: 5.91 M/S
ECHO LV POSTERIOR WALL: 1.02 CM (ref 0.6–1.1)
EJECTION FRACTION: 35 %
EOSINOPHIL # BLD AUTO: 0.08 X10(3)/MCL (ref 0–0.9)
EOSINOPHIL NFR BLD AUTO: 0.9 %
ERYTHROCYTE [DISTWIDTH] IN BLOOD BY AUTOMATED COUNT: 14.9 % (ref 11.5–17)
FLUAV AG UPPER RESP QL IA.RAPID: NOT DETECTED
FLUBV AG UPPER RESP QL IA.RAPID: NOT DETECTED
GFR SERPLBLD CREATININE-BSD FMLA CKD-EPI: 47 MLS/MIN/1.73/M2
GFR SERPLBLD CREATININE-BSD FMLA CKD-EPI: 51 MLS/MIN/1.73/M2
GLOBULIN SER-MCNC: 2.8 GM/DL (ref 2.4–3.5)
GLUCOSE SERPL-MCNC: 103 MG/DL (ref 82–115)
GLUCOSE SERPL-MCNC: 117 MG/DL (ref 82–115)
GLUCOSE UR QL STRIP.AUTO: NORMAL MG/DL
HCT VFR BLD AUTO: 45.9 % (ref 42–52)
HGB BLD-MCNC: 15.1 GM/DL (ref 14–18)
HYALINE CASTS #/AREA URNS LPF: ABNORMAL /LPF
IMM GRANULOCYTES # BLD AUTO: 0.03 X10(3)/MCL (ref 0–0.04)
IMM GRANULOCYTES NFR BLD AUTO: 0.3 %
INTERVENTRICULAR SEPTUM: 1.23 CM (ref 0.6–1.1)
KETONES UR QL STRIP.AUTO: NEGATIVE MG/DL
LEFT ATRIUM SIZE: 4.9 CM
LEFT VENTRICLE DIASTOLIC VOLUME INDEX: 66.55 ML/M2
LEFT VENTRICLE DIASTOLIC VOLUME: 149.74 ML
LEFT VENTRICLE MASS INDEX: 112 G/M2
LEFT VENTRICULAR INTERNAL DIMENSION IN DIASTOLE: 5.54 CM (ref 3.5–6)
LEFT VENTRICULAR MASS: 252.44 G
LEUKOCYTE ESTERASE UR QL STRIP.AUTO: NEGATIVE UNIT/L
LV LATERAL E/E' RATIO: 5.91 M/S
LV SEPTAL E/E' RATIO: 5.91 M/S
LVOT MG: 0.84 MMHG
LVOT MV: 0.42 CM/S
LYMPHOCYTES # BLD AUTO: 1.63 X10(3)/MCL (ref 0.6–4.6)
LYMPHOCYTES NFR BLD AUTO: 18.7 %
MAGNESIUM SERPL-MCNC: 2.1 MG/DL (ref 1.6–2.6)
MCH RBC QN AUTO: 33.4 PG
MCHC RBC AUTO-ENTMCNC: 32.9 MG/DL (ref 33–36)
MCV RBC AUTO: 101.5 FL (ref 80–94)
MONOCYTES # BLD AUTO: 0.54 X10(3)/MCL (ref 0.1–1.3)
MONOCYTES NFR BLD AUTO: 6.2 %
MUCOUS THREADS URNS QL MICRO: ABNORMAL /LPF
MV MEAN GRADIENT: 1 MMHG
MV PEAK A VEL: 0.45 M/S
MV PEAK E VEL: 0.65 M/S
MV PEAK GRADIENT: 2 MMHG
MV STENOSIS PRESSURE HALF TIME: 46.98 MS
MV VALVE AREA BY CONTINUITY EQUATION: 5.08 CM2
MV VALVE AREA P 1/2 METHOD: 4.68 CM2
NEUTROPHILS # BLD AUTO: 6.4 X10(3)/MCL (ref 2.1–9.2)
NEUTROPHILS NFR BLD AUTO: 73.4 %
NITRITE UR QL STRIP.AUTO: NEGATIVE
NRBC BLD AUTO-RTO: 0 %
PH UR STRIP.AUTO: 6 [PH]
PISA TR MAX VEL: 1.74 M/S
PLATELET # BLD AUTO: 284 X10(3)/MCL (ref 130–400)
PMV BLD AUTO: 11.1 FL (ref 7.4–10.4)
POCT GLUCOSE: 115 MG/DL (ref 70–110)
POTASSIUM SERPL-SCNC: 3.8 MMOL/L (ref 3.5–5.1)
POTASSIUM SERPL-SCNC: 4.2 MMOL/L (ref 3.5–5.1)
PROT SERPL-MCNC: 6.9 GM/DL (ref 5.8–7.6)
PROT UR QL STRIP.AUTO: NEGATIVE MG/DL
RA PRESSURE: 8 MMHG
RBC # BLD AUTO: 4.52 X10(6)/MCL (ref 4.7–6.1)
RBC #/AREA URNS AUTO: ABNORMAL /HPF
RBC UR QL AUTO: NEGATIVE UNIT/L
RIGHT VENTRICULAR END-DIASTOLIC DIMENSION: 3.44 CM
SARS-COV-2 RNA RESP QL NAA+PROBE: NOT DETECTED
SODIUM SERPL-SCNC: 144 MMOL/L (ref 136–145)
SODIUM SERPL-SCNC: 144 MMOL/L (ref 136–145)
SP GR UR STRIP.AUTO: 1.01
SQUAMOUS #/AREA URNS LPF: ABNORMAL /HPF
TDI LATERAL: 0.11 M/S
TDI SEPTAL: 0.11 M/S
TDI: 0.11 M/S
TR MAX PG: 12 MMHG
TROPONIN I SERPL-MCNC: 0.04 NG/ML (ref 0–0.04)
TROPONIN I SERPL-MCNC: 0.05 NG/ML (ref 0–0.04)
TROPONIN I SERPL-MCNC: 0.05 NG/ML (ref 0–0.04)
TSH SERPL-ACNC: 1.13 UIU/ML (ref 0.35–4.94)
TV REST PULMONARY ARTERY PRESSURE: 20 MMHG
UROBILINOGEN UR STRIP-ACNC: NORMAL MG/DL
WBC # SPEC AUTO: 8.7 X10(3)/MCL (ref 4.5–11.5)
WBC #/AREA URNS AUTO: ABNORMAL /HPF

## 2023-01-25 PROCEDURE — 81001 URINALYSIS AUTO W/SCOPE: CPT | Performed by: STUDENT IN AN ORGANIZED HEALTH CARE EDUCATION/TRAINING PROGRAM

## 2023-01-25 PROCEDURE — 99223 1ST HOSP IP/OBS HIGH 75: CPT | Mod: ,,, | Performed by: INTERNAL MEDICINE

## 2023-01-25 PROCEDURE — 80053 COMPREHEN METABOLIC PANEL: CPT | Performed by: FAMILY MEDICINE

## 2023-01-25 PROCEDURE — 94761 N-INVAS EAR/PLS OXIMETRY MLT: CPT

## 2023-01-25 PROCEDURE — 63600175 PHARM REV CODE 636 W HCPCS: Performed by: STUDENT IN AN ORGANIZED HEALTH CARE EDUCATION/TRAINING PROGRAM

## 2023-01-25 PROCEDURE — 20000000 HC ICU ROOM

## 2023-01-25 PROCEDURE — 99223 PR INITIAL HOSPITAL CARE,LEVL III: ICD-10-PCS | Mod: ,,, | Performed by: INTERNAL MEDICINE

## 2023-01-25 PROCEDURE — 84484 ASSAY OF TROPONIN QUANT: CPT | Performed by: FAMILY MEDICINE

## 2023-01-25 PROCEDURE — 96376 TX/PRO/DX INJ SAME DRUG ADON: CPT

## 2023-01-25 PROCEDURE — 83735 ASSAY OF MAGNESIUM: CPT | Performed by: STUDENT IN AN ORGANIZED HEALTH CARE EDUCATION/TRAINING PROGRAM

## 2023-01-25 PROCEDURE — 94640 AIRWAY INHALATION TREATMENT: CPT

## 2023-01-25 PROCEDURE — 0240U COVID/FLU A&B PCR: CPT | Performed by: STUDENT IN AN ORGANIZED HEALTH CARE EDUCATION/TRAINING PROGRAM

## 2023-01-25 PROCEDURE — 85025 COMPLETE CBC W/AUTO DIFF WBC: CPT | Performed by: FAMILY MEDICINE

## 2023-01-25 PROCEDURE — 93005 ELECTROCARDIOGRAM TRACING: CPT

## 2023-01-25 PROCEDURE — 96361 HYDRATE IV INFUSION ADD-ON: CPT

## 2023-01-25 PROCEDURE — 25000003 PHARM REV CODE 250: Performed by: FAMILY MEDICINE

## 2023-01-25 PROCEDURE — 84484 ASSAY OF TROPONIN QUANT: CPT | Performed by: STUDENT IN AN ORGANIZED HEALTH CARE EDUCATION/TRAINING PROGRAM

## 2023-01-25 PROCEDURE — 25000003 PHARM REV CODE 250: Performed by: STUDENT IN AN ORGANIZED HEALTH CARE EDUCATION/TRAINING PROGRAM

## 2023-01-25 PROCEDURE — 25000242 PHARM REV CODE 250 ALT 637 W/ HCPCS: Performed by: STUDENT IN AN ORGANIZED HEALTH CARE EDUCATION/TRAINING PROGRAM

## 2023-01-25 PROCEDURE — 96374 THER/PROPH/DIAG INJ IV PUSH: CPT

## 2023-01-25 PROCEDURE — 99291 CRITICAL CARE FIRST HOUR: CPT

## 2023-01-25 PROCEDURE — 25000003 PHARM REV CODE 250: Performed by: INTERNAL MEDICINE

## 2023-01-25 PROCEDURE — 84443 ASSAY THYROID STIM HORMONE: CPT | Performed by: STUDENT IN AN ORGANIZED HEALTH CARE EDUCATION/TRAINING PROGRAM

## 2023-01-25 PROCEDURE — 83880 ASSAY OF NATRIURETIC PEPTIDE: CPT | Performed by: FAMILY MEDICINE

## 2023-01-25 RX ORDER — FUROSEMIDE 10 MG/ML
40 INJECTION INTRAMUSCULAR; INTRAVENOUS DAILY
Status: DISCONTINUED | OUTPATIENT
Start: 2023-01-25 | End: 2023-01-28 | Stop reason: HOSPADM

## 2023-01-25 RX ORDER — FAMOTIDINE 20 MG/1
20 TABLET, FILM COATED ORAL 2 TIMES DAILY
Status: DISCONTINUED | OUTPATIENT
Start: 2023-01-25 | End: 2023-01-28 | Stop reason: HOSPADM

## 2023-01-25 RX ORDER — ENOXAPARIN SODIUM 100 MG/ML
40 INJECTION SUBCUTANEOUS EVERY 24 HOURS
Status: DISCONTINUED | OUTPATIENT
Start: 2023-01-25 | End: 2023-01-25

## 2023-01-25 RX ORDER — METOPROLOL TARTRATE 1 MG/ML
5 INJECTION, SOLUTION INTRAVENOUS EVERY 5 MIN PRN
Status: DISCONTINUED | OUTPATIENT
Start: 2023-01-25 | End: 2023-01-28 | Stop reason: HOSPADM

## 2023-01-25 RX ORDER — IBUPROFEN 200 MG
16 TABLET ORAL
Status: DISCONTINUED | OUTPATIENT
Start: 2023-01-25 | End: 2023-01-28 | Stop reason: HOSPADM

## 2023-01-25 RX ORDER — LISINOPRIL 10 MG/1
10 TABLET ORAL DAILY
Status: DISCONTINUED | OUTPATIENT
Start: 2023-01-25 | End: 2023-01-28 | Stop reason: HOSPADM

## 2023-01-25 RX ORDER — METOPROLOL SUCCINATE 25 MG/1
100 TABLET, EXTENDED RELEASE ORAL 2 TIMES DAILY
Status: DISCONTINUED | OUTPATIENT
Start: 2023-01-25 | End: 2023-01-25

## 2023-01-25 RX ORDER — FLUTICASONE FUROATE AND VILANTEROL 100; 25 UG/1; UG/1
1 POWDER RESPIRATORY (INHALATION) DAILY
Status: DISCONTINUED | OUTPATIENT
Start: 2023-01-25 | End: 2023-01-28 | Stop reason: HOSPADM

## 2023-01-25 RX ORDER — TAMSULOSIN HYDROCHLORIDE 0.4 MG/1
1 CAPSULE ORAL DAILY
Status: DISCONTINUED | OUTPATIENT
Start: 2023-01-25 | End: 2023-01-26

## 2023-01-25 RX ORDER — MUPIROCIN 20 MG/G
OINTMENT TOPICAL 2 TIMES DAILY
Status: DISCONTINUED | OUTPATIENT
Start: 2023-01-25 | End: 2023-01-28 | Stop reason: HOSPADM

## 2023-01-25 RX ORDER — IPRATROPIUM BROMIDE AND ALBUTEROL SULFATE 2.5; .5 MG/3ML; MG/3ML
3 SOLUTION RESPIRATORY (INHALATION) EVERY 6 HOURS PRN
Status: DISCONTINUED | OUTPATIENT
Start: 2023-01-25 | End: 2023-01-28 | Stop reason: HOSPADM

## 2023-01-25 RX ORDER — NAPROXEN SODIUM 220 MG/1
324 TABLET, FILM COATED ORAL DAILY
Status: DISCONTINUED | OUTPATIENT
Start: 2023-01-25 | End: 2023-01-28 | Stop reason: HOSPADM

## 2023-01-25 RX ORDER — SODIUM CHLORIDE 0.9 % (FLUSH) 0.9 %
10 SYRINGE (ML) INJECTION
Status: DISCONTINUED | OUTPATIENT
Start: 2023-01-25 | End: 2023-01-28 | Stop reason: HOSPADM

## 2023-01-25 RX ORDER — METOPROLOL TARTRATE 25 MG/1
25 TABLET, FILM COATED ORAL 2 TIMES DAILY
Status: DISCONTINUED | OUTPATIENT
Start: 2023-01-25 | End: 2023-01-26

## 2023-01-25 RX ORDER — GLUCAGON 1 MG
1 KIT INJECTION
Status: DISCONTINUED | OUTPATIENT
Start: 2023-01-25 | End: 2023-01-28 | Stop reason: HOSPADM

## 2023-01-25 RX ORDER — IBUPROFEN 200 MG
1 TABLET ORAL DAILY
Status: DISCONTINUED | OUTPATIENT
Start: 2023-01-25 | End: 2023-01-28 | Stop reason: HOSPADM

## 2023-01-25 RX ORDER — DILTIAZEM HYDROCHLORIDE 5 MG/ML
0.25 INJECTION INTRAVENOUS
Status: COMPLETED | OUTPATIENT
Start: 2023-01-25 | End: 2023-01-25

## 2023-01-25 RX ORDER — INSULIN ASPART 100 [IU]/ML
1-10 INJECTION, SOLUTION INTRAVENOUS; SUBCUTANEOUS
Status: DISCONTINUED | OUTPATIENT
Start: 2023-01-25 | End: 2023-01-28 | Stop reason: HOSPADM

## 2023-01-25 RX ORDER — ATORVASTATIN CALCIUM 40 MG/1
80 TABLET, FILM COATED ORAL DAILY
Status: DISCONTINUED | OUTPATIENT
Start: 2023-01-25 | End: 2023-01-28 | Stop reason: HOSPADM

## 2023-01-25 RX ORDER — IBUPROFEN 200 MG
24 TABLET ORAL
Status: DISCONTINUED | OUTPATIENT
Start: 2023-01-25 | End: 2023-01-28 | Stop reason: HOSPADM

## 2023-01-25 RX ADMIN — DILTIAZEM HYDROCHLORIDE 25 MG: 5 INJECTION INTRAVENOUS at 09:01

## 2023-01-25 RX ADMIN — SODIUM CHLORIDE 1000 ML: 9 INJECTION, SOLUTION INTRAVENOUS at 09:01

## 2023-01-25 RX ADMIN — FUROSEMIDE 40 MG: 10 INJECTION, SOLUTION INTRAMUSCULAR; INTRAVENOUS at 12:01

## 2023-01-25 RX ADMIN — IPRATROPIUM BROMIDE AND ALBUTEROL SULFATE 3 ML: 2.5; .5 SOLUTION RESPIRATORY (INHALATION) at 07:01

## 2023-01-25 RX ADMIN — APIXABAN 5 MG: 2.5 TABLET, FILM COATED ORAL at 08:01

## 2023-01-25 RX ADMIN — METOPROLOL TARTRATE 25 MG: 25 TABLET, FILM COATED ORAL at 08:01

## 2023-01-25 RX ADMIN — MUPIROCIN: 20 OINTMENT TOPICAL at 08:01

## 2023-01-25 RX ADMIN — FAMOTIDINE 20 MG: 20 TABLET, FILM COATED ORAL at 08:01

## 2023-01-25 RX ADMIN — ASPIRIN 81 MG CHEWABLE TABLET 324 MG: 81 TABLET CHEWABLE at 11:01

## 2023-01-25 RX ADMIN — DILTIAZEM HYDROCHLORIDE 5 MG/HR: 5 INJECTION INTRAVENOUS at 11:01

## 2023-01-25 NOTE — PLAN OF CARE
01/25/23 1435   Discharge Assessment   Assessment Type Discharge Planning Assessment   Confirmed/corrected address, phone number and insurance Yes   Confirmed Demographics Correct on Facesheet   Source of Information patient;family   When was your last doctors appointment?   (Dayami Shaw)   Reason For Admission Atrial fibrillation   People in Home alone   Facility Arrived From: Home   Do you expect to return to your current living situation? Yes   Do you have help at home or someone to help you manage your care at home? No   Prior to hospitilization cognitive status: Alert/Oriented   Current cognitive status: Alert/Oriented   Walking or Climbing Stairs ambulation difficulty, requires equipment   Mobility Management RW   Equipment Currently Used at Home walker, rolling;nebulizer   Readmission within 30 days? No   Patient currently being followed by outpatient case management? No   Do you currently have service(s) that help you manage your care at home? No   Do you take prescription medications? Yes  ([facility])   Do you have prescription coverage? Yes   Coverage AmGraematter M/D   Do you have any problems affording any of your prescribed medications? No   Is the patient taking medications as prescribed? yes   Who is going to help you get home at discharge? Sister   How do you get to doctors appointments? family or friend will provide   Are you on dialysis? No   Discharge Plan A Home   DME Needed Upon Discharge  none   Discharge Plan discussed with: Patient;Sibling   Name(s) and Number(s) Cheryl Zhang (Sister)   [phone]   Discharge Barriers Identified None   Physical Activity   On average, how many days per week do you engage in moderate to strenuous exercise (like a brisk walk)? 4 days   On average, how many minutes do you engage in exercise at this level? 30 min   Financial Resource Strain   How hard is it for you to pay for the very basics like food, housing, medical  care, and heating? Not very   Housing Stability   In the last 12 months, was there a time when you were not able to pay the mortgage or rent on time? N   In the last 12 months, how many places have you lived? 1   In the last 12 months, was there a time when you did not have a steady place to sleep or slept in a shelter (including now)? N   Transportation Needs   In the past 12 months, has lack of transportation kept you from medical appointments or from getting medications? no   In the past 12 months, has lack of transportation kept you from meetings, work, or from getting things needed for daily living? No   Food Insecurity   Within the past 12 months, you worried that your food would run out before you got the money to buy more. Sometimes   Within the past 12 months, the food you bought just didn't last and you didn't have money to get more. Never true   Stress   Do you feel stress - tense, restless, nervous, or anxious, or unable to sleep at night because your mind is troubled all the time - these days? Not at all   Social Connections   In a typical week, how many times do you talk on the phone with family, friends, or neighbors? More than 3   How often do you get together with friends or relatives? More than 3   How often do you attend Religion or Voodoo services? More than 4  ([employer])   Do you belong to any clubs or organizations such as Religion groups, unions, fraternal or athletic groups, or school groups? No   How often do you attend meetings of the clubs or organizations you belong to? Never   Are you , , , , never , or living with a partner? Never marrie   Alcohol Use   Q1: How often do you have a drink containing alcohol? Monthly or l   Q2: How many drinks containing alcohol do you have on a typical day when you are drinking? 1 or 2   Q3: How often do you have six or more drinks on one occasion? Never     Pt lives alone; single; no children.  Sister, Cheryl Vicente, is Emergency Contact. [place] referral submitted via [facility] for resource assistance. Provided sister with [place] contact info. Will follow for d/c planning needs.

## 2023-01-25 NOTE — H&P
[facility] - Emergency Dept  Pulmonary Critical Care Note    Patient Name: Ezra Zhang  MRN: [id number]  Admission Date: 1/25/2023  Hospital Length of Stay: 0 days  Code Status: Full Code  Attending Provider: Peewee Raines MD  Primary Care Provider: CARLO Hamlin     Subjective:     HPI: This is a 62-year old male with hx of paroxysmal atrial fibrillation s/p cardioversion in 2017, on Eliquis, COPD Stage II and non-ischemic cardiomyopathy s/p ICD placement who presented to the ED with a complaint of palpitations and abdominal swelling. Patient states he has had these symptoms for the past two weeks. Notes no changes to his medication regiment nor his diet. Says he has been compliant with his lasix but has noticed his abdomen progressively gotten bigger. Admits to associated shortness of breath but denies associated chest pain, nausea, vomiting, fever, chills or cough. Patient clarifies that he came to the ED today because his progressive shortness of breath got unbearable.    In the ED, patient was noted to be in atrial fibrillation with a HR ranging from . Was given a dose of Diltiazem 30 mg and then was started on Diltiazem drip. Last were significant for BNP of 1197.6 and troponin 0.051. Patient was consulted to Internal Medicine and was admitted with a diagnosis of atrial fibrillation with RVR>      Hospital Course/Significant events:      24 Hour Interval History:    Past Medical History  Atrial Fibrillation  CKD Stage III  Diabetes Mellitus, last hemoglobin a1c 5.3  Non-ischemic cardiomyopathy with recovered EF  Hyperlipidemia  Hypertension    Past Surgical History  Cardioversion  ICD placement  [facility]    Social History:  Drinks a 12-pack a week, last drink 3 days ago  History of cigarette use over 50 years  Also remote history of illicit drug use  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      Current Outpatient Medications   Medication Instructions     ACCU-CHEK FASTCLIX LANCET DRUM Misc Topical (Top), Daily    ACCU-CHEK GUIDE TEST STRIPS Strp USE AS DIRECTED once daily (keep log)    albuterol-ipratropium (DUO-NEB) 2.5 mg-0.5 mg/3 mL nebulizer solution 3 mLs, Nebulization, Every 6 hours PRN    atorvastatin (LIPITOR) 80 mg, Oral, Daily    cetirizine (ZYRTEC) 10 mg, Oral, Daily    clotrimazole-betamethasone 1-0.05% (LOTRISONE) cream 2 g, Topical (Top), 2 times daily    ELIQUIS 5 mg, Oral, 2 times daily    famotidine (PEPCID) 20 mg, Oral, 2 times daily    fluticasone propion-salmeterol 115-21 mcg/dose (ADVAIR HFA) 115-21 mcg/actuation HFAA inhaler 2 puffs, Inhalation, Every 12 hours    fluticasone propionate (FLONASE ALLERGY RELIEF) 100 mcg, Each Nostril, Daily    furosemide (LASIX) 20 mg, Oral, Daily, Refilled per Cardio orders.    glipiZIDE (GLUCOTROL) 5 mg, Oral, Daily    lancets-blood glucose strips 30 gauge Cmpk   Accucheck blood glucose test strips and lancets, See Instructions, Check CBG once daily and keep log., # 100 EA, 11 Refill(s), Pharmacy: [facility], 185, cm, Height/Length Dosing, 03/08/22 8:10:00 CST, 96, kg, Weight Dosing, 03/08/22 8:10:00 CST    lisinopriL 10 mg, Oral, Daily    metoprolol succinate (TOPROL-XL) 100 mg, Oral, 2 times daily    nicotine (NICODERM CQ) 14 mg/24 hr 1 patch, Transdermal, Daily    nicotine polacrilex (NICORETTE) 4 mg, Oral, As needed (PRN)    promethazine-codeine 6.25-10 mg/5 ml (PHENERGAN WITH CODEINE) 6.25-10 mg/5 mL syrup 5 mLs, Oral, Every 6 hours PRN    tamsulosin (FLOMAX) 0.4 mg Cap 1 capsule, Oral, Daily       Current Inpatient Medications   apixaban  5 mg Oral BID    aspirin  324 mg Oral Daily    atorvastatin  80 mg Oral Daily    famotidine  20 mg Oral BID    fluticasone furoate-vilanteroL  1 puff Inhalation Daily    furosemide (LASIX) injection  40 mg Intravenous Daily    lisinopriL  10 mg Oral Daily    metoprolol succinate  100 mg Oral BID    nicotine  1 patch Transdermal Daily    tamsulosin  1 capsule Oral  Daily       Current Intravenous Infusions   dilTIAZem 10 mg/hr (01/25/23 1212)         Review of Systems   Constitutional:  Negative for chills and fever.   HENT:  Negative for nosebleeds.    Eyes:  Negative for pain.   Respiratory:  Positive for shortness of breath. Negative for cough.    Cardiovascular:  Positive for palpitations. Negative for chest pain and leg swelling.   Gastrointestinal:  Negative for nausea and vomiting.   Genitourinary:  Negative for frequency.   Musculoskeletal:  Negative for neck pain.   Neurological:  Negative for headaches.        Objective:       Intake/Output Summary (Last 24 hours) at 1/25/2023 1316  Last data filed at 1/25/2023 1314  Gross per 24 hour   Intake 200 ml   Output 1250 ml   Net -1050 ml         Vital Signs (Most Recent):  Temp: 97.9 °F (36.6 °C) (01/25/23 0838)  Pulse: 108 (01/25/23 1312)  Resp: 20 (01/25/23 1233)  BP: (!) 148/113 (01/25/23 1233)  SpO2: 97 % (01/25/23 1233)    Body mass index is 28.02 kg/m².  Weight: 99 kg (218 lb 4.1 oz) Vital Signs (24h Range):  Temp:  [97.9 °F (36.6 °C)] 97.9 °F (36.6 °C)  Pulse:  [] 108  Resp:  [14-20] 20  SpO2:  [95 %-100 %] 97 %  BP: (134-157)/(104-118) 148/113     Physical Exam  Vitals and nursing note reviewed.   Constitutional:       Appearance: Normal appearance. He is not ill-appearing.   HENT:      Head: Normocephalic and atraumatic.      Nose: Nose normal. No congestion.      Mouth/Throat:      Mouth: Mucous membranes are moist.      Pharynx: No oropharyngeal exudate or posterior oropharyngeal erythema.   Eyes:      Extraocular Movements: Extraocular movements intact.      Pupils: Pupils are equal, round, and reactive to light.   Cardiovascular:      Rate and Rhythm: Tachycardia present. Rhythm irregular.      Pulses: Normal pulses.   Pulmonary:      Effort: Pulmonary effort is normal. No respiratory distress.      Breath sounds: Normal breath sounds.   Abdominal:      General: There is distension.      Tenderness:  There is no abdominal tenderness.      Hernia: No hernia is present.   Musculoskeletal:         General: No swelling or tenderness. Normal range of motion.   Skin:     General: Skin is warm and dry.      Capillary Refill: Capillary refill takes 2 to 3 seconds.      Coloration: Skin is not jaundiced.   Neurological:      General: No focal deficit present.      Mental Status: He is alert and oriented to person, place, and time.         Lines/Drains/Airways       Peripheral Intravenous Line  Duration                  Peripheral IV - Single Lumen 01/25/23 0900 20 G Left;Posterior Hand <1 day                    Significant Labs:    Lab Results   Component Value Date    WBC 8.7 01/25/2023    HGB 15.1 01/25/2023    HCT 45.9 01/25/2023    .5 (H) 01/25/2023     01/25/2023         BMP  Lab Results   Component Value Date     01/25/2023    K 4.2 01/25/2023    CO2 26 01/25/2023    BUN 23.7 01/25/2023    CREATININE 1.63 (H) 01/25/2023    CALCIUM 9.4 01/25/2023    EGFRNONAA 46 04/12/2022          Significant Imaging:  CXR: Per my interpretation, trachea is midline, there are no bony abnormalities, cardiac silhouette is enlarged, there are no diaphragmatic abnormalities and there is increased interstitial markings. Question whether these are baseline versus pulmonary edema.      Assessment/Plan:     Assessment  Atrial Fibrillation with RVR. Hx of paroxysmal afib s/p cardioversion in 2017.  CHF Exacerbation. Hx of non-ischemic Cardiomyopathy with recovered ejection fraction and s/p ICD placement  NSTEMI, likely type II  Chronic Kidney Disease Stage III  COPD Stage II, PFT in 2022  Hypertension  Hyperlipidemia      Plan  Continue with Diltiazem infusion. Titrate to HR < 110. Pending magnesium level. Will re-start Eliquis. Once HR is sustained < 110, can transition to po dilt.  Will give Lasix 40 mg IV and monitor I/O closely. Will check repeat BMP in the afternoon. Will repeat echocardiogram and thyroid  study.  Will monitor troponin level and EKG for a total 3 doses.  Renal indices are at baseline. Will continue to monitor.  Resume home inhalers and duoneb PRN.  Resume home anti-hypertensive with the the exception of metoprolol succinate.    DVT Prophylaxis: Eliquis  GI Prophylaxis: None     32 minutes of critical care was time spent personally by me on the following activities: development of treatment plan with patient or surrogate and bedside caregivers, discussions with consultants, evaluation of patient's response to treatment, examination of patient, ordering and performing treatments and interventions, ordering and review of laboratory studies, ordering and review of radiographic studies, pulse oximetry, re-evaluation of patient's condition.  This critical care time did not overlap with that of any other provider or involve time for any procedures.     Eduard Michaels MD  Pulmonary Critical Care Medicine  [facility] - Emergency Dept

## 2023-01-25 NOTE — ED PROVIDER NOTES
"Encounter Date: 1/25/2023       History     Chief Complaint   Patient presents with    Shortness of Breath     PT W CO SOB X 1 WK.  REPORTS MILD INTERMITTENT CP.  EKG OBTAINED.  AFIB W RVR .       63 y/o M with hx of Atrial fib, CKD, COPD, presents to the ED with complaint of sob.  Pt reports he has chronic sob and reports " getting worse over 1 week".  Pt denies chest pain, , headache, fever, chills, weakness, dizziness.  Pt reports " my HR is all over the place"     The history is provided by the patient. No  was used.   Review of patient's allergies indicates:  No Known Allergies  Past Medical History:   Diagnosis Date    Atrial fibrillation     CKD (chronic kidney disease)     DM (diabetes mellitus)     Dyslipidemia     Heart failure, unspecified     Smoking      Past Surgical History:   Procedure Laterality Date    CARDIOVERSION      coronary arteriograph      INSERT / REPLACE / REMOVE PACEMAKER      left  heart catherization       Family History   Problem Relation Age of Onset    Heart failure Mother     Cataracts Mother     Heart disease Mother     Glaucoma Mother     Peripheral vascular disease Mother     Hypertension Father      Social History     Tobacco Use    Smoking status: Some Days     Packs/day: 0.25     Years: 20.00     Pack years: 5.00     Types: Cigarettes    Smokeless tobacco: Never    Tobacco comments:     Taking patches   Substance Use Topics    Alcohol use: Yes     Alcohol/week: 1.0 standard drink     Types: 1 Cans of beer per week     Comment: not often    Drug use: Never     Review of Systems   Constitutional:  Negative for chills, fatigue and fever.   HENT:  Negative for sore throat.    Respiratory:  Positive for shortness of breath. Negative for wheezing and stridor.    Cardiovascular:  Negative for chest pain.   Gastrointestinal:  Negative for diarrhea, nausea and vomiting.   Genitourinary:  Negative for dysuria.   Musculoskeletal:  Negative for back pain and " myalgias.   Skin:  Negative for rash.   Neurological:  Negative for dizziness, tremors, syncope, speech difficulty, weakness and headaches.     Physical Exam     Initial Vitals [01/25/23 0838]   BP Pulse Resp Temp SpO2   (!) 151/106 (!) 132 20 97.9 °F (36.6 °C) 100 %      MAP       --         Physical Exam    Nursing note and vitals reviewed.  Constitutional: He appears well-developed and well-nourished. No distress.   HENT:   Head: Normocephalic and atraumatic.   Eyes: Conjunctivae are normal.   Cardiovascular:  Normal heart sounds and intact distal pulses.           Pulmonary/Chest: Breath sounds normal. No respiratory distress. He has no wheezes.   Abdominal: Abdomen is soft. Bowel sounds are normal. There is no abdominal tenderness. There is no rebound and no guarding.   Musculoskeletal:         General: Normal range of motion.     Neurological: He is alert and oriented to person, place, and time. He has normal strength. No sensory deficit. Gait normal. GCS score is 15. GCS eye subscore is 4. GCS verbal subscore is 5. GCS motor subscore is 6.   Skin: Skin is warm and dry. Capillary refill takes less than 2 seconds.   Psychiatric: He has a normal mood and affect. His behavior is normal. Judgment and thought content normal.       ED Course   Critical Care    Date/Time: 1/25/2023 11:00 AM  Performed by: Ab Canales MD  Authorized by: Ab Canales MD   Total critical care time (exclusive of procedural time) : 30 minutes  Critical care was necessary to treat or prevent imminent or life-threatening deterioration of the following conditions: cardiac failure, circulatory failure and respiratory failure.  Critical care was time spent personally by me on the following activities: development of treatment plan with patient or surrogate, interpretation of cardiac output measurements, evaluation of patient's response to treatment, obtaining history from patient or surrogate, examination of patient, ordering and performing  treatments and interventions, ordering and review of laboratory studies, ordering and review of radiographic studies, pulse oximetry, re-evaluation of patient's condition and review of old charts.      Labs Reviewed   COMPREHENSIVE METABOLIC PANEL - Abnormal; Notable for the following components:       Result Value    Chloride 110 (*)     Glucose Level 117 (*)     Creatinine 1.63 (*)     All other components within normal limits   TROPONIN I - Abnormal; Notable for the following components:    Troponin-I 0.051 (*)     All other components within normal limits   B-TYPE NATRIURETIC PEPTIDE - Abnormal; Notable for the following components:    Natriuretic Peptide 1,197.6 (*)     All other components within normal limits   CBC WITH DIFFERENTIAL - Abnormal; Notable for the following components:    RBC 4.52 (*)     .5 (*)     MCHC 32.9 (*)     MPV 11.1 (*)     All other components within normal limits   CBC W/ AUTO DIFFERENTIAL    Narrative:     The following orders were created for panel order CBC auto differential.  Procedure                               Abnormality         Status                     ---------                               -----------         ------                     CBC with Differential[[id number]]        Abnormal            Final result                 Please view results for these tests on the individual orders.   EXTRA TUBES    Narrative:     The following orders were created for panel order EXTRA TUBES.  Procedure                               Abnormality         Status                     ---------                               -----------         ------                     Light Blue Top Hold[[id number]]                              In process                 Red Top Hold[[id number]]                                     In process                 Gold Top Hold[[id number]]                                    In process                 Pink Top Hold[[id number]]                                     In process                   Please view results for these tests on the individual orders.   LIGHT BLUE TOP HOLD   RED TOP HOLD   GOLD TOP HOLD   PINK TOP HOLD   BASIC METABOLIC PANEL   URINALYSIS, REFLEX TO URINE CULTURE   TROPONIN I   TSH   POCT GLUCOSE MONITORING CONTINUOUS     EKG Readings: (Independently Interpreted)   Initial Reading: No STEMI. Rhythm: Atrial Fibrillation. Heart Rate: 132. Ectopy: No Ectopy. ST Segments: Normal ST Segments. T Waves: Normal.   Other EKG Interpretations: Repeat EKG- 01/25/2023   0925  HR- 110  Rhythm- a fib RVR   Normal st segments   Normal t wave    ECG Results              EKG 12-lead (In process)  Result time 01/25/23 09:28:43      In process by Interface, Lab In [facility] (01/25/23 09:28:43)                   Narrative:    Test Reason : I48.91,    Vent. Rate : 110 BPM     Atrial Rate : 120 BPM     P-R Int : 000 ms          QRS Dur : 076 ms      QT Int : 318 ms       P-R-T Axes : 000 069 027 degrees     QTc Int : 430 ms    Atrial fibrillation with rapid ventricular response with premature  ventricular or aberrantly conducted complexes  Abnormal ECG  When compared with ECG of 25-JAN-2023 08:38,  No significant change was found    Referred By: [email or web address]   SELF           Confirmed By:                                      EKG 12-lead (Shortness of Breath) Age > 50 (In process)  Result time 01/25/23 08:41:39      In process by Interface, Lab In [facility] (01/25/23 08:41:39)                   Narrative:    Test Reason : R06.02,    Vent. Rate : 132 BPM     Atrial Rate : 136 BPM     P-R Int : 000 ms          QRS Dur : 074 ms      QT Int : 340 ms       P-R-T Axes : 000 060 031 degrees     QTc Int : 503 ms    Atrial fibrillation with rapid ventricular response  Abnormal ECG  When compared with ECG of 03-AUG-2022 08:03,  Atrial fibrillation has replaced Sinus rhythm  Vent. rate has increased BY  73 BPM    Referred By:             Confirmed By:                                   Imaging  Results              X-Ray Chest AP Portable (Final result)  Result time 01/25/23 10:15:06      Final result by Sincere Hunter MD (01/25/23 10:15:06)                   Impression:      No acute chest disease is identified.      Electronically signed by: Sincere Hunter  Date:    01/25/2023  Time:    10:15               Narrative:    EXAMINATION:  XR CHEST AP PORTABLE    CLINICAL HISTORY:  Chest Pain;, .    COMPARISON:  August 26, 2022    FINDINGS:  No alveolar consolidation, effusion, or pneumothorax is seen.   The thoracic aorta is normal  cardiac silhouette, central pulmonary vessels and mediastinum are normal in size and are grossly unremarkable.   visualized osseous structures are grossly unremarkable.                                       Medications   diltiaZEM (CARDIZEM) 125 mg in dextrose 5 % (D5W) 125 mL infusion (10 mg/hr Intravenous Rate/Dose Change 1/25/23 1212)   aspirin chewable tablet 324 mg (324 mg Oral Given 1/25/23 1122)   sodium chloride 0.9% flush 10 mL (has no administration in time range)   furosemide injection 40 mg (has no administration in time range)   albuterol-ipratropium 2.5 mg-0.5 mg/3 mL nebulizer solution 3 mL (has no administration in time range)   atorvastatin tablet 80 mg (has no administration in time range)   apixaban tablet 5 mg (has no administration in time range)   famotidine tablet 20 mg (has no administration in time range)   fluticasone furoate-vilanteroL 100-25 mcg/dose diskus inhaler 1 puff (has no administration in time range)   lisinopriL tablet 10 mg (has no administration in time range)   metoprolol succinate (TOPROL-XL) 24 hr tablet 100 mg (has no administration in time range)   nicotine 14 mg/24 hr 1 patch (has no administration in time range)   tamsulosin 24 hr capsule 0.4 mg (has no administration in time range)   glucose chewable tablet 16 g (has no administration in time range)   glucose chewable tablet 24 g (has no administration in time range)    glucagon (human recombinant) injection 1 mg (has no administration in time range)   dextrose 10% bolus 125 mL 125 mL (has no administration in time range)   dextrose 10% bolus 250 mL 250 mL (has no administration in time range)   insulin aspart U-100 injection 1-10 Units (has no administration in time range)   sodium chloride 0.9% bolus 1,000 mL 1,000 mL (0 mLs Intravenous Stopped 1/25/23 1001)   diltiaZEM injection 25 mg (25 mg Intravenous Given 1/25/23 0906)     Medical Decision Making:   Pt with atrial fib RVR - started on cardizem drip. Reviewed labs. Pt with elevated troponin. Asa given.  Discussed admission and pt agrees. Medicine consulted for admission.                          Clinical Impression:   Final diagnoses:  [R06.02] SOB (shortness of breath)  [I48.91] Atrial fibrillation (Primary)        ED Disposition Condition    Admit Stable                Ab Canales MD  01/25/23 6169

## 2023-01-26 LAB
ALBUMIN SERPL-MCNC: 3.7 G/DL (ref 3.4–4.8)
ALBUMIN/GLOB SERPL: 1.4 RATIO (ref 1.1–2)
ALP SERPL-CCNC: 111 UNIT/L (ref 40–150)
ALT SERPL-CCNC: 39 UNIT/L (ref 0–55)
AST SERPL-CCNC: 19 UNIT/L (ref 5–34)
BASOPHILS # BLD AUTO: 0.04 X10(3)/MCL (ref 0–0.2)
BASOPHILS NFR BLD AUTO: 0.5 %
BILIRUBIN DIRECT+TOT PNL SERPL-MCNC: 0.8 MG/DL
BUN SERPL-MCNC: 24.5 MG/DL (ref 8.4–25.7)
CALCIUM SERPL-MCNC: 9.2 MG/DL (ref 8.8–10)
CHLORIDE SERPL-SCNC: 107 MMOL/L (ref 98–107)
CO2 SERPL-SCNC: 24 MMOL/L (ref 23–31)
CREAT SERPL-MCNC: 1.49 MG/DL (ref 0.73–1.18)
EOSINOPHIL # BLD AUTO: 0.12 X10(3)/MCL (ref 0–0.9)
EOSINOPHIL NFR BLD AUTO: 1.6 %
ERYTHROCYTE [DISTWIDTH] IN BLOOD BY AUTOMATED COUNT: 14.8 % (ref 11.5–17)
GFR SERPLBLD CREATININE-BSD FMLA CKD-EPI: 52 MLS/MIN/1.73/M2
GLOBULIN SER-MCNC: 2.6 GM/DL (ref 2.4–3.5)
GLUCOSE SERPL-MCNC: 170 MG/DL (ref 82–115)
HCT VFR BLD AUTO: 42.6 % (ref 42–52)
HGB BLD-MCNC: 13.9 GM/DL (ref 14–18)
IMM GRANULOCYTES # BLD AUTO: 0.03 X10(3)/MCL (ref 0–0.04)
IMM GRANULOCYTES NFR BLD AUTO: 0.4 %
LYMPHOCYTES # BLD AUTO: 2.14 X10(3)/MCL (ref 0.6–4.6)
LYMPHOCYTES NFR BLD AUTO: 28.5 %
MAGNESIUM SERPL-MCNC: 2.1 MG/DL (ref 1.6–2.6)
MCH RBC QN AUTO: 33.6 PG
MCHC RBC AUTO-ENTMCNC: 32.6 MG/DL (ref 33–36)
MCV RBC AUTO: 102.9 FL (ref 80–94)
MONOCYTES # BLD AUTO: 0.6 X10(3)/MCL (ref 0.1–1.3)
MONOCYTES NFR BLD AUTO: 8 %
NEUTROPHILS # BLD AUTO: 4.57 X10(3)/MCL (ref 2.1–9.2)
NEUTROPHILS NFR BLD AUTO: 61 %
NRBC BLD AUTO-RTO: 0 %
PHOSPHATE SERPL-MCNC: 3.5 MG/DL (ref 2.3–4.7)
PLATELET # BLD AUTO: 272 X10(3)/MCL (ref 130–400)
PMV BLD AUTO: 11.3 FL (ref 7.4–10.4)
POCT GLUCOSE: 110 MG/DL (ref 70–110)
POCT GLUCOSE: 125 MG/DL (ref 70–110)
POCT GLUCOSE: 137 MG/DL (ref 70–110)
POCT GLUCOSE: 85 MG/DL (ref 70–110)
POCT GLUCOSE: 97 MG/DL (ref 70–110)
POCT GLUCOSE: 97 MG/DL (ref 70–110)
POTASSIUM SERPL-SCNC: 3.4 MMOL/L (ref 3.5–5.1)
PROT SERPL-MCNC: 6.3 GM/DL (ref 5.8–7.6)
RBC # BLD AUTO: 4.14 X10(6)/MCL (ref 4.7–6.1)
SODIUM SERPL-SCNC: 141 MMOL/L (ref 136–145)
WBC # SPEC AUTO: 7.5 X10(3)/MCL (ref 4.5–11.5)

## 2023-01-26 PROCEDURE — 99900035 HC TECH TIME PER 15 MIN (STAT)

## 2023-01-26 PROCEDURE — 85025 COMPLETE CBC W/AUTO DIFF WBC: CPT | Performed by: STUDENT IN AN ORGANIZED HEALTH CARE EDUCATION/TRAINING PROGRAM

## 2023-01-26 PROCEDURE — 25000003 PHARM REV CODE 250: Performed by: STUDENT IN AN ORGANIZED HEALTH CARE EDUCATION/TRAINING PROGRAM

## 2023-01-26 PROCEDURE — 80053 COMPREHEN METABOLIC PANEL: CPT | Performed by: STUDENT IN AN ORGANIZED HEALTH CARE EDUCATION/TRAINING PROGRAM

## 2023-01-26 PROCEDURE — 94761 N-INVAS EAR/PLS OXIMETRY MLT: CPT

## 2023-01-26 PROCEDURE — 94640 AIRWAY INHALATION TREATMENT: CPT

## 2023-01-26 PROCEDURE — 83735 ASSAY OF MAGNESIUM: CPT | Performed by: STUDENT IN AN ORGANIZED HEALTH CARE EDUCATION/TRAINING PROGRAM

## 2023-01-26 PROCEDURE — S4991 NICOTINE PATCH NONLEGEND: HCPCS | Performed by: STUDENT IN AN ORGANIZED HEALTH CARE EDUCATION/TRAINING PROGRAM

## 2023-01-26 PROCEDURE — 25000242 PHARM REV CODE 250 ALT 637 W/ HCPCS: Performed by: STUDENT IN AN ORGANIZED HEALTH CARE EDUCATION/TRAINING PROGRAM

## 2023-01-26 PROCEDURE — 63600175 PHARM REV CODE 636 W HCPCS: Performed by: STUDENT IN AN ORGANIZED HEALTH CARE EDUCATION/TRAINING PROGRAM

## 2023-01-26 PROCEDURE — 20000000 HC ICU ROOM

## 2023-01-26 PROCEDURE — 84100 ASSAY OF PHOSPHORUS: CPT | Performed by: STUDENT IN AN ORGANIZED HEALTH CARE EDUCATION/TRAINING PROGRAM

## 2023-01-26 PROCEDURE — 25000003 PHARM REV CODE 250: Performed by: FAMILY MEDICINE

## 2023-01-26 PROCEDURE — 25000003 PHARM REV CODE 250

## 2023-01-26 PROCEDURE — 21400001 HC TELEMETRY ROOM

## 2023-01-26 RX ORDER — ACETAMINOPHEN 325 MG/1
650 TABLET ORAL EVERY 6 HOURS PRN
Status: DISCONTINUED | OUTPATIENT
Start: 2023-01-26 | End: 2023-01-28 | Stop reason: HOSPADM

## 2023-01-26 RX ORDER — METOPROLOL SUCCINATE 50 MG/1
100 TABLET, EXTENDED RELEASE ORAL 2 TIMES DAILY
Status: DISCONTINUED | OUTPATIENT
Start: 2023-01-26 | End: 2023-01-28

## 2023-01-26 RX ORDER — FLUTICASONE PROPIONATE 50 MCG
2 SPRAY, SUSPENSION (ML) NASAL DAILY
Status: DISCONTINUED | OUTPATIENT
Start: 2023-01-26 | End: 2023-01-28 | Stop reason: HOSPADM

## 2023-01-26 RX ORDER — METOPROLOL TARTRATE 50 MG/1
50 TABLET ORAL 2 TIMES DAILY
Status: DISCONTINUED | OUTPATIENT
Start: 2023-01-26 | End: 2023-01-26

## 2023-01-26 RX ADMIN — MUPIROCIN: 20 OINTMENT TOPICAL at 08:01

## 2023-01-26 RX ADMIN — FAMOTIDINE 20 MG: 20 TABLET, FILM COATED ORAL at 08:01

## 2023-01-26 RX ADMIN — LISINOPRIL 10 MG: 10 TABLET ORAL at 08:01

## 2023-01-26 RX ADMIN — NICOTINE 1 PATCH: 14 PATCH, EXTENDED RELEASE TRANSDERMAL at 08:01

## 2023-01-26 RX ADMIN — IPRATROPIUM BROMIDE AND ALBUTEROL SULFATE 3 ML: 2.5; .5 SOLUTION RESPIRATORY (INHALATION) at 07:01

## 2023-01-26 RX ADMIN — IPRATROPIUM BROMIDE AND ALBUTEROL SULFATE 3 ML: 2.5; .5 SOLUTION RESPIRATORY (INHALATION) at 03:01

## 2023-01-26 RX ADMIN — POTASSIUM BICARBONATE 25 MEQ: 978 TABLET, EFFERVESCENT ORAL at 06:01

## 2023-01-26 RX ADMIN — METOPROLOL TARTRATE 5 MG: 1 INJECTION, SOLUTION INTRAVENOUS at 02:01

## 2023-01-26 RX ADMIN — METOPROLOL TARTRATE 5 MG: 1 INJECTION, SOLUTION INTRAVENOUS at 11:01

## 2023-01-26 RX ADMIN — TAMSULOSIN HYDROCHLORIDE 0.4 MG: 0.4 CAPSULE ORAL at 08:01

## 2023-01-26 RX ADMIN — FUROSEMIDE 40 MG: 10 INJECTION, SOLUTION INTRAMUSCULAR; INTRAVENOUS at 08:01

## 2023-01-26 RX ADMIN — IPRATROPIUM BROMIDE AND ALBUTEROL SULFATE 3 ML: 2.5; .5 SOLUTION RESPIRATORY (INHALATION) at 01:01

## 2023-01-26 RX ADMIN — APIXABAN 5 MG: 2.5 TABLET, FILM COATED ORAL at 08:01

## 2023-01-26 RX ADMIN — ATORVASTATIN CALCIUM 80 MG: 40 TABLET, FILM COATED ORAL at 08:01

## 2023-01-26 RX ADMIN — METOPROLOL SUCCINATE 100 MG: 50 TABLET, FILM COATED, EXTENDED RELEASE ORAL at 08:01

## 2023-01-26 RX ADMIN — METOPROLOL TARTRATE 5 MG: 1 INJECTION, SOLUTION INTRAVENOUS at 01:01

## 2023-01-26 RX ADMIN — METOPROLOL TARTRATE 25 MG: 25 TABLET, FILM COATED ORAL at 08:01

## 2023-01-26 RX ADMIN — FLUTICASONE FUROATE AND VILANTEROL TRIFENATATE 1 PUFF: 100; 25 POWDER RESPIRATORY (INHALATION) at 07:01

## 2023-01-26 RX ADMIN — METOPROLOL TARTRATE 5 MG: 1 INJECTION, SOLUTION INTRAVENOUS at 04:01

## 2023-01-26 RX ADMIN — ASPIRIN 81 MG CHEWABLE TABLET 324 MG: 81 TABLET CHEWABLE at 08:01

## 2023-01-26 RX ADMIN — ACETAMINOPHEN 650 MG: 325 TABLET, FILM COATED ORAL at 06:01

## 2023-01-26 NOTE — PROGRESS NOTES
[facility] - ICU  Pulmonary Critical Care Note    Patient Name: Ezra Zhang  MRN: [id number]  Admission Date: 1/25/2023  Hospital Length of Stay: 1 days  Code Status: Full Code  Attending Provider: Peewee Raines MD  Primary Care Provider: CARLO Hamlin     Subjective:     HPI: This is a 62-year old male with hx of paroxysmal atrial fibrillation s/p cardioversion in 2017, on Eliquis, COPD Stage II and non-ischemic cardiomyopathy s/p ICD placement who presented to the ED with a complaint of palpitations and abdominal swelling. Patient states he has had these symptoms for the past two weeks. Notes no changes to his medication regiment nor his diet. Says he has been compliant with his lasix but has noticed his abdomen progressively gotten bigger. Admits to associated shortness of breath but denies associated chest pain, nausea, vomiting, fever, chills or cough. Patient clarifies that he came to the ED today because his progressive shortness of breath got unbearable.     In the ED, patient was noted to be in atrial fibrillation with a HR ranging from . Was given a dose of Diltiazem 30 mg and then was started on Diltiazem drip. Last were significant for BNP of 1197.6 and troponin 0.051. Patient was consulted to Internal Medicine and was admitted with a diagnosis of atrial fibrillation with RVR.         Hospital Course/Significant events:  TTE: EF 35%    24 Hour Interval History:  Overnight, patient states major improvement of his palpitations and shortness of breath. Patient was diuresed with 40 mg of lasix yesterday. Over the last 12 hours, total urine output is 4500 with a net I/O of -3.7 L. HR has been consistently in the  while on metoprolol 25 mg BID. Required two pushes of IV lopressor. Notes abdominal distention has improved.    Past Medical History:   Diagnosis Date    Atrial fibrillation     CKD (chronic kidney disease)     DM (diabetes mellitus)     Dyslipidemia     Heart  failure, unspecified     Smoking        Past Surgical History:   Procedure Laterality Date    CARDIOVERSION      coronary arteriograph      INSERT / REPLACE / REMOVE PACEMAKER      left  heart catherization         Social History     Socioeconomic History    Marital status: Single   Tobacco Use    Smoking status: Some Days     Packs/day: 0.25     Years: 20.00     Pack years: 5.00     Types: Cigarettes    Smokeless tobacco: Never    Tobacco comments:     Taking patches   Substance and Sexual Activity    Alcohol use: Yes     Alcohol/week: 1.0 standard drink     Types: 1 Cans of beer per week     Comment: not often    Drug use: Never     Social Determinants of Health     Financial Resource Strain: Low Risk     Difficulty of Paying Living Expenses: Not very hard   Food Insecurity: Food Insecurity Present    Worried About Running Out of Food in the Last Year: Sometimes true    Ran Out of Food in the Last Year: Never true   Transportation Needs: No Transportation Needs    Lack of Transportation (Medical): No    Lack of Transportation (Non-Medical): No   Physical Activity: Insufficiently Active    Days of Exercise per Week: 4 days    Minutes of Exercise per Session: 30 min   Stress: No Stress Concern Present    Feeling of Stress : Not at all   Social Connections: Moderately Isolated    Frequency of Communication with Friends and Family: More than three times a week    Frequency of Social Gatherings with Friends and Family: More than three times a week    Attends [employer] Services: More than 4 times per year    Active Member of Clubs or Organizations: No    Attends Club or Organization Meetings: Never    Marital Status: Never    Housing Stability: Low Risk     Unable to Pay for Housing in the Last Year: No    Number of Places Lived in the Last Year: 1    Unstable Housing in the Last Year: No           Current Outpatient Medications   Medication Instructions    ACCU-CHEK FASTCLIX LANCET DRUM Misc Topical (Top), Daily     ACCU-CHEK GUIDE TEST STRIPS Strp USE AS DIRECTED once daily (keep log)    albuterol-ipratropium (DUO-NEB) 2.5 mg-0.5 mg/3 mL nebulizer solution 3 mLs, Nebulization, Every 6 hours PRN    atorvastatin (LIPITOR) 80 mg, Oral, Daily    cetirizine (ZYRTEC) 10 mg, Oral, Daily    clotrimazole-betamethasone 1-0.05% (LOTRISONE) cream 2 g, Topical (Top), 2 times daily    ELIQUIS 5 mg, Oral, 2 times daily    famotidine (PEPCID) 20 mg, Oral, 2 times daily    fluticasone propion-salmeterol 115-21 mcg/dose (ADVAIR HFA) 115-21 mcg/actuation HFAA inhaler 2 puffs, Inhalation, Every 12 hours    fluticasone propionate (FLONASE ALLERGY RELIEF) 100 mcg, Each Nostril, Daily    furosemide (LASIX) 20 mg, Oral, Daily, Refilled per Cardio orders.    glipiZIDE (GLUCOTROL) 5 mg, Oral, Daily    lancets-blood glucose strips 30 gauge Cmpk   Accucheck blood glucose test strips and lancets, See Instructions, Check CBG once daily and keep log., # 100 EA, 11 Refill(s), Pharmacy: [facility], 185, cm, Height/Length Dosing, 03/08/22 8:10:00 CST, 96, kg, Weight Dosing, 03/08/22 8:10:00 CST    lisinopriL 10 mg, Oral, Daily    metoprolol succinate (TOPROL-XL) 100 mg, Oral, 2 times daily    nicotine (NICODERM CQ) 14 mg/24 hr 1 patch, Transdermal, Daily    nicotine polacrilex (NICORETTE) 4 mg, Oral, As needed (PRN)    promethazine-codeine 6.25-10 mg/5 ml (PHENERGAN WITH CODEINE) 6.25-10 mg/5 mL syrup 5 mLs, Oral, Every 6 hours PRN    tamsulosin (FLOMAX) 0.4 mg Cap 1 capsule, Oral, Daily       Current Inpatient Medications   apixaban  5 mg Oral BID    aspirin  324 mg Oral Daily    atorvastatin  80 mg Oral Daily    famotidine  20 mg Oral BID    fluticasone furoate-vilanteroL  1 puff Inhalation Daily    furosemide (LASIX) injection  40 mg Intravenous Daily    lisinopriL  10 mg Oral Daily    metoprolol tartrate  25 mg Oral BID    mupirocin   Nasal BID    nicotine  1 patch Transdermal Daily    tamsulosin  1 capsule Oral Daily       Current Intravenous  Infusions        Review of Systems   Constitutional:  Negative for chills and fever.   Eyes:  Negative for photophobia.   Respiratory:  Positive for shortness of breath.    Cardiovascular:  Negative for chest pain.   Gastrointestinal:  Negative for nausea and vomiting.   Genitourinary:  Negative for frequency.   Musculoskeletal:  Negative for back pain.   Neurological:  Negative for headaches.        Objective:       Intake/Output Summary (Last 24 hours) at 1/26/2023 0857  Last data filed at 1/26/2023 0855  Gross per 24 hour   Intake 1040 ml   Output 4500 ml   Net -3460 ml         Vital Signs (Most Recent):  Temp: 97.7 °F (36.5 °C) (01/26/23 0800)  Pulse: (!) 113 (01/26/23 0824)  Resp: 12 (01/26/23 0824)  BP: (!) 146/96 (01/26/23 0824)  SpO2: 98 % (01/26/23 0844)    Body mass index is 27.99 kg/m².  Weight: 98.9 kg (218 lb) Vital Signs (24h Range):  Temp:  [97.7 °F (36.5 °C)-98.2 °F (36.8 °C)] 97.7 °F (36.5 °C)  Pulse:  [] 113  Resp:  [12-24] 12  SpO2:  [95 %-99 %] 98 %  BP: (112-157)/() 146/96     Physical Exam  Vitals and nursing note reviewed.   Constitutional:       General: He is not in acute distress.     Appearance: Normal appearance. He is obese.   HENT:      Head: Normocephalic and atraumatic.      Nose: Nose normal. No congestion or rhinorrhea.      Mouth/Throat:      Mouth: Mucous membranes are moist.   Eyes:      Extraocular Movements: Extraocular movements intact.      Pupils: Pupils are equal, round, and reactive to light.   Cardiovascular:      Rate and Rhythm: Tachycardia present. Rhythm irregular.      Pulses: Normal pulses.      Heart sounds: No murmur heard.  Pulmonary:      Effort: Pulmonary effort is normal. No respiratory distress.      Breath sounds: Normal breath sounds.   Abdominal:      General: Abdomen is flat. There is no distension.      Palpations: Abdomen is soft.   Musculoskeletal:         General: No swelling or tenderness. Normal range of motion.      Cervical back:  Normal range of motion and neck supple.   Skin:     General: Skin is warm and dry.      Capillary Refill: Capillary refill takes less than 2 seconds.      Coloration: Skin is not jaundiced.   Neurological:      General: No focal deficit present.      Mental Status: He is alert and oriented to person, place, and time.      Cranial Nerves: No cranial nerve deficit.         Lines/Drains/Airways       Peripheral Intravenous Line  Duration                  Peripheral IV - Single Lumen 01/25/23 0900 20 G Left;Posterior Hand <1 day                    Significant Labs:    Lab Results   Component Value Date    WBC 7.5 01/26/2023    HGB 13.9 (L) 01/26/2023    HCT 42.6 01/26/2023    .9 (H) 01/26/2023     01/26/2023         BMP  Lab Results   Component Value Date     01/26/2023    K 3.4 (L) 01/26/2023    CO2 24 01/26/2023    BUN 24.5 01/26/2023    CREATININE 1.49 (H) 01/26/2023    CALCIUM 9.2 01/26/2023    EGFRNONAA 46 04/12/2022       ABG  No results for input(s): PH, PO2, PCO2, HCO3, BE in the last 168 hours.    Mechanical Ventilation Support:       Significant Imaging:  I have reviewed the pertinent imaging within the past 24 hours.        Assessment/Plan:     Assessment  Atrial Fibrillation with RVR. Hx of paroxysmal afib s/p cardioversion in 2017.  CHF Exacerbation. Hx of non-ischemic Cardiomyopathy with recovered ejection fraction and s/p ICD placement  NSTEMI, likely type II  Chronic Kidney Disease Stage III  COPD Stage II, PFT in 2022  Hypertension  Hyperlipidemia      Plan  Will increase Metoprolol 50 mg BID. Goal HR <110.  Continue with lasix 40 mg and monitor I/O closely. Will repeat BMP in the afternoon and replete electrolytes as needed.  Echo showed dropped in EF to 35%. Troponin has downtrended and EKG never showed any signs of ischemia. Will speak to Cardiology about ischemic workup.  Continue with home inhalers, duonebs and anti-hypertensive medications.  Patient is a candidate for downgrade  as he is off continuous infusion.  Further recommendations from the Intensivist to follow.    DVT Prophylaxis: Eliquis  GI Prophylaxis: None     32 minutes of critical care was time spent personally by me on the following activities: development of treatment plan with patient or surrogate and bedside caregivers, discussions with consultants, evaluation of patient's response to treatment, examination of patient, ordering and performing treatments and interventions, ordering and review of laboratory studies, ordering and review of radiographic studies, pulse oximetry, re-evaluation of patient's condition.  This critical care time did not overlap with that of any other provider or involve time for any procedures.     Eduard Michaels MD  Pulmonary Critical Care Medicine  [facility] - ICU

## 2023-01-26 NOTE — PROGRESS NOTES
"Inpatient Nutrition Evaluation    Admit Date: 2023   Total duration of encounter: 1 day    Nutrition Recommendation/Prescription     Suggest Diabetic/cardiac diet   Pt education on diet/complete  MVI/fe  Biweekly wt  Will monitor nutrition status     Nutrition Assessment     Chart Review    Reason Seen: continuous nutrition monitoring    Malnutrition Screening Tool Results   Have you recently lost weight without trying?: No  Have you been eating poorly because of a decreased appetite?: No   MST Score: 0     Diagnosis:  Afib, CHF, NSTEMI, CKD, COPD, HTN, HLD     Relevant Medical History: Afib, COPD, cardiomyopathy, ICD, DM, CKD, DM     Nutrition-Related Medications: atorvastatin, famotidine, furosemide, lisinopril     Nutrition-Related Labs:  () H/H 13.9/42.6 Gluc 170(H) Bun 24.5 Cr 1.4 K 3.4 GFR 52(L) P 3.5     Diet Order: DIET RENAL NON-DIALYSIS  Oral Supplement Order: none  Appetite/Oral Intake: good/% of meals  Factors Affecting Nutritional Intake: none identified  Food/B[employer]ist/Cultural Preferences: none reported  Food Allergies: none reported       Wound(s):   none    Comments    (); Pt reported he is tolerating oral diet ; good appetite; follows Diabetic/low na diet at home. No wt loss. Labs acknowledged: Gluc (H)--hx DM; will change to diabetic /card diet; Bun/Cr (slight elevated); hx CKD--encouraged pt to follow diet/ take prescribed meds.     Anthropometrics    Height: 6' 2" (188 cm) Height Method: Stated  Last Weight: 98.9 kg (218 lb) (23 1333) Weight Method: Standard Scale  BMI (Calculated): 28  BMI Classification: overweight (BMI 25-29.9)        Ideal Body Weight (IBW), Male: 190 lb     % Ideal Body Weight, Male (lb): 114.74 %                 Usual Body Weight (UBW), k.9 kg  % Usual Body Weight: 100.19     Usual Weight Provided By: patient and EMR weight history    Wt Readings from Last 3 Encounters:   23 1333 98.9 kg (218 lb)   23 0838 99 kg (218 lb 4.1 oz) "   01/03/23 1112 96.4 kg (212 lb 9.6 oz)   12/13/22 0829 96.6 kg (213 lb)      Weight Change(s) Since Admission:  Admit Weight: 99 kg (218 lb 4.1 oz) (01/25/23 0838)  Wt stable     Patient Education    Education Provided: diabetic diet and heart healthy diet  Teaching Method: explanation and printed materials  Comprehension: verbalizes understanding  Barriers to Learning: none evident  Expected Compliance: fair  Comments: All questions were answered and dietitian's contact information was provided.     Monitoring & Evaluation     Dietitian will monitor food and beverage intake and weight.  Nutrition Risk/Follow-Up: low (follow-up in 5-7 days)  Patients assigned 'low nutrition risk' status do not qualify for a full nutritional assessment but will be monitored and re-evaluated in a 5-7 day time period. Please consult if re-evaluation needed sooner.

## 2023-01-26 NOTE — CONSULTS
[facility]  Cardiology Inpatient Consultation    1/26/2023  1:24 PM    Attending Cardiologist: Dr. Garza  Cardiology Fellow: Driss Gannon MD  Chief Compliant/Reason for Consult: Afib    HPI:   Ezra Zhang is a 62 y.o.year old male w/ PMH HFrecEF (likely tachy mediated), pAF on eliquis, NICM ([facility] 2014), single chamber abbott ICD (gen change 2018 due to inappropriate shocks/recall), CKD3a, Htn, DM2, COPD stage 2 admitted for afib RVR. Patient has had atrial fibrillation for over a decade and has undergone cardioversion once. He was recently planned for cardioversion but presented in sinus pre-op 8/2022. Appears to be truly paroxysmal. ICD was previously placed due to EF 30% and his EF improved through the years due to rate control and rhythm control. He has a history of digoxin toxicity. He was on amiodarone for >5 years and it was stopped late 2022 due to breakthrough atrial fibrillation and COPD with decreased DLCO. He was recently increased to toprol 100mg BID. He is now admitted with RVR and EF is reduced once again to 35% with CVP 8. He is being diuresed and attempts are being made to rate control. Patient this morning is feeling better than admission. He is not short of breath and has no chest pain. He has no leg swelling. He is tolerating his meals without nausea or abdominal pain. He states for the last many many months, he can feel his afib and fast heart rates. He came to the hospital due to significant DONALDSON and PND. We are consulted for afib RVR and depressed EF.     He has missed a couple doses of eliquis in the last month.     Past Medical History:  Past Medical History:   Diagnosis Date    Atrial fibrillation     CKD (chronic kidney disease)     DM (diabetes mellitus)     Dyslipidemia     Heart failure, unspecified     Smoking        Past Surgical History:  Past Surgical History:   Procedure Laterality Date    CARDIOVERSION      coronary arteriograph      INSERT / REPLACE / REMOVE  PACEMAKER      left  heart catherization         Family History:  Family History   Problem Relation Age of Onset    Heart failure Mother     Cataracts Mother     Heart disease Mother     Glaucoma Mother     Peripheral vascular disease Mother     Hypertension Father        Social History:  Social History     Socioeconomic History    Marital status: Single   Tobacco Use    Smoking status: Some Days     Packs/day: 0.25     Years: 20.00     Pack years: 5.00     Types: Cigarettes    Smokeless tobacco: Never    Tobacco comments:     Taking patches   Substance and Sexual Activity    Alcohol use: Yes     Alcohol/week: 1.0 standard drink     Types: 1 Cans of beer per week     Comment: not often    Drug use: Never     Social Determinants of Health     Financial Resource Strain: Low Risk     Difficulty of Paying Living Expenses: Not very hard   Food Insecurity: Food Insecurity Present    Worried About Running Out of Food in the Last Year: Sometimes true    Ran Out of Food in the Last Year: Never true   Transportation Needs: No Transportation Needs    Lack of Transportation (Medical): No    Lack of Transportation (Non-Medical): No   Physical Activity: Insufficiently Active    Days of Exercise per Week: 4 days    Minutes of Exercise per Session: 30 min   Stress: No Stress Concern Present    Feeling of Stress : Not at all   Social Connections: Moderately Isolated    Frequency of Communication with Friends and Family: More than three times a week    Frequency of Social Gatherings with Friends and Family: More than three times a week    Attends [employer] Services: More than 4 times per year    Active Member of Clubs or Organizations: No    Attends Club or Organization Meetings: Never    Marital Status: Never    Housing Stability: Low Risk     Unable to Pay for Housing in the Last Year: No    Number of Places Lived in the Last Year: 1    Unstable Housing in the Last Year: No       Allergies:   Review of patient's allergies  indicates:  No Known Allergies    Hospital Medications:  Prior to Admission medications    Medication Sig Start Date End Date Taking? Authorizing Provider   MARIBELL FASTCLIX LANCET DRUM Misc Apply topically once daily. 3/8/22   Historical Provider   MARIBELL GUIDE TEST STRIPS Strp USE AS DIRECTED once daily (keep log) 3/8/22   Historical Provider   albuterol-ipratropium (DUO-NEB) 2.5 mg-0.5 mg/3 mL nebulizer solution Take 3 mLs by nebulization every 6 (six) hours as needed for Wheezing. 12/13/22   CARLO Mcgowan   atorvastatin (LIPITOR) 80 MG tablet Take 1 tablet (80 mg total) by mouth once daily. 10/4/22   Mandi Garza MD   cetirizine (ZYRTEC) 10 MG tablet Take 1 tablet (10 mg total) by mouth once daily. 9/13/22   CARLO Mcgowan   clotrimazole-betamethasone 1-0.05% (LOTRISONE) cream Apply 2 g topically 2 (two) times daily. 2/18/22   Historical Provider   ELIQUIS 5 mg Tab Take 5 mg by mouth 2 (two) times daily. 2/18/22   Historical Provider   famotidine (PEPCID) 20 MG tablet Take 1 tablet (20 mg total) by mouth 2 (two) times daily. 9/13/22 9/13/23  CARLO Mcgowan   fluticasone propion-salmeterol 115-21 mcg/dose (ADVAIR HFA) 115-21 mcg/actuation HFAA inhaler Inhale 2 puffs into the lungs every 12 (twelve) hours. 9/13/22   CARLO Mcgowan   fluticasone propionate (FLONASE ALLERGY RELIEF) 50 mcg/actuation nasal spray 2 sprays (100 mcg total) by Each Nostril route Daily. 12/13/22   CARLO Mcgowan   furosemide (LASIX) 20 MG tablet Take 1 tablet (20 mg total) by mouth once daily. Refilled per Cardio orders. 1/3/23 7/2/23  Mandi Garza MD   glipiZIDE (GLUCOTROL) 5 MG tablet Take 5 mg by mouth once daily. 3/8/22   Historical Provider   lancets-blood glucose strips 30 gauge Cmpk   Accucheck blood glucose test strips and lancets, See Instructions, Check CBG once daily and keep log., # 100 EA, 11 Refill(s), Pharmacy: [facility], 185, cm, Height/Length Dosing, 03/08/22  "8:10:00 CST, 96, kg, Weight Dosing, 03/08/22 8:10:00 CST 3/8/22   Historical Provider   lisinopriL 10 MG tablet Take 1 tablet (10 mg total) by mouth once daily. 1/3/23 1/3/24  Mandi Garza MD   metoprolol succinate (TOPROL-XL) 100 MG 24 hr tablet Take 1 tablet (100 mg total) by mouth 2 (two) times daily. 1/3/23   Mandi Garza MD   nicotine (NICODERM CQ) 14 mg/24 hr Place 1 patch onto the skin once daily. 7/19/22   Mandi Garza MD   nicotine polacrilex (NICORETTE) 4 MG Gum Take 1 each (4 mg total) by mouth as needed (smoking urge). 7/19/22   Mandi Garza MD   promethazine-codeine 6.25-10 mg/5 ml (PHENERGAN WITH CODEINE) 6.25-10 mg/5 mL syrup Take 5 mLs by mouth every 6 (six) hours as needed for Cough. 12/13/22   Dayami Squires, CARLO   tamsulosin (FLOMAX) 0.4 mg Cap Take 1 capsule by mouth once daily. 3/14/22   Historical Provider       Review of Systems:  Up to 10 point review of systems is negative unless noted in HPI or overall note    PHYSICAL EXAM:  BP (!) 134/106   Pulse (!) 114   Temp 98 °F (36.7 °C)   Resp (!) 21   Ht 6' 2" (1.88 m)   Wt 98.9 kg (218 lb)   SpO2 98%   BMI 27.99 kg/m²   General: Alert and oriented. No acute distress. Speaking in full sentences.   HEENT: Normocephalic. Atraumatic  Neck: Supple. JVD mid jaw  Heart: Irreg irreg. S1 and S2 heard. No murmurs. No pitting edema BLE.  Lungs: Posterior crackles. Nonlabored respirations. No supplemental O2  GI: No tenderness or distention  : No sanchez  Skin: No venous stasis changes in BLE.   MSK: No deformities  Neuro: Alert    CARDIOVASCULAR STUDIES:  Pertinent cardiovascular studies including labs, imaging, cath reports, echo reports, CT, cMRI, etc were independently reviewed in this patients care and reviewed below as needed.     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 His EF is reduced to 35% globally with CVP of 8-10. He is improving with diuresis. He has previously failed amiodarone due to breakthrough afib and now has COPD with decreased DLCO likely from smoking. Has prior history of digoxin toxicity. He is approaching euvolemia and we are consulted for rhythm/rate control recommendations in the setting of reduced EF.      PLAN/RECOMMENDATIONS:    - LASHELL DCCV tomorrow. NPO after midnight  - Can continue diuresis with lasix 40mg IV daily for 1-2 more days  - Change to metoprolol succinate 100mg BID. Will discuss whether switching to bisoprolol 10mg daily would be more beneficial.   - Continue eliquis 5mg BID  - Likely poor candidate for long term amiodarone and digoxin though could use digoxin as single IV doses for rate control  - Poor candidate for diltiazem given depressed EF  - Would benefit from outpatient EP consultation for ablation in future  - Suspecting this is tachy-mediated so would delay new initiation of typical HFrEF GDMT for now.     Driss Gannon MD  [facility] Cardiology Fellow

## 2023-01-26 NOTE — CARE UPDATE
Spoke with ICU-attending on call. The patient remains in RVR with HR is around , never sustained. Given the fact that he's been on metoprolol in the past, stop IV diltiazem and switch to metoprolol succinate 25 mg BID with lopressor 5 mg pushes as needed. If HR sustained above 120, will start amiodarone drip.    Eduard Michaels MD  [facility] Internal Medicine, PGY-III

## 2023-01-27 ENCOUNTER — ANESTHESIA (OUTPATIENT)
Dept: CARDIOLOGY | Facility: HOSPITAL | Age: 63
DRG: 280 | End: 2023-01-27
Payer: MEDICAID

## 2023-01-27 ENCOUNTER — ANESTHESIA EVENT (OUTPATIENT)
Dept: CARDIOLOGY | Facility: HOSPITAL | Age: 63
DRG: 280 | End: 2023-01-27
Payer: MEDICAID

## 2023-01-27 LAB
ALBUMIN SERPL-MCNC: 3.4 G/DL (ref 3.4–4.8)
ALBUMIN/GLOB SERPL: 1.4 RATIO (ref 1.1–2)
ALP SERPL-CCNC: 108 UNIT/L (ref 40–150)
ALT SERPL-CCNC: 39 UNIT/L (ref 0–55)
AMPHET UR QL SCN: NEGATIVE
AST SERPL-CCNC: 23 UNIT/L (ref 5–34)
BARBITURATE SCN PRESENT UR: NEGATIVE
BASOPHILS # BLD AUTO: 0.05 X10(3)/MCL (ref 0–0.2)
BASOPHILS NFR BLD AUTO: 0.6 %
BENZODIAZ UR QL SCN: NEGATIVE
BILIRUBIN DIRECT+TOT PNL SERPL-MCNC: 0.8 MG/DL
BSA FOR ECHO PROCEDURE: 2.27 M2
BUN SERPL-MCNC: 23.3 MG/DL (ref 8.4–25.7)
CALCIUM SERPL-MCNC: 8.9 MG/DL (ref 8.8–10)
CANNABINOIDS UR QL SCN: NEGATIVE
CHLORIDE SERPL-SCNC: 108 MMOL/L (ref 98–107)
CO2 SERPL-SCNC: 26 MMOL/L (ref 23–31)
COCAINE UR QL SCN: NEGATIVE
CREAT SERPL-MCNC: 1.48 MG/DL (ref 0.73–1.18)
EOSINOPHIL # BLD AUTO: 0.12 X10(3)/MCL (ref 0–0.9)
EOSINOPHIL NFR BLD AUTO: 1.5 %
ERYTHROCYTE [DISTWIDTH] IN BLOOD BY AUTOMATED COUNT: 14.6 % (ref 11.5–17)
FENTANYL UR QL SCN: NEGATIVE
GFR SERPLBLD CREATININE-BSD FMLA CKD-EPI: 53 MLS/MIN/1.73/M2
GLOBULIN SER-MCNC: 2.5 GM/DL (ref 2.4–3.5)
GLUCOSE SERPL-MCNC: 104 MG/DL (ref 82–115)
HCT VFR BLD AUTO: 42.2 % (ref 42–52)
HGB BLD-MCNC: 14.2 GM/DL (ref 14–18)
IMM GRANULOCYTES # BLD AUTO: 0.01 X10(3)/MCL (ref 0–0.04)
IMM GRANULOCYTES NFR BLD AUTO: 0.1 %
LYMPHOCYTES # BLD AUTO: 2.61 X10(3)/MCL (ref 0.6–4.6)
LYMPHOCYTES NFR BLD AUTO: 32.3 %
MAGNESIUM SERPL-MCNC: 2.1 MG/DL (ref 1.6–2.6)
MCH RBC QN AUTO: 34 PG
MCHC RBC AUTO-ENTMCNC: 33.6 MG/DL (ref 33–36)
MCV RBC AUTO: 101 FL (ref 80–94)
MDMA UR QL SCN: NEGATIVE
MONOCYTES # BLD AUTO: 0.93 X10(3)/MCL (ref 0.1–1.3)
MONOCYTES NFR BLD AUTO: 11.5 %
NEUTROPHILS # BLD AUTO: 4.35 X10(3)/MCL (ref 2.1–9.2)
NEUTROPHILS NFR BLD AUTO: 54 %
NRBC BLD AUTO-RTO: 0 %
OPIATES UR QL SCN: NEGATIVE
PCP UR QL: NEGATIVE
PH UR: 7 [PH] (ref 3–11)
PHOSPHATE SERPL-MCNC: 3.6 MG/DL (ref 2.3–4.7)
PLATELET # BLD AUTO: 276 X10(3)/MCL (ref 130–400)
PMV BLD AUTO: 11.5 FL (ref 7.4–10.4)
POCT GLUCOSE: 100 MG/DL (ref 70–110)
POCT GLUCOSE: 88 MG/DL (ref 70–110)
POCT GLUCOSE: 98 MG/DL (ref 70–110)
POTASSIUM SERPL-SCNC: 3.8 MMOL/L (ref 3.5–5.1)
PROT SERPL-MCNC: 5.9 GM/DL (ref 5.8–7.6)
RBC # BLD AUTO: 4.18 X10(6)/MCL (ref 4.7–6.1)
SODIUM SERPL-SCNC: 142 MMOL/L (ref 136–145)
SPECIFIC GRAVITY, URINE AUTO (.000) (OHS): 1.01 (ref 1–1.03)
WBC # SPEC AUTO: 8.1 X10(3)/MCL (ref 4.5–11.5)

## 2023-01-27 PROCEDURE — 94761 N-INVAS EAR/PLS OXIMETRY MLT: CPT

## 2023-01-27 PROCEDURE — 25000003 PHARM REV CODE 250: Performed by: STUDENT IN AN ORGANIZED HEALTH CARE EDUCATION/TRAINING PROGRAM

## 2023-01-27 PROCEDURE — 80053 COMPREHEN METABOLIC PANEL: CPT | Performed by: STUDENT IN AN ORGANIZED HEALTH CARE EDUCATION/TRAINING PROGRAM

## 2023-01-27 PROCEDURE — 25000003 PHARM REV CODE 250: Performed by: NURSE ANESTHETIST, CERTIFIED REGISTERED

## 2023-01-27 PROCEDURE — 63600175 PHARM REV CODE 636 W HCPCS: Performed by: NURSE ANESTHETIST, CERTIFIED REGISTERED

## 2023-01-27 PROCEDURE — 25000003 PHARM REV CODE 250

## 2023-01-27 PROCEDURE — 99900035 HC TECH TIME PER 15 MIN (STAT)

## 2023-01-27 PROCEDURE — 94640 AIRWAY INHALATION TREATMENT: CPT

## 2023-01-27 PROCEDURE — 84100 ASSAY OF PHOSPHORUS: CPT | Performed by: STUDENT IN AN ORGANIZED HEALTH CARE EDUCATION/TRAINING PROGRAM

## 2023-01-27 PROCEDURE — 25000242 PHARM REV CODE 250 ALT 637 W/ HCPCS: Performed by: STUDENT IN AN ORGANIZED HEALTH CARE EDUCATION/TRAINING PROGRAM

## 2023-01-27 PROCEDURE — 25000242 PHARM REV CODE 250 ALT 637 W/ HCPCS: Performed by: INTERNAL MEDICINE

## 2023-01-27 PROCEDURE — 83735 ASSAY OF MAGNESIUM: CPT | Performed by: STUDENT IN AN ORGANIZED HEALTH CARE EDUCATION/TRAINING PROGRAM

## 2023-01-27 PROCEDURE — 80307 DRUG TEST PRSMV CHEM ANLYZR: CPT

## 2023-01-27 PROCEDURE — 63600175 PHARM REV CODE 636 W HCPCS: Performed by: STUDENT IN AN ORGANIZED HEALTH CARE EDUCATION/TRAINING PROGRAM

## 2023-01-27 PROCEDURE — 21400001 HC TELEMETRY ROOM

## 2023-01-27 PROCEDURE — 25000003 PHARM REV CODE 250: Performed by: INTERNAL MEDICINE

## 2023-01-27 PROCEDURE — 25000003 PHARM REV CODE 250: Performed by: FAMILY MEDICINE

## 2023-01-27 PROCEDURE — 85025 COMPLETE CBC W/AUTO DIFF WBC: CPT | Performed by: STUDENT IN AN ORGANIZED HEALTH CARE EDUCATION/TRAINING PROGRAM

## 2023-01-27 RX ORDER — LIDOCAINE HYDROCHLORIDE 20 MG/ML
INJECTION INTRAVENOUS
Status: DISCONTINUED | OUTPATIENT
Start: 2023-01-27 | End: 2023-01-27

## 2023-01-27 RX ORDER — PROPOFOL 10 MG/ML
VIAL (ML) INTRAVENOUS
Status: DISCONTINUED | OUTPATIENT
Start: 2023-01-27 | End: 2023-01-27

## 2023-01-27 RX ADMIN — SODIUM CHLORIDE, POTASSIUM CHLORIDE, SODIUM LACTATE AND CALCIUM CHLORIDE: 600; 310; 30; 20 INJECTION, SOLUTION INTRAVENOUS at 03:01

## 2023-01-27 RX ADMIN — PROPOFOL 40 MG: 10 INJECTION, EMULSION INTRAVENOUS at 02:01

## 2023-01-27 RX ADMIN — ATORVASTATIN CALCIUM 80 MG: 40 TABLET, FILM COATED ORAL at 08:01

## 2023-01-27 RX ADMIN — FLUTICASONE PROPIONATE 100 MCG: 50 SPRAY, METERED NASAL at 09:01

## 2023-01-27 RX ADMIN — LIDOCAINE HYDROCHLORIDE 50 MG: 20 INJECTION, SOLUTION INTRAVENOUS at 02:01

## 2023-01-27 RX ADMIN — IPRATROPIUM BROMIDE AND ALBUTEROL SULFATE 3 ML: 2.5; .5 SOLUTION RESPIRATORY (INHALATION) at 09:01

## 2023-01-27 RX ADMIN — MUPIROCIN: 20 OINTMENT TOPICAL at 09:01

## 2023-01-27 RX ADMIN — LISINOPRIL 10 MG: 10 TABLET ORAL at 08:01

## 2023-01-27 RX ADMIN — MUPIROCIN: 20 OINTMENT TOPICAL at 08:01

## 2023-01-27 RX ADMIN — FAMOTIDINE 20 MG: 20 TABLET, FILM COATED ORAL at 08:01

## 2023-01-27 RX ADMIN — PROPOFOL 50 MG: 10 INJECTION, EMULSION INTRAVENOUS at 02:01

## 2023-01-27 RX ADMIN — APIXABAN 5 MG: 2.5 TABLET, FILM COATED ORAL at 08:01

## 2023-01-27 RX ADMIN — SODIUM CHLORIDE, POTASSIUM CHLORIDE, SODIUM LACTATE AND CALCIUM CHLORIDE: 600; 310; 30; 20 INJECTION, SOLUTION INTRAVENOUS at 02:01

## 2023-01-27 RX ADMIN — METOPROLOL SUCCINATE 100 MG: 50 TABLET, FILM COATED, EXTENDED RELEASE ORAL at 08:01

## 2023-01-27 RX ADMIN — ASPIRIN 81 MG CHEWABLE TABLET 324 MG: 81 TABLET CHEWABLE at 08:01

## 2023-01-27 RX ADMIN — IPRATROPIUM BROMIDE AND ALBUTEROL SULFATE 3 ML: 2.5; .5 SOLUTION RESPIRATORY (INHALATION) at 06:01

## 2023-01-27 RX ADMIN — FLUTICASONE FUROATE AND VILANTEROL TRIFENATATE 1 PUFF: 100; 25 POWDER RESPIRATORY (INHALATION) at 06:01

## 2023-01-27 RX ADMIN — FUROSEMIDE 40 MG: 10 INJECTION, SOLUTION INTRAMUSCULAR; INTRAVENOUS at 09:01

## 2023-01-27 RX ADMIN — ACETAMINOPHEN 650 MG: 325 TABLET, FILM COATED ORAL at 09:01

## 2023-01-27 NOTE — ANESTHESIA PREPROCEDURE EVALUATION
01/27/2023  Ezra Zhang is a 62 y.o., male with HTN, DM, CAD, STEMI, NICMO and ICD, CRI, COPD, NJow with CHF EF 35% and AF RVR.      Pre-op Assessment    I have reviewed the Patient Summary Reports.     I have reviewed the Nursing Notes. I have reviewed the NPO Status.   I have reviewed the Medications.     Review of Systems  Anesthesia Hx:  No problems with previous Anesthesia  Denies Family Hx of Anesthesia complications.   Denies Personal Hx of Anesthesia complications.   Hematology/Oncology:  Hematology Normal   Oncology Normal     EENT/Dental:  EENT/Dental Normal  Ears General/Symptom(s) Ear Disease: Tympanic Membrane Problem   Cardiovascular:  Cardiovascular Normal     Pulmonary:  Pulmonary Normal    Renal/:  Renal/ Normal     Hepatic/GI:  Hepatic/GI Normal    Musculoskeletal:  Musculoskeletal Normal    Neurological:  Neurology Normal    Endocrine:  Endocrine Normal    Dermatological:  Skin Normal    Psych:  Psychiatric Normal           Physical Exam  General: Well nourished    Airway:  Mallampati: I   Mouth Opening: Normal  TM Distance: Normal  Tongue: Normal  Neck ROM: Normal ROM    Dental:  Intact    Chest/Lungs:  Normal Respiratory Rate, Rales, Basilar  Rales vs atelectasis bilat bases  Heart:  Rate: Normal  Rhythm: Regular Rhythm        Anesthesia Plan  Type of Anesthesia, risks & benefits discussed:    Anesthesia Type: Gen Natural Airway  Intra-op Monitoring Plan: Standard ASA Monitors  Induction:  IV  Informed Consent: Informed consent signed with the Patient and all parties understand the risks and agree with anesthesia plan.  All questions answered.   ASA Score: 4 Emergent  Day of Surgery Review of History & Physical: H&P Update referred to the surgeon/provider.I have interviewed and examined the patient. I have reviewed the patient's H&P dated: There are no significant changes. H&P  completed by Anesthesiologist.    Ready For Surgery From Anesthesia Perspective.     .

## 2023-01-27 NOTE — TRANSFER OF CARE
"Anesthesia Transfer of Care Note    Patient: Ezra Zhang    Procedure(s) Performed: * No procedures listed *    Patient location: GI    Anesthesia Type: general    Post pain: adequate analgesia    Post assessment: no apparent anesthetic complications    Post vital signs: stable    Level of consciousness: awake    Nausea/Vomiting: no nausea/vomiting    Complications: none    Transfer of care protocol was followed      Last vitals:   Visit Vitals  /86 (BP Location: Right arm, Patient Position: Lying)   Pulse 108   Temp 36.5 °C (97.7 °F) (Oral)   Resp 18   Ht 6' 2" (1.88 m)   Wt 98.9 kg (218 lb)   SpO2 97%   BMI 27.99 kg/m²     "

## 2023-01-27 NOTE — PROGRESS NOTES
[facility] - ICU  Pulmonary Critical Care Note    Patient Name: Ezra Zhang  MRN: [id number]  Admission Date: 1/25/2023  Hospital Length of Stay: 2 days  Code Status: Full Code  Attending Provider: Peewee Raines MD  Primary Care Provider: CARLO Hamlin     Subjective:     HPI: This is a 62-year old male with hx of paroxysmal atrial fibrillation s/p cardioversion in 2017, on Eliquis, COPD Stage II and non-ischemic cardiomyopathy s/p ICD placement who presented to the ED with a complaint of palpitations and abdominal swelling. Patient states he has had these symptoms for the past two weeks. Notes no changes to his medication regiment nor his diet. Says he has been compliant with his lasix but has noticed his abdomen progressively gotten bigger. Admits to associated shortness of breath but denies associated chest pain, nausea, vomiting, fever, chills or cough. Patient clarifies that he came to the ED today because his progressive shortness of breath got unbearable.     In the ED, patient was noted to be in atrial fibrillation with a HR ranging from . Was given a dose of Diltiazem 30 mg and then was started on Diltiazem drip. Last were significant for BNP of 1197.6 and troponin 0.051. Patient was consulted to Internal Medicine and was admitted with a diagnosis of atrial fibrillation with RVR.       Hospital Course/Significant events:  TTE: EF 35%    24 Hour Interval History:  No acute overnight events.  Patient states that his breathing is much improved as well as his abdominal swelling.  Over the last 12 hours, total urine output is 1.7L with a net I/O of -960 L. Will undergo silvia/DCCV today    Past Medical History:   Diagnosis Date    Atrial fibrillation     CKD (chronic kidney disease)     DM (diabetes mellitus)     Dyslipidemia     Heart failure, unspecified     Smoking        Past Surgical History:   Procedure Laterality Date    CARDIOVERSION      coronary arteriograph      INSERT /  REPLACE / REMOVE PACEMAKER      left  heart catherization         Social History     Socioeconomic History    Marital status: Single   Tobacco Use    Smoking status: Some Days     Packs/day: 0.25     Years: 20.00     Pack years: 5.00     Types: Cigarettes    Smokeless tobacco: Never    Tobacco comments:     Taking patches   Substance and Sexual Activity    Alcohol use: Yes     Alcohol/week: 1.0 standard drink     Types: 1 Cans of beer per week     Comment: not often    Drug use: Never     Social Determinants of Health     Financial Resource Strain: Low Risk     Difficulty of Paying Living Expenses: Not very hard   Food Insecurity: Food Insecurity Present    Worried About Running Out of Food in the Last Year: Sometimes true    Ran Out of Food in the Last Year: Never true   Transportation Needs: No Transportation Needs    Lack of Transportation (Medical): No    Lack of Transportation (Non-Medical): No   Physical Activity: Insufficiently Active    Days of Exercise per Week: 4 days    Minutes of Exercise per Session: 30 min   Stress: No Stress Concern Present    Feeling of Stress : Not at all   Social Connections: Moderately Isolated    Frequency of Communication with Friends and Family: More than three times a week    Frequency of Social Gatherings with Friends and Family: More than three times a week    Attends [employer] Services: More than 4 times per year    Active Member of Clubs or Organizations: No    Attends Club or Organization Meetings: Never    Marital Status: Never    Housing Stability: Low Risk     Unable to Pay for Housing in the Last Year: No    Number of Places Lived in the Last Year: 1    Unstable Housing in the Last Year: No           Current Outpatient Medications   Medication Instructions    ACCU-CHEK FASTCLIX LANCET DRUM Misc Topical (Top), Daily    ACCU-CHEK GUIDE TEST STRIPS Strp USE AS DIRECTED once daily (keep log)    albuterol-ipratropium (DUO-NEB) 2.5 mg-0.5 mg/3 mL nebulizer solution 3  mLs, Nebulization, Every 6 hours PRN    atorvastatin (LIPITOR) 80 mg, Oral, Daily    cetirizine (ZYRTEC) 10 mg, Oral, Daily    clotrimazole-betamethasone 1-0.05% (LOTRISONE) cream 2 g, Topical (Top), 2 times daily    ELIQUIS 5 mg, Oral, 2 times daily    famotidine (PEPCID) 20 mg, Oral, 2 times daily    fluticasone propion-salmeterol 115-21 mcg/dose (ADVAIR HFA) 115-21 mcg/actuation HFAA inhaler 2 puffs, Inhalation, Every 12 hours    fluticasone propionate (FLONASE ALLERGY RELIEF) 100 mcg, Each Nostril, Daily    furosemide (LASIX) 20 mg, Oral, Daily, Refilled per Cardio orders.    glipiZIDE (GLUCOTROL) 5 mg, Oral, Daily    lancets-blood glucose strips 30 gauge Cmpk   Accucheck blood glucose test strips and lancets, See Instructions, Check CBG once daily and keep log., # 100 EA, 11 Refill(s), Pharmacy: [facility], 185, cm, Height/Length Dosing, 03/08/22 8:10:00 CST, 96, kg, Weight Dosing, 03/08/22 8:10:00 CST    lisinopriL 10 mg, Oral, Daily    metoprolol succinate (TOPROL-XL) 100 mg, Oral, 2 times daily    nicotine (NICODERM CQ) 14 mg/24 hr 1 patch, Transdermal, Daily    nicotine polacrilex (NICORETTE) 4 mg, Oral, As needed (PRN)    promethazine-codeine 6.25-10 mg/5 ml (PHENERGAN WITH CODEINE) 6.25-10 mg/5 mL syrup 5 mLs, Oral, Every 6 hours PRN    tamsulosin (FLOMAX) 0.4 mg Cap 1 capsule, Oral, Daily       Current Inpatient Medications   apixaban  5 mg Oral BID    aspirin  324 mg Oral Daily    atorvastatin  80 mg Oral Daily    famotidine  20 mg Oral BID    fluticasone furoate-vilanteroL  1 puff Inhalation Daily    fluticasone propionate  2 spray Each Nostril Daily    furosemide (LASIX) injection  40 mg Intravenous Daily    lisinopriL  10 mg Oral Daily    metoprolol succinate  100 mg Oral BID    mupirocin   Nasal BID    nicotine  1 patch Transdermal Daily       Current Intravenous Infusions        Review of Systems   Constitutional:  Negative for chills and fever.   Eyes:  Negative for photophobia.    Respiratory:  Positive for shortness of breath.    Cardiovascular:  Negative for chest pain.   Gastrointestinal:  Negative for nausea and vomiting.   Genitourinary:  Negative for frequency.   Musculoskeletal:  Negative for back pain.   Neurological:  Negative for headaches.        Objective:       Intake/Output Summary (Last 24 hours) at 1/27/2023 1435  Last data filed at 1/27/2023 1348  Gross per 24 hour   Intake 240 ml   Output 2300 ml   Net -2060 ml           Vital Signs (Most Recent):  Temp: 97.7 °F (36.5 °C) (01/27/23 1130)  Pulse: 108 (01/27/23 1130)  Resp: 16 (01/27/23 1130)  BP: (!) 131/98 (01/27/23 1130)  SpO2: 97 % (01/27/23 1428)    Body mass index is 27.99 kg/m².  Weight: 98.9 kg (218 lb) Vital Signs (24h Range):  Temp:  [97 °F (36.1 °C)-98.2 °F (36.8 °C)] 97.7 °F (36.5 °C)  Pulse:  [103-128] 108  Resp:  [12-25] 16  SpO2:  [94 %-99 %] 97 %  BP: (116-135)/() 131/98     Physical Exam  Vitals and nursing note reviewed.   Constitutional:       General: He is not in acute distress.     Appearance: Normal appearance. He is obese.   HENT:      Head: Normocephalic and atraumatic.      Nose: Nose normal. No congestion or rhinorrhea.      Mouth/Throat:      Mouth: Mucous membranes are moist.   Eyes:      Extraocular Movements: Extraocular movements intact.      Pupils: Pupils are equal, round, and reactive to light.   Cardiovascular:      Rate and Rhythm: Tachycardia present. Rhythm irregular.      Pulses: Normal pulses.      Heart sounds: No murmur heard.  Pulmonary:      Effort: Pulmonary effort is normal. No respiratory distress.      Breath sounds: Normal breath sounds.   Abdominal:      General: Abdomen is flat. There is no distension.      Palpations: Abdomen is soft.   Musculoskeletal:         General: No swelling or tenderness. Normal range of motion.      Cervical back: Normal range of motion and neck supple.   Skin:     General: Skin is warm and dry.      Capillary Refill: Capillary refill takes  less than 2 seconds.      Coloration: Skin is not jaundiced.   Neurological:      General: No focal deficit present.      Mental Status: He is alert and oriented to person, place, and time.      Cranial Nerves: No cranial nerve deficit.         Lines/Drains/Airways       Peripheral Intravenous Line  Duration                  Peripheral IV - Single Lumen 01/25/23 0900 20 G Left;Posterior Hand 2 days                    Significant Labs:    Lab Results   Component Value Date    WBC 8.1 01/27/2023    HGB 14.2 01/27/2023    HCT 42.2 01/27/2023    .0 (H) 01/27/2023     01/27/2023         BMP  Lab Results   Component Value Date     01/27/2023    K 3.8 01/27/2023    CO2 26 01/27/2023    BUN 23.3 01/27/2023    CREATININE 1.48 (H) 01/27/2023    CALCIUM 8.9 01/27/2023    EGFRNONAA 46 04/12/2022       ABG  No results for input(s): PH, PO2, PCO2, HCO3, BE in the last 168 hours.    Mechanical Ventilation Support:       Significant Imaging:  I have reviewed the pertinent imaging within the past 24 hours.        Assessment/Plan:       Atrial Fibrillation with RVR. Hx of paroxysmal afib s/p cardioversion in 2017.  - silvia/DCCV today.  - Toprol 100 mg b.i.d., Lasix 40 mg daily  - ultimately may be a candidate for outpatient EP consultation for ablation  - Eliquis 5 mg b.i.d.    CHF Exacerbation. Hx of non-ischemic Cardiomyopathy with recovered ejection fraction and s/p ICD placement (single-chamber abbot)  - suspected tachy mediated, we will diurese for 1-2 more days    NSTEMI, likely type II  - [facility] 2014, above cardiomyopathy nonischemic  - suspect demand    Chronic Kidney Disease Stage III  - suspect no true ISABELA, patient appears baseline    COPD Stage II, PFT in 2022  - continue inhaled ICS, Laba inhaler    Hypertension  - lisinopril 10 mg, patient states he is not been taking this however has received this medication since Wednesday      DVT Prophylaxis: Eliquis  GI Prophylaxis: None  Fluids: None  Diet:  Cardiac,  once silvia completed    Dispo:  Admitted due to AFib RVR, CHF exacerbation.  We will attempt rhythm control with cardioversion today, if unsuccessful we will need to optimize medications and for to possible outpatient EP for ablation, we will clarify heart failure picture and if GDMT is appropriate, anticipate discharge tomorrow     Cristian Peterson MD  Pulmonary Critical Care Medicine  [facility] - ICU

## 2023-01-27 NOTE — ANESTHESIA POSTPROCEDURE EVALUATION
Anesthesia Post Evaluation    Patient: Ezra Zhang    Procedure(s) Performed: * No procedures listed *    Final Anesthesia Type: general      Patient location during evaluation: GI PACU  Patient participation: Yes- Able to Participate  Level of consciousness: awake and alert  Pain management: adequate  Airway patency: patent      Anesthetic complications: no      Cardiovascular status: hemodynamically stable  Respiratory status: unassisted, room air and spontaneous ventilation  Hydration status: euvolemic  Follow-up not needed.            No case tracking events are documented in the log.      Pain/Ten Score: Pain Rating Prior to Med Admin: 6 (1/27/2023  9:31 AM)  Pain Rating Post Med Admin: 0 (1/27/2023 10:31 AM)

## 2023-01-28 VITALS
TEMPERATURE: 96 F | DIASTOLIC BLOOD PRESSURE: 104 MMHG | BODY MASS INDEX: 27.59 KG/M2 | HEIGHT: 74 IN | RESPIRATION RATE: 14 BRPM | WEIGHT: 214.94 LBS | SYSTOLIC BLOOD PRESSURE: 129 MMHG | OXYGEN SATURATION: 98 % | HEART RATE: 112 BPM

## 2023-01-28 PROBLEM — I48.91 ATRIAL FIBRILLATION WITH RVR: Status: ACTIVE | Noted: 2023-01-28

## 2023-01-28 PROBLEM — I50.9 ACUTE ON CHRONIC CONGESTIVE HEART FAILURE: Status: ACTIVE | Noted: 2023-01-28

## 2023-01-28 LAB
ALBUMIN SERPL-MCNC: 3.8 G/DL (ref 3.4–4.8)
ALBUMIN/GLOB SERPL: 1.3 RATIO (ref 1.1–2)
ALP SERPL-CCNC: 123 UNIT/L (ref 40–150)
ALT SERPL-CCNC: 38 UNIT/L (ref 0–55)
AST SERPL-CCNC: 26 UNIT/L (ref 5–34)
BASOPHILS # BLD AUTO: 0.05 X10(3)/MCL (ref 0–0.2)
BASOPHILS NFR BLD AUTO: 0.6 %
BILIRUBIN DIRECT+TOT PNL SERPL-MCNC: 0.7 MG/DL
BUN SERPL-MCNC: 22.1 MG/DL (ref 8.4–25.7)
CALCIUM SERPL-MCNC: 9.2 MG/DL (ref 8.8–10)
CHLORIDE SERPL-SCNC: 107 MMOL/L (ref 98–107)
CO2 SERPL-SCNC: 25 MMOL/L (ref 23–31)
CREAT SERPL-MCNC: 1.45 MG/DL (ref 0.73–1.18)
EOSINOPHIL # BLD AUTO: 0.11 X10(3)/MCL (ref 0–0.9)
EOSINOPHIL NFR BLD AUTO: 1.2 %
ERYTHROCYTE [DISTWIDTH] IN BLOOD BY AUTOMATED COUNT: 14.4 % (ref 11.5–17)
GFR SERPLBLD CREATININE-BSD FMLA CKD-EPI: 54 MLS/MIN/1.73/M2
GLOBULIN SER-MCNC: 2.9 GM/DL (ref 2.4–3.5)
GLUCOSE SERPL-MCNC: 86 MG/DL (ref 82–115)
HCT VFR BLD AUTO: 47.7 % (ref 42–52)
HGB BLD-MCNC: 15.6 GM/DL (ref 14–18)
IMM GRANULOCYTES # BLD AUTO: 0.02 X10(3)/MCL (ref 0–0.04)
IMM GRANULOCYTES NFR BLD AUTO: 0.2 %
LYMPHOCYTES # BLD AUTO: 2.42 X10(3)/MCL (ref 0.6–4.6)
LYMPHOCYTES NFR BLD AUTO: 26.9 %
MAGNESIUM SERPL-MCNC: 2.2 MG/DL (ref 1.6–2.6)
MCH RBC QN AUTO: 33.2 PG
MCHC RBC AUTO-ENTMCNC: 32.7 MG/DL (ref 33–36)
MCV RBC AUTO: 101.5 FL (ref 80–94)
MONOCYTES # BLD AUTO: 0.85 X10(3)/MCL (ref 0.1–1.3)
MONOCYTES NFR BLD AUTO: 9.5 %
NEUTROPHILS # BLD AUTO: 5.53 X10(3)/MCL (ref 2.1–9.2)
NEUTROPHILS NFR BLD AUTO: 61.6 %
NRBC BLD AUTO-RTO: 0 %
PHOSPHATE SERPL-MCNC: 4 MG/DL (ref 2.3–4.7)
PLATELET # BLD AUTO: 306 X10(3)/MCL (ref 130–400)
PMV BLD AUTO: 11 FL (ref 7.4–10.4)
POCT GLUCOSE: 93 MG/DL (ref 70–110)
POCT GLUCOSE: 96 MG/DL (ref 70–110)
POTASSIUM SERPL-SCNC: 3.8 MMOL/L (ref 3.5–5.1)
PROT SERPL-MCNC: 6.7 GM/DL (ref 5.8–7.6)
RBC # BLD AUTO: 4.7 X10(6)/MCL (ref 4.7–6.1)
SODIUM SERPL-SCNC: 143 MMOL/L (ref 136–145)
WBC # SPEC AUTO: 9 X10(3)/MCL (ref 4.5–11.5)

## 2023-01-28 PROCEDURE — 94640 AIRWAY INHALATION TREATMENT: CPT

## 2023-01-28 PROCEDURE — 83735 ASSAY OF MAGNESIUM: CPT | Performed by: STUDENT IN AN ORGANIZED HEALTH CARE EDUCATION/TRAINING PROGRAM

## 2023-01-28 PROCEDURE — 25000003 PHARM REV CODE 250: Performed by: FAMILY MEDICINE

## 2023-01-28 PROCEDURE — 84100 ASSAY OF PHOSPHORUS: CPT | Performed by: STUDENT IN AN ORGANIZED HEALTH CARE EDUCATION/TRAINING PROGRAM

## 2023-01-28 PROCEDURE — 63600175 PHARM REV CODE 636 W HCPCS: Performed by: STUDENT IN AN ORGANIZED HEALTH CARE EDUCATION/TRAINING PROGRAM

## 2023-01-28 PROCEDURE — 80053 COMPREHEN METABOLIC PANEL: CPT | Performed by: STUDENT IN AN ORGANIZED HEALTH CARE EDUCATION/TRAINING PROGRAM

## 2023-01-28 PROCEDURE — 25000003 PHARM REV CODE 250: Performed by: INTERNAL MEDICINE

## 2023-01-28 PROCEDURE — 25000003 PHARM REV CODE 250

## 2023-01-28 PROCEDURE — 85025 COMPLETE CBC W/AUTO DIFF WBC: CPT | Performed by: STUDENT IN AN ORGANIZED HEALTH CARE EDUCATION/TRAINING PROGRAM

## 2023-01-28 PROCEDURE — 25000003 PHARM REV CODE 250: Performed by: STUDENT IN AN ORGANIZED HEALTH CARE EDUCATION/TRAINING PROGRAM

## 2023-01-28 PROCEDURE — 25000242 PHARM REV CODE 250 ALT 637 W/ HCPCS: Performed by: STUDENT IN AN ORGANIZED HEALTH CARE EDUCATION/TRAINING PROGRAM

## 2023-01-28 PROCEDURE — S4991 NICOTINE PATCH NONLEGEND: HCPCS | Performed by: STUDENT IN AN ORGANIZED HEALTH CARE EDUCATION/TRAINING PROGRAM

## 2023-01-28 PROCEDURE — 94761 N-INVAS EAR/PLS OXIMETRY MLT: CPT

## 2023-01-28 RX ORDER — LIDOCAINE AND PRILOCAINE 25; 25 MG/G; MG/G
CREAM TOPICAL
Status: DISCONTINUED | OUTPATIENT
Start: 2023-01-28 | End: 2023-01-28

## 2023-01-28 RX ORDER — LIDOCAINE 50 MG/G
OINTMENT TOPICAL
Status: DISCONTINUED | OUTPATIENT
Start: 2023-01-28 | End: 2023-01-28

## 2023-01-28 RX ORDER — LIDOCAINE 50 MG/G
OINTMENT TOPICAL
Status: DISCONTINUED | OUTPATIENT
Start: 2023-01-28 | End: 2023-01-28 | Stop reason: HOSPADM

## 2023-01-28 RX ORDER — METOPROLOL SUCCINATE 50 MG/1
150 TABLET, EXTENDED RELEASE ORAL 2 TIMES DAILY
Qty: 180 TABLET | Refills: 11 | Status: SHIPPED | OUTPATIENT
Start: 2023-01-28 | End: 2023-02-16 | Stop reason: SDUPTHER

## 2023-01-28 RX ORDER — METOPROLOL SUCCINATE 50 MG/1
150 TABLET, EXTENDED RELEASE ORAL 2 TIMES DAILY
Status: DISCONTINUED | OUTPATIENT
Start: 2023-01-28 | End: 2023-01-28 | Stop reason: HOSPADM

## 2023-01-28 RX ADMIN — FUROSEMIDE 40 MG: 10 INJECTION, SOLUTION INTRAMUSCULAR; INTRAVENOUS at 08:01

## 2023-01-28 RX ADMIN — ATORVASTATIN CALCIUM 80 MG: 40 TABLET, FILM COATED ORAL at 08:01

## 2023-01-28 RX ADMIN — IPRATROPIUM BROMIDE AND ALBUTEROL SULFATE 3 ML: 2.5; .5 SOLUTION RESPIRATORY (INHALATION) at 07:01

## 2023-01-28 RX ADMIN — MUPIROCIN: 20 OINTMENT TOPICAL at 08:01

## 2023-01-28 RX ADMIN — FLUTICASONE FUROATE AND VILANTEROL TRIFENATATE 1 PUFF: 100; 25 POWDER RESPIRATORY (INHALATION) at 07:01

## 2023-01-28 RX ADMIN — METOPROLOL SUCCINATE 150 MG: 50 TABLET, FILM COATED, EXTENDED RELEASE ORAL at 08:01

## 2023-01-28 RX ADMIN — LIDOCAINE: 50 OINTMENT TOPICAL at 04:01

## 2023-01-28 RX ADMIN — NICOTINE 1 PATCH: 14 PATCH, EXTENDED RELEASE TRANSDERMAL at 08:01

## 2023-01-28 RX ADMIN — LISINOPRIL 10 MG: 10 TABLET ORAL at 08:01

## 2023-01-28 RX ADMIN — APIXABAN 5 MG: 2.5 TABLET, FILM COATED ORAL at 08:01

## 2023-01-28 RX ADMIN — LIDOCAINE: 50 OINTMENT TOPICAL at 01:01

## 2023-01-28 RX ADMIN — FAMOTIDINE 20 MG: 20 TABLET, FILM COATED ORAL at 08:01

## 2023-01-28 RX ADMIN — ASPIRIN 81 MG CHEWABLE TABLET 324 MG: 81 TABLET CHEWABLE at 08:01
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
## 2023-01-28 NOTE — DISCHARGE SUMMARY
[facility] Internal Medicine Discharge Summary    Admitting Physician: Peewee Raines MD  Attending Physician: Peewee Raines MD  Date of Admit: 1/25/2023  Date of Discharge: 1/28/2023    Condition: Good  Outcome: Patient tolerated treatment/procedure well without complication and is now ready for discharge.  DISPOSITION: Home or Self Care        Discharge Diagnoses:     Patient Active Problem List   Diagnosis    A-fib    Anemia    AR (allergic rhinitis)    Asthma with COPD    Bladder wall thickening    Chronic back pain    CKD (chronic kidney disease)    Controlled type 2 diabetes mellitus without complication, without long-term current use of insulin    Elevated serum creatinine    HA (headache)    Chronic hepatitis C virus infection    HLD (hyperlipidemia)    Hydroureteronephrosis    Joint pain    Plantar wart    Rheumatoid arthritis    Radiculopathy    Renal cyst, right    Umbilical hernia    Colon cancer screening    Hypertension    Smoking    Non-ischemic cardiomyopathy    History of hepatitis C    Chronic systolic heart failure    Diabetes mellitus    Recurrent sinusitis    Shortness of breath    Prostate cancer screening    Vitamin D deficiency    GERD (gastroesophageal reflux disease)       Principal Problem:  A-fib    Consultants and Procedures:     Consultants:  IP CONSULT TO INTERNAL MEDICINE  IP CONSULT TO CARDIOLOGY    Procedures:   * No surgery found *      Brief Admission History:      This is a 62-year old male with hx of paroxysmal atrial fibrillation s/p cardioversion in 2017, on Eliquis, COPD Stage II and non-ischemic cardiomyopathy s/p ICD placement who presented to the ED with a complaint of palpitations and abdominal swelling. Patient states he has had these symptoms for the past two weeks. Notes no changes to his medication regiment nor his diet. Says he has been compliant with his lasix but has noticed his abdomen progressively gotten bigger. Admits to associated shortness of breath but  "denies associated chest pain, nausea, vomiting, fever, chills or cough. Patient clarifies that he came to the ED today because his progressive shortness of breath got unbearable.     In the ED, patient was noted to be in atrial fibrillation with a HR ranging from . Was given a dose of Diltiazem 30 mg and then was started on Diltiazem drip. Last were significant for BNP of 1197.6 and troponin 0.051. Patient was consulted to Internal Medicine and was admitted with a diagnosis of atrial fibrillation with RVR.       Hospital Course with Pertinent Findings:      Patient was weaned off of diltiazem drip and downgraded from the ICU.  Diuresis was done with Lasix with good urine output.  Titrating up on Toprol, final dose to continue outpatient will be 150 mg b.i.d..  Transesophageal echo/DCCV was attempted, however patient remained in atrial fibrillation.  Patient improved symptomatically noting improvement in abdominal swelling, and shortness of breath.  Plan will be to follow up with Cardiology and in post wards.  Ultimately we will want referral to electrophysiology for possible ablation.    Discharge physical exam:  Vitals  BP: (!) 142/106  Temp: 96.3 °F (35.7 °C)  Temp src: Oral  Pulse: (!) 111  Resp: 16  SpO2: 99 %  Height: 6' 2.02" (188 cm)  Weight: 97.5 kg (214 lb 15.2 oz)    Vitals and nursing note reviewed.   Constitutional:       General: He is not in acute distress.     Appearance: Normal appearance.  HENT:      Head: Normocephalic and atraumatic.      Nose: Nose normal. No congestion or rhinorrhea.      Mouth/Throat:      Mouth: Mucous membranes are moist.   Eyes:      Extraocular Movements: Extraocular movements intact.      Pupils: Pupils are equal, round, and reactive to light.   Cardiovascular:      Rate and Rhythm: Tachycardia present. Rhythm irregular.      Pulses: Normal pulses.      Heart sounds: No murmur heard.  Pulmonary:      Effort: Pulmonary effort is normal. No respiratory distress.      " Breath sounds: Normal breath sounds.   Abdominal:      General: Abdomen is flat. There is no distension.      Palpations: Abdomen is soft.   Musculoskeletal:         General: No swelling or tenderness. Normal range of motion.      Cervical back: Normal range of motion and neck supple.   Skin:     General: Skin is warm and dry.      Capillary Refill: Capillary refill takes less than 2 seconds.      Coloration: Skin is not jaundiced.   Neurological:      General: No focal deficit present.      Mental Status: He is alert and oriented to person, place, and time.      Cranial Nerves: No cranial nerve deficit.     TIME SPENT ON DISCHARGE: 35 minutes    Discharge Medications:         Medication List        CHANGE how you take these medications      metoprolol succinate 50 MG 24 hr tablet  Commonly known as: TOPROL-XL  Take 3 tablets (150 mg total) by mouth 2 (two) times daily.  What changed:   medication strength  how much to take            CONTINUE taking these medications      ACCU-CHEK FASTCLIX LANCET DRUM Misc  Generic drug: lancets     ACCU-CHEK GUIDE TEST STRIPS Strp  Generic drug: blood sugar diagnostic     albuterol-ipratropium 2.5 mg-0.5 mg/3 mL nebulizer solution  Commonly known as: DUO-NEB  Take 3 mLs by nebulization every 6 (six) hours as needed for Wheezing.     atorvastatin 80 MG tablet  Commonly known as: LIPITOR  Take 1 tablet (80 mg total) by mouth once daily.     cetirizine 10 MG tablet  Commonly known as: ZYRTEC  Take 1 tablet (10 mg total) by mouth once daily.     clotrimazole-betamethasone 1-0.05% cream  Commonly known as: LOTRISONE     ELIQUIS 5 mg Tab  Generic drug: apixaban     famotidine 20 MG tablet  Commonly known as: PEPCID  Take 1 tablet (20 mg total) by mouth 2 (two) times daily.     fluticasone propion-salmeterol 115-21 mcg/dose 115-21 mcg/actuation Hfaa inhaler  Commonly known as: ADVAIR HFA  Inhale 2 puffs into the lungs every 12 (twelve) hours.     fluticasone propionate 50 mcg/actuation  nasal spray  Commonly known as: FLONASE ALLERGY RELIEF  2 sprays (100 mcg total) by Each Nostril route Daily.     furosemide 20 MG tablet  Commonly known as: LASIX  Take 1 tablet (20 mg total) by mouth once daily. Refilled per Cardio orders.     glipiZIDE 5 MG tablet  Commonly known as: GLUCOTROL     lancets-blood glucose strips 30 gauge Cmpk     lisinopriL 10 MG tablet  Take 1 tablet (10 mg total) by mouth once daily.     nicotine 14 mg/24 hr  Commonly known as: NICODERM CQ  Place 1 patch onto the skin once daily.     nicotine polacrilex 4 MG Gum  Commonly known as: NICORETTE  Take 1 each (4 mg total) by mouth as needed (smoking urge).     promethazine-codeine 6.25-10 mg/5 ml 6.25-10 mg/5 mL syrup  Commonly known as: PHENERGAN with CODEINE  Take 5 mLs by mouth every 6 (six) hours as needed for Cough.     tamsulosin 0.4 mg Cap  Commonly known as: FLOMAX               Where to Get Your Medications        These medications were sent to [facility] - [place], [place] - [address]  [address], [facility] [address]      Phone: [phone]   metoprolol succinate 50 MG 24 hr tablet         Discharge Instructions:         Ezra Zhang is being discharged Home or Self Care.    Discharge Procedure Orders   Ambulatory referral/consult to Internal Medicine   Standing Status: Future   Referral Priority: Routine Referral Type: Consultation   Referral Reason: Specialty Services Required   Requested Specialty: Internal Medicine   Number of Visits Requested: 1     Ambulatory referral/consult to Cardiac Electrophysiology   Standing Status: Future   Referral Priority: Routine Referral Type: Consultation   Referral Reason: Specialty Services Required   Requested Specialty: Cardiology   Number of Visits Requested: 1        Follow-Up Appointments:   Follow-up Information       [facility] - Cardiology Follow up.    Specialty: Cardiology  Contact information:  [address]  [place] [address] [phone]             [facility] - Internal Medicine Follow up.    Specialty: Internal Medicine  Contact information:  [address] [address] [phone]                         We will follow up with Cardiology outpatient, to set up electrophysiology appointment with either Dr. Degroot or Dr. Anguiano.    For post was follow up, to evaluate for AFib rate control and heart failure symptoms.      To address at follow-up:      At this time, Ezra Zhang is determined to have maximized benefits of IP hospitalization. he is discharged in stable condition with OP f/u recommendations and instructions. All questions answered, and patient verbalized agreement with the POC. They were given return precautions prior to d/c including symptoms that should prompt return to ED or to call PCP. Total time spent of DC of 35 minutes.           Cristian Peterson MD  \[facility]\"" Internal Medicine PGY-1

## 2023-01-30 ENCOUNTER — PATIENT OUTREACH (OUTPATIENT)
Dept: ADMINISTRATIVE | Facility: CLINIC | Age: 63
End: 2023-01-30
Payer: MEDICAID

## 2023-01-30 NOTE — PROGRESS NOTES
C3 nurse spoke with Ezra Zhang  for a TCC post hospital discharge follow up call. The patient has a scheduled HOSFU appointment with CARLO Hamlin  on 03/14/2023 @ 715 am. Appointment with Dr. Garza @ [facility] 04/04/2023 @ 1030 am. Appointment [facility] 03/15/2023 @ 1030 am.  The patient does not have a scheduled HOSFU appointment with CARLO Hamlin  within 5-7 days post hospital discharge date 01/28/2023.     Message sent to PCP staff requesting they contact patient and schedule follow up appointment.

## 2023-02-01 ENCOUNTER — DOCUMENTATION ONLY (OUTPATIENT)
Dept: CARDIOLOGY | Facility: HOSPITAL | Age: 63
End: 2023-02-01
Payer: MEDICAID

## 2023-02-01 LAB — POCT GLUCOSE: 86 MG/DL (ref 70–110)

## 2023-02-01 NOTE — PROGRESS NOTES
Cardiology follow up note     Pt underwent inpatient LASHELL and cardioversion with 200 J x3 on 1/27/2023 and unfortunately converted for a few seconds after every attempt only.    Pt will need to be referred to EP for possible Afib ablation as he has failed amiodarone in the past and given his low EF, very few pharmacological agents can still be administered.  Meanwhile, metoprolol succinate will be increased to 150mg bid.    Explained the following to the patient who is agreeable. EP referral will be placed.    Mandi Garza MD  Cardiology staff

## 2023-02-02 NOTE — PROGRESS NOTES
Heart Failure Follow Up Call:  Upon discharge:      EF 35%      BP: 142/106      HR: 111    Discharge Medications:  metoprolol succinate 50 MG 24 hr tablet  Commonly known as: TOPROL-XL  Take 3 tablets (150 mg total) by mouth 2 (two) times daily.  What changed:   medication strength  how much to take                CONTINUE taking these medications       ACCU-CHEK FASTCLIX LANCET DRUM Misc  Generic drug: lancets      ACCU-CHEK GUIDE TEST STRIPS Strp  Generic drug: blood sugar diagnostic      albuterol-ipratropium 2.5 mg-0.5 mg/3 mL nebulizer solution  Commonly known as: DUO-NEB  Take 3 mLs by nebulization every 6 (six) hours as needed for Wheezing.      atorvastatin 80 MG tablet  Commonly known as: LIPITOR  Take 1 tablet (80 mg total) by mouth once daily.      cetirizine 10 MG tablet  Commonly known as: ZYRTEC  Take 1 tablet (10 mg total) by mouth once daily.      clotrimazole-betamethasone 1-0.05% cream  Commonly known as: LOTRISONE      ELIQUIS 5 mg Tab  Generic drug: apixaban      famotidine 20 MG tablet  Commonly known as: PEPCID  Take 1 tablet (20 mg total) by mouth 2 (two) times daily.      fluticasone propion-salmeterol 115-21 mcg/dose 115-21 mcg/actuation Hfaa inhaler  Commonly known as: ADVAIR HFA  Inhale 2 puffs into the lungs every 12 (twelve) hours.      fluticasone propionate 50 mcg/actuation nasal spray  Commonly known as: FLONASE ALLERGY RELIEF  2 sprays (100 mcg total) by Each Nostril route Daily.      furosemide 20 MG tablet  Commonly known as: LASIX  Take 1 tablet (20 mg total) by mouth once daily. Refilled per Cardio orders.      glipiZIDE 5 MG tablet  Commonly known as: GLUCOTROL      lancets-blood glucose strips 30 gauge Cmpk      lisinopriL 10 MG tablet  Take 1 tablet (10 mg total) by mouth once daily.      nicotine 14 mg/24 hr  Commonly known as: NICODERM CQ  Place 1 patch onto the skin once daily.      nicotine polacrilex 4 MG Gum  Commonly known as: NICORETTE  Take 1 each (4 mg total) by  mouth as needed (smoking urge).      promethazine-codeine 6.25-10 mg/5 ml 6.25-10 mg/5 mL syrup  Commonly known as: PHENERGAN with CODEINE  Take 5 mLs by mouth every 6 (six) hours as needed for Cough.      tamsulosin 0.4 mg Cap  Commonly known as: FLOMAX        Discussed patients discharge medications.    Patient is compliant and taking all medications as prescribed.  Patient states metoprolol makes him very tired and sometimes a little dizzy after he takes it.    He goes lay down.  Stated his blood pressure is fine after taking medication.  Today it was 140/96.    Discussed fluid pills.  Discussed with patient the importance of taking and recording daily weights. Patient does not currently have a scale.  Staying with a relative.  He has one at home.  Discussed briefly how patient is doing with their heart healthy diet.  Reminded patient of upcoming appointment on  2/15/23 with cardiology nurse, 2/20/23 with Internal medicine.   Instructed if symptoms worsen or return to report to the emergency room.

## 2023-02-07 ENCOUNTER — TELEPHONE (OUTPATIENT)
Dept: INTERNAL MEDICINE | Facility: CLINIC | Age: 63
End: 2023-02-07
Payer: MEDICAID

## 2023-02-07 NOTE — TELEPHONE ENCOUNTER
Patient's sister informed me patient could not do physical therapy due to him heart problem AFIB. Informed patient's sister to inform us when he is cleared by his cardiologist, so we can send a new referral for physical therapy. Patient's sister verbalized understanding of all information given to her today.

## 2023-02-10 ENCOUNTER — HOSPITAL ENCOUNTER (EMERGENCY)
Facility: HOSPITAL | Age: 63
Discharge: HOME | End: 2023-02-10
Attending: FAMILY MEDICINE
Payer: MEDICAID

## 2023-02-10 VITALS
BODY MASS INDEX: 28.47 KG/M2 | TEMPERATURE: 98 F | OXYGEN SATURATION: 96 % | DIASTOLIC BLOOD PRESSURE: 90 MMHG | SYSTOLIC BLOOD PRESSURE: 115 MMHG | WEIGHT: 221.81 LBS | RESPIRATION RATE: 21 BRPM | HEIGHT: 74 IN | HEART RATE: 70 BPM

## 2023-02-10 DIAGNOSIS — I48.0 PAROXYSMAL ATRIAL FIBRILLATION: Primary | ICD-10-CM

## 2023-02-10 DIAGNOSIS — R07.9 CHEST PAIN: ICD-10-CM

## 2023-02-10 DIAGNOSIS — I50.42 CHRONIC COMBINED SYSTOLIC AND DIASTOLIC CONGESTIVE HEART FAILURE: ICD-10-CM

## 2023-02-10 LAB
ALBUMIN SERPL-MCNC: 4.5 G/DL (ref 3.4–4.8)
ALBUMIN/GLOB SERPL: 1.7 RATIO (ref 1.1–2)
ALP SERPL-CCNC: 121 UNIT/L (ref 40–150)
ALT SERPL-CCNC: 39 UNIT/L (ref 0–55)
AST SERPL-CCNC: 23 UNIT/L (ref 5–34)
BASOPHILS # BLD AUTO: 0.06 X10(3)/MCL (ref 0–0.2)
BASOPHILS NFR BLD AUTO: 0.7 %
BILIRUBIN DIRECT+TOT PNL SERPL-MCNC: 0.9 MG/DL
BNP BLD-MCNC: 1198.2 PG/ML
BUN SERPL-MCNC: 20.5 MG/DL (ref 8.4–25.7)
CALCIUM SERPL-MCNC: 9.9 MG/DL (ref 8.8–10)
CHLORIDE SERPL-SCNC: 108 MMOL/L (ref 98–107)
CO2 SERPL-SCNC: 27 MMOL/L (ref 23–31)
CREAT SERPL-MCNC: 1.51 MG/DL (ref 0.73–1.18)
EOSINOPHIL # BLD AUTO: 0.15 X10(3)/MCL (ref 0–0.9)
EOSINOPHIL NFR BLD AUTO: 1.7 %
ERYTHROCYTE [DISTWIDTH] IN BLOOD BY AUTOMATED COUNT: 13.7 % (ref 11.5–17)
GFR SERPLBLD CREATININE-BSD FMLA CKD-EPI: 52 MLS/MIN/1.73/M2
GLOBULIN SER-MCNC: 2.7 GM/DL (ref 2.4–3.5)
GLUCOSE SERPL-MCNC: 98 MG/DL (ref 82–115)
HCT VFR BLD AUTO: 47.5 % (ref 42–52)
HGB BLD-MCNC: 15.7 GM/DL (ref 14–18)
IMM GRANULOCYTES # BLD AUTO: 0.03 X10(3)/MCL (ref 0–0.04)
IMM GRANULOCYTES NFR BLD AUTO: 0.3 %
LYMPHOCYTES # BLD AUTO: 2.16 X10(3)/MCL (ref 0.6–4.6)
LYMPHOCYTES NFR BLD AUTO: 24.9 %
MCH RBC QN AUTO: 33.2 PG
MCHC RBC AUTO-ENTMCNC: 33.1 MG/DL (ref 33–36)
MCV RBC AUTO: 100.4 FL (ref 80–94)
MONOCYTES # BLD AUTO: 0.61 X10(3)/MCL (ref 0.1–1.3)
MONOCYTES NFR BLD AUTO: 7 %
NEUTROPHILS # BLD AUTO: 5.67 X10(3)/MCL (ref 2.1–9.2)
NEUTROPHILS NFR BLD AUTO: 65.4 %
NRBC BLD AUTO-RTO: 0 %
PLATELET # BLD AUTO: 266 X10(3)/MCL (ref 130–400)
PMV BLD AUTO: 11.1 FL (ref 7.4–10.4)
POCT GLUCOSE: 81 MG/DL (ref 70–110)
POTASSIUM SERPL-SCNC: 5 MMOL/L (ref 3.5–5.1)
PROT SERPL-MCNC: 7.2 GM/DL (ref 5.8–7.6)
RBC # BLD AUTO: 4.73 X10(6)/MCL (ref 4.7–6.1)
SODIUM SERPL-SCNC: 142 MMOL/L (ref 136–145)
TROPONIN I SERPL-MCNC: 0.09 NG/ML (ref 0–0.04)
WBC # SPEC AUTO: 8.7 X10(3)/MCL (ref 4.5–11.5)

## 2023-02-10 PROCEDURE — 99285 EMERGENCY DEPT VISIT HI MDM: CPT | Mod: 25

## 2023-02-10 PROCEDURE — 25000003 PHARM REV CODE 250: Performed by: FAMILY MEDICINE

## 2023-02-10 PROCEDURE — 82962 GLUCOSE BLOOD TEST: CPT

## 2023-02-10 PROCEDURE — 83880 ASSAY OF NATRIURETIC PEPTIDE: CPT | Performed by: FAMILY MEDICINE

## 2023-02-10 PROCEDURE — 85025 COMPLETE CBC W/AUTO DIFF WBC: CPT | Performed by: FAMILY MEDICINE

## 2023-02-10 PROCEDURE — 80053 COMPREHEN METABOLIC PANEL: CPT | Performed by: FAMILY MEDICINE

## 2023-02-10 PROCEDURE — 84484 ASSAY OF TROPONIN QUANT: CPT | Performed by: FAMILY MEDICINE

## 2023-02-10 PROCEDURE — 93005 ELECTROCARDIOGRAM TRACING: CPT

## 2023-02-10 PROCEDURE — 96375 TX/PRO/DX INJ NEW DRUG ADDON: CPT

## 2023-02-10 PROCEDURE — 96374 THER/PROPH/DIAG INJ IV PUSH: CPT

## 2023-02-10 RX ORDER — MECLIZINE HYDROCHLORIDE 25 MG/1
25 TABLET ORAL
Status: COMPLETED | OUTPATIENT
Start: 2023-02-10 | End: 2023-02-10

## 2023-02-10 RX ORDER — DILTIAZEM HYDROCHLORIDE 5 MG/ML
0.25 INJECTION INTRAVENOUS
Status: COMPLETED | OUTPATIENT
Start: 2023-02-10 | End: 2023-02-10

## 2023-02-10 RX ORDER — LABETALOL HCL 20 MG/4 ML
10 SYRINGE (ML) INTRAVENOUS
Status: DISCONTINUED | OUTPATIENT
Start: 2023-02-10 | End: 2023-02-10

## 2023-02-10 RX ORDER — LABETALOL HCL 20 MG/4 ML
10 SYRINGE (ML) INTRAVENOUS
Status: COMPLETED | OUTPATIENT
Start: 2023-02-10 | End: 2023-02-10

## 2023-02-10 RX ADMIN — MECLIZINE HYDROCHLORIDE 25 MG: 25 TABLET ORAL at 08:02

## 2023-02-10 RX ADMIN — DILTIAZEM HYDROCHLORIDE 25 MG: 5 INJECTION INTRAVENOUS at 09:02

## 2023-02-10 RX ADMIN — LABETALOL HYDROCHLORIDE 10 MG: 5 INJECTION, SOLUTION INTRAVENOUS at 08:02

## 2023-02-10 NOTE — ED PROVIDER NOTES
Encounter Date: 2/10/2023       History     Chief Complaint   Patient presents with    Shortness of Breath    Dizziness    Headache     C/o above symptoms since last pm. States in atrial fib.      63 y/o M presents to the ER with complaint of dizziness, sob, headache. Pt reports that he has a hx of atrial fib and was admitted last month for similar symptoms.  Pt reports he also has been having trouble with his BP lately.  Pt denies CP, abdominal pain, N/V/D, weakness, fever, chills. Pt reports his headache is similar to the ones in the past.  Denies headache being worst in life.       Pt has a hx of paroxysmal atrial fibrillation s/p cardioversion in 2017, on Eliquis, COPD Stage II and non-ischemic cardiomyopathy s/p ICD placement.     Reviewed documentation- pt admitted to [facility] for atrial fib on 1/25/23 thru 1/28/23.       The history is provided by the patient. No  was used.   Review of patient's allergies indicates:  No Known Allergies  Past Medical History:   Diagnosis Date    Atrial fibrillation     CKD (chronic kidney disease)     DM (diabetes mellitus)     Dyslipidemia     Heart failure, unspecified     Smoking      Past Surgical History:   Procedure Laterality Date    CARDIOVERSION      coronary arteriograph      INSERT / REPLACE / REMOVE PACEMAKER      left  heart catherization       Family History   Problem Relation Age of Onset    Heart failure Mother     Cataracts Mother     Heart disease Mother     Glaucoma Mother     Peripheral vascular disease Mother     Hypertension Father      Social History     Tobacco Use    Smoking status: Some Days     Packs/day: 0.25     Years: 20.00     Pack years: 5.00     Types: Cigarettes    Smokeless tobacco: Never    Tobacco comments:     Taking patches   Substance Use Topics    Alcohol use: Yes     Alcohol/week: 1.0 standard drink     Types: 1 Cans of beer per week     Comment: not often    Drug use: Never     Review of Systems   Constitutional:   Negative for chills, fatigue and fever.   HENT:  Negative for sore throat.    Eyes:  Negative for visual disturbance.   Respiratory:  Positive for shortness of breath. Negative for cough and stridor.    Cardiovascular:  Negative for chest pain, palpitations and leg swelling.   Gastrointestinal:  Negative for nausea.   Genitourinary:  Negative for dysuria.   Musculoskeletal:  Negative for back pain.   Skin:  Negative for rash.   Neurological:  Positive for dizziness and headaches. Negative for tremors, seizures, syncope, speech difficulty, weakness, light-headedness and numbness.   Hematological:  Does not bruise/bleed easily.     Physical Exam     Initial Vitals [02/10/23 0725]   BP Pulse Resp Temp SpO2   (!) 145/101 93 (!) 24 98.1 °F (36.7 °C) 100 %      MAP       --         Physical Exam    Nursing note and vitals reviewed.  Constitutional: He appears well-developed and well-nourished. No distress.   HENT:   Head: Normocephalic and atraumatic.   Eyes: Conjunctivae are normal.   Neck: Neck supple. No JVD present.   Normal range of motion.  Cardiovascular:            Irregular irregular rhythm, rate-    Pulmonary/Chest: Breath sounds normal. No respiratory distress. He has no wheezes.   Abdominal: Abdomen is soft. Bowel sounds are normal. There is no abdominal tenderness. There is no rebound and no guarding.   Musculoskeletal:         General: Normal range of motion.      Cervical back: Normal range of motion and neck supple.     Neurological: He is alert and oriented to person, place, and time. He has normal strength. No sensory deficit. Gait normal. GCS score is 15. GCS eye subscore is 4. GCS verbal subscore is 5. GCS motor subscore is 6.   Skin: Skin is warm and dry. Capillary refill takes less than 2 seconds.   Psychiatric: He has a normal mood and affect. His behavior is normal. Judgment and thought content normal.       ED Course   Procedures  Labs Reviewed   COMPREHENSIVE METABOLIC PANEL - Abnormal;  Notable for the following components:       Result Value    Chloride 108 (*)     Creatinine 1.51 (*)     All other components within normal limits   TROPONIN I - Abnormal; Notable for the following components:    Troponin-I 0.086 (*)     All other components within normal limits   B-TYPE NATRIURETIC PEPTIDE - Abnormal; Notable for the following components:    Natriuretic Peptide 1,198.2 (*)     All other components within normal limits   CBC WITH DIFFERENTIAL - Abnormal; Notable for the following components:    .4 (*)     MPV 11.1 (*)     All other components within normal limits   CBC W/ AUTO DIFFERENTIAL    Narrative:     The following orders were created for panel order CBC auto differential.  Procedure                               Abnormality         Status                     ---------                               -----------         ------                     CBC with Differential[[id number]]        Abnormal            Final result                 Please view results for these tests on the individual orders.   EXTRA TUBES    Narrative:     The following orders were created for panel order EXTRA TUBES.  Procedure                               Abnormality         Status                     ---------                               -----------         ------                     Light Blue Top Hold[[id number]]                              In process                 Red Top Hold[[id number]]                                     In process                 Light Green Top Hold[[id number]]                             In process                 Pink Top Hold[[id number]]                                    In process                   Please view results for these tests on the individual orders.   LIGHT BLUE TOP HOLD   RED TOP HOLD   LIGHT GREEN TOP HOLD   PINK TOP HOLD   POCT GLUCOSE     EKG Readings: (Independently Interpreted)   Initial Reading: No STEMI. Rhythm: Atrial Fibrillation. Heart Rate: 105. Ectopy: Rare  PVCs. ST Segments: Normal ST Segments. T Waves: Normal.   ECG Results              EKG 12-lead (In process)  Result time 02/10/23 08:03:33      In process by Interface, Lab In [facility] (02/10/23 08:03:33)                   Narrative:    Test Reason : R07.9,    Vent. Rate : 105 BPM     Atrial Rate : 072 BPM     P-R Int : 000 ms          QRS Dur : 074 ms      QT Int : 352 ms       P-R-T Axes : 000 074 013 degrees     QTc Int : 465 ms    Atrial fibrillation with rapid ventricular response with premature  ventricular or aberrantly conducted complexes  Abnormal ECG  When compared with ECG of 10-FEB-2023 07:38,  Atrial fibrillation has replaced Sinus rhythm    Referred By: [email or web address]   SELF           Confirmed By:                                      EKG 12-lead (Chest Pain) Age >30 (In process)  Result time 02/10/23 07:42:49      In process by Interface, Lab In [facility] (02/10/23 07:42:49)                   Narrative:    Test Reason : R07.9,    Vent. Rate : 115 BPM     Atrial Rate : 115 BPM     P-R Int : 198 ms          QRS Dur : 074 ms      QT Int : 298 ms       P-R-T Axes : 000 075 032 degrees     QTc Int : 412 ms    Sinus tachycardia with Premature atrial complexes  Otherwise normal ECG  When compared with ECG of 27-JAN-2023 15:23,  Sinus rhythm has replaced Atrial fibrillation  Questionable change in The axis    Referred By:             Confirmed By:                                   Imaging Results              X-Ray Chest 1 View (Final result)  Result time 02/10/23 09:50:11      Final result by Uche Anand MD (02/10/23 09:50:11)                   Impression:      NO ACUTE CARDIOPULMONARY PROCESS IDENTIFIED.      Electronically signed by: Uche Anand  Date:    02/10/2023  Time:    09:50               Narrative:    EXAMINATION:  XR CHEST 1 VIEW    CLINICAL HISTORY:  dizziness;    TECHNIQUE:  One    COMPARISON:  January 25, 2023.    FINDINGS:  Cardiopericardial silhouette is within normal limits.  Left chest  implanted cardiac device electrode terminates within the right ventricle.  No acute dense focal or segmental consolidation, congestive process, pleural effusions or pneumothorax.                                       Medications   meclizine tablet 25 mg (25 mg Oral Given 2/10/23 0823)   labetalol 20 mg/4 mL (5 mg/mL) IV syring (10 mg Intravenous Given 2/10/23 0823)   diltiaZEM injection 25 mg (25 mg Intravenous Given 2/10/23 0923)     Medical Decision Making:   Clinical Tests:   Lab Tests: Reviewed       <> Summary of Lab: BNP elevated however stable from previous BNP.  Troponin mildly elevated from previous labs.  Chest x-ray shows no acute pulmonary congestion at this time. His CKD is stable.    Radiological Study: Reviewed  Medical Tests: Reviewed  ED Management:  Patient presents with paroxysmal Afib HR- , , and hypertension.  Patient complaints include dizziness and short of breath.  Patient given 1 dose of labetalol IV and 1 dose of IV cardizem . He reports he is feeling much better with treatment in ED.  His atrial fib is better controlled in addition to his BP.  No clinical signs of acute CHF at this time.  Pt has no active CP or sob at this time. EKG reviewed Discussed admission  however pt declines at this time.   He is currently coherent and competent in his medical decision-making at this time.  He understands that he could be having heart attack given his elevated troponin  however at this time would like to go home.  Family is at bedside and she also verbalized understanding.           ED Course as of 02/10/23 1235   Fri Feb 10, 2023   0916 Reviewed labs. Trop slightly bumped from 2 weeks ago. BNP remains the same.   [AK]   0918 Awaiting CXR    [AK]   0934 RN reports pt told him that he took all his diabetic meds but didn't eat this morning and he is feeling tired. CBG checked by RN and reports it 81. Will order diabetic tray for patient.  Reexamined the patient- Patient with no altered mental  status, reports feeling better with treatment in the ED so far. [AK]   1001 Reviewed CXR. Reexamined patient.  Discussed all labs and imaging and offered admission however patient declines at this time.   [AK]      ED Course User Index  [AK] Ab Canales MD                 Clinical Impression:   Final diagnoses:  [R07.9] Chest pain  [I48.0] Paroxysmal atrial fibrillation (Primary)  [I50.42] Chronic combined systolic and diastolic congestive heart failure        ED Disposition Condition    Discharge Stable          ED Prescriptions    None       Follow-up Information       Follow up With Specialties Details Why Contact Info    [facility] In 2 days  [address] [address]  [phone]      Follow up with [facility] cardiologist  appt on Wed, 2/15/23 as scheduled.        [facility] Emergency Medicine  As needed, If symptoms worsen [address] [facility] [id number] [phone]             Ab Canales MD  02/10/23 4150

## 2023-02-15 ENCOUNTER — TELEPHONE (OUTPATIENT)
Dept: CARDIOLOGY | Facility: CLINIC | Age: 63
End: 2023-02-15

## 2023-02-15 ENCOUNTER — CLINICAL SUPPORT (OUTPATIENT)
Dept: CARDIOLOGY | Facility: CLINIC | Age: 63
End: 2023-02-15
Payer: MEDICAID

## 2023-02-15 ENCOUNTER — ANCILLARY ORDERS (OUTPATIENT)
Dept: CARDIOLOGY | Facility: HOSPITAL | Age: 63
End: 2023-02-15
Payer: MEDICAID

## 2023-02-15 VITALS
SYSTOLIC BLOOD PRESSURE: 150 MMHG | DIASTOLIC BLOOD PRESSURE: 100 MMHG | WEIGHT: 225.5 LBS | OXYGEN SATURATION: 99 % | RESPIRATION RATE: 20 BRPM | TEMPERATURE: 98 F | BODY MASS INDEX: 28.94 KG/M2 | HEIGHT: 74 IN | HEART RATE: 66 BPM

## 2023-02-15 DIAGNOSIS — I10 HYPERTENSION, UNSPECIFIED TYPE: Primary | ICD-10-CM

## 2023-02-15 DIAGNOSIS — I48.91 ATRIAL FIBRILLATION, UNSPECIFIED TYPE: ICD-10-CM

## 2023-02-15 PROCEDURE — 99215 OFFICE O/P EST HI 40 MIN: CPT | Mod: PBBFAC

## 2023-02-15 NOTE — TELEPHONE ENCOUNTER
Patient here today for HR check. Reports compliance with medication regimen. B/P log reviewed and scanned into chart. Pt complain of having SOB at rest with palpitations. Vitals charted  B/P 153/100 HR-66, manually 150/100 HR-72.  Pt was seen recently in ER X 2. Due to frequent ER visits and increased B/P pt is scheduled to see Dr. Garza tomorrow at 9:30 AM. Instructions given to follow low salt heart healthy diet, continue medications as prescribed, monitor and log B/P, keep all appointments, call clinic with any questions or concerns, report to ER if symptoms warrant. Pt. verbalized understanding and agreeable with POC.

## 2023-02-16 ENCOUNTER — OFFICE VISIT (OUTPATIENT)
Dept: CARDIOLOGY | Facility: CLINIC | Age: 63
End: 2023-02-16
Payer: MEDICAID

## 2023-02-16 VITALS
HEART RATE: 95 BPM | OXYGEN SATURATION: 100 % | SYSTOLIC BLOOD PRESSURE: 139 MMHG | WEIGHT: 224.44 LBS | HEIGHT: 74 IN | RESPIRATION RATE: 18 BRPM | DIASTOLIC BLOOD PRESSURE: 98 MMHG | TEMPERATURE: 98 F | BODY MASS INDEX: 28.8 KG/M2

## 2023-02-16 DIAGNOSIS — E78.5 HYPERLIPIDEMIA LDL GOAL <70: ICD-10-CM

## 2023-02-16 DIAGNOSIS — I50.20 HFREF (HEART FAILURE WITH REDUCED EJECTION FRACTION): Primary | ICD-10-CM

## 2023-02-16 DIAGNOSIS — E78.00 PURE HYPERCHOLESTEROLEMIA: ICD-10-CM

## 2023-02-16 DIAGNOSIS — I48.91 ATRIAL FIBRILLATION WITH RVR: ICD-10-CM

## 2023-02-16 DIAGNOSIS — I10 PRIMARY HYPERTENSION: ICD-10-CM

## 2023-02-16 DIAGNOSIS — E11.9 CONTROLLED TYPE 2 DIABETES MELLITUS WITHOUT COMPLICATION, WITHOUT LONG-TERM CURRENT USE OF INSULIN: ICD-10-CM

## 2023-02-16 DIAGNOSIS — I48.19 PERSISTENT ATRIAL FIBRILLATION: ICD-10-CM

## 2023-02-16 DIAGNOSIS — I42.8 NON-ISCHEMIC CARDIOMYOPATHY: ICD-10-CM

## 2023-02-16 PROCEDURE — 99215 OFFICE O/P EST HI 40 MIN: CPT | Mod: PBBFAC | Performed by: INTERNAL MEDICINE

## 2023-02-16 RX ORDER — FUROSEMIDE 40 MG/1
40 TABLET ORAL DAILY
Qty: 90 TABLET | Refills: 3 | Status: SHIPPED | OUTPATIENT
Start: 2023-02-16 | End: 2023-12-04

## 2023-02-16 RX ORDER — AMIODARONE HYDROCHLORIDE 200 MG/1
200 TABLET ORAL DAILY
Qty: 90 TABLET | Refills: 3 | Status: ON HOLD | OUTPATIENT
Start: 2023-02-16 | End: 2023-03-22 | Stop reason: HOSPADM

## 2023-02-16 RX ORDER — SACUBITRIL AND VALSARTAN 24; 26 MG/1; MG/1
1 TABLET, FILM COATED ORAL 2 TIMES DAILY
Qty: 180 TABLET | Refills: 3 | Status: SHIPPED | OUTPATIENT
Start: 2023-02-16 | End: 2023-04-04 | Stop reason: DRUGHIGH

## 2023-02-16 RX ORDER — APIXABAN 5 MG/1
5 TABLET, FILM COATED ORAL 2 TIMES DAILY
Qty: 180 TABLET | Refills: 3 | Status: SHIPPED | OUTPATIENT
Start: 2023-02-16 | End: 2023-04-04

## 2023-02-16 RX ORDER — METOPROLOL SUCCINATE 200 MG/1
200 TABLET, EXTENDED RELEASE ORAL 2 TIMES DAILY
Qty: 180 TABLET | Refills: 3 | Status: SHIPPED | OUTPATIENT
Start: 2023-02-16 | End: 2023-10-17

## 2023-02-16 NOTE — PATIENT INSTRUCTIONS
Increase furosemide (lasix) to 40mg daily    Increase metoprolol to 200mg twice a day    Start amiodarone - FOLLOW INSTRUCTIONS ON BOTTLE FOR DOSING    Stop lisinopril - wait 36 hours - start Entresto (sacubitril-valsartan 24/26mg twice a day)    Return for nurse visit in 3-4 weeks - get labs done before appt    Follow up in 2 months

## 2023-02-16 NOTE — PROGRESS NOTES
Chief Complaint   Patient presents with    Follow-up     Afib and increased b/p.    Shortness of Breath     Sob lightheadedness blurred visio    Palpitations     Palpatations . Pt states cardioversion was attempted xs 3 and has not worked. And also was shocked.       This is a 62-year-old male with atrial fibrillation status post LASHELL and cardioversion in February 2017 on Eliquis, nonischemic cardiomyopathy with recovered EF s/p ICD, COPD, hepatitis C, diabetes mellitus type II, and CKD stage III who presents for routine follow up and ongoing care. Patient had his ICD generator changed in August 2018 due to recall.     On 7/19/2022 pt presented for device interrogation that showed episodes of atrial fibrillation with RVR. Pt also reported intermittent fatigue, palpitations and decreased activity.  Metoprolol was increased to 50 mg b.i.d. and decision was to proceed with a cardioversion.  On day of cardioversion August 3, 2022, patient was in sinus rhythm and procedure was canceled.    Pt was then seen in clinic in 10/2022 and he was doing well. He was in afib but rate controlled. Amiodarone was stopped as he had been on it for 5 years and he was back in afib.    Pt was then admitted on 1/25/2023 with afib with RVR and acute heart failure symptoms. Repeat echo showed new drop in EF to 35%. He was diuresed and underwent DCCV x3 where he would convert to NSR for a few seconds only. Metoprolol was increased to 150mg bid and he was referred to EP (Dr. Anguiano - has not seen him yet)  Since then the pt has not feeling well and had an ED visit where he was found to be in afib with RVR. He was given diltiazem 25mg x1 and labetalol with improvement in his rates and BP.  Today, he reports continues orthopnea, fatigue, dyspnea and palpitations. He is compliant with eliquis and denies bleeding.     Results for orders placed during the hospital encounter of 08/31/22    Echo    Interpretation Summary  · The left ventricle is  normal in size with moderate concentric hypertrophy and normal systolic function.  · The estimated ejection fraction is 60%.  · Indeterminate left ventricular diastolic function.  · Normal right ventricular size with normal right ventricular systolic function.  · Moderate left atrial enlargement.  · Mild right atrial enlargement.  · Mild mitral regurgitation.  · There is no pulmonary hypertension.  · The estimated PA systolic pressure is 24 mmHg.  · Normal central venous pressure (3 mmHg).      TTE 10/14/2020: LVEF 55%    Left heart cath- no CAD (2014)      Review of Systems   Constitutional: negative for fever,chills, sweats, weakness, fatigue, decreased activity   Eye: negative for blurry vision, vision change  ENMT: negative for sore throat, nasal congestion  Respiratory: negative for shortness of breath, cough, wheezing  Cardiovascular: negative for chest pain, dyspnea on exertion, orthopnea, PND, lower extremity edema, palpitations, claudication  Gastrointestinal: negative for nausea, vomiting, abdominal pain, constipation, diarrhea, blood in stool  Genitourinary: negative for hematuria, nocturia, dysuria  Endocrine: negative for abnormal thirst, temperature  Musculoskeletal: negative for back pain, joint pain  Integumentary: negative for abnormal mole  Neurologic: negative for weakness, numbness, headaches, shooting pain  Hematology: negative for easy bruising, easy bleeding  Allergy: negative for watery eyes, sneezing  Psychiatric: negative for loss of interest, anxiety, stress      Physical Exam Vitals & Measurements   Vitals:    02/16/23 0908   BP: (!) 139/98   Pulse: 95   Resp: 18   Temp: 97.7 °F (36.5 °C)       General: alert and oriented/no acute distress  Eye: EOMI/normal conjunctiva/no xanthelasma  HENT: normocephalic/moist oral mucosa  Neck: supple/nontender/no carotid bruit  Respiratory: rales at bases, decreased lung sounds otherwise  Cardiovascular:  Irregularly irregular/no murmur/normal  peripheral perfusion/1+ edema/JVD  Gastrointestinal: soft/nontender  Musculoskeletal: normal ROM  Integumentary: warm/dry/pink/intact  Neurologic: alert/oriented/normal sensory/no focal deficits  Psychiatric: cooperative/appropriate mood and affect/normal judgment      Current Outpatient Medications:     ACCU-CHEK FASTCLIX LANCET DRUM Misc, Apply topically once daily., Disp: , Rfl:     ACCU-CHEK GUIDE TEST STRIPS Strp, USE AS DIRECTED once daily (keep log), Disp: , Rfl:     albuterol-ipratropium (DUO-NEB) 2.5 mg-0.5 mg/3 mL nebulizer solution, Take 3 mLs by nebulization every 6 (six) hours as needed for Wheezing., Disp: 75 mL, Rfl: 6    atorvastatin (LIPITOR) 80 MG tablet, Take 1 tablet (80 mg total) by mouth once daily., Disp: 90 tablet, Rfl: 3    cetirizine (ZYRTEC) 10 MG tablet, Take 1 tablet (10 mg total) by mouth once daily., Disp: 30 tablet, Rfl: 6    clotrimazole-betamethasone 1-0.05% (LOTRISONE) cream, Apply 2 g topically 2 (two) times daily., Disp: , Rfl:     ELIQUIS 5 mg Tab, Take 5 mg by mouth 2 (two) times daily., Disp: , Rfl:     famotidine (PEPCID) 20 MG tablet, Take 1 tablet (20 mg total) by mouth 2 (two) times daily., Disp: 60 tablet, Rfl: 11    fluticasone propion-salmeterol 115-21 mcg/dose (ADVAIR HFA) 115-21 mcg/actuation HFAA inhaler, Inhale 2 puffs into the lungs every 12 (twelve) hours., Disp: 12 g, Rfl: 6    fluticasone propionate (FLONASE ALLERGY RELIEF) 50 mcg/actuation nasal spray, 2 sprays (100 mcg total) by Each Nostril route Daily., Disp: 18 g, Rfl: 6    furosemide (LASIX) 20 MG tablet, Take 1 tablet (20 mg total) by mouth once daily. Refilled per Cardio orders., Disp: 90 tablet, Rfl: 3    glipiZIDE (GLUCOTROL) 5 MG tablet, Take 5 mg by mouth once daily., Disp: , Rfl:     lancets-blood glucose strips 30 gauge Cmpk,  Accucheck blood glucose test strips and lancets, See Instructions, Check CBG once daily and keep log., # 100 EA, 11 Refill(s), Pharmacy: [facility], 185, cm,  Height/Length Dosing, 03/08/22 8:10:00 CST, 96, kg, Weight Dosing, 03/08/22 8:10:00 CST, Disp: , Rfl:     lisinopriL 10 MG tablet, Take 1 tablet (10 mg total) by mouth once daily., Disp: 90 tablet, Rfl: 3    metoprolol succinate (TOPROL-XL) 50 MG 24 hr tablet, Take 3 tablets (150 mg total) by mouth 2 (two) times daily., Disp: 180 tablet, Rfl: 11    nicotine (NICODERM CQ) 14 mg/24 hr, Place 1 patch onto the skin once daily., Disp: 28 patch, Rfl: 2    nicotine polacrilex (NICORETTE) 4 MG Gum, Take 1 each (4 mg total) by mouth as needed (smoking urge)., Disp: 170 each, Rfl: 3    promethazine-codeine 6.25-10 mg/5 ml (PHENERGAN WITH CODEINE) 6.25-10 mg/5 mL syrup, Take 5 mLs by mouth every 6 (six) hours as needed for Cough., Disp: 240 mL, Rfl: 1    tamsulosin (FLOMAX) 0.4 mg Cap, Take 1 capsule by mouth once daily., Disp: , Rfl:       Assessment/Plan  Paroxysmal Atrial Fibrillation/flutter - s/p LASHELL/cardioversion 2/17  Pt's device interrogation in July 2022 showed episodes of atrial fibrillation with RVR. Pt also reporting intermittent fatigue and decreased activity.  Toprol was increased to 50 mg b.i.d. and plan was for cardioversion.    On day of procedure patient was in sinus rhythm and procedure was canceled.  In oct 2022 I suggested stopping amiodarone due to known toxicity and as he is in AFib despite it and pt was agreeable.  Pt was admitted to [facility] on 1/25/2023 with Afib with RVR and was found to have new drop in his EF to 35%. Pt was diuresed and rate controlled. DCCVx3 then failed at restoring NSR (except for a few seconds after each attempt. BB increased further and pt referred to Dr. Anguiano for possible ablation(who he has not seen yet).  Pt has not been feeling well since discharge and has orthopnea, fatigue, palpitations and DONALDSON.  Explained to the pt that with his new drop in EF, we are limited in what rate control or antiarrythmic drugs we can use.  We will increase toprol to 200mg bid and restart  amiodarone with a 10g load.  Pt also volume overloaded on exam and will benefit from further diuresis. Will increase lasix to 40mg daily.   Will plan a NV for symptom check, BP and HR check in 2 weeks. Will obtain a BMP on same day.   Meanwhile will contact Dr. Anguiano to try to expedite appointment if possible.   - Continue with Eliquis for CVA prophylaxis. Denies any adverse bleeding issues    Non ischemic cardiomyopathy  Chronic Systolic HF (recovered in 2020 with new drop in 1/2023) s/p ICD   NYHA III  New drop in EF likely tachycardia induced cardiomyopathy  TTE 1/2023 shows EF 35%.  Afib treatment as above  Will stop lisinopril and start entresto 24/26mg bid after 36 hour washout  Increase toprol to 200mg bid  Pt with volume overload on exam, will increase lasix to 40mg daily.   Continue daily weights  2 g salt diet      HTN  - BP mildly elevated at 139/98  Possibly due to volume overload  As above will switch to entresto and increase lasix  - Counseled on low-sodium diet    Hyperlipidemia  LDL 96 in 3/2021 with repeat 88 in 9/2022 after increase lipitor to 80  Goal < 70 given DM  Lipitor then increased to 80mg      Tobacco Use  Pt quit smoking in 7/2022  Congratulated him and encouraged to remain abstinent    Leg pain/numbness  - Reports leg pain/numbness with ambulation or rest  - AMARILYS ordered in the past, not done yet    COPD  DM   CKD III  - Continue management per PCP    Increase toprol to 200mg bid  Start amio with load  Increase lasix to 40mg   Stop lisinopril and start entresto 24/26mg bid  BMP/Mag  NV   EP appointment   F/U in 3 months  Continue with routine ICD interrogations  Continue to follow up with PCP as directed

## 2023-02-17 NOTE — PHYSICIAN QUERY
PT Name: Ezra Zhang  MR #: [id number]     DOCUMENTATION CLARIFICATION      CDS/: Tereza Rogers RN CDIS           Contact information: [email or web address]  This form is a permanent document in the medical record.     Query Date: February 17, 2023    Dear Provider,  By submitting this query, we are merely seeking further clarification of documentation.  Please utilize your independent clinical judgment when addressing the question(s) below.     The Medical Record contains the following:    Supporting Clinical Findings Location in Medical Record   NSTEMI, likely type II  - [facility] 2014, above cardiomyopathy nonischemic  - suspect demand HM note 1/27     Latest Reference Range & Units 01/25/23 08:57 01/25/23 12:44 01/25/23 18:17   Troponin I 0.000 - 0.045 ng/mL 0.051 (H) 0.054 (H) 0.044   Atrial fibrillation with rapid ventricular response   Abnormal ECG   When compared with ECG of 03-AUG-2022 08:03,   Atrial fibrillation has replaced Sinus rhythm   Vent. rate has increased BY  73 BPM   Confirmed by Mandi Garza MD ([id number]) on 1/30/2023 12:21:48 AM  Labs               EKG 1/25        1. Atrial Fibrillation with RVR.  2. CHF Exacerbation  note 1/27     Please clarify if the _Type 2 NSTEMI _ diagnosis has been:    [x  ] Ruled In   [  ] Ruled Out   [  ] Other/Clarification of findings (please specify): _______________    [   ] Clinically undetermined           Please document in your progress notes daily for the duration of treatment, until resolved, and include in your discharge summary.    Form No. [id number]

## 2023-02-17 NOTE — PHYSICIAN QUERY
PT Name: Ezra Zhang  MR #: [id number]     DOCUMENTATION CLARIFICATION     CDS/: Tereza Rogers RN CDIS          Contact information: [email or web address]    This form is a permanent document in the medical record.     Query Date: February 17, 2023    By submitting this query, we are merely seeking further clarification of documentation.  Please utilize your independent clinical judgment when addressing the question(s) below.    The Medical Record contains the following   Indicators Supporting Clinical Findings Location in Medical Record   x Heart Failure documented Chronic systolic heart failure    CHF Exacerbation. Hx of non-ischemic Cardiomyopathy with recovered ejection fraction and s/p ICD placement Discharge summary     H&P    x BNP BNP of 1197.6 H&P    x EF/Echo Atrial fibrillation observed.  Normal right ventricular size.  Intermediate central venous pressure (8 mmHg).  The estimated PA systolic pressure is 20 mmHg.  IVC is dilated and collapses > 50% with inspiration.  The left ventricle is normal in size with  Moderate right atrial enlargement.  Mild to moderate left atrial enlargement.  The estimated ejection fraction is 35%. Echo 1/2023    Radiology findings     x Subjective/Objective Respiratory Conditions Respiratory:  Positive for shortness of breath. Negative for cough.     Lungs: Posterior crackles. Nonlabored respirations. No supplemental O2 H&P     Cards consult     Recent/Current MI      Heart Transplant, LVAD     x Edema, JVD Neck: Supple. JVD mid jaw Cards consult     Ascites     x Diuretics/Meds furosemide injection 40 mg  Dose: 40 mg  Freq: Daily Route: IV  Start: 01/25/23 1230 End: 01/28/23 1448 MAR    Other Treatment      Other       Heart failure is a clinical diagnosis which includes symptomatic fluid retention, elevated intracardiac pressures, and/or the inability of the heart to deliver adequate blood flow.    Heart Failure with reduced Ejection Fraction (HFrEF) or  Systolic Heart Failure (loses ability to contract normally, EF is <40%)    Heart Failure with preserved Ejection Fraction (HFpEF) or Diastolic Heart Failure (stiff ventricles, does not relax properly, EF is >50%)     Heart Failure with Combined Systolic and Diastolic Failure (stiff ventricles, does not relax properly and EF is <50%)    Mid-range or mildly reduced ejection fraction (HFmrEF) is classified as systolic heart failure.  Congestive heart failure with a recovered EF is classified as Diastolic Heart Failure.  Common clues to acute exacerbation:  Rapidly progressive symptoms (w/in 2 weeks of presentation), using IV diuretics, using supplemental O2, pulmonary edema on Xray, new or worsening pleural effusion, +JVD or other signs of volume overload, MI w/in 4 weeks, and/or BNP >500  The clinical guidelines noted are only system guidelines, and do not replace the providers clinical judgment.    Provider, please clarify the ACUITY on the heart failure:     [ x  ]  Acute on Chronic Systolic Heart Failure (HFrEF or HFmrEF) - worsening of CHF signs/symptoms in preexisting CHF   [   ]  Chronic Systolic Heart Failure (HFrEF or HFmrEF) - preexisting and stable   [   ]  Other (please specify): ___________________________________   [  ]  Clinically Undetermined       Please document in your progress notes daily for the duration of treatment until resolved and include in your discharge summary.    References:  American Heart Association editorial staff. (2017, May). Ejection Fraction Heart Failure Measurement. American Heart Association. https://www.heart.org/en/health-topics/heart-failure/diagnosing-heart-failure/ejection-fraction-heart-failure-measurement#:~:text=Ejection%20fraction%20(EF)%20is%20a,pushed%20out%20with%20each%20heartbeat  CHRISTINA Lopez (2020, December 15). Heart failure with preserved ejection fraction: Clinical manifestations and diagnosis. UpToDate.  https://www.[employer]/contents/heart-failure-with-preserved-ejection-fraction-clinical-manifestations-and-diagnosis.  ICD-10-CM/PCS Coding Clinic Third Quarter ICD-10, Effective with discharges: September 8, 2020 [facility] [place] § Heart failure with mid-range or mildly reduced ejection fraction (2020).  ICD-10-CM/PCS Coding Clinic Third Quarter ICD-10, Effective with discharges: September 8, 2020 [facility] § Heart failure with recovered ejection fraction (2020).  Form No. [id number]

## 2023-02-18 ENCOUNTER — HOSPITAL ENCOUNTER (EMERGENCY)
Facility: HOSPITAL | Age: 63
Discharge: HOME | End: 2023-02-18
Attending: EMERGENCY MEDICINE
Payer: MEDICAID

## 2023-02-18 VITALS
WEIGHT: 220 LBS | HEART RATE: 76 BPM | BODY MASS INDEX: 28.23 KG/M2 | HEIGHT: 74 IN | TEMPERATURE: 98 F | RESPIRATION RATE: 18 BRPM | SYSTOLIC BLOOD PRESSURE: 148 MMHG | DIASTOLIC BLOOD PRESSURE: 117 MMHG | OXYGEN SATURATION: 98 %

## 2023-02-18 DIAGNOSIS — R06.00 DYSPNEA: ICD-10-CM

## 2023-02-18 DIAGNOSIS — I48.91 ATRIAL FIBRILLATION, UNSPECIFIED TYPE: Primary | ICD-10-CM

## 2023-02-18 LAB
ALBUMIN SERPL-MCNC: 4.2 G/DL (ref 3.4–4.8)
ALBUMIN/GLOB SERPL: 1.4 RATIO (ref 1.1–2)
ALP SERPL-CCNC: 123 UNIT/L (ref 40–150)
ALT SERPL-CCNC: 35 UNIT/L (ref 0–55)
AMPHET UR QL SCN: NEGATIVE
APPEARANCE UR: CLEAR
AST SERPL-CCNC: 17 UNIT/L (ref 5–34)
BACTERIA #/AREA URNS AUTO: ABNORMAL /HPF
BARBITURATE SCN PRESENT UR: NEGATIVE
BASOPHILS # BLD AUTO: 0.05 X10(3)/MCL (ref 0–0.2)
BASOPHILS NFR BLD AUTO: 0.6 %
BENZODIAZ UR QL SCN: NEGATIVE
BILIRUB UR QL STRIP.AUTO: NEGATIVE MG/DL
BILIRUBIN DIRECT+TOT PNL SERPL-MCNC: 0.7 MG/DL
BNP BLD-MCNC: 869.7 PG/ML
BUN SERPL-MCNC: 17.8 MG/DL (ref 8.4–25.7)
CALCIUM SERPL-MCNC: 9.8 MG/DL (ref 8.8–10)
CANNABINOIDS UR QL SCN: NEGATIVE
CHLORIDE SERPL-SCNC: 109 MMOL/L (ref 98–107)
CK MB SERPL-MCNC: 1.7 NG/ML
CK SERPL-CCNC: 60 U/L (ref 30–200)
CO2 SERPL-SCNC: 25 MMOL/L (ref 23–31)
COCAINE UR QL SCN: NEGATIVE
COLOR UR AUTO: ABNORMAL
CREAT SERPL-MCNC: 1.24 MG/DL (ref 0.73–1.18)
EOSINOPHIL # BLD AUTO: 0.17 X10(3)/MCL (ref 0–0.9)
EOSINOPHIL NFR BLD AUTO: 2 %
ERYTHROCYTE [DISTWIDTH] IN BLOOD BY AUTOMATED COUNT: 13.5 % (ref 11.5–17)
FENTANYL UR QL SCN: NEGATIVE
GFR SERPLBLD CREATININE-BSD FMLA CKD-EPI: >60 MLS/MIN/1.73/M2
GLOBULIN SER-MCNC: 2.9 GM/DL (ref 2.4–3.5)
GLUCOSE SERPL-MCNC: 104 MG/DL (ref 82–115)
GLUCOSE UR QL STRIP.AUTO: NORMAL MG/DL
HCT VFR BLD AUTO: 48.2 % (ref 42–52)
HGB BLD-MCNC: 15.8 G/DL (ref 14–18)
HYALINE CASTS #/AREA URNS LPF: ABNORMAL /LPF
IMM GRANULOCYTES # BLD AUTO: 0.03 X10(3)/MCL (ref 0–0.04)
IMM GRANULOCYTES NFR BLD AUTO: 0.4 %
KETONES UR QL STRIP.AUTO: NEGATIVE MG/DL
LEUKOCYTE ESTERASE UR QL STRIP.AUTO: NEGATIVE UNIT/L
LYMPHOCYTES # BLD AUTO: 1.83 X10(3)/MCL (ref 0.6–4.6)
LYMPHOCYTES NFR BLD AUTO: 21.5 %
MCH RBC QN AUTO: 32.7 PG
MCHC RBC AUTO-ENTMCNC: 32.8 G/DL (ref 33–36)
MCV RBC AUTO: 99.8 FL (ref 80–94)
MDMA UR QL SCN: NEGATIVE
MONOCYTES # BLD AUTO: 0.62 X10(3)/MCL (ref 0.1–1.3)
MONOCYTES NFR BLD AUTO: 7.3 %
MUCOUS THREADS URNS QL MICRO: ABNORMAL /LPF
NEUTROPHILS # BLD AUTO: 5.81 X10(3)/MCL (ref 2.1–9.2)
NEUTROPHILS NFR BLD AUTO: 68.2 %
NITRITE UR QL STRIP.AUTO: NEGATIVE
NRBC BLD AUTO-RTO: 0 %
OPIATES UR QL SCN: NEGATIVE
PCP UR QL: NEGATIVE
PH UR STRIP.AUTO: 7 [PH]
PH UR: 7 [PH] (ref 3–11)
PLATELET # BLD AUTO: 237 X10(3)/MCL (ref 130–400)
PMV BLD AUTO: 11.8 FL (ref 7.4–10.4)
POTASSIUM SERPL-SCNC: 4.3 MMOL/L (ref 3.5–5.1)
PROT SERPL-MCNC: 7.1 GM/DL (ref 5.8–7.6)
PROT UR QL STRIP.AUTO: ABNORMAL MG/DL
RBC # BLD AUTO: 4.83 X10(6)/MCL (ref 4.7–6.1)
RBC #/AREA URNS AUTO: ABNORMAL /HPF
RBC UR QL AUTO: NEGATIVE UNIT/L
SODIUM SERPL-SCNC: 142 MMOL/L (ref 136–145)
SP GR UR STRIP.AUTO: 1.01
SQUAMOUS #/AREA URNS LPF: ABNORMAL /HPF
TROPONIN I SERPL-MCNC: 0.04 NG/ML (ref 0–0.04)
UROBILINOGEN UR STRIP-ACNC: NORMAL MG/DL
WBC # SPEC AUTO: 8.5 X10(3)/MCL (ref 4.5–11.5)
WBC #/AREA URNS AUTO: ABNORMAL /HPF

## 2023-02-18 PROCEDURE — 85025 COMPLETE CBC W/AUTO DIFF WBC: CPT | Performed by: EMERGENCY MEDICINE

## 2023-02-18 PROCEDURE — 80307 DRUG TEST PRSMV CHEM ANLYZR: CPT | Performed by: EMERGENCY MEDICINE

## 2023-02-18 PROCEDURE — 25000003 PHARM REV CODE 250: Performed by: EMERGENCY MEDICINE

## 2023-02-18 PROCEDURE — 81001 URINALYSIS AUTO W/SCOPE: CPT | Mod: 59 | Performed by: EMERGENCY MEDICINE

## 2023-02-18 PROCEDURE — 99285 EMERGENCY DEPT VISIT HI MDM: CPT | Mod: 25

## 2023-02-18 PROCEDURE — 83880 ASSAY OF NATRIURETIC PEPTIDE: CPT | Performed by: EMERGENCY MEDICINE

## 2023-02-18 PROCEDURE — 96374 THER/PROPH/DIAG INJ IV PUSH: CPT

## 2023-02-18 PROCEDURE — 80053 COMPREHEN METABOLIC PANEL: CPT | Performed by: EMERGENCY MEDICINE

## 2023-02-18 PROCEDURE — 82553 CREATINE MB FRACTION: CPT | Performed by: EMERGENCY MEDICINE

## 2023-02-18 PROCEDURE — 93005 ELECTROCARDIOGRAM TRACING: CPT

## 2023-02-18 PROCEDURE — 84484 ASSAY OF TROPONIN QUANT: CPT | Performed by: EMERGENCY MEDICINE

## 2023-02-18 PROCEDURE — 82550 ASSAY OF CK (CPK): CPT | Performed by: EMERGENCY MEDICINE

## 2023-02-18 RX ORDER — IBUPROFEN 600 MG/1
600 TABLET ORAL
Status: COMPLETED | OUTPATIENT
Start: 2023-02-18 | End: 2023-02-18

## 2023-02-18 RX ORDER — DILTIAZEM HYDROCHLORIDE 5 MG/ML
10 INJECTION INTRAVENOUS
Status: COMPLETED | OUTPATIENT
Start: 2023-02-18 | End: 2023-02-18

## 2023-02-18 RX ADMIN — IBUPROFEN 600 MG: 600 TABLET, FILM COATED ORAL at 04:02

## 2023-02-18 RX ADMIN — DILTIAZEM HYDROCHLORIDE 10 MG: 5 INJECTION INTRAVENOUS at 01:02

## 2023-02-18 NOTE — ED PROVIDER NOTES
"Encounter Date: 2/18/2023       History     Chief Complaint   Patient presents with    Hypertension     Pt reports htn, headache, shortness of breath, blurred vision. States abdomen is firm as well "fluid".Recent ICU admission reported. Hx of afib.      Dyspnea, history rapid afib; admitted in January with RVR and volume overload; medications recently adjusted by out patient cardiology team. Patient reports worsening dyspnea since that time. He is also endorsing several efforts at cardioversion in the past 3 months, the most recent of which was not effective. Endorsing intermittent associated diaphoresis and nausea (presently resolved) episodes lasting 15-20 minutes. Rhythm quite variable on monitor at time of initial evaluation.      Palpitations   This is a recurrent problem. The current episode started several days ago. Episode frequency: ongoing intermittent symptoms, worsened over past week. The problem has been waxing and waning. The problem is associated with anti-arrhythmics. Associated symptoms include diaphoresis, chest pressure, irregular heartbeat, nausea and shortness of breath. Pertinent negatives include no fever, no malaise/fatigue, no numbness, no chest pain, no claudication, no exertional chest pressure, no near-syncope, no orthopnea, no PND, no syncope, no abdominal pain, no vomiting, no headaches, no back pain, no leg pain, no lower extremity edema, no dizziness, no weakness, no cough, no hemoptysis and no sputum production. Risk factors: hx afib with RVR, difficult rate control. His past medical history does not include anemia, hyperthyroidism or valve disorder. Past medical history comments: a fib, rvr.   Review of patient's allergies indicates:  No Known Allergies  Past Medical History:   Diagnosis Date    Atrial fibrillation     CKD (chronic kidney disease)     DM (diabetes mellitus)     Dyslipidemia     Heart failure, unspecified     Smoking      Past Surgical History:   Procedure Laterality " Date    CARDIOVERSION      coronary arteriograph      INSERT / REPLACE / REMOVE PACEMAKER      left  heart catherization       Family History   Problem Relation Age of Onset    Heart failure Mother     Cataracts Mother     Heart disease Mother     Glaucoma Mother     Peripheral vascular disease Mother     Hypertension Father      Social History     Tobacco Use    Smoking status: Former     Packs/day: 0.25     Years: 20.00     Pack years: 5.00     Types: Cigarettes    Smokeless tobacco: Never    Tobacco comments:     Taking patches   Substance Use Topics    Alcohol use: Never     Alcohol/week: 1.0 standard drink     Types: 1 Cans of beer per week     Comment: not often    Drug use: Never     Review of Systems   Constitutional:  Positive for diaphoresis. Negative for fever and malaise/fatigue.   Respiratory:  Positive for shortness of breath. Negative for cough, hemoptysis and sputum production.    Cardiovascular:  Positive for palpitations. Negative for chest pain, orthopnea, claudication, syncope, PND and near-syncope.   Gastrointestinal:  Positive for nausea. Negative for abdominal pain and vomiting.   Musculoskeletal:  Negative for back pain.   Neurological:  Negative for dizziness, weakness, numbness and headaches.   All other systems reviewed and are negative.    Physical Exam     Initial Vitals [02/18/23 1240]   BP Pulse Resp Temp SpO2   (!) 158/124 74 18 97.5 °F (36.4 °C) 98 %      MAP       --         Physical Exam    Nursing note and vitals reviewed.  Constitutional: He appears well-developed and well-nourished. He is not diaphoretic. No distress.   HENT:   Head: Normocephalic and atraumatic.   Right Ear: External ear normal.   Left Ear: External ear normal.   Eyes: EOM are normal. Pupils are equal, round, and reactive to light. Right eye exhibits no discharge. Left eye exhibits no discharge.   Neck: Neck supple. No thyromegaly present. No tracheal deviation present. No JVD present.   Normal range of  motion.  Cardiovascular:  Normal heart sounds and intact distal pulses.     Exam reveals no gallop and no friction rub.       No murmur heard.  Irregularly irregular ;   Pulmonary/Chest: Breath sounds normal. No stridor. No respiratory distress. He has no wheezes. He has no rhonchi. He has no rales. He exhibits no tenderness.   Abdominal: Abdomen is soft. Bowel sounds are normal. He exhibits no distension. There is no abdominal tenderness. There is no rebound and no guarding.   Musculoskeletal:         General: No tenderness or edema. Normal range of motion.      Cervical back: Normal range of motion and neck supple.     Neurological: He is alert and oriented to person, place, and time. He has normal strength. No cranial nerve deficit or sensory deficit. GCS score is 15. GCS eye subscore is 4. GCS verbal subscore is 5. GCS motor subscore is 6.   Skin: Skin is warm and dry. Capillary refill takes less than 2 seconds. No rash and no abscess noted. No erythema. No pallor.   Psychiatric: He has a normal mood and affect. His behavior is normal. Judgment and thought content normal.       ED Course   Procedures  Labs Reviewed   COMPREHENSIVE METABOLIC PANEL - Abnormal; Notable for the following components:       Result Value    Chloride 109 (*)     Creatinine 1.24 (*)     All other components within normal limits   B-TYPE NATRIURETIC PEPTIDE - Abnormal; Notable for the following components:    Natriuretic Peptide 869.7 (*)     All other components within normal limits   URINALYSIS, REFLEX TO URINE CULTURE - Abnormal; Notable for the following components:    Protein, UA 1+ (*)     Mucous, UA Trace (*)     Hyaline Casts, UA 0-2 (*)     All other components within normal limits   CBC WITH DIFFERENTIAL - Abnormal; Notable for the following components:    MCV 99.8 (*)     MCHC 32.8 (*)     MPV 11.8 (*)     All other components within normal limits   TROPONIN I - Normal   CK-MB - Normal   CK - Normal   DRUG SCREEN, URINE  (BEAKER) - Normal    Narrative:     Cut off concentrations:    Amphetamines - 1000 ng/ml  Barbiturates - 200 ng/ml  Benzodiazepine - 200 ng/ml  Cannabinoids (THC) - 50 ng/ml  Cocaine - 300 ng/ml  Fentanyl - 1.0 ng/ml  MDMA - 500 ng/ml  Opiates - 300 ng/ml   Phencyclidine (PCP) - 25 ng/ml    Specimen submitted for drug analysis and tested for pH and specific gravity in order to evaluate sample integrity. Suspect tampering if specific gravity is <1.003 and/or pH is not within the range of 4.5 - 8.0  False negatives may result form substances such as bleach added to urine.  False positives may result for the presence of a substance with similar chemical structure to the drug or its metabolite.    This test provides only a PRELIMINARY analytical test result. A more specific alternate chemical method must be used in order to obtain a confirmed analytical result. Gas chromatography/mass spectrometry (GC/MS) is the preferred confirmatory method. Other chemical confirmation methods are available. Clinical consideration and professional judgement should be applied to any drug of abuse test result, particularly when preliminary positive results are used.    Positive results will be confirmed only at the physicians request. Unconfirmed screening results are to be used only for medical purposes (treatment).        CBC W/ AUTO DIFFERENTIAL    Narrative:     The following orders were created for panel order CBC auto differential.  Procedure                               Abnormality         Status                     ---------                               -----------         ------                     CBC with Differential[[id number]]        Abnormal            Final result                 Please view results for these tests on the individual orders.   EXTRA TUBES    Narrative:     The following orders were created for panel order EXTRA TUBES.  Procedure                               Abnormality         Status                      ---------                               -----------         ------                     Light Blue Top Hold[[id number]]                              In process                 Gold Top Hold[[id number]]                                    In process                   Please view results for these tests on the individual orders.   LIGHT BLUE TOP HOLD   GOLD TOP HOLD     EKG Readings: (Independently Interpreted)   Afib with ; non acute, non ischemic appearing   ECG Results              EKG 12-lead (In process)  Result time 02/18/23 13:14:57      In process by Interface, [facility] (02/18/23 13:14:57)                   Narrative:    Test Reason : R06.00,    Vent. Rate : 105 BPM     Atrial Rate : 115 BPM     P-R Int : 000 ms          QRS Dur : 084 ms      QT Int : 330 ms       P-R-T Axes : 000 062 007 degrees     QTc Int : 436 ms    Atrial fibrillation with rapid ventricular response  Abnormal ECG  When compared with ECG of 10-FEB-2023 07:39,  No significant change was found    Referred By: [email or web address]   SELF           Confirmed By:                                   Imaging Results              X-Ray Chest AP Portable (Final result)  Result time 02/18/23 14:05:49      Final result by Facundo Caldwell MD (02/18/23 14:05:49)                   Impression:      No acute cardiopulmonary process.      Electronically signed by: Facundo Caldwell  Date:    02/18/2023  Time:    14:05               Narrative:    EXAMINATION:  XR CHEST AP PORTABLE    CLINICAL HISTORY:  Dyspnea, unspecified    TECHNIQUE:  Single view of the chest    COMPARISON:  02/10/2023    FINDINGS:  No focal opacification, pleural effusion, or pneumothorax.    The cardiomediastinal silhouette is within normal limits.    Left-sided cardiac device is unchanged.    No acute osseous abnormality.                        Wet Read by Salvador Ren MD (02/18/23 13:52:42, [facility], Emergency Medicine)    Icd in place, mild  pulmonary vascular congestion vs chronic fibrosis given known copd                                  X-Rays:   Independently Interpreted Readings:   Chest X-Ray: No acute abnormal ;   Medications   diltiaZEM injection 10 mg (10 mg Intravenous Given 2/18/23 1320)   ibuprofen tablet 600 mg (600 mg Oral Given 2/18/23 1614)     Medical Decision Making:   History:   Old Medical Records: I decided to obtain old medical records.  Old Records Summarized: other records.       <> Summary of Records: Previous records confirm history of afib with RVR and recent visit with medication adjustment and early planned fu.  Initial Assessment:   Presents today still in atrial fibrillation but on appropriate medications with recent adjustment. Endorsing chronic symptoms compatible with volume overload but not endorsing any change over recent 2 weeks. Risk found sufficient to warrant expanded evaluation with objective data, augmented efforts at rate control, discussion with internal medicine team.  Differential Diagnosis:   Chronic atrial fibrillation with or without decompensated heart failure, ACS/NSTEMI, electrolyte derangement, pneumonia, pneumothorax  Independently Interpreted Test(s):   I have ordered and independently interpreted X-rays - see prior notes.  I have ordered and independently interpreted EKG Reading(s) - see prior notes  Clinical Tests:   Lab Tests: Ordered and Reviewed  Radiological Study: Ordered and Reviewed  ED Management:  Clinical course improved in the ED with small dose of diltiazem. Objective data resulted and generally reassuring. Case discussed with internal medicine team who are reassured by interval progress with recent medication adjustments. They do plan to see patient in follow up this week.  Other:   I have discussed this case with another health care provider.       <> Summary of the Discussion: Internal medicine is consulted and  plans out patient supervision, discharge home with strict return  precautions / anticipatory guidance.            ED Course as of 02/18/23 1646   Sat Feb 18, 2023   1418 Reassuring hemogram ; [CT]   1435 Bnp essentially unchanged (to modestly improved) relative to nearline time comparisons ; [CT]   1436 Reassuring chemistries ; [CT]   1436 Negative troponin ; [CT]   1437 Negative cpk ; [CT]   1609 Negative utox ; [CT]   1609 Reassuring ua ; [CT]      ED Course User Index  [CT] Salvador Ren MD                 Clinical Impression:   Final diagnoses:  [R06.00] Dyspnea  [I48.91] Atrial fibrillation, unspecified type (Primary)        ED Disposition Condition    Discharge Stable          ED Prescriptions    None       Follow-up Information       Follow up With Specialties Details Why Contact Info    [facility] Emergency Medicine  As needed, If symptoms worsen [address] [facility] [phone]             Salvador Ren MD  02/18/23 2269

## 2023-02-18 NOTE — CONSULTS
[facility] Internal Medicine Consult Note     Date of Admit: 2/18/2023  Current Hospital Day: 1     Reason for Consult:      Atrial Fibrillation, Headaches    Subjective:     History of Present Illness:  Ezra Zhang is a 62 y.o. male who  has a past medical history of Atrial fibrillation, CKD (chronic kidney disease), DM (diabetes mellitus), Dyslipidemia, Heart failure, unspecified, and Smoking.. Internal Medicine is being consulted for Afib and Headaches.    Patient has longstanding hx of afib status post LASHELL and DCCV which was unsuccessful. Recently seen by Cardiology 2/16/2023 with changes to medications: restarted Amio with loading, Toprol 200mg BID, Lasix 40mg increased and started Entresto. He also has an appointment with EP for discussion on further treatments (ablation). He has had headaches past few days and orthopnea and difficulty sleeping. He was noted to have 150s HR in the ED today and rate control was achieved with IV diltiazem 10mg.     Review of Systems:    Review of Systems   Constitutional:  Positive for malaise/fatigue. Negative for chills and fever.   HENT:  Negative for congestion.    Eyes:  Positive for blurred vision.   Respiratory:  Positive for shortness of breath. Negative for cough.    Cardiovascular:  Positive for palpitations and orthopnea. Negative for leg swelling.   Gastrointestinal:  Negative for heartburn and nausea.   Genitourinary:  Negative for dysuria and urgency.   Musculoskeletal:  Negative for myalgias.   Neurological:  Positive for headaches. Negative for focal weakness.   Psychiatric/Behavioral:  Negative for depression.        Past Medical History: See above    Past Surgical History:  Past Surgical History:   Procedure Laterality Date    CARDIOVERSION      coronary arteriograph      INSERT / REPLACE / REMOVE PACEMAKER      left  heart catherization         Allergies:  Review of patient's allergies indicates:  No Known Allergies    Home Medications:  Prior to Admission  medications    Medication Sig Start Date End Date Taking? Authorizing Provider   GARRY-MISTI FASTCLIX LANCET DRUM Misc Apply topically once daily. 3/8/22   Historical Provider   MARIBELL GUIDE TEST STRIPS Strp USE AS DIRECTED once daily (keep log) 3/8/22   Historical Provider   albuterol-ipratropium (DUO-NEB) 2.5 mg-0.5 mg/3 mL nebulizer solution Take 3 mLs by nebulization every 6 (six) hours as needed for Wheezing. 12/13/22   CARLO Mcgowan   amiodarone (PACERONE) 200 MG Tab Take 1 tablet (200 mg total) by mouth once daily. 2/16/23 2/16/24  Mandi Garza MD   atorvastatin (LIPITOR) 80 MG tablet Take 1 tablet (80 mg total) by mouth once daily. 10/4/22   Mandi Garza MD   cetirizine (ZYRTEC) 10 MG tablet Take 1 tablet (10 mg total) by mouth once daily. 9/13/22   CARLO Mcgowan   clotrimazole-betamethasone 1-0.05% (LOTRISONE) cream Apply 2 g topically 2 (two) times daily. 2/18/22   Historical Provider   ELIQUIS 5 mg Tab Take 1 tablet (5 mg total) by mouth 2 (two) times daily. 2/16/23   Mandi Garza MD   famotidine (PEPCID) 20 MG tablet Take 1 tablet (20 mg total) by mouth 2 (two) times daily. 9/13/22 9/13/23  CARLO Mgcowan   fluticasone propion-salmeterol 115-21 mcg/dose (ADVAIR HFA) 115-21 mcg/actuation HFAA inhaler Inhale 2 puffs into the lungs every 12 (twelve) hours. 9/13/22   CARLO Mcgowan   fluticasone propionate (FLONASE ALLERGY RELIEF) 50 mcg/actuation nasal spray 2 sprays (100 mcg total) by Each Nostril route Daily. 12/13/22   CARLO Mcgowan   furosemide (LASIX) 40 MG tablet Take 1 tablet (40 mg total) by mouth once daily. Refilled per Cardio orders. 2/16/23 8/15/23  Mandi Garza MD   glipiZIDE (GLUCOTROL) 5 MG tablet Take 5 mg by mouth once daily. 3/8/22   Historical Provider   lancets-blood glucose strips 30 gauge [facility]   Accucheck blood glucose test strips and lancets, See Instructions, Check CBG once daily and keep log., # 100 EA, 11 Refill(s),  "Pharmacy: [facility], 185, cm, Height/Length Dosing, 03/08/22 8:10:00 CST, 96, kg, Weight Dosing, 03/08/22 8:10:00 CST 3/8/22   Historical Provider   metoprolol succinate (TOPROL-XL) 200 MG 24 hr tablet Take 1 tablet (200 mg total) by mouth 2 (two) times daily. 2/16/23 2/16/24  Mandi Garza MD   nicotine (NICODERM CQ) 14 mg/24 hr Place 1 patch onto the skin once daily. 7/19/22   Mandi Garza MD   nicotine polacrilex (NICORETTE) 4 MG Gum Take 1 each (4 mg total) by mouth as needed (smoking urge). 7/19/22   Mandi Garza MD   promethazine-codeine 6.25-10 mg/5 ml (PHENERGAN WITH CODEINE) 6.25-10 mg/5 mL syrup Take 5 mLs by mouth every 6 (six) hours as needed for Cough. 12/13/22   Dayami Squires, ALYSEP   sacubitriL-valsartan (ENTRESTO) 24-26 mg per tablet Take 1 tablet by mouth 2 (two) times daily. 2/16/23   Mandi Garza MD   tamsulosin (FLOMAX) 0.4 mg Cap Take 1 capsule by mouth once daily. 3/14/22   Historical Provider       Family History:  Family History   Problem Relation Age of Onset    Heart failure Mother     Cataracts Mother     Heart disease Mother     Glaucoma Mother     Peripheral vascular disease Mother     Hypertension Father        Social History:  Social History     Tobacco Use    Smoking status: Former     Packs/day: 0.25     Years: 20.00     Pack years: 5.00     Types: Cigarettes    Smokeless tobacco: Never    Tobacco comments:     Taking patches   Substance Use Topics    Alcohol use: Never     Alcohol/week: 1.0 standard drink     Types: 1 Cans of beer per week     Comment: not often    Drug use: Never          Objective:   Vitals  Vitals  BP: (!) 146/124  Temp: 97.5 °F (36.4 °C)  Temp src: Tympanic  Pulse: 77  Resp: (!) 22  SpO2: 97 %  Height: 6' 2" (188 cm)  Weight: 99.8 kg (220 lb)    Physical Examination:  General: Awake, alert, & oriented to person, place & time. No acute distress  Psychiatric: Mood and affect normal  HEENT: Normocephalic, atraumatic. Face " symmetric.  Cardiovascular: Irregular rate and rhythm. Normal S1 & S2 w/out murmurs, rubs or gallops.  No JVD appreciated.  2+ pulses throughout.  Pulmonary: Bilateral symmetric chest rise. Non-labored, mild wheeze noted on left lung on expiration   Abdominal:  Soft, nontender, nondistended. Bowel sounds present  Extremities: No clubbing, cyanosis or edema.  Skin:  Exposed skin is warm & dry.  Neuro:   Patient moves all extremities equally. Sensation intact bilateraly.    Laboratory:  CBC:   Lab Results   Component Value Date    WBC 8.5 02/18/2023    HGB 15.8 02/18/2023    HCT 48.2 02/18/2023     02/18/2023    MCV 99.8 (H) 02/18/2023    RDW 13.5 02/18/2023     BMP:   Lab Results   Component Value Date     02/18/2023    K 4.3 02/18/2023    CHLORIDE 109 (H) 02/18/2023    CO2 25 02/18/2023    BUN 17.8 02/18/2023    CREATININE 1.24 (H) 02/18/2023    GLUCOSE 104 02/18/2023    CALCIUM 9.8 02/18/2023     LFTs:   Lab Results   Component Value Date    ALBUMIN 4.2 02/18/2023    BILITOT 0.7 02/18/2023    BILIDIR 0.2 08/25/2021    IBILI 0.20 08/25/2021    AST 17 02/18/2023    ALKPHOS 123 02/18/2023    ALT 35 02/18/2023     Coags:   Lab Results   Component Value Date    INR 1.16 02/13/2019    PROTIME 14.7 (H) 02/13/2019    PTT 35.2 11/30/2022     FLP:   Lab Results   Component Value Date    CHOL 141 09/06/2022    HDL 38 09/06/2022    LDL 88.00 09/06/2022    TRIG 75 09/06/2022     DM:   Lab Results   Component Value Date    HGBA1C 5.3 11/30/2022    HGBA1C 5.2 03/02/2022    HGBA1C 5.1 08/25/2021    CREATININE 1.24 (H) 02/18/2023     Thyroid:   Lab Results   Component Value Date    TSH 1.131 01/25/2023    [id number] 1.16 03/02/2021     Anemia:   Lab Results   Component Value Date    IRON 83 03/27/2018    FERRITIN 295.1 03/27/2018    TRANS 212.0 03/27/2018    TIBC 177 (L) 03/27/2018    [id number] 242 03/27/2018    FOLATE 19.8 (H) 03/27/2018     Cardiac:   Lab Results   Component Value Date    TROPONINI 0.038 02/18/2023     CPK 60 02/18/2023    CPKMB 1.7 02/18/2023    .7 (H) 02/18/2023     Urinalysis:   Lab Results   Component Value Date    COLORU LIGHT YELLOW 08/25/2021    PHUA 7.0 02/18/2023    NITRITE Negative 08/25/2021    KETONESU Negative 08/25/2021    UROBILINOGEN Normal 02/18/2023    WBCUA 0-5 02/18/2023       Trended Cardiac Data:  Recent Labs   Lab 02/18/23  1353   TROPONINI 0.038   CPK 60   CPKMB 1.7   .7*     EKG today: Afib RVR      Radiology:  X-Ray Chest AP Portable    Result Date: 2/18/2023  No acute cardiopulmonary process. Electronically signed by: Facundo Caldwell Date:    02/18/2023 Time:    14:05       Assessment & Plan:   Longstanding Persistent Atrial Fibrillation in RVR  NICMO with HFrEF (35%)  - Improved with 10mg Diltiazem, rate controlled  - BNP improved from previous values. Trop and EKG negative for ischemia. Renal indices at baseline.  - Advised to continue on current regimen started by Cardiology 2/16/2023, and to keep appointment with EP 2/23/2023  - Headache likely multifactorial due to underlying conditions, sleep issues that may be resolved on an outpatient basis  - Gave strict precautions to return to ED    Cristian Peterson MD  [facility] Internal Medicine PGY-1

## 2023-02-20 ENCOUNTER — OFFICE VISIT (OUTPATIENT)
Dept: INTERNAL MEDICINE | Facility: CLINIC | Age: 63
End: 2023-02-20
Payer: MEDICAID

## 2023-02-20 VITALS
SYSTOLIC BLOOD PRESSURE: 138 MMHG | OXYGEN SATURATION: 98 % | BODY MASS INDEX: 28.88 KG/M2 | RESPIRATION RATE: 18 BRPM | DIASTOLIC BLOOD PRESSURE: 100 MMHG | HEART RATE: 98 BPM | TEMPERATURE: 98 F | WEIGHT: 225 LBS | HEIGHT: 74 IN

## 2023-02-20 DIAGNOSIS — I48.91 ATRIAL FIBRILLATION WITH RVR: ICD-10-CM

## 2023-02-20 DIAGNOSIS — I10 HYPERTENSION, UNSPECIFIED TYPE: ICD-10-CM

## 2023-02-20 DIAGNOSIS — J44.9 CHRONIC OBSTRUCTIVE PULMONARY DISEASE, UNSPECIFIED COPD TYPE: ICD-10-CM

## 2023-02-20 DIAGNOSIS — I50.9 CONGESTIVE HEART FAILURE, UNSPECIFIED HF CHRONICITY, UNSPECIFIED HEART FAILURE TYPE: Primary | ICD-10-CM

## 2023-02-20 DIAGNOSIS — E78.5 HYPERLIPIDEMIA, UNSPECIFIED HYPERLIPIDEMIA TYPE: ICD-10-CM

## 2023-02-20 PROCEDURE — 99215 OFFICE O/P EST HI 40 MIN: CPT | Mod: PBBFAC

## 2023-02-20 NOTE — PROGRESS NOTES
[facility]    Subjective:        Ezra Zhang is a 62 y.o. male who  has a past medical history of Atrial fibrillation, CKD (chronic kidney disease), DM (diabetes mellitus), Dyslipidemia, Heart failure, unspecified, and Smoking.  He presents to clinic today for follow-up of chronic medical conditions.  Patient was admitted to the hospital 01/25/2023 and discharged 01/28/2023 for CHF exacerbation and AFib with RVR.  Upon discharge the patient's metoprolol was up titrated to 150 mg b.i.d. and follow-up with Cardiology with needing referral to EP.  Patient states that he is feeling better since increasing his Lasix to 40 mg.  Were then patient was having difficulty ambulating up become short of breath and could barely talk without becoming short of breath which is now better.  Patient denies lightheadedness, dizziness, chest pain, abdominal pain, nausea, vomiting, hematuria, hematochezia, dysuria or peripheral edema.    Review of Systems:  10 point ROS negative except for HPI    Objective:   Vital Signs:  Vitals:    02/20/23 1247   BP: (!) 138/100   Pulse: 98   Resp: 18   Temp: 98.2 °F (36.8 °C)            Body mass index is 28.89 kg/m².     General:  Well developed, well nourished, no acute respiratory distress  Head: Normocephalic, atraumatic  Eyes: PERRL, EOMI, anicteric sclera  Throat: No posterior pharyngeal erythema or exudate, no tonsillar exudate  Neck: supple, normal ROM, no thyromegaly   CVS:  Irregularly irregular  Resp:  Lungs clear to auscultation bilaterally, no wheezes, rales, or rhonci  GI:  Abdomen soft, non-tender, non-distended, normoactive bowel sounds  MSK:  No muscle atrophy, cyanosis, peripheral edema, full range of motion  Skin:  No rashes, ulcers, erythema  Neuro:  Alert and oriented x3, No focal neuro deficits, CNII-XII grossly intact  Psych:  Appropriate mood and affect         Laboratory:  Lab Results   Component Value Date    WBC 8.5 02/18/2023    HGB 15.8  02/18/2023    HCT 48.2 02/18/2023     02/18/2023    MCV 99.8 (H) 02/18/2023    RDW 13.5 02/18/2023    Lab Results   Component Value Date     02/18/2023    K 4.3 02/18/2023    CO2 25 02/18/2023    BUN 17.8 02/18/2023    CREATININE 1.24 (H) 02/18/2023    CALCIUM 9.8 02/18/2023    MG 2.20 01/28/2023    PHOS 4.0 01/28/2023      Lab Results   Component Value Date    HGBA1C 5.3 11/30/2022    .4 11/30/2022    CREATININE 1.24 (H) 02/18/2023    CREATRANDUR 51.5 (L) 11/30/2022    PROTEINURINE 7.1 11/28/2022    Lab Results   Component Value Date    TSH 1.131 01/25/2023    [id number] 1.16 03/02/2021                .labDM:   Lab Results   Component Value Date    HGBA1C 5.3 11/30/2022    .4 11/30/2022    CREATININE 1.24 (H) 02/18/2023    CREATRANDUR 51.5 (L) 11/30/2022    PROTEINURINE 7.1 11/28/2022     Thyroid:   Lab Results   Component Value Date    TSH 1.131 01/25/2023    [id number] 1.16 03/02/2021     Anemia:   Lab Results   Component Value Date    IRON 83 03/27/2018    TIBC 177 (L) 03/27/2018    FERRITIN 295.1 03/27/2018    [id number] 242 03/27/2018    FOLATE 19.8 (H) 03/27/2018       Current Medications:  Current Outpatient Medications   Medication Instructions    ACCU-CHEK FASTCLIX LANCET DRUM Misc Topical (Top), Daily    ACCU-CHEK GUIDE TEST STRIPS Strp USE AS DIRECTED once daily (keep log)    albuterol-ipratropium (DUO-NEB) 2.5 mg-0.5 mg/3 mL nebulizer solution 3 mLs, Nebulization, Every 6 hours PRN    amiodarone (PACERONE) 200 mg, Oral, Daily    atorvastatin (LIPITOR) 80 mg, Oral, Daily    cetirizine (ZYRTEC) 10 mg, Oral, Daily    clotrimazole-betamethasone 1-0.05% (LOTRISONE) cream 2 g, Topical (Top), 2 times daily    ELIQUIS 5 mg, Oral, 2 times daily    famotidine (PEPCID) 20 mg, Oral, 2 times daily    fluticasone propion-salmeterol 115-21 mcg/dose (ADVAIR HFA) 115-21 mcg/actuation HFAA inhaler 2 puffs, Inhalation, Every 12 hours    fluticasone propionate (FLONASE ALLERGY RELIEF) 100 mcg,  Each Nostril, Daily    furosemide (LASIX) 40 mg, Oral, Daily, Refilled per Cardio orders.    glipiZIDE (GLUCOTROL) 5 mg, Oral, Daily    lancets-blood glucose strips 30 gauge Cmpk   Accucheck blood glucose test strips and lancets, See Instructions, Check CBG once daily and keep log., # 100 EA, 11 Refill(s), Pharmacy: [facility], 185, cm, Height/Length Dosing, 03/08/22 8:10:00 CST, 96, kg, Weight Dosing, 03/08/22 8:10:00 CST    metoprolol succinate (TOPROL-XL) 200 mg, Oral, 2 times daily    nicotine (NICODERM CQ) 14 mg/24 hr 1 patch, Transdermal, Daily    nicotine polacrilex (NICORETTE) 4 mg, Oral, As needed (PRN)    promethazine-codeine 6.25-10 mg/5 ml (PHENERGAN WITH CODEINE) 6.25-10 mg/5 mL syrup 5 mLs, Oral, Every 6 hours PRN    sacubitriL-valsartan (ENTRESTO) 24-26 mg per tablet 1 tablet, Oral, 2 times daily    tamsulosin (FLOMAX) 0.4 mg Cap 1 capsule, Oral, Daily        Assessment and Plan:      AFib   - continue metoprolol 200 mg b.i.d.  - Eliquis 5 mg b.i.d.  - continue amiodarone 200 mg daily  - referred to Dr. Anguiano for  ablation on 02/23/2023  -continue follow-up cardiology    HFrEF EF 35%  Nonischemic cardiomyopathy  -echocardiogram on 01/25/2023 revealed EF 35% and left atrial dilation of 4.9 cm  -continue Entresto 24-26 mg twice a day  -continue Lasix 40 mg daily    Hypertension   -Lasix 40 mg   -Entresto 24-26 mg twice a day   -metoprolol 200 mg twice a day    Hyperlipidemia   -continue Lipitor 80 mg    COPD  -continue Advair and DuoNeb        Health Maintenance   Topic Date Due    Foot Exam  03/08/2023    Eye Exam  03/08/2023    Hemoglobin A1c  05/30/2023    Lipid Panel  09/06/2023    PROSTATE-SPECIFIC ANTIGEN  11/30/2023    Low Dose Statin  02/16/2024    TETANUS VACCINE  12/21/2026    Hepatitis C Screening  Completed                  Ori Swartz MD

## 2023-02-20 NOTE — PROGRESS NOTES
I have reviewed and concur with the resident's history, physical, assessment, and plan.  I have discussed with him all issues related to the diagnosis, workup and treatment plan. Care provided as reasonable and necessary.    Ranjit Box MD  [facility]

## 2023-03-07 ENCOUNTER — LAB VISIT (OUTPATIENT)
Dept: LAB | Facility: HOSPITAL | Age: 63
End: 2023-03-07
Attending: NURSE PRACTITIONER
Payer: MEDICAID

## 2023-03-07 ENCOUNTER — TELEPHONE (OUTPATIENT)
Dept: INTERNAL MEDICINE | Facility: CLINIC | Age: 63
End: 2023-03-07
Payer: MEDICAID

## 2023-03-07 DIAGNOSIS — I50.20 HFREF (HEART FAILURE WITH REDUCED EJECTION FRACTION): ICD-10-CM

## 2023-03-07 DIAGNOSIS — E78.5 HYPERLIPIDEMIA, UNSPECIFIED HYPERLIPIDEMIA TYPE: ICD-10-CM

## 2023-03-07 LAB
ANION GAP SERPL CALC-SCNC: 8 MEQ/L
BUN SERPL-MCNC: 22.4 MG/DL (ref 8.4–25.7)
CALCIUM SERPL-MCNC: 9.5 MG/DL (ref 8.8–10)
CHLORIDE SERPL-SCNC: 109 MMOL/L (ref 98–107)
CHOLEST SERPL-MCNC: 100 MG/DL
CHOLEST/HDLC SERPL: 3 {RATIO} (ref 0–5)
CO2 SERPL-SCNC: 26 MMOL/L (ref 23–31)
CREAT SERPL-MCNC: 1.47 MG/DL (ref 0.73–1.18)
CREAT/UREA NIT SERPL: 15
GFR SERPLBLD CREATININE-BSD FMLA CKD-EPI: 53 MLS/MIN/1.73/M2
GLUCOSE SERPL-MCNC: 84 MG/DL (ref 82–115)
HDLC SERPL-MCNC: 33 MG/DL (ref 35–60)
LDLC SERPL CALC-MCNC: 56 MG/DL (ref 50–140)
MAGNESIUM SERPL-MCNC: 2.1 MG/DL (ref 1.6–2.6)
POTASSIUM SERPL-SCNC: 4.3 MMOL/L (ref 3.5–5.1)
SODIUM SERPL-SCNC: 143 MMOL/L (ref 136–145)
TRIGL SERPL-MCNC: 55 MG/DL (ref 34–140)
VLDLC SERPL CALC-MCNC: 11 MG/DL

## 2023-03-07 PROCEDURE — 36415 COLL VENOUS BLD VENIPUNCTURE: CPT

## 2023-03-07 PROCEDURE — 83735 ASSAY OF MAGNESIUM: CPT

## 2023-03-07 PROCEDURE — 80048 BASIC METABOLIC PNL TOTAL CA: CPT

## 2023-03-07 PROCEDURE — 80061 LIPID PANEL: CPT

## 2023-03-07 NOTE — TELEPHONE ENCOUNTER
Attempted to contact patient to give test results, no answer left voicemail to return call at [phone].

## 2023-03-08 ENCOUNTER — TELEPHONE (OUTPATIENT)
Dept: INTERNAL MEDICINE | Facility: CLINIC | Age: 63
End: 2023-03-08
Payer: MEDICAID

## 2023-03-08 NOTE — TELEPHONE ENCOUNTER
Pt and pt sister called back but was unable to give results and cristofer was in clinic pt requested a call back with those results.

## 2023-03-08 NOTE — TELEPHONE ENCOUNTER
Spoke with patient about his CAT SCAN being denied due to him not completing his physical therapy. Patient explained to me his has been in the hospital and has a couple of procedures to do. He informed me when he completes his procedures he will restart his physical therapy sessions.

## 2023-03-14 ENCOUNTER — CLINICAL SUPPORT (OUTPATIENT)
Dept: INTERNAL MEDICINE | Facility: CLINIC | Age: 63
End: 2023-03-14
Attending: NURSE PRACTITIONER
Payer: MEDICAID

## 2023-03-14 ENCOUNTER — OFFICE VISIT (OUTPATIENT)
Dept: INTERNAL MEDICINE | Facility: CLINIC | Age: 63
End: 2023-03-14
Payer: MEDICAID

## 2023-03-14 VITALS
WEIGHT: 231 LBS | BODY MASS INDEX: 29.65 KG/M2 | DIASTOLIC BLOOD PRESSURE: 88 MMHG | HEART RATE: 91 BPM | HEIGHT: 74 IN | SYSTOLIC BLOOD PRESSURE: 132 MMHG | TEMPERATURE: 98 F | RESPIRATION RATE: 18 BRPM

## 2023-03-14 DIAGNOSIS — I10 PRIMARY HYPERTENSION: ICD-10-CM

## 2023-03-14 DIAGNOSIS — E55.9 VITAMIN D DEFICIENCY: ICD-10-CM

## 2023-03-14 DIAGNOSIS — E78.5 HYPERLIPIDEMIA LDL GOAL <70: Primary | ICD-10-CM

## 2023-03-14 DIAGNOSIS — G89.29 CHRONIC BACK PAIN, UNSPECIFIED BACK LOCATION, UNSPECIFIED BACK PAIN LATERALITY: ICD-10-CM

## 2023-03-14 DIAGNOSIS — I48.91 ATRIAL FIBRILLATION WITH RVR: ICD-10-CM

## 2023-03-14 DIAGNOSIS — M54.9 CHRONIC BACK PAIN, UNSPECIFIED BACK LOCATION, UNSPECIFIED BACK PAIN LATERALITY: ICD-10-CM

## 2023-03-14 DIAGNOSIS — I50.22 CHRONIC SYSTOLIC HEART FAILURE: ICD-10-CM

## 2023-03-14 DIAGNOSIS — J44.89 ASTHMA WITH COPD: ICD-10-CM

## 2023-03-14 DIAGNOSIS — E11.9 CONTROLLED TYPE 2 DIABETES MELLITUS WITHOUT COMPLICATION, WITHOUT LONG-TERM CURRENT USE OF INSULIN: Primary | ICD-10-CM

## 2023-03-14 DIAGNOSIS — Z00.00 WELL ADULT EXAM: ICD-10-CM

## 2023-03-14 DIAGNOSIS — E11.9 CONTROLLED TYPE 2 DIABETES MELLITUS WITHOUT COMPLICATION, WITHOUT LONG-TERM CURRENT USE OF INSULIN: ICD-10-CM

## 2023-03-14 DIAGNOSIS — F41.9 ANXIETY: ICD-10-CM

## 2023-03-14 DIAGNOSIS — G47.00 INSOMNIA, UNSPECIFIED TYPE: ICD-10-CM

## 2023-03-14 PROCEDURE — 4010F ACE/ARB THERAPY RXD/TAKEN: CPT | Mod: CPTII,,, | Performed by: NURSE PRACTITIONER

## 2023-03-14 PROCEDURE — 99214 PR OFFICE/OUTPT VISIT, EST, LEVL IV, 30-39 MIN: ICD-10-PCS | Mod: S$PBB,25,, | Performed by: NURSE PRACTITIONER

## 2023-03-14 PROCEDURE — 92228 IMG RTA DETC/MNTR DS PHY/QHP: CPT | Mod: PBBFAC,59 | Performed by: NURSE PRACTITIONER

## 2023-03-14 PROCEDURE — 1159F MED LIST DOCD IN RCRD: CPT | Mod: CPTII,,, | Performed by: NURSE PRACTITIONER

## 2023-03-14 PROCEDURE — 3008F BODY MASS INDEX DOCD: CPT | Mod: CPTII,,, | Performed by: NURSE PRACTITIONER

## 2023-03-14 PROCEDURE — 3079F PR MOST RECENT DIASTOLIC BLOOD PRESSURE 80-89 MM HG: ICD-10-PCS | Mod: CPTII,,, | Performed by: NURSE PRACTITIONER

## 2023-03-14 PROCEDURE — 92228 DIABETIC EYE SCREENING PHOTO: ICD-10-PCS | Mod: 26,S$PBB,, | Performed by: NURSE PRACTITIONER

## 2023-03-14 PROCEDURE — 4010F PR ACE/ARB THEARPY RXD/TAKEN: ICD-10-PCS | Mod: CPTII,,, | Performed by: NURSE PRACTITIONER

## 2023-03-14 PROCEDURE — 3075F SYST BP GE 130 - 139MM HG: CPT | Mod: CPTII,,, | Performed by: NURSE PRACTITIONER

## 2023-03-14 PROCEDURE — 1159F PR MEDICATION LIST DOCUMENTED IN MEDICAL RECORD: ICD-10-PCS | Mod: CPTII,,, | Performed by: NURSE PRACTITIONER

## 2023-03-14 PROCEDURE — 99215 OFFICE O/P EST HI 40 MIN: CPT | Mod: PBBFAC | Performed by: NURSE PRACTITIONER

## 2023-03-14 PROCEDURE — 3008F PR BODY MASS INDEX (BMI) DOCUMENTED: ICD-10-PCS | Mod: CPTII,,, | Performed by: NURSE PRACTITIONER

## 2023-03-14 PROCEDURE — 3079F DIAST BP 80-89 MM HG: CPT | Mod: CPTII,,, | Performed by: NURSE PRACTITIONER

## 2023-03-14 PROCEDURE — 99214 OFFICE O/P EST MOD 30 MIN: CPT | Mod: S$PBB,25,, | Performed by: NURSE PRACTITIONER

## 2023-03-14 PROCEDURE — 3075F PR MOST RECENT SYSTOLIC BLOOD PRESS GE 130-139MM HG: ICD-10-PCS | Mod: CPTII,,, | Performed by: NURSE PRACTITIONER

## 2023-03-14 RX ORDER — FLUTICASONE PROPIONATE 50 MCG
2 SPRAY, SUSPENSION (ML) NASAL DAILY
Qty: 18 G | Refills: 6 | Status: SHIPPED | OUTPATIENT
Start: 2023-03-14 | End: 2023-09-18 | Stop reason: SDUPTHER

## 2023-03-14 RX ORDER — PROMETHAZINE HYDROCHLORIDE AND CODEINE PHOSPHATE 6.25; 1 MG/5ML; MG/5ML
5 SOLUTION ORAL EVERY 6 HOURS PRN
Qty: 240 ML | Refills: 1 | Status: SHIPPED | OUTPATIENT
Start: 2023-03-14 | End: 2023-03-14 | Stop reason: SDUPTHER

## 2023-03-14 RX ORDER — TRAZODONE HYDROCHLORIDE 50 MG/1
25 TABLET ORAL NIGHTLY PRN
Qty: 45 TABLET | Refills: 1 | Status: SHIPPED | OUTPATIENT
Start: 2023-03-14 | End: 2023-05-15 | Stop reason: SDUPTHER

## 2023-03-14 RX ORDER — PROMETHAZINE HYDROCHLORIDE AND CODEINE PHOSPHATE 6.25; 1 MG/5ML; MG/5ML
5 SOLUTION ORAL EVERY 6 HOURS PRN
Qty: 240 ML | Refills: 1 | Status: SHIPPED | OUTPATIENT
Start: 2023-03-14 | End: 2023-03-14

## 2023-03-14 RX ORDER — IPRATROPIUM BROMIDE 0.5 MG/2.5ML
500 SOLUTION RESPIRATORY (INHALATION) 4 TIMES DAILY
Qty: 100 ML | Refills: 6 | Status: SHIPPED | OUTPATIENT
Start: 2023-03-14 | End: 2023-06-14 | Stop reason: SDUPTHER

## 2023-03-14 RX ORDER — PROMETHAZINE HYDROCHLORIDE AND CODEINE PHOSPHATE 6.25; 1 MG/5ML; MG/5ML
5 SOLUTION ORAL EVERY 4 HOURS PRN
Qty: 240 ML | Refills: 1 | Status: SHIPPED | OUTPATIENT
Start: 2023-03-14 | End: 2023-03-14 | Stop reason: SDUPTHER

## 2023-03-14 RX ORDER — PROMETHAZINE HYDROCHLORIDE AND CODEINE PHOSPHATE 6.25; 1 MG/5ML; MG/5ML
5 SOLUTION ORAL EVERY 4 HOURS PRN
Qty: 240 ML | Refills: 1 | Status: SHIPPED | OUTPATIENT
Start: 2023-03-14 | End: 2023-03-24

## 2023-03-14 RX ORDER — ALBUTEROL SULFATE 0.83 MG/ML
2.5 SOLUTION RESPIRATORY (INHALATION) EVERY 6 HOURS PRN
Qty: 100 EACH | Refills: 6 | Status: SHIPPED | OUTPATIENT
Start: 2023-03-14 | End: 2023-04-04 | Stop reason: SDUPTHER

## 2023-03-14 RX ORDER — FLUTICASONE PROPIONATE AND SALMETEROL XINAFOATE 115; 21 UG/1; UG/1
2 AEROSOL, METERED RESPIRATORY (INHALATION) EVERY 12 HOURS
Qty: 12 G | Refills: 6 | Status: SHIPPED | OUTPATIENT
Start: 2023-03-14 | End: 2023-06-14

## 2023-03-14 RX ORDER — CETIRIZINE HYDROCHLORIDE 10 MG/1
10 TABLET ORAL DAILY
Qty: 90 TABLET | Refills: 1 | Status: SHIPPED | OUTPATIENT
Start: 2023-03-14 | End: 2023-09-18 | Stop reason: SDUPTHER

## 2023-03-14 NOTE — PROGRESS NOTES
Patient ID: [id number]     Chief Complaint: LAB RESULTS and Follow-up (Lab results)        HPI:     HPI      Ezra Zhang is a 62 y.o. male here today for a follow up.         ----------------------------  Atrial fibrillation  CKD (chronic kidney disease)  DM (diabetes mellitus)  Dyslipidemia  Heart failure, unspecified  Smoking     Past Surgical History:   Procedure Laterality Date    CARDIOVERSION      coronary arteriograph      INSERT / REPLACE / REMOVE PACEMAKER      left  heart catherization         Review of patient's allergies indicates:  No Known Allergies    Current Outpatient Medications   Medication Instructions    ACCU-CHEK FASTCLIX LANCET DRUM Misc Topical (Top), Daily    ACCU-CHEK GUIDE TEST STRIPS Strp USE AS DIRECTED once daily (keep log)    albuterol-ipratropium (DUO-NEB) 2.5 mg-0.5 mg/3 mL nebulizer solution 3 mLs, Nebulization, Every 6 hours PRN    amiodarone (PACERONE) 200 mg, Oral, Daily    atorvastatin (LIPITOR) 80 mg, Oral, Daily    cetirizine (ZYRTEC) 10 mg, Oral, Daily    clotrimazole-betamethasone 1-0.05% (LOTRISONE) cream 2 g, Topical (Top), 2 times daily    ELIQUIS 5 mg, Oral, 2 times daily    famotidine (PEPCID) 20 mg, Oral, 2 times daily    fluticasone propion-salmeterol 115-21 mcg/dose (ADVAIR HFA) 115-21 mcg/actuation HFAA inhaler 2 puffs, Inhalation, Every 12 hours    fluticasone propionate (FLONASE ALLERGY RELIEF) 100 mcg, Each Nostril, Daily    furosemide (LASIX) 40 mg, Oral, Daily, Refilled per Cardio orders.    glipiZIDE (GLUCOTROL) 5 mg, Oral, Daily    lancets-blood glucose strips 30 gauge Cmpk   Accucheck blood glucose test strips and lancets, See Instructions, Check CBG once daily and keep log., # 100 EA, 11 Refill(s), Pharmacy: [facility], 185, cm, Height/Length Dosing, 03/08/22 8:10:00 CST, 96, kg, Weight Dosing, 03/08/22 8:10:00 CST    metoprolol succinate (TOPROL-XL) 200 mg, Oral, 2 times daily    nicotine (NICODERM CQ) 14 mg/24 hr 1 patch, Transdermal, Daily     nicotine polacrilex (NICORETTE) 4 mg, Oral, As needed (PRN)    promethazine-codeine 6.25-10 mg/5 ml (PHENERGAN WITH CODEINE) 6.25-10 mg/5 mL syrup 5 mLs, Oral, Every 6 hours PRN    sacubitriL-valsartan (ENTRESTO) 24-26 mg per tablet 1 tablet, Oral, 2 times daily    tamsulosin (FLOMAX) 0.4 mg Cap 1 capsule, Oral, Daily       Social History     Socioeconomic History    Marital status: Single   Tobacco Use    Smoking status: Former     Packs/day: 0.25     Years: 20.00     Pack years: 5.00     Types: Cigarettes    Smokeless tobacco: Never    Tobacco comments:     Taking patches   Substance and Sexual Activity    Alcohol use: Never     Alcohol/week: 1.0 standard drink     Types: 1 Cans of beer per week     Comment: not often    Drug use: Never     Social Determinants of Health     Financial Resource Strain: Low Risk     Difficulty of Paying Living Expenses: Not very hard   Food Insecurity: Food Insecurity Present    Worried About Running Out of Food in the Last Year: Sometimes true    Ran Out of Food in the Last Year: Never true   Transportation Needs: No Transportation Needs    Lack of Transportation (Medical): No    Lack of Transportation (Non-Medical): No   Physical Activity: Insufficiently Active    Days of Exercise per Week: 4 days    Minutes of Exercise per Session: 30 min   Stress: No Stress Concern Present    Feeling of Stress : Not at all   Social Connections: Moderately Isolated    Frequency of Communication with Friends and Family: More than three times a week    Frequency of Social Gatherings with Friends and Family: More than three times a week    Attends Shinto Services: More than 4 times per year    Active Member of Clubs or Organizations: No    Attends Club or Organization Meetings: Never    Marital Status: Never    Housing Stability: Low Risk     Unable to Pay for Housing in the Last Year: No    Number of Places Lived in the Last Year: 1    Unstable Housing in the Last Year: No        Family  "History   Problem Relation Age of Onset    Heart failure Mother     Cataracts Mother     Heart disease Mother     Glaucoma Mother     Peripheral vascular disease Mother     Hypertension Father         Patient Care Team:  CARLO Mcgowan as PCP - General (Family Medicine)  CARLO Rivera as Nurse Practitioner (Nephrology)     Subjective:     Review of Systems     See HPI for details    Constitutional: Denies Change in appetite. Denies Chills. Denies Fever. Denies Night sweats.  Eye: Denies Blurred vision. Denies Discharge. Denies Eye pain.  ENT: Denies Decreased hearing. Denies Sore throat. Denies Swollen glands.  Respiratory: Denies Cough. Denies Shortness of breath. Denies Shortness of breath with exertion. Denies Wheezing.  Cardiovascular: DeniesChest pain at rest. Denies Chest pain with exertion. Denies Irregular heartbeat. Denies Palpitations. Denies Edema.  Gastrointestinal: Denies Abdominal pain. DeniesDiarrhea. Denies Nausea. Denies Vomiting. Denies Hematemesis or Hematochezia.  Genitourinary: Denies Dysuria. Denies Urinary frequency. Denies Urinary urgency. Denies Blood in urine.  Endocrine: Denies Cold intolerance. Denies Excessive thirst. Denies Heat intolerance. Denies Weight loss. Denies Weight gain.  Musculoskeletal: Denies Painful joints. Denies Weakness.  Integumentary: Denies Rash. Denies Itching. Denies Dry skin.  Neurologic: Denies Dizziness. Denies Fainting. Denies Headache.  Psychiatric: Denies Depression. Denies Anxiety. Denies Suicidal/Homicidal ideations.    All Other ROS: Negative except as stated in HPI.       Objective:     Visit Vitals  /88 (BP Location: Left arm, Patient Position: Sitting, BP Method: Large (Automatic))   Pulse 91   Temp 97.9 °F (36.6 °C) (Oral)   Resp 18   Ht 6' 2" (1.88 m)   Wt 104.8 kg (231 lb)   BMI 29.66 kg/m²       Physical Exam    General: Alert and oriented, No acute distress.  Head: Normocephalic, Atraumatic.  Eye: Pupils are equal, round and " reactive to light, Extraocular movements are intact, Sclera non-icteric.  Ears/Nose/Throat: Normal, Mucosa moist,Clear.  Neck/Thyroid: Supple, Non-tender, No carotid bruit, No lymphadenopathy, No JVD, Full range of motion.  Respiratory: Clear to auscultation bilaterally; No wheezes, rales or rhonchi,Non-labored respirations, Symmetrical chest wall expansion.  Cardiovascular: Regular rate and rhythm, S1/S2 normal, No murmurs, rubs or gallops.  Gastrointestinal: Soft, Non-tender, Non-distended, Normal bowel sounds, No palpable organomegaly.  Musculoskeletal: Normal range of motion.  Integumentary: Warm, Dry, Intact, No suspicious lesions or rashes.  Extremities: No clubbing, cyanosis or edema  Neurologic: No focal deficits, Cranial Nerves II-XII are grossly intact, Motor strength normal upper and lower extremities, Sensory exam intact.  Psychiatric: Normal interaction, Coherent speech, Euthymic mood, Appropriate affect       Labs Reviewed:     Chemistry:  Lab Results   Component Value Date     03/07/2023    K 4.3 03/07/2023    CHLORIDE 109 (H) 03/07/2023    BUN 22.4 03/07/2023    CREATININE 1.47 (H) 03/07/2023    EGFRNORACEVR 53 03/07/2023    GLUCOSE 84 03/07/2023    CALCIUM 9.5 03/07/2023    ALKPHOS 123 02/18/2023    LABPROT 7.1 02/18/2023    ALBUMIN 4.2 02/18/2023    BILIDIR 0.2 08/25/2021    IBILI 0.20 08/25/2021    AST 17 02/18/2023    ALT 35 02/18/2023    MG 2.10 03/07/2023    PHOS 4.0 01/28/2023    [id number] 26.6 (L) 11/30/2022        Lab Results   Component Value Date    HGBA1C 5.3 11/30/2022        Hematology:  Lab Results   Component Value Date    WBC 8.5 02/18/2023    HGB 15.8 02/18/2023    HCT 48.2 02/18/2023     02/18/2023       Lipid Panel:  Lab Results   Component Value Date    CHOL 100 03/07/2023    HDL 33 (L) 03/07/2023    LDL 56.00 03/07/2023    TRIG 55 03/07/2023    TOTALCHOLEST 3 03/07/2023        Urine:  Lab Results   Component Value Date    COLORUA Light-Yellow 02/18/2023     APPEARANCEUA Clear 02/18/2023    SGUA 1.012 02/18/2023    PHUA 7.0 02/18/2023    PROTEINUA 1+ (A) 02/18/2023    GLUCOSEUA Normal 02/18/2023    KETONESUA Negative 02/18/2023    BLOODUA Negative 02/18/2023    NITRITESUA Negative 02/18/2023    LEUKOCYTESUR Negative 02/18/2023    RBCUA 0-5 02/18/2023    WBCUA 0-5 02/18/2023    BACTERIA None Seen 02/18/2023    SQEPUA None Seen 02/18/2023    HYALINECASTS 0-2 (A) 02/18/2023    CREATRANDUR 51.5 (L) 11/30/2022    PROTEINURINE 7.1 11/28/2022    UPROTCREA 0.1 11/28/2022        Assessment:       ICD-10-CM ICD-9-CM   1. Hyperlipidemia LDL goal <70  E78.5 272.4   2. Primary hypertension  I10 401.9   3. Vitamin D deficiency  E55.9 268.9   4. Controlled type 2 diabetes mellitus without complication, without long-term current use of insulin  E11.9 250.00   5. Well adult exam  Z00.00 V70.0   6. Chronic back pain, unspecified back location, unspecified back pain laterality  M54.9 724.5    G89.29 338.29   7. Asthma with COPD  J44.9 493.20   8. Chronic systolic heart failure  I50.22 428.22   9. Atrial fibrillation with RVR  I48.91 427.31   10. Anxiety  F41.9 300.00   11. Insomnia, unspecified type  G47.00 780.52        Plan:     1. Hyperlipidemia LDL goal <70  Low fat diet and exercise. Cont Atorvastatin as prescribed.     2. Primary hypertension  Low fat low salt diet and exercise. Cont Lisinopril, , Metoprolol as prescribed.        3. Vitamin D deficiency  Cont OTC Vit D 3 as prescribed.     4. Controlled type 2 diabetes mellitus without complication, without long-term current use of insulin  A1c 5.3. ADA diet and exercise. Cont Glipizide as prescribed. A1c in 6 months. Urine microalbumin 11-30-22, DM eye exam 3-8-22- will get today in clinic, DM foot done today.Pt states he was following Dr Mazariegos for podiatry but states he no longer takes his insurance. [facility] and [facility] for DM foot care.   Protective Sensation (w/ 10 gram monofilament):  Right:  Intact  Left: Intact    Visual Inspection:  Dry Skin -  Bilateral    Pedal Pulses:   Right: Present  Left: Present    Posterior Tibialis Pulses:   Right:Present  Left: Present     5. Well adult exam  Labs in 6 months. [facility] cologu[facility] 7-6-20 neg results- next due 7/2025. UTD PSA- 11-30-22.     6. Chronic back pain, unspecified back location, unspecified back pain laterality  Pt attended PT  with slight improvement. Pt states low back pain is worsening and having radiculopathy down bilat legs. Will get MRI L-spine in 2 weeks. Prior XR L-spine done 10-1-21 showing:  Reason For Exam  Radiculopathy     Radiology Report  EXAMINATION  XR Spine Lumbar 2 or 3 Views     INDICATION  Radiculopathy     Comparison: 3/19/2019     FINDINGS  There are 5 nonrib-bearing lumbar-type vertebral bodies. There is a  rightward curvature of the lumbar spine centered at L2 with minimal  retrolisthesis of L2 over L3. The vertebral body heights are  maintained. There is moderate disc height loss throughout the lumbar  spine with multilevel marginal osteophyte formation, slightly  progressed. There is mild facet arthropathy, worse in the lower lumbar  spine. The soft tissues are unremarkable.        IMPRESSION  1.  No acute abnormality identified.  2.  Multilevel degenerative changes of the lumbar spine, slightly  progressed.     Signature Line  Electronically Signed By: Rosenda Mathias MD  Date/Time Signed: 10/01/2021 12:30        This document has an image     Result type:                   XR Spine Lumbar 2 or 3 Views  Result date:                  October 01, 2021 10:48 CDT  Result status:                Auth (Verified)  Result title:                    XR Spine Lumbar 2 or 3 Views  Performed by:               Rosenda Mathias MD on October 01, 2021 12:29 CDT  Verified by:                    Rosenda Mathias MD on October 01, 2021 12:30 CDT  Encounter info:             [id number], [facility], Outpatient, 10/1/2021 -  10/1/2021     CT ordered due to pacemaker/Debrillator.  CT denied per his insurance as pt never completed PT as previously ordered.        7. Asthma with COPD  PFT done 9-6-22 Mod obstruction without significant improvement post BD, Decreased DLCO suggest altered Pulm gas exchange surface, possibly emphysema.  UTD with CXR- done 8-26-22 WNL. Cont meds as prescribed. Pt UTD with pneumovax. Refilled Promethazine with codiene cough syrup as prescribed prn cough.       8. Chronic systolic heart failure  Pt followed in Cardio cl. Keep appt.    9. Atrial fibrillation with RVR  Pt followed in Cardio cl. Keep appt.    10. Insomnia   Pt states symptoms started after recent cardioversion. Will try on       Follow up in about 6 months (around 9/14/2023) for with labs 1 week prior to appt. In addition to their scheduled follow up, the patient has also been instructed to follow up on as needed basis.     Future Appointments   Date Time Provider Department Center   3/15/2023 10:30 AM Francisco Weir NP [facility] UROLO [place] [facility]   3/16/2023 10:00 AM NURSE, [facility] CARDIOLOGY [facility] RM [place] [facility]   3/22/2023  9:45 AM CV [facility] CATH [place] [facility] CARDIA [facility]    3/23/2023  7:30 AM PROVIDERS, [facility] [place] [facility]   4/4/2023 10:30 AM Mandi Garza MD [facility] RM [place] [facility]   4/4/2023 10:30 AM PACEMAKER, [facility] CARDIOLOGY [facility] RM [place]   5/31/2023  9:45 AM CARLO Rivera [facility] NEPHR CARLO Ahumada

## 2023-03-15 ENCOUNTER — OFFICE VISIT (OUTPATIENT)
Dept: UROLOGY | Facility: CLINIC | Age: 63
End: 2023-03-15
Payer: MEDICAID

## 2023-03-15 VITALS
HEART RATE: 59 BPM | OXYGEN SATURATION: 99 % | HEIGHT: 74 IN | TEMPERATURE: 98 F | BODY MASS INDEX: 29.71 KG/M2 | RESPIRATION RATE: 20 BRPM | DIASTOLIC BLOOD PRESSURE: 86 MMHG | SYSTOLIC BLOOD PRESSURE: 121 MMHG | WEIGHT: 231.5 LBS

## 2023-03-15 DIAGNOSIS — Q62.11 HYDRONEPHROSIS WITH URETEROPELVIC JUNCTION (UPJ) OBSTRUCTION: ICD-10-CM

## 2023-03-15 DIAGNOSIS — Q62.11 HYDRONEPHROSIS WITH URETEROPELVIC JUNCTION (UPJ) OBSTRUCTION: Primary | ICD-10-CM

## 2023-03-15 DIAGNOSIS — R35.0 BENIGN PROSTATIC HYPERPLASIA WITH URINARY FREQUENCY: Primary | ICD-10-CM

## 2023-03-15 DIAGNOSIS — N13.30 HYDROURETERONEPHROSIS: ICD-10-CM

## 2023-03-15 DIAGNOSIS — N40.1 BENIGN PROSTATIC HYPERPLASIA WITH URINARY FREQUENCY: Primary | ICD-10-CM

## 2023-03-15 DIAGNOSIS — N13.30 HYDRONEPHROSIS, UNSPECIFIED HYDRONEPHROSIS TYPE: ICD-10-CM

## 2023-03-15 PROCEDURE — 3074F SYST BP LT 130 MM HG: CPT | Mod: CPTII,,, | Performed by: NURSE PRACTITIONER

## 2023-03-15 PROCEDURE — 3079F DIAST BP 80-89 MM HG: CPT | Mod: CPTII,,, | Performed by: NURSE PRACTITIONER

## 2023-03-15 PROCEDURE — 4010F PR ACE/ARB THEARPY RXD/TAKEN: ICD-10-PCS | Mod: CPTII,,, | Performed by: NURSE PRACTITIONER

## 2023-03-15 PROCEDURE — 4010F ACE/ARB THERAPY RXD/TAKEN: CPT | Mod: CPTII,,, | Performed by: NURSE PRACTITIONER

## 2023-03-15 PROCEDURE — 1160F PR REVIEW ALL MEDS BY PRESCRIBER/CLIN PHARMACIST DOCUMENTED: ICD-10-PCS | Mod: CPTII,,, | Performed by: NURSE PRACTITIONER

## 2023-03-15 PROCEDURE — 3008F PR BODY MASS INDEX (BMI) DOCUMENTED: ICD-10-PCS | Mod: CPTII,,, | Performed by: NURSE PRACTITIONER

## 2023-03-15 PROCEDURE — 99214 OFFICE O/P EST MOD 30 MIN: CPT | Mod: PBBFAC | Performed by: NURSE PRACTITIONER

## 2023-03-15 PROCEDURE — 99213 PR OFFICE/OUTPT VISIT, EST, LEVL III, 20-29 MIN: ICD-10-PCS | Mod: S$PBB,,, | Performed by: NURSE PRACTITIONER

## 2023-03-15 PROCEDURE — 1159F PR MEDICATION LIST DOCUMENTED IN MEDICAL RECORD: ICD-10-PCS | Mod: CPTII,,, | Performed by: NURSE PRACTITIONER

## 2023-03-15 PROCEDURE — 1159F MED LIST DOCD IN RCRD: CPT | Mod: CPTII,,, | Performed by: NURSE PRACTITIONER

## 2023-03-15 PROCEDURE — 3008F BODY MASS INDEX DOCD: CPT | Mod: CPTII,,, | Performed by: NURSE PRACTITIONER

## 2023-03-15 PROCEDURE — 3079F PR MOST RECENT DIASTOLIC BLOOD PRESSURE 80-89 MM HG: ICD-10-PCS | Mod: CPTII,,, | Performed by: NURSE PRACTITIONER

## 2023-03-15 PROCEDURE — 1160F RVW MEDS BY RX/DR IN RCRD: CPT | Mod: CPTII,,, | Performed by: NURSE PRACTITIONER

## 2023-03-15 PROCEDURE — 3074F PR MOST RECENT SYSTOLIC BLOOD PRESSURE < 130 MM HG: ICD-10-PCS | Mod: CPTII,,, | Performed by: NURSE PRACTITIONER

## 2023-03-15 PROCEDURE — 99213 OFFICE O/P EST LOW 20 MIN: CPT | Mod: S$PBB,,, | Performed by: NURSE PRACTITIONER

## 2023-03-15 NOTE — PROGRESS NOTES
Ezra Zhang is a 62 y.o. male here for a diabetic eye screening with non-dilated fundus photos per CARLO Hamlin.    Patient cooperative?: Yes  Small pupils?: No  Last eye exam: unknown    For exam results, see Encounter Report.

## 2023-03-15 NOTE — PROGRESS NOTES
Chief Complaint: No chief complaint on file.      HPI: Patient is a 61-year-old male here for a 6 month F/U kidney stones and hydroureteronephrosis.  Patient treated in the past for chronic hydroureteronephrosis, renal cysts, BPH patient currently on Flomax 0.4 mg p.o. daily.  On last visit patient presented with improved urinary stream, mild ED. denied hesitancy, straining, interruption of stream, dribbling, frequency, nocturia. Creatinine fluctuating now at 1.79, CT shows severe hydroureteronephrosis to the UVJ. Patient referred to [facility] for Eval. & Treatment for severe hydroureteronephrosis to the UVJ.  PSA 1.69 on 09/15/2022. Today PSA pending.  Patient has no urologic symptoms of complaints today.  MIO:  As listed below.    Allergies:  Review of patient's allergies indicates:  No Known Allergies    Medications:  Current Outpatient Medications   Medication Sig Dispense Refill    ACCU-CHEK FASTCLIX LANCET DRUM Misc Apply topically once daily.      ACCU-CHEK GUIDE TEST STRIPS Strp USE AS DIRECTED once daily (keep log)      albuterol (PROVENTIL) 2.5 mg /3 mL (0.083 %) nebulizer solution Take 3 mLs (2.5 mg total) by nebulization every 6 (six) hours as needed for Wheezing. Rescue 100 each 6    amiodarone (PACERONE) 200 MG Tab Take 1 tablet (200 mg total) by mouth once daily. 90 tablet 3    atorvastatin (LIPITOR) 80 MG tablet Take 1 tablet (80 mg total) by mouth once daily. 90 tablet 3    cetirizine (ZYRTEC) 10 MG tablet Take 1 tablet (10 mg total) by mouth once daily. 90 tablet 1    clotrimazole-betamethasone 1-0.05% (LOTRISONE) cream Apply 2 g topically 2 (two) times daily.      ELIQUIS 5 mg Tab Take 1 tablet (5 mg total) by mouth 2 (two) times daily. 180 tablet 3    famotidine (PEPCID) 20 MG tablet Take 1 tablet (20 mg total) by mouth 2 (two) times daily. 60 tablet 11    fluticasone propion-salmeterol 115-21 mcg/dose (ADVAIR HFA) 115-21 mcg/actuation HFAA inhaler Inhale 2 puffs into the lungs every 12 (twelve)  hours. 12 g 6    fluticasone propionate (FLONASE ALLERGY RELIEF) 50 mcg/actuation nasal spray 2 sprays (100 mcg total) by Each Nostril route Daily. 18 g 6    furosemide (LASIX) 40 MG tablet Take 1 tablet (40 mg total) by mouth once daily. Refilled per Cardio orders. 90 tablet 3    glipiZIDE (GLUCOTROL) 5 MG tablet Take 5 mg by mouth once daily.      ipratropium (ATROVENT) 0.02 % nebulizer solution Take 2.5 mLs (500 mcg total) by nebulization 4 (four) times daily. Rescue 100 mL 6    lancets-blood glucose strips 30 gauge Cmpk   Accucheck blood glucose test strips and lancets, See Instructions, Check CBG once daily and keep log., # 100 EA, 11 Refill(s), Pharmacy: [facility], 185, cm, Height/Length Dosing, 03/08/22 8:10:00 CST, 96, kg, Weight Dosing, 03/08/22 8:10:00 CST      metoprolol succinate (TOPROL-XL) 200 MG 24 hr tablet Take 1 tablet (200 mg total) by mouth 2 (two) times daily. 180 tablet 3    nicotine (NICODERM CQ) 14 mg/24 hr Place 1 patch onto the skin once daily. 28 patch 2    nicotine polacrilex (NICORETTE) 4 MG Gum Take 1 each (4 mg total) by mouth as needed (smoking urge). 170 each 3    promethazine-codeine 6.25-10 mg/5 ml (PHENERGAN WITH CODEINE) 6.25-10 mg/5 mL syrup Take 5 mLs by mouth every 4 (four) hours as needed for Cough. 240 mL 1    sacubitriL-valsartan (ENTRESTO) 24-26 mg per tablet Take 1 tablet by mouth 2 (two) times daily. 180 tablet 3    tamsulosin (FLOMAX) 0.4 mg Cap Take 1 capsule by mouth once daily.      traZODone (DESYREL) 50 MG tablet Take 0.5 tablets (25 mg total) by mouth nightly as needed for Insomnia. 45 tablet 1     No current facility-administered medications for this visit.       Review of Systems:  General: No fever, chills, fatigability, or weight loss.  Skin: No rashes, itching, or changes in color or texture of skin.  Chest: Denies DONALDSON, cyanosis, wheezing, cough, and sputum production.  Abdomen: Appetite fine. No weight loss. Denies diarrhea, abdominal pain,  hematemesis, or blood in stool.  Musculoskeletal: No joint stiffness or swelling. Denies back pain.  : As above.  All other review of systems negative.    PMH:  Past Medical History:   Diagnosis Date    Atrial fibrillation     CHF (congestive heart failure)     CKD (chronic kidney disease)     COPD (chronic obstructive pulmonary disease)     DM (diabetes mellitus)     Dyslipidemia     Essential (primary) hypertension     Heart failure, unspecified     Rheumatoid arthritis, unspecified     Smoking     Type 2 diabetes mellitus without complications        PSH:  Past Surgical History:   Procedure Laterality Date    CARDIOVERSION      coronary arteriograph      defibillator      INSERT / REPLACE / REMOVE PACEMAKER      left  heart catherization         FamHx:  Family History   Problem Relation Age of Onset    Heart failure Mother     Cataracts Mother     Heart disease Mother     Glaucoma Mother     Peripheral vascular disease Mother     Hypertension Father        SocHx:  Social History     Socioeconomic History    Marital status: Single   Tobacco Use    Smoking status: Former     Packs/day: 0.25     Years: 20.00     Pack years: 5.00     Types: Cigarettes    Smokeless tobacco: Never    Tobacco comments:     Taking patches   Substance and Sexual Activity    Alcohol use: Never     Alcohol/week: 1.0 standard drink     Types: 1 Cans of beer per week     Comment: not often    Drug use: Never    Sexual activity: Yes     Partners: Female     Social Determinants of Health     Financial Resource Strain: Low Risk     Difficulty of Paying Living Expenses: Not very hard   Food Insecurity: Food Insecurity Present    Worried About Running Out of Food in the Last Year: Sometimes true    Ran Out of Food in the Last Year: Never true   Transportation Needs: No Transportation Needs    Lack of Transportation (Medical): No    Lack of Transportation (Non-Medical): No   Physical Activity: Insufficiently Active    Days of Exercise per Week: 4  days    Minutes of Exercise per Session: 30 min   Stress: No Stress Concern Present    Feeling of Stress : Not at all   Social Connections: Moderately Isolated    Frequency of Communication with Friends and Family: More than three times a week    Frequency of Social Gatherings with Friends and Family: More than three times a week    Attends [employer] Services: More than 4 times per year    Active Member of Clubs or Organizations: No    Attends Club or Organization Meetings: Never    Marital Status: Never    Housing Stability: Low Risk     Unable to Pay for Housing in the Last Year: No    Number of Places Lived in the Last Year: 1    Unstable Housing in the Last Year: No       Physical Exam:  There were no vitals filed for this visit.  General: A&Ox3, no apparent distress, no deformities  Neck: No masses, normal thyroid  Lungs: CTA patrick, no use of accessory muscles  Heart: RRR, no arrhythmias  Abdomen: Soft, NT, ND, no masses, no hernias, no hepatosplenomegaly  Lymphatic: Neck and groin nodes negative  Skin: The skin is warm and dry. No jaundice.  Ext: No c/c/e.    GUMale: Test desc patrick, no abnormalities of epididymus. Penis circumcised, with normal penile and scrotal skin. Meatus normal. Normal rectal tone, no hemorrhoids. Prostate: 2 finger breadth wide, 1 fingerbreadth Zina, smooth, soft, nontender, no nodules or masses appreciated. SV not palpable. Perineum and anus normal.    Labs: PSA pending      Impression: BPH, hydroureter nephrosis, renal cysts      Plan: Instructed patient continue tamsulosin 0.4 milligram p.o. N daily will re-refer patient to [facility] to evaluate and treated for hydroureter.  Instructed patient on timed voiding every 2-3 hrs, double voiding, avoidance of bladder irritants such as alcohol, citrus foods, chocolate, caffeinated drinks, energy drinks, spicy foods, sugar, caffeine products, sodas. Instructed patient to avoid drinking fluids 1-2 hours prior to bedtime & void immediately  before bedtime. RTC 6 months with Renal US & PSA.

## 2023-03-15 NOTE — PROGRESS NOTES
Placed in room. Seen by Ron Weir NP. Spoke with patient. PSA now and in 6 months. Schedule renal U/S to be done.

## 2023-03-16 ENCOUNTER — CLINICAL SUPPORT (OUTPATIENT)
Dept: CARDIOLOGY | Facility: CLINIC | Age: 63
End: 2023-03-16
Payer: MEDICAID

## 2023-03-16 VITALS
OXYGEN SATURATION: 98 % | HEIGHT: 74 IN | HEART RATE: 73 BPM | BODY MASS INDEX: 29.71 KG/M2 | WEIGHT: 231.5 LBS | SYSTOLIC BLOOD PRESSURE: 110 MMHG | DIASTOLIC BLOOD PRESSURE: 86 MMHG | RESPIRATION RATE: 20 BRPM

## 2023-03-16 DIAGNOSIS — I48.19 PERSISTENT ATRIAL FIBRILLATION: Primary | ICD-10-CM

## 2023-03-16 PROCEDURE — 99214 OFFICE O/P EST MOD 30 MIN: CPT | Mod: PBBFAC

## 2023-03-16 PROCEDURE — 93005 ELECTROCARDIOGRAM TRACING: CPT

## 2023-03-16 NOTE — PROGRESS NOTES
Mr. Zhang attends nurse visit. He did increase Entresto at last visit.   Also increased metoprolol and started back on 200mg amiodarone daily.   Home B/P log 140's-160's/90's-110,  HR 80's. Most recent B/P 120's/80-90's.   States he is feeling well, Less edema, less fatigue. He states that his B/P is   just starting to go down. He has no cardiac complaints. Today's B/P is   121/92, HR 73. Left arm is 110/86. EKG done. He is scheduled for an   Ablation with Dr. Anguiano on 3-22-23.   Presented to Dr. Garza. No new orders at this time.   Instructed to Continue current medications, eat low salt  diet and keep all future appointments. Continue home B/P logs.   He verbalizes understanding and agrees with plan.

## 2023-03-21 ENCOUNTER — HOSPITAL ENCOUNTER (OUTPATIENT)
Dept: RADIOLOGY | Facility: HOSPITAL | Age: 63
Discharge: HOME | End: 2023-03-21
Attending: NURSE PRACTITIONER
Payer: MEDICAID

## 2023-03-21 ENCOUNTER — ANESTHESIA EVENT (OUTPATIENT)
Dept: CARDIOLOGY | Facility: HOSPITAL | Age: 63
End: 2023-03-21
Payer: MEDICAID

## 2023-03-21 DIAGNOSIS — N13.30 HYDROURETERONEPHROSIS: ICD-10-CM

## 2023-03-21 PROCEDURE — 76770 US EXAM ABDO BACK WALL COMP: CPT | Mod: TC

## 2023-03-21 RX ORDER — SODIUM CHLORIDE 0.9 % (FLUSH) 0.9 %
10 SYRINGE (ML) INJECTION
Status: CANCELLED | OUTPATIENT
Start: 2023-03-21

## 2023-03-22 ENCOUNTER — ANESTHESIA (OUTPATIENT)
Dept: CARDIOLOGY | Facility: HOSPITAL | Age: 63
End: 2023-03-22
Payer: MEDICAID

## 2023-03-22 ENCOUNTER — HOSPITAL ENCOUNTER (OUTPATIENT)
Facility: HOSPITAL | Age: 63
Discharge: HOME | End: 2023-03-22
Attending: INTERNAL MEDICINE | Admitting: INTERNAL MEDICINE
Payer: MEDICAID

## 2023-03-22 ENCOUNTER — HOSPITAL ENCOUNTER (OUTPATIENT)
Dept: CARDIOLOGY | Facility: HOSPITAL | Age: 63
Discharge: HOME | End: 2023-03-22
Attending: INTERNAL MEDICINE
Payer: MEDICAID

## 2023-03-22 VITALS
DIASTOLIC BLOOD PRESSURE: 108 MMHG | TEMPERATURE: 98 F | HEART RATE: 76 BPM | OXYGEN SATURATION: 96 % | BODY MASS INDEX: 29.52 KG/M2 | HEIGHT: 74 IN | SYSTOLIC BLOOD PRESSURE: 143 MMHG | WEIGHT: 230 LBS | RESPIRATION RATE: 15 BRPM

## 2023-03-22 VITALS — HEIGHT: 74 IN | WEIGHT: 235.88 LBS | BODY MASS INDEX: 30.27 KG/M2

## 2023-03-22 DIAGNOSIS — I48.0 PAROXYSMAL ATRIAL FIBRILLATION: Primary | ICD-10-CM

## 2023-03-22 DIAGNOSIS — I48.0 PAROXYSMAL ATRIAL FIBRILLATION: ICD-10-CM

## 2023-03-22 DIAGNOSIS — I48.0 AF (PAROXYSMAL ATRIAL FIBRILLATION): Primary | ICD-10-CM

## 2023-03-22 PROBLEM — I51.3 THROMBUS OF LEFT ATRIAL APPENDAGE: Status: ACTIVE | Noted: 2023-03-22

## 2023-03-22 LAB
BSA FOR ECHO PROCEDURE: 2.36 M2
EJECTION FRACTION: 15 %
INR BLD: 1.32 (ref 0–1.3)
POCT GLUCOSE: 82 MG/DL (ref 70–110)
PROTHROMBIN TIME: 16.3 SECONDS (ref 12.5–14.5)

## 2023-03-22 PROCEDURE — 37000008 HC ANESTHESIA 1ST 15 MINUTES

## 2023-03-22 PROCEDURE — 93005 ELECTROCARDIOGRAM TRACING: CPT | Mod: 59

## 2023-03-22 PROCEDURE — 94761 N-INVAS EAR/PLS OXIMETRY MLT: CPT

## 2023-03-22 PROCEDURE — 93010 ELECTROCARDIOGRAM REPORT: CPT | Mod: ,,, | Performed by: INTERNAL MEDICINE

## 2023-03-22 PROCEDURE — 63600175 PHARM REV CODE 636 W HCPCS: Performed by: NURSE ANESTHETIST, CERTIFIED REGISTERED

## 2023-03-22 PROCEDURE — 93325 DOPPLER ECHO COLOR FLOW MAPG: CPT | Mod: 59

## 2023-03-22 PROCEDURE — 25000242 PHARM REV CODE 250 ALT 637 W/ HCPCS: Performed by: ANESTHESIOLOGY

## 2023-03-22 PROCEDURE — 01922 ANES N-INVAS IMG/RADJ THER: CPT

## 2023-03-22 PROCEDURE — 93010 EKG 12-LEAD: ICD-10-PCS | Mod: ,,, | Performed by: INTERNAL MEDICINE

## 2023-03-22 PROCEDURE — 94640 AIRWAY INHALATION TREATMENT: CPT

## 2023-03-22 PROCEDURE — 37000009 HC ANESTHESIA EA ADD 15 MINS

## 2023-03-22 PROCEDURE — 25000003 PHARM REV CODE 250: Performed by: INTERNAL MEDICINE

## 2023-03-22 PROCEDURE — 85610 PROTHROMBIN TIME: CPT | Performed by: INTERNAL MEDICINE

## 2023-03-22 PROCEDURE — 25000003 PHARM REV CODE 250: Performed by: NURSE ANESTHETIST, CERTIFIED REGISTERED

## 2023-03-22 PROCEDURE — 99900035 HC TECH TIME PER 15 MIN (STAT)

## 2023-03-22 PROCEDURE — 27201423 OPTIME MED/SURG SUP & DEVICES STERILE SUPPLY

## 2023-03-22 RX ORDER — PROCHLORPERAZINE EDISYLATE 5 MG/ML
5 INJECTION INTRAMUSCULAR; INTRAVENOUS EVERY 30 MIN PRN
Status: CANCELLED | OUTPATIENT
Start: 2023-03-22

## 2023-03-22 RX ORDER — DEXAMETHASONE SODIUM PHOSPHATE 4 MG/ML
INJECTION, SOLUTION INTRA-ARTICULAR; INTRALESIONAL; INTRAMUSCULAR; INTRAVENOUS; SOFT TISSUE
Status: DISCONTINUED | OUTPATIENT
Start: 2023-03-22 | End: 2023-03-22

## 2023-03-22 RX ORDER — WARFARIN SODIUM 5 MG/1
5 TABLET ORAL DAILY
Qty: 90 TABLET | Refills: 3
Start: 2023-03-22 | End: 2024-03-21

## 2023-03-22 RX ORDER — SODIUM CHLORIDE, SODIUM GLUCONATE, SODIUM ACETATE, POTASSIUM CHLORIDE AND MAGNESIUM CHLORIDE 30; 37; 368; 526; 502 MG/100ML; MG/100ML; MG/100ML; MG/100ML; MG/100ML
INJECTION, SOLUTION INTRAVENOUS CONTINUOUS
Status: DISCONTINUED | OUTPATIENT
Start: 2023-03-22 | End: 2023-03-22 | Stop reason: HOSPADM

## 2023-03-22 RX ORDER — IPRATROPIUM BROMIDE 0.5 MG/2.5ML
0.5 SOLUTION RESPIRATORY (INHALATION) ONCE
Status: COMPLETED | OUTPATIENT
Start: 2023-03-22 | End: 2023-03-22

## 2023-03-22 RX ORDER — PROPOFOL 10 MG/ML
VIAL (ML) INTRAVENOUS
Status: DISCONTINUED | OUTPATIENT
Start: 2023-03-22 | End: 2023-03-22

## 2023-03-22 RX ORDER — ONDANSETRON 2 MG/ML
INJECTION INTRAMUSCULAR; INTRAVENOUS
Status: DISCONTINUED | OUTPATIENT
Start: 2023-03-22 | End: 2023-03-22

## 2023-03-22 RX ORDER — FENTANYL CITRATE 50 UG/ML
INJECTION, SOLUTION INTRAMUSCULAR; INTRAVENOUS
Status: DISCONTINUED | OUTPATIENT
Start: 2023-03-22 | End: 2023-03-22

## 2023-03-22 RX ORDER — LIDOCAINE HYDROCHLORIDE 20 MG/ML
INJECTION, SOLUTION EPIDURAL; INFILTRATION; INTRACAUDAL; PERINEURAL
Status: DISCONTINUED | OUTPATIENT
Start: 2023-03-22 | End: 2023-03-22

## 2023-03-22 RX ORDER — LORAZEPAM 2 MG/ML
0.25 INJECTION INTRAMUSCULAR ONCE AS NEEDED
Status: CANCELLED | OUTPATIENT
Start: 2023-03-22 | End: 2034-08-18

## 2023-03-22 RX ORDER — MEPERIDINE HYDROCHLORIDE 25 MG/ML
12.5 INJECTION INTRAMUSCULAR; INTRAVENOUS; SUBCUTANEOUS EVERY 10 MIN PRN
Status: CANCELLED | OUTPATIENT
Start: 2023-03-22 | End: 2023-03-23

## 2023-03-22 RX ORDER — WARFARIN SODIUM 5 MG/1
10 TABLET ORAL ONCE
Status: DISCONTINUED | OUTPATIENT
Start: 2023-03-22 | End: 2023-03-22

## 2023-03-22 RX ORDER — IPRATROPIUM BROMIDE AND ALBUTEROL SULFATE 2.5; .5 MG/3ML; MG/3ML
3 SOLUTION RESPIRATORY (INHALATION)
Status: CANCELLED | OUTPATIENT
Start: 2023-03-22

## 2023-03-22 RX ORDER — PHENYLEPHRINE HCL IN 0.9% NACL 1 MG/10 ML
SYRINGE (ML) INTRAVENOUS
Status: DISCONTINUED | OUTPATIENT
Start: 2023-03-22 | End: 2023-03-22

## 2023-03-22 RX ORDER — LIDOCAINE HYDROCHLORIDE 10 MG/ML
1 INJECTION, SOLUTION EPIDURAL; INFILTRATION; INTRACAUDAL; PERINEURAL ONCE
Status: DISCONTINUED | OUTPATIENT
Start: 2023-03-22 | End: 2023-03-22 | Stop reason: HOSPADM

## 2023-03-22 RX ORDER — IPRATROPIUM BROMIDE AND ALBUTEROL SULFATE 2.5; .5 MG/3ML; MG/3ML
3 SOLUTION RESPIRATORY (INHALATION) ONCE
Status: DISCONTINUED | OUTPATIENT
Start: 2023-03-22 | End: 2023-03-22 | Stop reason: HOSPADM

## 2023-03-22 RX ORDER — ONDANSETRON 4 MG/1
8 TABLET, ORALLY DISINTEGRATING ORAL EVERY 6 HOURS PRN
Status: DISCONTINUED | OUTPATIENT
Start: 2023-03-22 | End: 2023-03-22 | Stop reason: HOSPADM

## 2023-03-22 RX ORDER — MIDAZOLAM HYDROCHLORIDE 1 MG/ML
0.5 INJECTION INTRAMUSCULAR; INTRAVENOUS ONCE AS NEEDED
Status: DISCONTINUED | OUTPATIENT
Start: 2023-03-22 | End: 2023-03-22 | Stop reason: HOSPADM

## 2023-03-22 RX ORDER — WARFARIN SODIUM 5 MG/1
10 TABLET ORAL ONCE
Status: COMPLETED | OUTPATIENT
Start: 2023-03-22 | End: 2023-03-22

## 2023-03-22 RX ORDER — ROCURONIUM BROMIDE 10 MG/ML
INJECTION, SOLUTION INTRAVENOUS
Status: DISCONTINUED | OUTPATIENT
Start: 2023-03-22 | End: 2023-03-22

## 2023-03-22 RX ORDER — ONDANSETRON 2 MG/ML
4 INJECTION INTRAMUSCULAR; INTRAVENOUS DAILY PRN
Status: CANCELLED | OUTPATIENT
Start: 2023-03-22

## 2023-03-22 RX ADMIN — ROCURONIUM BROMIDE 50 MG: 10 SOLUTION INTRAVENOUS at 12:03

## 2023-03-22 RX ADMIN — Medication 100 MCG: at 01:03

## 2023-03-22 RX ADMIN — FENTANYL CITRATE 50 MCG: 50 INJECTION, SOLUTION INTRAMUSCULAR; INTRAVENOUS at 12:03

## 2023-03-22 RX ADMIN — FENTANYL CITRATE 50 MCG: 50 INJECTION, SOLUTION INTRAMUSCULAR; INTRAVENOUS at 01:03

## 2023-03-22 RX ADMIN — PROPOFOL 100 MG: 10 INJECTION, EMULSION INTRAVENOUS at 12:03

## 2023-03-22 RX ADMIN — DEXAMETHASONE SODIUM PHOSPHATE 12 MG: 4 INJECTION, SOLUTION INTRA-ARTICULAR; INTRALESIONAL; INTRAMUSCULAR; INTRAVENOUS; SOFT TISSUE at 01:03

## 2023-03-22 RX ADMIN — SUGAMMADEX 200 MG: 100 INJECTION, SOLUTION INTRAVENOUS at 01:03

## 2023-03-22 RX ADMIN — SODIUM CHLORIDE: 9 INJECTION, SOLUTION INTRAVENOUS at 12:03

## 2023-03-22 RX ADMIN — WARFARIN SODIUM 10 MG: 5 TABLET ORAL at 04:03

## 2023-03-22 RX ADMIN — IPRATROPIUM BROMIDE 0.5 MG: 0.5 SOLUTION RESPIRATORY (INHALATION) at 11:03

## 2023-03-22 RX ADMIN — ONDANSETRON 4 MG: 2 INJECTION INTRAMUSCULAR; INTRAVENOUS at 01:03

## 2023-03-22 RX ADMIN — LIDOCAINE HYDROCHLORIDE 80 MG: 20 INJECTION, SOLUTION EPIDURAL; INFILTRATION; INTRACAUDAL; PERINEURAL at 12:03

## 2023-03-22 NOTE — INTERVAL H&P NOTE
The patient has been examined and the H&P has been reviewed:    I concur with the findings and no changes have occurred since H&P was written.    Procedure risks, benefits and alternative options discussed and understood by patient/family.    Patient will have a LASHELL/PVI under general anesthesia.       Active Hospital Problems    Diagnosis  POA    *A-fib [I48.91]  Yes      Resolved Hospital Problems   No resolved problems to display.

## 2023-03-22 NOTE — DISCHARGE INSTRUCTIONS
STOP Amiodarone  Continue Eliquis   prescription for Warfarin at [facility] in [place]  Office will call for you to have blood work drawn this Friday 3/24/2023.     Booklet on Coumadin given to patient by [facility] and discuss with  patient and family

## 2023-03-22 NOTE — NURSING
Contacted Dr. Anguiano in regard to labs results of PT/INR.  PT-16.3   INR -1.32 and whether to proceed with giving Coumadin.    Dr. Anguiano stated to go ahead and give the Coumadin 10 mg po as ordered.

## 2023-03-22 NOTE — ANESTHESIA PROCEDURE NOTES
Arterial    Diagnosis: Afib ablation    Patient location during procedure: done in OR  Procedure start time: 3/22/2023 12:56 PM  Timeout: 3/22/2023 12:56 PM  Procedure end time: 3/22/2023 1:04 PM    Staffing  Authorizing Provider: Vernon Pelaez Jr., MD  Performing Provider: Vernon Pelaez Jr., MD    Staffing  Other anesthesia staff: Vernon Pelaez Jr., MD  Anesthesiologist was present at the time of the procedure.    Preanesthetic Checklist  Completed: patient identified, IV checked, site marked, risks and benefits discussed, surgical consent, monitors and equipment checked, pre-op evaluation, timeout performed and anesthesia consent givenArterial  Skin Prep: alcohol swabs  Orientation: right  Location: radial    Catheter Size: 20 G  Catheter placement by Ultrasound guidance. Heme positive aspiration all ports.   Vessel Caliber: medium, patent, compressibility normal  Needle advanced into vessel with real time Ultrasound guidance.Insertion Attempts: 3  Assessment  Dressing: secured with tape and tegaderm  Additional Notes  Initiall attempts at Rt radial access unsucessfull.. after fransisca accesses LASHELL confirmed JOANNE clot and case canceled

## 2023-03-22 NOTE — ANESTHESIA PREPROCEDURE EVALUATION
03/22/2023  Ezra Zhang is a 62 y.o., male with CHF (EF 35%) presents to  short-stay for transesophageal echo with AFib ablation (paroxysmal AFib).  Patient tolerated IV sedation last month for LASHELL.  EKG demonstrates AFib with heart rate in the 80s.    Transthoracic echo January 2023   EF 35%  Trace TR   PA systolic pressure greater than 12    Last 3 sets of Vitals    Vitals - 1 value per visit 3/16/2023 3/22/2023 3/22/2023   SYSTOLIC 110 145 -   DIASTOLIC 86 101 -   Pulse 73 79 -   Temp - 97.5 -   Resp 20 - -   SPO2 98 99 -   Weight (lb) 231.48 - 235.89   Weight (kg) 105 - 107   Height 73.622 - 74   BMI (Calculated) 30 - 30.3   VISIT REPORT - - -   Pain Score  - - -   Some recent data might be hidden         Lab Results   Component Value Date    WBC 8.5 02/18/2023    HGB 15.8 02/18/2023    HCT 48.2 02/18/2023    MCV 99.8 (H) 02/18/2023     02/18/2023          BMP  Lab Results   Component Value Date     03/07/2023    K 4.3 03/07/2023    CO2 26 03/07/2023    BUN 22.4 03/07/2023    CREATININE 1.47 (H) 03/07/2023    CALCIUM 9.5 03/07/2023    EGFRNONAA 46 04/12/2022      Pre-op Assessment    I have reviewed the Patient Summary Reports.    I have reviewed the NPO Status.   I have reviewed the Medications.     Review of Systems  Anesthesia Hx:   Denies Personal Hx of Anesthesia complications.   Social:  Smoker    Cardiovascular:   Hypertension CHF  Functional Capacity 2 METS    Pulmonary:   COPD, moderate Shortness of breath    Renal/:   Chronic Renal Disease, CKD    Hepatic/GI:   GERD  Liver Disease, Hepatitis, Viral Hepatitis Type C    Endocrine:   Diabetes, type 2        Physical Exam  General: Well nourished, Cooperative, Alert and Oriented    Airway:  Mallampati: II   Mouth Opening: Normal  TM Distance: Normal  Tongue: Normal  Neck ROM: Normal  ROM    Dental:  Intact  Underbite  Chest/Lungs:  Clear to auscultation, Normal Respiratory Rate    Heart:  Rate: Normal  Rhythm: Regular Rhythm        Anesthesia Plan  Type of Anesthesia, risks & benefits discussed:    Anesthesia Type: Gen ETT  Intra-op Monitoring Plan: Standard ASA Monitors and Art Line  Post Op Pain Control Plan: multimodal analgesia and IV/PO Opioids PRN  Induction:  IV  Airway Plan: Direct  Informed Consent: Informed consent signed with the Patient and all parties understand the risks and agree with anesthesia plan.  All questions answered.   ASA Score: 4  Day of Surgery Review of History & Physical: H&P Update referred to the surgeon/provider.    Ready For Surgery From Anesthesia Perspective.     .

## 2023-03-22 NOTE — DISCHARGE SUMMARY
[facility] - Cath Lab Services  Discharge Note  Short Stay    Procedure(s) (LRB):  Transesophageal echo (LASHELL) intra-procedure log documentation (N/A)      OUTCOME: Patient tolerated treatment/procedure well without complication and is now ready for discharge.    Patient had a LASHELL with no complications. LVEF is severely reduced and there is a small thrombus in the JOANNE. PVI  will be cancelled. Patient will be started on warfarin and will check his INR Friday. Elquis will be stopped once INR is above 2.       DISPOSITION: Home or Self Care    FINAL DIAGNOSIS:  A-fib. Thrombus in the JOANNE.     FOLLOWUP: In clinic    DISCHARGE INSTRUCTIONS:  No discharge procedures on file.     TIME SPENT ON DISCHARGE: 30 minutes

## 2023-03-23 NOTE — ANESTHESIA POSTPROCEDURE EVALUATION
Anesthesia Post Evaluation    Patient: Ezra Zhang    Procedure(s) Performed: Procedure(s) (LRB):  Transesophageal echo (LASHELL) intra-procedure log documentation (N/A)    Final Anesthesia Type: general      Patient location during evaluation: Cath Lab  Patient participation: Yes- Able to Participate  Level of consciousness: awake and alert  Post-procedure vital signs: reviewed and stable  Pain management: adequate  Airway patency: patent    PONV status at discharge: No PONV  Anesthetic complications: no      Cardiovascular status: blood pressure returned to baseline  Respiratory status: spontaneous ventilation and room air  Hydration status: euvolemic  Follow-up not needed.        Ablation canceled after + JOANNE clot    Vitals Value Taken Time   /108 03/22/23 1401   Temp 36.6 °C (97.9 °F) 03/22/23 1332   Pulse 82 03/22/23 1401   Resp 15 03/22/23 1401   SpO2 94 % 03/22/23 1401   Vitals shown include unvalidated device data.      Event Time   Out of Recovery 14:00:00         Pain/Ten Score: Ten Score: 9 (3/22/2023  1:31 PM)

## 2023-03-24 ENCOUNTER — LAB VISIT (OUTPATIENT)
Dept: LAB | Facility: HOSPITAL | Age: 63
End: 2023-03-24
Attending: INTERNAL MEDICINE
Payer: MEDICAID

## 2023-03-24 DIAGNOSIS — N40.1 BENIGN PROSTATIC HYPERPLASIA WITH URINARY FREQUENCY: ICD-10-CM

## 2023-03-24 DIAGNOSIS — E11.9 CONTROLLED TYPE 2 DIABETES MELLITUS WITHOUT COMPLICATION, WITHOUT LONG-TERM CURRENT USE OF INSULIN: ICD-10-CM

## 2023-03-24 DIAGNOSIS — I10 PRIMARY HYPERTENSION: ICD-10-CM

## 2023-03-24 DIAGNOSIS — E55.9 VITAMIN D DEFICIENCY: ICD-10-CM

## 2023-03-24 DIAGNOSIS — R35.0 BENIGN PROSTATIC HYPERPLASIA WITH URINARY FREQUENCY: ICD-10-CM

## 2023-03-24 LAB
ALBUMIN SERPL-MCNC: 4.1 G/DL (ref 3.4–4.8)
ALBUMIN/GLOB SERPL: 1.3 RATIO (ref 1.1–2)
ALP SERPL-CCNC: 107 UNIT/L (ref 40–150)
ALT SERPL-CCNC: 36 UNIT/L (ref 0–55)
AST SERPL-CCNC: 23 UNIT/L (ref 5–34)
BILIRUBIN DIRECT+TOT PNL SERPL-MCNC: 0.5 MG/DL
BUN SERPL-MCNC: 27.7 MG/DL (ref 8.4–25.7)
CALCIUM SERPL-MCNC: 9.5 MG/DL (ref 8.8–10)
CHLORIDE SERPL-SCNC: 108 MMOL/L (ref 98–107)
CO2 SERPL-SCNC: 28 MMOL/L (ref 23–31)
CREAT SERPL-MCNC: 1.4 MG/DL (ref 0.73–1.18)
DEPRECATED CALCIDIOL+CALCIFEROL SERPL-MC: 19.4 NG/ML (ref 30–80)
EST. AVERAGE GLUCOSE BLD GHB EST-MCNC: 131.2 MG/DL
GFR SERPLBLD CREATININE-BSD FMLA CKD-EPI: 56 MLS/MIN/1.73/M2
GLOBULIN SER-MCNC: 3.1 GM/DL (ref 2.4–3.5)
GLUCOSE SERPL-MCNC: 106 MG/DL (ref 82–115)
HBA1C MFR BLD: 6.2 %
INR BLD: 4.21 (ref 0–1.3)
POTASSIUM SERPL-SCNC: 4.7 MMOL/L (ref 3.5–5.1)
PROT SERPL-MCNC: 7.2 GM/DL (ref 5.8–7.6)
PROTHROMBIN TIME: 39.7 SECONDS (ref 12.5–14.5)
PSA SERPL-MCNC: 0.76 NG/ML
SODIUM SERPL-SCNC: 143 MMOL/L (ref 136–145)
TSH SERPL-ACNC: 0.76 UIU/ML (ref 0.35–4.94)

## 2023-03-24 PROCEDURE — 85610 PROTHROMBIN TIME: CPT

## 2023-03-24 PROCEDURE — 84443 ASSAY THYROID STIM HORMONE: CPT

## 2023-03-24 PROCEDURE — 83036 HEMOGLOBIN GLYCOSYLATED A1C: CPT

## 2023-03-24 PROCEDURE — 80053 COMPREHEN METABOLIC PANEL: CPT

## 2023-03-24 PROCEDURE — 82306 VITAMIN D 25 HYDROXY: CPT

## 2023-03-24 PROCEDURE — 36415 COLL VENOUS BLD VENIPUNCTURE: CPT

## 2023-03-24 PROCEDURE — 84153 ASSAY OF PSA TOTAL: CPT

## 2023-03-29 ENCOUNTER — LAB VISIT (OUTPATIENT)
Dept: LAB | Facility: HOSPITAL | Age: 63
End: 2023-03-29
Attending: INTERNAL MEDICINE
Payer: MEDICAID

## 2023-03-29 DIAGNOSIS — I51.3 THROMBUS IN HEART CHAMBER: Primary | ICD-10-CM

## 2023-03-29 LAB
INR BLD: 3.46 (ref 0–1.3)
PROTHROMBIN TIME: 32.9 SECONDS (ref 12.5–14.5)

## 2023-03-29 PROCEDURE — 85610 PROTHROMBIN TIME: CPT

## 2023-03-29 PROCEDURE — 36415 COLL VENOUS BLD VENIPUNCTURE: CPT

## 2023-03-31 ENCOUNTER — HOSPITAL ENCOUNTER (OUTPATIENT)
Dept: RADIOLOGY | Facility: HOSPITAL | Age: 63
Discharge: HOME | End: 2023-03-31
Attending: NURSE PRACTITIONER
Payer: MEDICAID

## 2023-03-31 DIAGNOSIS — Q62.11 HYDRONEPHROSIS WITH URETEROPELVIC JUNCTION (UPJ) OBSTRUCTION: ICD-10-CM

## 2023-03-31 DIAGNOSIS — N13.30 HYDRONEPHROSIS, UNSPECIFIED HYDRONEPHROSIS TYPE: ICD-10-CM

## 2023-03-31 PROCEDURE — 74178 CT ABD&PLV WO CNTR FLWD CNTR: CPT | Mod: TC

## 2023-03-31 PROCEDURE — 78708 K FLOW/FUNCT IMAGE W/DRUG: CPT | Mod: TC

## 2023-03-31 RX ORDER — FUROSEMIDE 10 MG/ML
INJECTION INTRAMUSCULAR; INTRAVENOUS
Status: DISPENSED
Start: 2023-03-31 | End: 2023-03-31

## 2023-04-02 ENCOUNTER — TELEPHONE (OUTPATIENT)
Dept: INTERNAL MEDICINE | Facility: CLINIC | Age: 63
End: 2023-04-02
Payer: MEDICAID

## 2023-04-02 DIAGNOSIS — E55.9 VITAMIN D DEFICIENCY: Primary | ICD-10-CM

## 2023-04-02 RX ORDER — CHOLECALCIFEROL (VITAMIN D3) 50 MCG
1 TABLET ORAL DAILY
Qty: 30 TABLET | Refills: 0 | Status: ON HOLD | COMMUNITY
Start: 2023-04-02 | End: 2023-09-11 | Stop reason: HOSPADM

## 2023-04-03 DIAGNOSIS — N13.30 HYDRONEPHROSIS, UNSPECIFIED HYDRONEPHROSIS TYPE: ICD-10-CM

## 2023-04-03 NOTE — TELEPHONE ENCOUNTER
Vitamin D level 19.4 down from 26.6. Call pt and inform vitamin D level is low and to take Vit D3 2000 IU OTC 1 cap po daily. Will recheck Vit D level on next labs.

## 2023-04-04 ENCOUNTER — CLINICAL SUPPORT (OUTPATIENT)
Dept: CARDIOLOGY | Facility: CLINIC | Age: 63
End: 2023-04-04
Payer: MEDICAID

## 2023-04-04 ENCOUNTER — OFFICE VISIT (OUTPATIENT)
Dept: CARDIOLOGY | Facility: CLINIC | Age: 63
End: 2023-04-04
Payer: MEDICAID

## 2023-04-04 VITALS
DIASTOLIC BLOOD PRESSURE: 81 MMHG | HEART RATE: 66 BPM | RESPIRATION RATE: 22 BRPM | WEIGHT: 242.38 LBS | HEIGHT: 74 IN | OXYGEN SATURATION: 99 % | SYSTOLIC BLOOD PRESSURE: 125 MMHG | BODY MASS INDEX: 31.11 KG/M2 | TEMPERATURE: 98 F

## 2023-04-04 DIAGNOSIS — I48.19 PERSISTENT ATRIAL FIBRILLATION: ICD-10-CM

## 2023-04-04 DIAGNOSIS — I50.20 HFREF (HEART FAILURE WITH REDUCED EJECTION FRACTION): Primary | ICD-10-CM

## 2023-04-04 DIAGNOSIS — I73.9 CLAUDICATION: ICD-10-CM

## 2023-04-04 DIAGNOSIS — I73.9 CLAUDICATION: Primary | ICD-10-CM

## 2023-04-04 DIAGNOSIS — I10 PRIMARY HYPERTENSION: Primary | ICD-10-CM

## 2023-04-04 DIAGNOSIS — I42.8 NON-ISCHEMIC CARDIOMYOPATHY: ICD-10-CM

## 2023-04-04 DIAGNOSIS — I50.20 HFREF (HEART FAILURE WITH REDUCED EJECTION FRACTION): ICD-10-CM

## 2023-04-04 DIAGNOSIS — E78.5 HYPERLIPIDEMIA LDL GOAL <70: ICD-10-CM

## 2023-04-04 PROCEDURE — 93282 PRGRMG EVAL IMPLANTABLE DFB: CPT | Mod: PBBFAC | Performed by: INTERNAL MEDICINE

## 2023-04-04 PROCEDURE — 99215 OFFICE O/P EST HI 40 MIN: CPT | Mod: PBBFAC | Performed by: INTERNAL MEDICINE

## 2023-04-04 PROCEDURE — 99212 OFFICE O/P EST SF 10 MIN: CPT | Mod: PBBFAC,27

## 2023-04-04 RX ORDER — ALBUTEROL SULFATE 0.83 MG/ML
2.5 SOLUTION RESPIRATORY (INHALATION) EVERY 6 HOURS PRN
Qty: 100 EACH | Refills: 6 | Status: SHIPPED | OUTPATIENT
Start: 2023-04-04 | End: 2023-06-14

## 2023-04-04 RX ORDER — ALBUTEROL SULFATE 0.83 MG/ML
2.5 SOLUTION RESPIRATORY (INHALATION) EVERY 6 HOURS PRN
Qty: 100 EACH | Refills: 6 | Status: SHIPPED | OUTPATIENT
Start: 2023-04-04 | End: 2023-04-04 | Stop reason: SDUPTHER

## 2023-04-04 RX ORDER — SACUBITRIL AND VALSARTAN 49; 51 MG/1; MG/1
1 TABLET, FILM COATED ORAL 2 TIMES DAILY
Qty: 60 TABLET | Refills: 11 | Status: SHIPPED | OUTPATIENT
Start: 2023-04-04 | End: 2024-03-18 | Stop reason: SDUPTHER

## 2023-04-04 NOTE — PATIENT INSTRUCTIONS
Increase lasix to 80mg twice a day for 3 days; then resume 40mg daily.    Increase Entresto to 49/51 twice a day.    Call to schedule ultrasound of legs ( number attached).    Go to lab for non-fasting BMP in 1 week.

## 2023-04-04 NOTE — PROGRESS NOTES
Chief Complaint   Patient presents with    f/u after LASHELL last month denies chest pain has DONALDSON        This is a 62-year-old male with atrial fibrillation status post LASHELL and cardioversion in February 2017 on Eliquis, nonischemic cardiomyopathy with recovered EF s/p ICD, COPD, hepatitis C, diabetes mellitus type II, and CKD stage III who presents for routine follow up and ongoing care. Patient had his ICD generator changed in August 2018 due to recall.     Underwent LASHELL prior to PVI but JOANNE thrombus was seen and PVI was cancelled. He was taken off Eliquis and started on Warfarin. His INR has been mildly supratherapeutic but denies bleeding. He is gaining weight and having more exertional dyspnea and increased abdominal girth with early satiety. No orthopnea or PND. He is drinking 5 bottles of water daily, but does report watching his salt intake. He also c/o claudication in BL LE.  Denies CP, palpitations, lightheadedness or syncope.       Review of Systems   As above, otherwise negative    Physical Exam Vitals & Measurements   Vitals:    04/04/23 1105   BP: 125/81   Pulse:    Resp:    Temp:        General: alert and oriented/no acute distress  Eye: EOMI/normal conjunctiva/no xanthelasma  HENT: normocephalic/moist oral mucosa  Neck: + JVD 8cm above RA  Respiratory: rales at bases, decreased lung sounds otherwise  Cardiovascular:  Irregularly irregular/no murmur/normal peripheral perfusion/1+ edema  Gastrointestinal: soft/nontender  Musculoskeletal: normal ROM  Integumentary: warm/dry/pink/intact  Neurologic: alert/oriented/normal sensory/no focal deficits  Psychiatric: cooperative/appropriate mood and affect/normal judgment      Current Outpatient Medications:     atorvastatin (LIPITOR) 80 MG tablet, Take 1 tablet (80 mg total) by mouth once daily., Disp: 90 tablet, Rfl: 3    cetirizine (ZYRTEC) 10 MG tablet, Take 1 tablet (10 mg total) by mouth once daily., Disp: 90 tablet, Rfl: 1    cholecalciferol, vitamin D3,  (VITAMIN D3) 50 mcg (2,000 unit) Tab, Take 1 tablet (2,000 Units total) by mouth once daily., Disp: 30 tablet, Rfl: 0    clotrimazole-betamethasone 1-0.05% (LOTRISONE) cream, Apply 2 g topically 2 (two) times daily., Disp: , Rfl:     famotidine (PEPCID) 20 MG tablet, Take 1 tablet (20 mg total) by mouth 2 (two) times daily., Disp: 60 tablet, Rfl: 11    fluticasone propion-salmeterol 115-21 mcg/dose (ADVAIR HFA) 115-21 mcg/actuation HFAA inhaler, Inhale 2 puffs into the lungs every 12 (twelve) hours., Disp: 12 g, Rfl: 6    fluticasone propionate (FLONASE ALLERGY RELIEF) 50 mcg/actuation nasal spray, 2 sprays (100 mcg total) by Each Nostril route Daily., Disp: 18 g, Rfl: 6    furosemide (LASIX) 40 MG tablet, Take 1 tablet (40 mg total) by mouth once daily. Refilled per Cardio orders., Disp: 90 tablet, Rfl: 3    glipiZIDE (GLUCOTROL) 5 MG tablet, Take 5 mg by mouth once daily., Disp: , Rfl:     ipratropium (ATROVENT) 0.02 % nebulizer solution, Take 2.5 mLs (500 mcg total) by nebulization 4 (four) times daily. Rescue, Disp: 100 mL, Rfl: 6    lancets-blood glucose strips 30 gauge Cmpk,  Accucheck blood glucose test strips and lancets, See Instructions, Check CBG once daily and keep log., # 100 EA, 11 Refill(s), Pharmacy: [facility], 185, cm, Height/Length Dosing, 03/08/22 8:10:00 CST, 96, kg, Weight Dosing, 03/08/22 8:10:00 CST, Disp: , Rfl:     metoprolol succinate (TOPROL-XL) 200 MG 24 hr tablet, Take 1 tablet (200 mg total) by mouth 2 (two) times daily., Disp: 180 tablet, Rfl: 3    nicotine (NICODERM CQ) 14 mg/24 hr, Place 1 patch onto the skin once daily., Disp: 28 patch, Rfl: 2    nicotine polacrilex (NICORETTE) 4 MG Gum, Take 1 each (4 mg total) by mouth as needed (smoking urge)., Disp: 170 each, Rfl: 3    tamsulosin (FLOMAX) 0.4 mg Cap, Take 1 capsule by mouth once daily., Disp: , Rfl:     traZODone (DESYREL) 50 MG tablet, Take 0.5 tablets (25 mg total) by mouth nightly as needed for Insomnia., Disp: 45  tablet, Rfl: 1    warfarin (COUMADIN) 5 MG tablet, Take 1 tablet (5 mg total) by mouth Daily., Disp: 90 tablet, Rfl: 3    ACCU-CHEK FASTCLIX LANCET DRUM Misc, Apply topically once daily., Disp: , Rfl:     ACCU-CHEK GUIDE TEST STRIPS Strp, USE AS DIRECTED once daily (keep log), Disp: , Rfl:     albuterol (PROVENTIL) 2.5 mg /3 mL (0.083 %) nebulizer solution, Take 3 mLs (2.5 mg total) by nebulization every 6 (six) hours as needed for Wheezing. Rescue, Disp: 100 each, Rfl: 6    sacubitriL-valsartan (ENTRESTO) 49-51 mg per tablet, Take 1 tablet by mouth 2 (two) times daily., Disp: 60 tablet, Rfl: 11      Assessment/Plan  Paroxysmal Atrial Fibrillation/flutter   - TE/DCCV aborted after JOANNE thrombus identified  - Eliquis changed to Warfarin  - Follow up with EP as scheduled after 3 months of warfarin therapy    Non ischemic cardiomyopathy  Chronic Systolic HF (recovered in 2020 with new drop in 1/2023) s/p ICD   NYHA III  - Volume overloaded on exam and symptomology  - Instructed to increase Lasix dose to 80mg BID x 3 doses then resume 40mg daily  - Decrease water intake. Watch salt intake  - Repeat BMP in 1 week    HTN  - BP elevated in clinic and on Bp log per patient  - Increase Entresto to 49/51 mg BID  - Continue Toprol  200mg BID  - Repeat BMP in 1 week    Claudication  - Will obtain BL LE arterial dopplers    Nurse visit in 1 week for BP check and improvement of symptoms after increase diuresis    RTC in 3 months after PVI      Peewee Levin MD  [facility] PGY-4

## 2023-04-04 NOTE — PROGRESS NOTES
Cardiology Attending    I have discussed Ezra Zhang including the patient's symptoms, findings, and management plan with the cardiology fellow.  Please see the Cardiology Note for details.

## 2023-04-05 ENCOUNTER — LAB VISIT (OUTPATIENT)
Dept: LAB | Facility: HOSPITAL | Age: 63
End: 2023-04-05
Attending: INTERNAL MEDICINE
Payer: MEDICAID

## 2023-04-05 DIAGNOSIS — D48.19 ABDOMINAL FIBROMATOSIS: Primary | ICD-10-CM

## 2023-04-05 LAB
INR BLD: 5.19 (ref 0–1.3)
PROTHROMBIN TIME: 48.5 SECONDS (ref 12.5–14.5)

## 2023-04-05 PROCEDURE — 36415 COLL VENOUS BLD VENIPUNCTURE: CPT

## 2023-04-05 PROCEDURE — 85610 PROTHROMBIN TIME: CPT

## 2023-04-12 ENCOUNTER — TELEPHONE (OUTPATIENT)
Dept: INTERNAL MEDICINE | Facility: CLINIC | Age: 63
End: 2023-04-12
Payer: MEDICAID

## 2023-04-12 ENCOUNTER — LAB VISIT (OUTPATIENT)
Dept: LAB | Facility: HOSPITAL | Age: 63
End: 2023-04-12
Attending: INTERNAL MEDICINE
Payer: MEDICAID

## 2023-04-12 DIAGNOSIS — I10 PRIMARY HYPERTENSION: ICD-10-CM

## 2023-04-12 DIAGNOSIS — I42.8 NON-ISCHEMIC CARDIOMYOPATHY: ICD-10-CM

## 2023-04-12 DIAGNOSIS — N18.31 STAGE 3A CHRONIC KIDNEY DISEASE: ICD-10-CM

## 2023-04-12 DIAGNOSIS — E55.9 VITAMIN D DEFICIENCY: ICD-10-CM

## 2023-04-12 DIAGNOSIS — I51.3 THROMBUS IN HEART CHAMBER: ICD-10-CM

## 2023-04-12 DIAGNOSIS — I48.91 A-FIB: Primary | ICD-10-CM

## 2023-04-12 LAB
ALBUMIN SERPL-MCNC: 4.3 G/DL (ref 3.4–4.8)
ALBUMIN/GLOB SERPL: 1.4 RATIO (ref 1.1–2)
ALP SERPL-CCNC: 115 UNIT/L (ref 40–150)
ALT SERPL-CCNC: 41 UNIT/L (ref 0–55)
APPEARANCE UR: CLEAR
AST SERPL-CCNC: 27 UNIT/L (ref 5–34)
BACTERIA #/AREA URNS AUTO: ABNORMAL /HPF
BILIRUB UR QL STRIP.AUTO: NEGATIVE MG/DL
BILIRUBIN DIRECT+TOT PNL SERPL-MCNC: 0.9 MG/DL
BUN SERPL-MCNC: 27.3 MG/DL (ref 8.4–25.7)
CALCIUM SERPL-MCNC: 9.7 MG/DL (ref 8.8–10)
CHLORIDE SERPL-SCNC: 106 MMOL/L (ref 98–107)
CO2 SERPL-SCNC: 31 MMOL/L (ref 23–31)
COLOR UR AUTO: YELLOW
CREAT SERPL-MCNC: 1.49 MG/DL (ref 0.73–1.18)
CREAT UR-MCNC: 127 MG/DL (ref 63–166)
DEPRECATED CALCIDIOL+CALCIFEROL SERPL-MC: 35.4 NG/ML (ref 30–80)
GFR SERPLBLD CREATININE-BSD FMLA CKD-EPI: 52 MLS/MIN/1.73/M2
GLOBULIN SER-MCNC: 3 GM/DL (ref 2.4–3.5)
GLUCOSE SERPL-MCNC: 120 MG/DL (ref 82–115)
GLUCOSE UR QL STRIP.AUTO: NEGATIVE MG/DL
INR BLD: 3.96 (ref 0–1.3)
KETONES UR QL STRIP.AUTO: NEGATIVE MG/DL
LEUKOCYTE ESTERASE UR QL STRIP.AUTO: NEGATIVE UNIT/L
NITRITE UR QL STRIP.AUTO: NEGATIVE
PH UR STRIP.AUTO: 6.5 [PH]
POTASSIUM SERPL-SCNC: 4.6 MMOL/L (ref 3.5–5.1)
PROT SERPL-MCNC: 7.3 GM/DL (ref 5.8–7.6)
PROT UR QL STRIP.AUTO: NEGATIVE MG/DL
PROT UR STRIP-MCNC: 14 MG/DL
PROTHROMBIN TIME: 37.5 SECONDS (ref 12.5–14.5)
RBC #/AREA URNS AUTO: ABNORMAL /HPF
RBC UR QL AUTO: NEGATIVE UNIT/L
SODIUM SERPL-SCNC: 145 MMOL/L (ref 136–145)
SP GR UR STRIP.AUTO: 1.02
SQUAMOUS #/AREA URNS AUTO: ABNORMAL /HPF
URINE PROTEIN/CREATININE RATIO (OHS): 0.1
UROBILINOGEN UR STRIP-ACNC: 0.2 MG/DL
WBC #/AREA URNS AUTO: ABNORMAL /HPF

## 2023-04-12 PROCEDURE — 80053 COMPREHEN METABOLIC PANEL: CPT

## 2023-04-12 PROCEDURE — 82570 ASSAY OF URINE CREATININE: CPT

## 2023-04-12 PROCEDURE — 82306 VITAMIN D 25 HYDROXY: CPT

## 2023-04-12 PROCEDURE — 85610 PROTHROMBIN TIME: CPT

## 2023-04-12 PROCEDURE — 36415 COLL VENOUS BLD VENIPUNCTURE: CPT

## 2023-04-12 PROCEDURE — 81001 URINALYSIS AUTO W/SCOPE: CPT

## 2023-04-12 NOTE — TELEPHONE ENCOUNTER
Patient would like a refill on Promethazine patient states he has been coughing. Patient stated he didn't try anything otc it doesn't work.

## 2023-04-12 NOTE — TELEPHONE ENCOUNTER
Attempted to contact patient for test results, no answer left voice message for patient to return my call at [phone].

## 2023-04-12 NOTE — TELEPHONE ENCOUNTER
Patient informed Vitamin D level 19.4 down form 26.6. Patient informed Vitamin D level is low and to take Vitamin D3 2000 IU OTC 1 capsule po daily. Patient informed we will recheck his vitamin d level on next labs. Patient verbalized understanding.

## 2023-04-13 DIAGNOSIS — R05.9 COUGH, UNSPECIFIED TYPE: Primary | ICD-10-CM

## 2023-04-14 ENCOUNTER — HOSPITAL ENCOUNTER (OUTPATIENT)
Dept: RADIOLOGY | Facility: HOSPITAL | Age: 63
Discharge: HOME | End: 2023-04-14
Attending: NURSE PRACTITIONER
Payer: MEDICAID

## 2023-04-14 DIAGNOSIS — N13.30 HYDRONEPHROSIS, UNSPECIFIED HYDRONEPHROSIS TYPE: ICD-10-CM

## 2023-04-14 PROCEDURE — 78708 K FLOW/FUNCT IMAGE W/DRUG: CPT | Mod: TC

## 2023-04-14 RX ORDER — FUROSEMIDE 10 MG/ML
INJECTION INTRAMUSCULAR; INTRAVENOUS
Status: DISPENSED
Start: 2023-04-14 | End: 2023-04-14

## 2023-04-14 RX ORDER — PROMETHAZINE HYDROCHLORIDE AND CODEINE PHOSPHATE 6.25; 1 MG/5ML; MG/5ML
5 SOLUTION ORAL EVERY 4 HOURS PRN
Qty: 120 ML | Refills: 0 | Status: SHIPPED | OUTPATIENT
Start: 2023-04-14 | End: 2023-04-24

## 2023-04-14 RX ORDER — PROMETHAZINE HYDROCHLORIDE AND CODEINE PHOSPHATE 6.25; 1 MG/5ML; MG/5ML
5 SOLUTION ORAL EVERY 4 HOURS PRN
Qty: 120 ML | Refills: 0 | Status: SHIPPED | OUTPATIENT
Start: 2023-04-14 | End: 2023-04-14 | Stop reason: SDUPTHER

## 2023-04-14 NOTE — PROGRESS NOTES
Pt in clnic requesting refill of Promethazine with codeine cough syrup. Informed pt I will refill it this time but I can not keep giving him the codeine cough syrup but I can prescribedPromethazine DM. RX for Promethazine with codeine cough syrup refilled and given to patient.

## 2023-04-17 ENCOUNTER — HOSPITAL ENCOUNTER (OUTPATIENT)
Dept: RADIOLOGY | Facility: HOSPITAL | Age: 63
Discharge: HOME | End: 2023-04-17
Attending: INTERNAL MEDICINE
Payer: MEDICAID

## 2023-04-17 ENCOUNTER — HOSPITAL ENCOUNTER (OUTPATIENT)
Dept: RADIOLOGY | Facility: HOSPITAL | Age: 63
Discharge: HOME | End: 2023-04-17
Attending: NURSE PRACTITIONER
Payer: MEDICAID

## 2023-04-17 DIAGNOSIS — I73.9 CLAUDICATION: ICD-10-CM

## 2023-04-17 DIAGNOSIS — J44.89 ASTHMA WITH COPD: ICD-10-CM

## 2023-04-17 PROCEDURE — 93925 LOWER EXTREMITY STUDY: CPT | Mod: TC

## 2023-04-17 PROCEDURE — 71046 X-RAY EXAM CHEST 2 VIEWS: CPT | Mod: TC

## 2023-04-19 ENCOUNTER — LAB VISIT (OUTPATIENT)
Dept: LAB | Facility: HOSPITAL | Age: 63
End: 2023-04-19
Attending: INTERNAL MEDICINE
Payer: MEDICAID

## 2023-04-19 DIAGNOSIS — D48.19 ABDOMINAL FIBROMATOSIS: Primary | ICD-10-CM

## 2023-04-19 LAB
INR BLD: 2.86 (ref 0–1.3)
PROTHROMBIN TIME: 27.5 SECONDS (ref 12.5–14.5)

## 2023-04-19 PROCEDURE — 85610 PROTHROMBIN TIME: CPT

## 2023-04-19 PROCEDURE — 36415 COLL VENOUS BLD VENIPUNCTURE: CPT

## 2023-04-26 ENCOUNTER — LAB VISIT (OUTPATIENT)
Dept: LAB | Facility: HOSPITAL | Age: 63
End: 2023-04-26
Attending: INTERNAL MEDICINE
Payer: MEDICAID

## 2023-04-26 DIAGNOSIS — I48.91 A-FIB: Primary | ICD-10-CM

## 2023-04-26 LAB
INR BLD: 2.55 (ref 0–1.3)
PROTHROMBIN TIME: 24.6 SECONDS (ref 12.5–14.5)

## 2023-04-26 PROCEDURE — 36415 COLL VENOUS BLD VENIPUNCTURE: CPT

## 2023-04-26 PROCEDURE — 85610 PROTHROMBIN TIME: CPT

## 2023-05-11 ENCOUNTER — LAB VISIT (OUTPATIENT)
Dept: LAB | Facility: HOSPITAL | Age: 63
End: 2023-05-11
Attending: INTERNAL MEDICINE
Payer: MEDICAID

## 2023-05-11 DIAGNOSIS — I51.3 THROMBUS IN HEART CHAMBER: ICD-10-CM

## 2023-05-11 DIAGNOSIS — I48.91 A-FIB: Primary | ICD-10-CM

## 2023-05-11 LAB
INR BLD: 3.6 (ref 0–1.3)
PROTHROMBIN TIME: 34.2 SECONDS (ref 12.5–14.5)

## 2023-05-11 PROCEDURE — 85610 PROTHROMBIN TIME: CPT

## 2023-05-11 PROCEDURE — 36415 COLL VENOUS BLD VENIPUNCTURE: CPT

## 2023-05-15 DIAGNOSIS — Q62.11 HYDRONEPHROSIS WITH URETEROPELVIC JUNCTION (UPJ) OBSTRUCTION: ICD-10-CM

## 2023-05-15 RX ORDER — TRAZODONE HYDROCHLORIDE 50 MG/1
50 TABLET ORAL NIGHTLY PRN
Qty: 90 TABLET | Refills: 1 | Status: SHIPPED | OUTPATIENT
Start: 2023-05-15 | End: 2023-09-18 | Stop reason: SDUPTHER

## 2023-05-17 ENCOUNTER — LAB VISIT (OUTPATIENT)
Dept: LAB | Facility: HOSPITAL | Age: 63
End: 2023-05-17
Attending: INTERNAL MEDICINE
Payer: MEDICAID

## 2023-05-17 DIAGNOSIS — I51.3 THROMBUS IN HEART CHAMBER: Primary | ICD-10-CM

## 2023-05-17 LAB
INR BLD: 2.49 (ref 0–1.3)
PROTHROMBIN TIME: 24 SECONDS (ref 12.5–14.5)

## 2023-05-17 PROCEDURE — 85610 PROTHROMBIN TIME: CPT

## 2023-05-17 PROCEDURE — 36415 COLL VENOUS BLD VENIPUNCTURE: CPT

## 2023-05-24 RX ORDER — LISINOPRIL 5 MG/1
1 TABLET ORAL DAILY
COMMUNITY
End: 2023-05-31

## 2023-05-24 RX ORDER — LISINOPRIL 10 MG/1
1 TABLET ORAL DAILY
COMMUNITY
End: 2023-05-31

## 2023-05-24 RX ORDER — IPRATROPIUM BROMIDE AND ALBUTEROL SULFATE 2.5; .5 MG/3ML; MG/3ML
SOLUTION RESPIRATORY (INHALATION) EVERY 6 HOURS PRN
COMMUNITY
Start: 2023-04-04 | End: 2023-06-14 | Stop reason: CLARIF

## 2023-05-24 RX ORDER — AMIODARONE HYDROCHLORIDE 200 MG/1
1 TABLET ORAL DAILY
COMMUNITY
End: 2023-07-18

## 2023-05-31 ENCOUNTER — LAB VISIT (OUTPATIENT)
Dept: LAB | Facility: HOSPITAL | Age: 63
End: 2023-05-31
Attending: INTERNAL MEDICINE
Payer: MEDICAID

## 2023-05-31 ENCOUNTER — OFFICE VISIT (OUTPATIENT)
Dept: NEPHROLOGY | Facility: CLINIC | Age: 63
End: 2023-05-31
Payer: MEDICAID

## 2023-05-31 VITALS
OXYGEN SATURATION: 99 % | HEIGHT: 74 IN | HEART RATE: 77 BPM | BODY MASS INDEX: 31.91 KG/M2 | WEIGHT: 248.63 LBS | TEMPERATURE: 98 F | DIASTOLIC BLOOD PRESSURE: 80 MMHG | SYSTOLIC BLOOD PRESSURE: 120 MMHG | RESPIRATION RATE: 18 BRPM

## 2023-05-31 DIAGNOSIS — N18.31 CKD STAGE G3A/A2, GFR 45-59 AND ALBUMIN CREATININE RATIO 30-299 MG/G: Primary | ICD-10-CM

## 2023-05-31 DIAGNOSIS — I48.92 ATRIAL FIBRILLATION AND FLUTTER: Primary | ICD-10-CM

## 2023-05-31 DIAGNOSIS — N13.30 HYDRONEPHROSIS OF LEFT KIDNEY: ICD-10-CM

## 2023-05-31 DIAGNOSIS — I10 PRIMARY HYPERTENSION: ICD-10-CM

## 2023-05-31 DIAGNOSIS — I48.91 ATRIAL FIBRILLATION AND FLUTTER: Primary | ICD-10-CM

## 2023-05-31 PROBLEM — E66.9 OBESITY: Status: ACTIVE | Noted: 2023-04-12

## 2023-05-31 PROBLEM — I50.9 CONGESTIVE HEART FAILURE: Status: ACTIVE | Noted: 2023-04-12

## 2023-05-31 PROBLEM — E78.5 DYSLIPIDEMIA: Status: ACTIVE | Noted: 2023-04-12

## 2023-05-31 PROBLEM — G47.33 OBSTRUCTIVE SLEEP APNEA OF ADULT: Status: ACTIVE | Noted: 2023-04-12

## 2023-05-31 PROBLEM — I25.10 CORONARY ARTERIOSCLEROSIS: Status: ACTIVE | Noted: 2023-04-12

## 2023-05-31 LAB
INR BLD: 3.35 (ref 0–1.3)
PROTHROMBIN TIME: 31.9 SECONDS (ref 12.5–14.5)

## 2023-05-31 PROCEDURE — 99215 OFFICE O/P EST HI 40 MIN: CPT | Mod: PBBFAC | Performed by: NURSE PRACTITIONER

## 2023-05-31 PROCEDURE — 4010F PR ACE/ARB THEARPY RXD/TAKEN: ICD-10-PCS | Mod: CPTII,,, | Performed by: NURSE PRACTITIONER

## 2023-05-31 PROCEDURE — 1159F MED LIST DOCD IN RCRD: CPT | Mod: CPTII,,, | Performed by: NURSE PRACTITIONER

## 2023-05-31 PROCEDURE — 99214 OFFICE O/P EST MOD 30 MIN: CPT | Mod: S$PBB,,, | Performed by: NURSE PRACTITIONER

## 2023-05-31 PROCEDURE — 3079F DIAST BP 80-89 MM HG: CPT | Mod: CPTII,,, | Performed by: NURSE PRACTITIONER

## 2023-05-31 PROCEDURE — 3074F PR MOST RECENT SYSTOLIC BLOOD PRESSURE < 130 MM HG: ICD-10-PCS | Mod: CPTII,,, | Performed by: NURSE PRACTITIONER

## 2023-05-31 PROCEDURE — 99214 PR OFFICE/OUTPT VISIT, EST, LEVL IV, 30-39 MIN: ICD-10-PCS | Mod: S$PBB,,, | Performed by: NURSE PRACTITIONER

## 2023-05-31 PROCEDURE — 3066F NEPHROPATHY DOC TX: CPT | Mod: CPTII,,, | Performed by: NURSE PRACTITIONER

## 2023-05-31 PROCEDURE — 36415 COLL VENOUS BLD VENIPUNCTURE: CPT

## 2023-05-31 PROCEDURE — 3074F SYST BP LT 130 MM HG: CPT | Mod: CPTII,,, | Performed by: NURSE PRACTITIONER

## 2023-05-31 PROCEDURE — 3008F BODY MASS INDEX DOCD: CPT | Mod: CPTII,,, | Performed by: NURSE PRACTITIONER

## 2023-05-31 PROCEDURE — 85610 PROTHROMBIN TIME: CPT

## 2023-05-31 PROCEDURE — 3008F PR BODY MASS INDEX (BMI) DOCUMENTED: ICD-10-PCS | Mod: CPTII,,, | Performed by: NURSE PRACTITIONER

## 2023-05-31 PROCEDURE — 3079F PR MOST RECENT DIASTOLIC BLOOD PRESSURE 80-89 MM HG: ICD-10-PCS | Mod: CPTII,,, | Performed by: NURSE PRACTITIONER

## 2023-05-31 PROCEDURE — 1159F PR MEDICATION LIST DOCUMENTED IN MEDICAL RECORD: ICD-10-PCS | Mod: CPTII,,, | Performed by: NURSE PRACTITIONER

## 2023-05-31 PROCEDURE — 4010F ACE/ARB THERAPY RXD/TAKEN: CPT | Mod: CPTII,,, | Performed by: NURSE PRACTITIONER

## 2023-05-31 PROCEDURE — 3066F PR DOCUMENTATION OF TREATMENT FOR NEPHROPATHY: ICD-10-PCS | Mod: CPTII,,, | Performed by: NURSE PRACTITIONER

## 2023-05-31 RX ORDER — PROMETHAZINE HYDROCHLORIDE AND CODEINE PHOSPHATE 6.25; 1 MG/5ML; MG/5ML
5 SOLUTION ORAL EVERY 4 HOURS PRN
COMMUNITY
End: 2023-12-06

## 2023-05-31 NOTE — PROGRESS NOTES
[facility]  Nephrology Clinic Note    Chief complaint: Chronic Kidney Disease (RTC, took meds, dyspnea, Afib, edema patrick le, back pain)    History of present illness:   Ezra Zhang is a 62 y.o. Black or  male with past medical history of CKD, DM (diagnosed in 2008), heart failure with reduced ejection fraction (15% per echo in March of 2023), treated hepatitis C, dyslipidemia, atrial fibrillation (anticoagulated with warfarin), left hydronephrosis (followed by [facility] and [facility]), and past history of tobacco abuse. Patient presents for follow-up appointment in nephrology clinic today, states that lower extremity edema is controlled with current dose of furosemide, complains of lower back pain, which is a chronic issue.  Denies bowel/urinary incontinence.     Review of Systems  12 point review of systems conducted, negative except as stated in the history of present illness.    Allergies: Patient has No Known Allergies.     Past Medical History:  has a past medical history of Atrial fibrillation, CHF (congestive heart failure), CKD (chronic kidney disease), COPD (chronic obstructive pulmonary disease), DM (diabetes mellitus), Dyslipidemia, Essential (primary) hypertension, Heart failure, unspecified, Rheumatoid arthritis, unspecified, Smoking, and Type 2 diabetes mellitus without complications.    Procedure History:  has a past surgical history that includes coronary arteriograph; left  heart catherization; Insert / replace / remove pacemaker; Cardioversion; defibillator; and echocardiogram,transesophageal (N/A, 3/22/2023).    Family History: family history includes Cataracts in his mother; Glaucoma in his mother; Heart disease in his mother; Heart failure in his mother; Hypertension in his father; Peripheral vascular disease in his mother.    Social History:  reports that he has never smoked. He has never been exposed to tobacco smoke. He has never used  "smokeless tobacco. He reports that he does not drink alcohol and does not use drugs.    Physical exam  /80 (BP Location: Right arm, Patient Position: Sitting, BP Method: Large (Automatic))   Pulse 77   Temp 97.9 °F (36.6 °C) (Oral)   Resp 18   Ht 6' 2.02" (1.88 m)   Wt 112.8 kg (248 lb 9.6 oz)   SpO2 99%   BMI 31.90 kg/m²   General appearance: Patient is in no acute distress.  Skin: No rashes or wounds.  HEENT: PERRLA, EOMI, no scleral icterus, no JVD. Neck is supple.  Chest: Respirations are unlabored. Lungs sounds are clear.   Heart: S1, S2.   Abdomen: Benign.  : Deferred.  Extremities: BLE 1+ pitting edema, peripheral pulses are palpable.   Neuro: No focal deficits.     Home Medications:    Current Outpatient Medications:     ACCU-CHEK FASTCLIX LANCET DRUM Misc, Apply topically once daily., Disp: , Rfl:     ACCU-CHEK GUIDE TEST STRIPS Strp, USE AS DIRECTED once daily (keep log), Disp: , Rfl:     albuterol (PROVENTIL) 2.5 mg /3 mL (0.083 %) nebulizer solution, Take 3 mLs (2.5 mg total) by nebulization every 6 (six) hours as needed for Wheezing. Rescue, Disp: 100 each, Rfl: 6    albuterol-ipratropium (DUO-NEB) 2.5 mg-0.5 mg/3 mL nebulizer solution, Take by nebulization every 6 (six) hours as needed., Disp: , Rfl:     atorvastatin (LIPITOR) 80 MG tablet, Take 1 tablet (80 mg total) by mouth once daily., Disp: 90 tablet, Rfl: 3    cetirizine (ZYRTEC) 10 MG tablet, Take 1 tablet (10 mg total) by mouth once daily., Disp: 90 tablet, Rfl: 1    cholecalciferol, vitamin D3, (VITAMIN D3) 50 mcg (2,000 unit) Tab, Take 1 tablet (2,000 Units total) by mouth once daily., Disp: 30 tablet, Rfl: 0    clotrimazole-betamethasone 1-0.05% (LOTRISONE) cream, Apply 2 g topically 2 (two) times daily., Disp: , Rfl:     famotidine (PEPCID) 20 MG tablet, Take 1 tablet (20 mg total) by mouth 2 (two) times daily., Disp: 60 tablet, Rfl: 11    fluticasone propion-salmeterol 115-21 mcg/dose (ADVAIR HFA) 115-21 mcg/actuation HFAA " inhaler, Inhale 2 puffs into the lungs every 12 (twelve) hours., Disp: 12 g, Rfl: 6    fluticasone propionate (FLONASE ALLERGY RELIEF) 50 mcg/actuation nasal spray, 2 sprays (100 mcg total) by Each Nostril route Daily., Disp: 18 g, Rfl: 6    furosemide (LASIX) 40 MG tablet, Take 1 tablet (40 mg total) by mouth once daily. Refilled per Cardio orders., Disp: 90 tablet, Rfl: 3    glipiZIDE (GLUCOTROL) 5 MG tablet, Take 5 mg by mouth once daily., Disp: , Rfl:     ipratropium (ATROVENT) 0.02 % nebulizer solution, Take 2.5 mLs (500 mcg total) by nebulization 4 (four) times daily. Rescue, Disp: 100 mL, Rfl: 6    lancets-blood glucose strips 30 gauge Cmpk,  Accucheck blood glucose test strips and lancets, See Instructions, Check CBG once daily and keep log., # 100 EA, 11 Refill(s), Pharmacy: [facility], 185, cm, Height/Length Dosing, 03/08/22 8:10:00 CST, 96, kg, Weight Dosing, 03/08/22 8:10:00 CST, Disp: , Rfl:     metoprolol succinate (TOPROL-XL) 200 MG 24 hr tablet, Take 1 tablet (200 mg total) by mouth 2 (two) times daily., Disp: 180 tablet, Rfl: 3    nicotine (NICODERM CQ) 14 mg/24 hr, Place 1 patch onto the skin once daily., Disp: 28 patch, Rfl: 2    nicotine polacrilex (NICORETTE) 4 MG Gum, Take 1 each (4 mg total) by mouth as needed (smoking urge)., Disp: 170 each, Rfl: 3    promethazine-codeine 6.25-10 mg/5 ml (PHENERGAN WITH CODEINE) 6.25-10 mg/5 mL syrup, Take 5 mLs by mouth every 4 (four) hours as needed for Cough., Disp: , Rfl:     sacubitriL-valsartan (ENTRESTO) 49-51 mg per tablet, Take 1 tablet by mouth 2 (two) times daily., Disp: 60 tablet, Rfl: 11    traZODone (DESYREL) 50 MG tablet, Take 1 tablet (50 mg total) by mouth nightly as needed for Insomnia., Disp: 90 tablet, Rfl: 1    warfarin (COUMADIN) 5 MG tablet, Take 1 tablet (5 mg total) by mouth Daily., Disp: 90 tablet, Rfl: 3    amiodarone (PACERONE) 200 MG Tab, Take 1 tablet by mouth Daily., Disp: , Rfl:     apixaban (ELIQUIS) 5 mg Tab, Eliquis 5  mg tablet, Disp: , Rfl:     Laboratory data    Serum  Lab Results   Component Value Date    WBC 8.5 02/18/2023    HGB 15.8 02/18/2023    HCT 48.2 02/18/2023     02/18/2023    IRON 83 03/27/2018    TIBC 177 (L) 03/27/2018    TRANS 212.0 03/27/2018    LABIRON 46.9 03/27/2018    FERRITIN 295.1 03/27/2018    FOLATE 19.8 (H) 03/27/2018    XKXIHWFG07 242 03/27/2018     04/12/2023    K 4.6 04/12/2023    CHLORIDE 106 04/12/2023    CO2 31 04/12/2023    BUN 27.3 (H) 04/12/2023    CREATININE 1.49 (H) 04/12/2023    EGFRNORACEVR 52 04/12/2023    GLUCOSE 120 (H) 04/12/2023    CALCIUM 9.7 04/12/2023    ALKPHOS 115 04/12/2023    LABPROT 7.3 04/12/2023    ALBUMIN 4.3 04/12/2023    BILIDIR 0.2 08/25/2021    IBILI 0.20 08/25/2021    AST 27 04/12/2023    ALT 41 04/12/2023    MG 2.10 03/07/2023    PHOS 4.0 01/28/2023      Lab Results   Component Value Date    HGBA1C 6.2 03/24/2023    OCTRCNGB74AB 35.4 04/12/2023    HIV Nonreactive 01/09/2018    HEPCAB Reactive (A) 08/23/2019    HEPBSURFAG Negative 08/23/2019    HEPBCAB Nonreactive 08/23/2019     Urine:  Lab Results   Component Value Date    COLORUA Yellow 04/12/2023    APPEARANCEUA Clear 04/12/2023    SGUA 1.020 04/12/2023    PHUA 6.5 04/12/2023    PROTEINUA Negative 04/12/2023    GLUCOSEUA Negative 04/12/2023    KETONESUA Negative 04/12/2023    BLOODUA Negative 04/12/2023    NITRITESUA Negative 04/12/2023    LEUKOCYTESUR Negative 04/12/2023    RBCUA None Seen 04/12/2023    WBCUA None Seen 04/12/2023    BACTERIA None Seen 04/12/2023    SQEPUA None Seen 02/18/2023    HYALINECASTS 0-2 (A) 02/18/2023    CREATRANDUR 127.0 04/12/2023    PROTEINURINE 14.0 04/12/2023    UPROTCREA 0.1 04/12/2023         Imaging  CT Abdomen Pelvis W Wo Contrast 03/31/2023  Chest: Stable heart chamber size. No pericardial effusion. No interval change of the visualized vasculature. No airspace consolidation or suspicious lung lesion. No worsening pleural effusion or evidence for  loculation.    Hepatobiliary/Pancreas: No new or enlarging hepatic lesion. No suspicious findings of the gallbladder or biliary system. No acute process or suspicious interval change of the pancreas.    Spleen: No interval change.    Adrenal/: No new, enlarging, or otherwise suspicious adrenal or renal lesion.  Small simple appearing bilateral renal cortex cysts are unchanged.  Both kidneys enhance in temporally symmetric fashion.  Persistent moderate to severe left hydroureteronephrosis is appreciated, with similar degree of dilatation throughout the course of the ureter to the level of the ureterovesicular junction.  No new or worsening focal abnormality to indicate etiology of distal obstructive uropathy is identified.  No interval development of right hydronephrosis or ureteral dilatation is identified.  Mural contour of the urinary bladder is unremarkable.  There is no convincing intraluminal filling defect within limitations of lacking intravesicular contrast opacification.  The prostate is similar in size and appearance.    Esophagus/GI tract: The included lower esophagus is unremarkable. No evidence of gastric outlet or small bowel obstruction. No acute inflammatory process or convincing focal lesion.    Musculoskeletal: No significant interval changes appreciated.  Similar degenerative alterations are noted throughout the spinal column and bony pelvis, as well as early stage changes of bilateral femoral head avascular necrosis.    Other findings: No free fluid or air. No drainable collections. Aortoiliac vascular structures are similar in comparison. No development of pathologic angie enlargement or necrotic adenopathy.    IMPRESSION  1. Grossly stable appearance of moderate to severe left hydroureteronephrosis.  Etiology of distal obstructive uropathy remains indeterminate, but could be secondary to persistent stricture just at the level of the UVJ.  2. No development of asymmetric renal enhancement to  suggest new or worsening left renal function.  3. Additional chronic secondary findings are grossly unchanged from the April 2022 CT appearance.    RADIATION DOSE  Automated tube current modulation, weight-based exposure dosing, and/or iterative reconstruction technique utilized to reach lowest reasonably achievable exposure rate.    DLP: 1275 mGy*cm      Electronically signed by: Xavier Wisdom  Date:    03/31/2023  Time:    15:31      Impression and plan     CKD stage G3a/A2, GFR 45-59 and albumin creatinine ratio  mg/g  Renal indices are relatively stable, there is no significant proteinuria.  Continue renal sparing activities:    -Follow low sodium diet (2 grams a day)  -Control high blood pressure ( goal BP < 130/80, please record BP at home every day and bring log to next office visit)  -Exercise at least 30 minutes a day, 5 days a week.  -Maintain healthy weight.  -Decrease or stop alcohol use  -Do not smoke  -Stay well hydrated  -Receive Pneumovax, Flu, and HBV vaccines if indicated.  -Do not take NSAIDs (Ibuprofen, Naproxen, Aleve, Advil, Toradol, Mobic), may take only Tylenol as needed for pain/headaches.  -Take cholesterol-lowering medications as prescribed (LDL goal <100)    Handout on treatment options for kidney disease provided.  F/U with PCP as scheduled  [facility] with routine labs in 6 months    Primary hypertension  Blood pressure reading is at goal, continue current antihypertensive regimen and 2 g a day dietary sodium restriction.      Hydronephrosis of left kidney  Follow up with [facility] as scheduled.      BMI 31.0-31.9,adult  Lifestyle and dietary interventions discussed, patient counseled on weight loss using portion control, non- sedentary lifestyle, low-carbohydrate/low fat diet.       5/31/2023  Nadira Buchanan NP  [id number]

## 2023-06-04 ENCOUNTER — HOSPITAL ENCOUNTER (EMERGENCY)
Facility: HOSPITAL | Age: 63
Discharge: HOME | End: 2023-06-04
Attending: EMERGENCY MEDICINE
Payer: MEDICAID

## 2023-06-04 VITALS
RESPIRATION RATE: 24 BRPM | SYSTOLIC BLOOD PRESSURE: 111 MMHG | TEMPERATURE: 98 F | DIASTOLIC BLOOD PRESSURE: 78 MMHG | OXYGEN SATURATION: 97 % | HEART RATE: 55 BPM | BODY MASS INDEX: 31.6 KG/M2 | HEIGHT: 74 IN | WEIGHT: 246.25 LBS

## 2023-06-04 DIAGNOSIS — I48.91 ATRIAL FIBRILLATION: ICD-10-CM

## 2023-06-04 DIAGNOSIS — M47.817 LUMBAR AND SACRAL OSTEOARTHRITIS: Primary | ICD-10-CM

## 2023-06-04 DIAGNOSIS — R52 PAIN: ICD-10-CM

## 2023-06-04 LAB
ALBUMIN SERPL-MCNC: 3.8 G/DL (ref 3.4–4.8)
ALBUMIN/GLOB SERPL: 1.2 RATIO (ref 1.1–2)
ALP SERPL-CCNC: 142 UNIT/L (ref 40–150)
ALT SERPL-CCNC: 17 UNIT/L (ref 0–55)
APPEARANCE UR: CLEAR
AST SERPL-CCNC: 15 UNIT/L (ref 5–34)
BACTERIA #/AREA URNS AUTO: ABNORMAL /HPF
BASOPHILS # BLD AUTO: 0.04 X10(3)/MCL
BASOPHILS NFR BLD AUTO: 0.4 %
BILIRUB UR QL STRIP.AUTO: NEGATIVE MG/DL
BILIRUBIN DIRECT+TOT PNL SERPL-MCNC: 0.5 MG/DL
BNP BLD-MCNC: 390.3 PG/ML
BUN SERPL-MCNC: 20 MG/DL (ref 8.4–25.7)
CALCIUM SERPL-MCNC: 9.4 MG/DL (ref 8.8–10)
CHLORIDE SERPL-SCNC: 108 MMOL/L (ref 98–107)
CO2 SERPL-SCNC: 28 MMOL/L (ref 23–31)
COLOR UR: ABNORMAL
CREAT SERPL-MCNC: 1.58 MG/DL (ref 0.73–1.18)
CRP SERPL-MCNC: 12 MG/L
EOSINOPHIL # BLD AUTO: 0.32 X10(3)/MCL (ref 0–0.9)
EOSINOPHIL NFR BLD AUTO: 3.3 %
ERYTHROCYTE [DISTWIDTH] IN BLOOD BY AUTOMATED COUNT: 13.2 % (ref 11.5–17)
ERYTHROCYTE [SEDIMENTATION RATE] IN BLOOD: 19 MM/HR (ref 0–15)
GFR SERPLBLD CREATININE-BSD FMLA CKD-EPI: 49 MLS/MIN/1.73/M2
GLOBULIN SER-MCNC: 3.2 GM/DL (ref 2.4–3.5)
GLUCOSE SERPL-MCNC: 100 MG/DL (ref 82–115)
GLUCOSE UR QL STRIP.AUTO: NORMAL MG/DL
HCT VFR BLD AUTO: 39.5 % (ref 42–52)
HGB BLD-MCNC: 13.3 G/DL (ref 14–18)
HYALINE CASTS #/AREA URNS LPF: ABNORMAL /LPF
IMM GRANULOCYTES # BLD AUTO: 0.02 X10(3)/MCL (ref 0–0.04)
IMM GRANULOCYTES NFR BLD AUTO: 0.2 %
KETONES UR QL STRIP.AUTO: NEGATIVE MG/DL
LEUKOCYTE ESTERASE UR QL STRIP.AUTO: NEGATIVE UNIT/L
LYMPHOCYTES # BLD AUTO: 2.88 X10(3)/MCL (ref 0.6–4.6)
LYMPHOCYTES NFR BLD AUTO: 30 %
MCH RBC QN AUTO: 32.8 PG (ref 27–31)
MCHC RBC AUTO-ENTMCNC: 33.7 G/DL (ref 33–36)
MCV RBC AUTO: 97.3 FL (ref 80–94)
MONOCYTES # BLD AUTO: 0.81 X10(3)/MCL (ref 0.1–1.3)
MONOCYTES NFR BLD AUTO: 8.4 %
MUCOUS THREADS URNS QL MICRO: ABNORMAL /LPF
NEUTROPHILS # BLD AUTO: 5.52 X10(3)/MCL (ref 2.1–9.2)
NEUTROPHILS NFR BLD AUTO: 57.7 %
NITRITE UR QL STRIP.AUTO: NEGATIVE
NRBC BLD AUTO-RTO: 0 %
PH UR STRIP.AUTO: 6 [PH]
PLATELET # BLD AUTO: 270 X10(3)/MCL (ref 130–400)
PMV BLD AUTO: 10.9 FL (ref 7.4–10.4)
POTASSIUM SERPL-SCNC: 4.1 MMOL/L (ref 3.5–5.1)
PROT SERPL-MCNC: 7 GM/DL (ref 5.8–7.6)
PROT UR QL STRIP.AUTO: NEGATIVE MG/DL
RBC # BLD AUTO: 4.06 X10(6)/MCL (ref 4.7–6.1)
RBC #/AREA URNS AUTO: ABNORMAL /HPF
RBC UR QL AUTO: NEGATIVE UNIT/L
SODIUM SERPL-SCNC: 144 MMOL/L (ref 136–145)
SP GR UR STRIP.AUTO: 1.01
SQUAMOUS #/AREA URNS LPF: ABNORMAL /HPF
TROPONIN I SERPL-MCNC: 0.03 NG/ML (ref 0–0.04)
UROBILINOGEN UR STRIP-ACNC: NORMAL MG/DL
WBC # SPEC AUTO: 9.59 X10(3)/MCL (ref 4.5–11.5)
WBC #/AREA URNS AUTO: ABNORMAL /HPF

## 2023-06-04 PROCEDURE — 80053 COMPREHEN METABOLIC PANEL: CPT | Performed by: EMERGENCY MEDICINE

## 2023-06-04 PROCEDURE — 85652 RBC SED RATE AUTOMATED: CPT | Performed by: EMERGENCY MEDICINE

## 2023-06-04 PROCEDURE — 99285 EMERGENCY DEPT VISIT HI MDM: CPT | Mod: 25

## 2023-06-04 PROCEDURE — 85610 PROTHROMBIN TIME: CPT | Performed by: EMERGENCY MEDICINE

## 2023-06-04 PROCEDURE — 86140 C-REACTIVE PROTEIN: CPT | Performed by: EMERGENCY MEDICINE

## 2023-06-04 PROCEDURE — 81001 URINALYSIS AUTO W/SCOPE: CPT | Performed by: EMERGENCY MEDICINE

## 2023-06-04 PROCEDURE — 85025 COMPLETE CBC W/AUTO DIFF WBC: CPT | Performed by: EMERGENCY MEDICINE

## 2023-06-04 PROCEDURE — 83880 ASSAY OF NATRIURETIC PEPTIDE: CPT | Performed by: EMERGENCY MEDICINE

## 2023-06-04 PROCEDURE — 84484 ASSAY OF TROPONIN QUANT: CPT | Performed by: EMERGENCY MEDICINE

## 2023-06-04 RX ORDER — PREDNISONE 50 MG/1
50 TABLET ORAL DAILY
Qty: 5 TABLET | Refills: 0 | Status: SHIPPED | OUTPATIENT
Start: 2023-06-04 | End: 2023-06-09

## 2023-06-04 NOTE — ED PROVIDER NOTES
Encounter Date: 6/4/2023       History     Chief Complaint   Patient presents with    Leg Pain     Reports SOB and also pain behind left knee x2-3 weeks. States he currently has a known clot near left atrium. Compliant with coumandin, hx of afib.      He is here with his wife with complaints of bilateral lower extremity pain, left greater than right, indicating an area in the inguinal regions bilaterally as well as left posterior thigh and popliteal fossa region, extending to the left lower leg and foot.  He also has ongoing pain and decreased range of motion of the low back bilaterally.  He reports a history of bone spurs in his lumbar spine as well as rheumatoid arthritis.  There was no accident, fall, or other identified physical strain or stress to any of these regions.  Has not had a previously known lower extremity DVT, but he does have atrial fibrillation and has been found to have a clot in the atrial appendage.  For that reason, ablation has been postponed and he is anticoagulated with Coumadin, he reports good compliance with this and recently stable PT INR evaluations.  Has some mild dyspnea without chest pain or palpitations.  No problems with urination, no incontinence of bowel or bladder, no dysuria.  Back and leg pain have progressed to the point where he is having an impact on walking and has been using a walker recently.  No other complaints.    The history is provided by the patient.   Review of patient's allergies indicates:  No Known Allergies  Past Medical History:   Diagnosis Date    Atrial fibrillation     CHF (congestive heart failure)     CKD (chronic kidney disease)     COPD (chronic obstructive pulmonary disease)     DM (diabetes mellitus)     Dyslipidemia     Essential (primary) hypertension     Heart failure, unspecified     Rheumatoid arthritis, unspecified     Smoking     Type 2 diabetes mellitus without complications      Past Surgical History:   Procedure Laterality Date     CARDIOVERSION      coronary arteriograph      defibillator      ECHOCARDIOGRAM,TRANSESOPHAGEAL N/A 3/22/2023    Procedure: Transesophageal echo (LASHELL) intra-procedure log documentation;  Surgeon: Matthieu Diagnostic Provider;  Location: [facility] CATH LAB;  Service: Cardiology;  Laterality: N/A;    INSERT / REPLACE / REMOVE PACEMAKER      left  heart catherization       Family History   Problem Relation Age of Onset    Heart failure Mother     Cataracts Mother     Heart disease Mother     Glaucoma Mother     Peripheral vascular disease Mother     Hypertension Father      Social History     Tobacco Use    Smoking status: Never     Passive exposure: Never    Smokeless tobacco: Never    Tobacco comments:     Taking patches   Substance Use Topics    Alcohol use: Never     Alcohol/week: 1.0 standard drink     Types: 1 Cans of beer per week     Comment: not often    Drug use: Never     Review of Systems   Constitutional:  Negative for chills and fever.   HENT:  Negative for congestion, facial swelling, nosebleeds and sinus pressure.    Eyes:  Negative for pain and redness.   Respiratory:  Positive for shortness of breath. Negative for chest tightness and wheezing.    Cardiovascular:  Negative for chest pain, palpitations and leg swelling.   Gastrointestinal:  Negative for abdominal distention, abdominal pain, diarrhea, nausea and vomiting.   Endocrine: Negative for cold intolerance, polydipsia and polyphagia.   Genitourinary:  Negative for difficulty urinating, dysuria, frequency and hematuria.   Musculoskeletal:  Positive for back pain and myalgias. Negative for arthralgias and neck pain.        See HPI   Skin:  Negative for color change and rash.   Neurological:  Negative for dizziness, weakness, numbness and headaches.   Hematological:  Negative for adenopathy. Does not bruise/bleed easily.   Psychiatric/Behavioral:  Negative for agitation and behavioral problems.    All other systems reviewed and are negative.    Physical Exam      Initial Vitals [06/04/23 1640]   BP Pulse Resp Temp SpO2   (!) 134/94 76 20 97.7 °F (36.5 °C) 98 %      MAP       --         Physical Exam    Nursing note and vitals reviewed.  Constitutional: He appears well-developed and well-nourished. He is not diaphoretic. He appears distressed.   Large frame/ stocky/ mild to moderate discomfort   HENT:   Head: Normocephalic and atraumatic.   Mouth/Throat: Oropharynx is clear and moist. No oropharyngeal exudate.   Eyes: Conjunctivae and EOM are normal. Pupils are equal, round, and reactive to light. Right eye exhibits no discharge. Left eye exhibits no discharge. No scleral icterus.   Neck: Neck supple. No thyromegaly present. No tracheal deviation present. No JVD present.   Normal range of motion.  Cardiovascular:  Normal rate and normal heart sounds.     Exam reveals no gallop and no friction rub.       No murmur heard.  IRRR - rate controlled a. Fib by monitor   Pulmonary/Chest: Breath sounds normal. No respiratory distress. He has no wheezes. He has no rhonchi. He has no rales. He exhibits no tenderness.   Abdominal: Abdomen is soft. Bowel sounds are normal. He exhibits no distension and no mass. There is no abdominal tenderness. There is no rebound and no guarding.   Musculoskeletal:         General: No tenderness or edema.      Cervical back: Normal range of motion and neck supple.      Comments: Indicates diffuse pain about the low lumbar region and bilateral inguinal areas extending to the groin and posterior thighs, extending to left foot as well.  There is no point tenderness, focal swelling, erythema, or other gross abnormality.  He has mild to moderate low back pain on hip flexion and sitting upright but well-preserved range of motion throughout.  I do not detect any edema or cord in either lower extremity.  Intact distal neurovascular and tendon exam for both lower extremities.     Lymphadenopathy:     He has no cervical adenopathy.   Neurological: He is alert  and oriented to person, place, and time. He has normal strength. No cranial nerve deficit.   Skin: Skin is warm and dry. No rash noted. No erythema.   Psychiatric: He has a normal mood and affect. His behavior is normal. Judgment and thought content normal.       ED Course   Procedures  Labs Reviewed   COMPREHENSIVE METABOLIC PANEL - Abnormal; Notable for the following components:       Result Value    Chloride 108 (*)     Creatinine 1.58 (*)     All other components within normal limits   PROTIME-INR - Abnormal; Notable for the following components:    INR 2.54 (*)     All other components within normal limits   URINALYSIS, REFLEX TO URINE CULTURE - Abnormal; Notable for the following components:    Squamous Epithelial Cells, UA Trace (*)     Mucous, UA Trace (*)     Hyaline Casts, UA 3-5 (*)     All other components within normal limits   B-TYPE NATRIURETIC PEPTIDE - Abnormal; Notable for the following components:    Natriuretic Peptide 390.3 (*)     All other components within normal limits   CBC WITH DIFFERENTIAL - Abnormal; Notable for the following components:    RBC 4.06 (*)     Hgb 13.3 (*)     Hct 39.5 (*)     MCV 97.3 (*)     MCH 32.8 (*)     MPV 10.9 (*)     All other components within normal limits   SEDIMENTATION RATE, AUTOMATED - Abnormal; Notable for the following components:    Sed Rate 19 (*)     All other components within normal limits   C-REACTIVE PROTEIN - Abnormal; Notable for the following components:    C-Reactive Protein 12.00 (*)     All other components within normal limits   TROPONIN I - Normal   CBC W/ AUTO DIFFERENTIAL    Narrative:     The following orders were created for panel order CBC auto differential.  Procedure                               Abnormality         Status                     ---------                               -----------         ------                     CBC with Differential[[id number]]        Abnormal            Final result                 Please view results  for these tests on the individual orders.   EXTRA TUBES    Narrative:     The following orders were created for panel order EXTRA TUBES.  Procedure                               Abnormality         Status                     ---------                               -----------         ------                     Red Top Hold[[id number]]                                     In process                   Please view results for these tests on the individual orders.   RED TOP HOLD          Imaging Results              CT Lumbar Spine Without Contrast (Preliminary result)  Result time 06/04/23 19:49:45      Preliminary result by Robson Hughes MD (06/04/23 19:49:45)                   Narrative:    START OF REPORT:  Technique: CT of the lumbar spine was performed without intravenous contrast with direct axial as well as sagittal and coronal reconstruction images.    Comparison: Correlation is with CT abdomen study dated â2023-03-31 08:15:01â.    Clinical history: Lumbar radiculopathy, symptoms persist with conservative treatment.    Findings:  Anatomy: Unremarkable.  Mineralization: The bony mineralization is within normal limits.  Bone alignment: Unremarkable with no significant listhesis.  Curvature: There is straightening of the lumbar lordotic curvature.  Bone and bone marrow: The vertebral body heights are maintained.  Intervertebral disc spaces: Moderately decreased disc height is seen at L1-L2 down through L5-S1.  Osteophytes: There are medium sized osteophytes at T12 down through S1.  Endplate Sclerosis: Moderate endplate sclerosis is seen off the opposing endplates at L2-L3 and L5-S1.  Spinal canal: There is moderate spinal canal narrowing at L2 L3 a circumferential disc osteophyte complex with associated moderate left-sided neural foraminal narrowing. There is mild spinal canal narrowing at L3 L4 circumferential disc osteophyte complex with associated moderate left-sided neural foraminal narrowing. There is  moderate spinal canal narrowing at L4 L5 circumferential disc osteophyte complex with associated moderate bilateral neural foraminal narrowing. There is mild - moderate spinal canal narrowing at L5 S1 circumferential disc osteophyte complex with associated moderate bilateral neural foraminal narrowing.  Fractures: No acute fracture dislocation or subluxation is seen.  Visualized sacrum: Large osteophytes are seen in the bilateral sacroiliac joints.  Miscellaneous: Limited view of the abdomen demonstrates a dilated left ureter.      Impression:  1. No acute fracture dislocation or subluxation is seen.  2. There is moderate spinal canal narrowing at L2 L3 a circumferential disc osteophyte complex with associated moderate left-sided neural foraminal narrowing. There is mild spinal canal narrowing at L3 L4 circumferential disc osteophyte complex with associated moderate left-sided neural foraminal narrowing. There is moderate spinal canal narrowing at L4 L5 circumferential disc osteophyte complex with associated moderate bilateral neural foraminal narrowing. There is mild - moderate spinal canal narrowing at L5 S1 circumferential disc osteophyte complex with associated moderate bilateral neural foraminal narrowing.  3. Degenerative changes and other findings as above.                                        6:54 PM Transfer care to Dr. Ren.       Medications - No data to display  Medical Decision Making:   Initial Assessment:   Presents with low back pain most compatible with degenerative arthritis  Differential Diagnosis:   Degenerative arthritis appears most likely; risk found sufficient to warrant expanded evaluation with objective data.  Clinical Tests:   Lab Tests: Ordered and Reviewed  Radiological Study: Ordered and Reviewed  ED Management:  Patient stable in the ED. An expanded evaluation with objective data is found reassuring. Patient does have degenerative arthritis as is evidenced on CT spine. US  excludes DVT. Plan home with return precautions, anticipatory guidance, fu instructions. Dc in stable condition without event.            ED Course as of 06/04/23 2153   Bonnie Jun 04, 2023 2105 Received in sign our at 1900 at which time much of the objective data remained pending ; [CT]   2106 Bnp improved in comparison to nearline time comparisons ; [CT]   2106 Chemistries reassuring (uncanged in comparison to recent previous values ) ; [CT]   2106 Reassuring hemogram ; [CT]   2106 Ua contaminated, unconvicing as uti ; [CT]   2107 Coags demonstrate patient properly anticoagulated with coumadin ; [CT]   2108 Ultrasound demonstrates no DVT ; [CT]   2148 Ct resulted demonstrates degenerative osteoarthritis; nothing acute identified ; [CT]      ED Course User Index  [CT] Salvador Ren MD                 Clinical Impression:   Final diagnoses:  [R52] Pain  [I48.91] Atrial fibrillation  [M47.817] Lumbar and sacral osteoarthritis (Primary)        ED Disposition Condition    Discharge Stable          ED Prescriptions       Medication Sig Dispense Start Date End Date Auth. Provider    predniSONE (DELTASONE) 50 MG Tab Take 1 tablet (50 mg total) by mouth once daily. for 5 days 5 tablet 6/4/2023 6/9/2023 Salvador Ren MD          Follow-up Information       Follow up With Specialties Details Why Contact Info    [facility] Emergency Medicine  As needed, If symptoms worsen [address] [facility] [id number] [phone]             Salvador Ren MD  06/04/23 2153

## 2023-06-09 ENCOUNTER — HOSPITAL ENCOUNTER (OUTPATIENT)
Dept: RADIOLOGY | Facility: HOSPITAL | Age: 63
Discharge: HOME | End: 2023-06-09
Attending: NURSE PRACTITIONER
Payer: MEDICAID

## 2023-06-09 DIAGNOSIS — Q62.11 HYDRONEPHROSIS WITH URETEROPELVIC JUNCTION (UPJ) OBSTRUCTION: ICD-10-CM

## 2023-06-09 PROCEDURE — 78708 K FLOW/FUNCT IMAGE W/DRUG: CPT | Mod: TC

## 2023-06-09 PROCEDURE — 63600175 PHARM REV CODE 636 W HCPCS

## 2023-06-09 RX ORDER — FUROSEMIDE 10 MG/ML
INJECTION INTRAMUSCULAR; INTRAVENOUS
Status: COMPLETED
Start: 2023-06-09 | End: 2023-06-09

## 2023-06-09 RX ADMIN — FUROSEMIDE 40 MG: 10 INJECTION, SOLUTION INTRAMUSCULAR; INTRAVENOUS at 07:06

## 2023-06-11 DIAGNOSIS — E11.9 CONTROLLED TYPE 2 DIABETES MELLITUS WITHOUT COMPLICATION, WITHOUT LONG-TERM CURRENT USE OF INSULIN: Primary | ICD-10-CM

## 2023-06-11 RX ORDER — GLIPIZIDE 5 MG/1
5 TABLET ORAL DAILY
Qty: 30 TABLET | Refills: 3 | Status: SHIPPED | OUTPATIENT
Start: 2023-06-11 | End: 2023-09-18 | Stop reason: SDUPTHER

## 2023-06-14 ENCOUNTER — OFFICE VISIT (OUTPATIENT)
Dept: INTERNAL MEDICINE | Facility: CLINIC | Age: 63
End: 2023-06-14
Payer: MEDICAID

## 2023-06-14 VITALS
WEIGHT: 245 LBS | TEMPERATURE: 98 F | HEIGHT: 74 IN | SYSTOLIC BLOOD PRESSURE: 124 MMHG | RESPIRATION RATE: 18 BRPM | HEART RATE: 94 BPM | DIASTOLIC BLOOD PRESSURE: 87 MMHG | BODY MASS INDEX: 31.44 KG/M2

## 2023-06-14 DIAGNOSIS — M54.9 CHRONIC BACK PAIN, UNSPECIFIED BACK LOCATION, UNSPECIFIED BACK PAIN LATERALITY: ICD-10-CM

## 2023-06-14 DIAGNOSIS — M48.061 SPINAL STENOSIS OF LUMBAR REGION, UNSPECIFIED WHETHER NEUROGENIC CLAUDICATION PRESENT: ICD-10-CM

## 2023-06-14 DIAGNOSIS — J44.89 ASTHMA WITH COPD: Primary | ICD-10-CM

## 2023-06-14 DIAGNOSIS — G89.29 CHRONIC BACK PAIN, UNSPECIFIED BACK LOCATION, UNSPECIFIED BACK PAIN LATERALITY: ICD-10-CM

## 2023-06-14 PROCEDURE — 4010F PR ACE/ARB THEARPY RXD/TAKEN: ICD-10-PCS | Mod: CPTII,,, | Performed by: NURSE PRACTITIONER

## 2023-06-14 PROCEDURE — 3079F DIAST BP 80-89 MM HG: CPT | Mod: CPTII,,, | Performed by: NURSE PRACTITIONER

## 2023-06-14 PROCEDURE — 3079F PR MOST RECENT DIASTOLIC BLOOD PRESSURE 80-89 MM HG: ICD-10-PCS | Mod: CPTII,,, | Performed by: NURSE PRACTITIONER

## 2023-06-14 PROCEDURE — 3008F BODY MASS INDEX DOCD: CPT | Mod: CPTII,,, | Performed by: NURSE PRACTITIONER

## 2023-06-14 PROCEDURE — 1159F PR MEDICATION LIST DOCUMENTED IN MEDICAL RECORD: ICD-10-PCS | Mod: CPTII,,, | Performed by: NURSE PRACTITIONER

## 2023-06-14 PROCEDURE — 4010F ACE/ARB THERAPY RXD/TAKEN: CPT | Mod: CPTII,,, | Performed by: NURSE PRACTITIONER

## 2023-06-14 PROCEDURE — 3066F NEPHROPATHY DOC TX: CPT | Mod: CPTII,,, | Performed by: NURSE PRACTITIONER

## 2023-06-14 PROCEDURE — 99214 OFFICE O/P EST MOD 30 MIN: CPT | Mod: S$PBB,,, | Performed by: NURSE PRACTITIONER

## 2023-06-14 PROCEDURE — 1159F MED LIST DOCD IN RCRD: CPT | Mod: CPTII,,, | Performed by: NURSE PRACTITIONER

## 2023-06-14 PROCEDURE — 3074F PR MOST RECENT SYSTOLIC BLOOD PRESSURE < 130 MM HG: ICD-10-PCS | Mod: CPTII,,, | Performed by: NURSE PRACTITIONER

## 2023-06-14 PROCEDURE — 3074F SYST BP LT 130 MM HG: CPT | Mod: CPTII,,, | Performed by: NURSE PRACTITIONER

## 2023-06-14 PROCEDURE — 99215 OFFICE O/P EST HI 40 MIN: CPT | Mod: PBBFAC | Performed by: NURSE PRACTITIONER

## 2023-06-14 PROCEDURE — 99214 PR OFFICE/OUTPT VISIT, EST, LEVL IV, 30-39 MIN: ICD-10-PCS | Mod: S$PBB,,, | Performed by: NURSE PRACTITIONER

## 2023-06-14 PROCEDURE — 3008F PR BODY MASS INDEX (BMI) DOCUMENTED: ICD-10-PCS | Mod: CPTII,,, | Performed by: NURSE PRACTITIONER

## 2023-06-14 PROCEDURE — 3066F PR DOCUMENTATION OF TREATMENT FOR NEPHROPATHY: ICD-10-PCS | Mod: CPTII,,, | Performed by: NURSE PRACTITIONER

## 2023-06-14 RX ORDER — FLUTICASONE PROPIONATE AND SALMETEROL XINAFOATE 230; 21 UG/1; UG/1
2 AEROSOL, METERED RESPIRATORY (INHALATION) 2 TIMES DAILY
Qty: 12 G | Refills: 6 | Status: SHIPPED | OUTPATIENT
Start: 2023-06-14 | End: 2023-09-18 | Stop reason: SDUPTHER

## 2023-06-14 RX ORDER — ALBUTEROL SULFATE 0.83 MG/ML
2.5 SOLUTION RESPIRATORY (INHALATION) EVERY 6 HOURS PRN
Qty: 100 EACH | Refills: 6 | Status: SHIPPED | OUTPATIENT
Start: 2023-06-14 | End: 2023-09-18 | Stop reason: SDUPTHER

## 2023-06-14 RX ORDER — IPRATROPIUM BROMIDE 0.5 MG/2.5ML
500 SOLUTION RESPIRATORY (INHALATION) 4 TIMES DAILY
Qty: 100 ML | Refills: 6 | Status: SHIPPED | OUTPATIENT
Start: 2023-06-14 | End: 2023-09-18 | Stop reason: SDUPTHER

## 2023-06-14 NOTE — PROGRESS NOTES
Patient ID: [id number]     Chief Complaint: ER-FOLLOW UP        HPI:     HPI      Ezra Zhang is a 62 y.o. male here today for a follow up.         ----------------------------  Atrial fibrillation  CHF (congestive heart failure)  CKD (chronic kidney disease)  COPD (chronic obstructive pulmonary disease)  DM (diabetes mellitus)  Dyslipidemia  Essential (primary) hypertension  Heart failure, unspecified  Rheumatoid arthritis, unspecified  Smoking  Type 2 diabetes mellitus without complications     Past Surgical History:   Procedure Laterality Date    CARDIOVERSION      coronary arteriograph      defibillator      ECHOCARDIOGRAM,TRANSESOPHAGEAL N/A 3/22/2023    Procedure: Transesophageal echo (ALSHELL) intra-procedure log documentation;  Surgeon: [facility];  Location: [facility] CATH LAB;  Service: Cardiology;  Laterality: N/A;    INSERT / REPLACE / REMOVE PACEMAKER      left  heart catherization         Review of patient's allergies indicates:  No Known Allergies    Current Outpatient Medications   Medication Instructions    ACCU-CHEK FASTCLIX LANCET DRUM Misc Topical (Top), Daily    ACCU-CHEK GUIDE TEST STRIPS Strp USE AS DIRECTED once daily (keep log)    albuterol (PROVENTIL) 2.5 mg, Nebulization, Every 6 hours PRN, Rescue    amiodarone (PACERONE) 200 MG Tab 1 tablet, Oral, Daily    apixaban (ELIQUIS) 5 mg Tab Eliquis 5 mg tablet    atorvastatin (LIPITOR) 80 mg, Oral, Daily    cetirizine (ZYRTEC) 10 mg, Oral, Daily    cholecalciferol (vitamin D3) (VITAMIN D3) 2,000 Units, Oral, Daily    clotrimazole-betamethasone 1-0.05% (LOTRISONE) cream 2 g, Topical (Top), 2 times daily    famotidine (PEPCID) 20 mg, Oral, 2 times daily    fluticasone propion-salmeterol 115-21 mcg/dose (ADVAIR HFA) 115-21 mcg/actuation HFAA inhaler 2 puffs, Inhalation, Every 12 hours    fluticasone propionate (FLONASE ALLERGY RELIEF) 100 mcg, Each Nostril, Daily    furosemide (LASIX) 40 mg, Oral, Daily, Refilled per Cardio orders.     glipiZIDE (GLUCOTROL) 5 mg, Oral, Daily    ipratropium (ATROVENT) 500 mcg, Nebulization, 4 times daily, Rescue    lancets-blood glucose strips 30 gauge Cmpk   Accucheck blood glucose test strips and lancets, See Instructions, Check CBG once daily and keep log., # 100 EA, 11 Refill(s), Pharmacy: [facility], 185, cm, Height/Length Dosing, 03/08/22 8:10:00 CST, 96, kg, Weight Dosing, 03/08/22 8:10:00 CST    metoprolol succinate (TOPROL-XL) 200 mg, Oral, 2 times daily    nicotine (NICODERM CQ) 14 mg/24 hr 1 patch, Transdermal, Daily    nicotine polacrilex (NICORETTE) 4 mg, Oral, As needed (PRN)    promethazine-codeine 6.25-10 mg/5 ml (PHENERGAN WITH CODEINE) 6.25-10 mg/5 mL syrup 5 mLs, Oral, Every 4 hours PRN    sacubitriL-valsartan (ENTRESTO) 49-51 mg per tablet 1 tablet, Oral, 2 times daily    traZODone (DESYREL) 50 mg, Oral, Nightly PRN    warfarin (COUMADIN) 5 mg, Oral, Daily       Social History     Socioeconomic History    Marital status: Single   Tobacco Use    Smoking status: Never     Passive exposure: Never    Smokeless tobacco: Never    Tobacco comments:     Taking patches   Substance and Sexual Activity    Alcohol use: Never     Alcohol/week: 1.0 standard drink     Types: 1 Cans of beer per week     Comment: not often    Drug use: Never    Sexual activity: Yes     Partners: Female     Social Determinants of Health     Financial Resource Strain: Low Risk     Difficulty of Paying Living Expenses: Not very hard   Food Insecurity: Food Insecurity Present    Worried About Running Out of Food in the Last Year: Sometimes true    Ran Out of Food in the Last Year: Never true   Transportation Needs: No Transportation Needs    Lack of Transportation (Medical): No    Lack of Transportation (Non-Medical): No   Physical Activity: Insufficiently Active    Days of Exercise per Week: 4 days    Minutes of Exercise per Session: 30 min   Stress: No Stress Concern Present    Feeling of Stress : Not at all   Social  Connections: Moderately Isolated    Frequency of Communication with Friends and Family: More than three times a week    Frequency of Social Gatherings with Friends and Family: More than three times a week    Attends [employer] Services: More than 4 times per year    Active Member of Clubs or Organizations: No    Attends Club or Organization Meetings: Never    Marital Status: Never    Housing Stability: Low Risk     Unable to Pay for Housing in the Last Year: No    Number of Places Lived in the Last Year: 1    Unstable Housing in the Last Year: No        Family History   Problem Relation Age of Onset    Heart failure Mother     Cataracts Mother     Heart disease Mother     Glaucoma Mother     Peripheral vascular disease Mother     Hypertension Father         Patient Care Team:  CARLO Mcgowan as PCP - General (Family Medicine)  CARLO Rivera as Nurse Practitioner (Nephrology)     Subjective:     Review of Systems     See HPI for details    Constitutional: Denies Change in appetite. Denies Chills. Denies Fever. Denies Night sweats.  Eye: Denies Blurred vision. Denies Discharge. Denies Eye pain.  ENT: Denies Decreased hearing. Denies Sore throat. Denies Swollen glands.  Respiratory: Denies Cough. Denies Shortness of breath. Denies Shortness of breath with exertion. Denies Wheezing.  Cardiovascular: DeniesChest pain at rest. Denies Chest pain with exertion. Denies Irregular heartbeat. Denies Palpitations. Denies Edema.  Gastrointestinal: Denies Abdominal pain. DeniesDiarrhea. Denies Nausea. Denies Vomiting. Denies Hematemesis or Hematochezia.  Genitourinary: Denies Dysuria. Denies Urinary frequency. Denies Urinary urgency. Denies Blood in urine.  Endocrine: Denies Cold intolerance. Denies Excessive thirst. Denies Heat intolerance. Denies Weight loss. Denies Weight gain.  Musculoskeletal: Denies Painful joints. Denies Weakness. Admits Back pain  Integumentary: Denies Rash. Denies Itching. Denies Dry  "skin.  Neurologic: Denies Dizziness. Denies Fainting. Denies Headache.  Psychiatric: Denies Depression. Denies Anxiety. Denies Suicidal/Homicidal ideations.    All Other ROS: Negative except as stated in HPI.       Objective:     Visit Vitals  /87 (BP Location: Left arm, Patient Position: Sitting, BP Method: Large (Automatic))   Pulse 94   Temp 98.2 °F (36.8 °C) (Oral)   Resp 18   Ht 6' 2" (1.88 m)   Wt 111.1 kg (245 lb)   BMI 31.46 kg/m²       Physical Exam    General: Alert and oriented, No acute distress.  Head: Normocephalic, Atraumatic.  Eye: Pupils are equal, round and reactive to light, Extraocular movements are intact, Sclera non-icteric.  Ears/Nose/Throat: Normal, Mucosa moist,Clear.  Neck/Thyroid: Supple, Non-tender, No carotid bruit, No lymphadenopathy, No JVD, Full range of motion.  Respiratory: Clear to auscultation bilaterally; No wheezes, rales or rhonchi,Non-labored respirations, Symmetrical chest wall expansion.  Cardiovascular: Regular rate and rhythm, S1/S2 normal, No murmurs, rubs or gallops.  Gastrointestinal: Soft, Non-tender, Non-distended, Normal bowel sounds, No palpable organomegaly.  Musculoskeletal: Normal range of motion.  Integumentary: Warm, Dry, Intact, No suspicious lesions or rashes.  Extremities: No clubbing, cyanosis or edema  Neurologic: No focal deficits, Cranial Nerves II-XII are grossly intact, Decreased strength to left lower extremities, Sensory exam intact.  Psychiatric: Normal interaction, Coherent speech, Euthymic mood, Appropriate affect       Labs Reviewed:     Chemistry:  Lab Results   Component Value Date     06/04/2023    K 4.1 06/04/2023    CHLORIDE 108 (H) 06/04/2023    BUN 20.0 06/04/2023    CREATININE 1.58 (H) 06/04/2023    EGFRNORACEVR 49 06/04/2023    GLUCOSE 100 06/04/2023    CALCIUM 9.4 06/04/2023    ALKPHOS 142 06/04/2023    LABPROT 7.0 06/04/2023    ALBUMIN 3.8 06/04/2023    BILIDIR 0.2 08/25/2021    IBILI 0.20 08/25/2021    AST 15 06/04/2023    " ALT 17 06/04/2023    MG 2.10 03/07/2023    PHOS 4.0 01/28/2023    LGZPKOOY44MG 35.4 04/12/2023        Lab Results   Component Value Date    HGBA1C 6.2 03/24/2023        Hematology:  Lab Results   Component Value Date    WBC 9.59 06/04/2023    HGB 13.3 (L) 06/04/2023    HCT 39.5 (L) 06/04/2023     06/04/2023       Lipid Panel:  Lab Results   Component Value Date    CHOL 100 03/07/2023    HDL 33 (L) 03/07/2023    LDL 56.00 03/07/2023    TRIG 55 03/07/2023    TOTALCHOLEST 3 03/07/2023        Urine:  Lab Results   Component Value Date    COLORUA Light-Yellow 06/04/2023    APPEARANCEUA Clear 06/04/2023    SGUA 1.013 06/04/2023    PHUA 6.0 06/04/2023    PROTEINUA Negative 06/04/2023    GLUCOSEUA Normal 06/04/2023    KETONESUA Negative 06/04/2023    BLOODUA Negative 06/04/2023    NITRITESUA Negative 06/04/2023    LEUKOCYTESUR Negative 06/04/2023    RBCUA 0-5 06/04/2023    WBCUA 0-5 06/04/2023    BACTERIA None Seen 06/04/2023    SQEPUA Trace (A) 06/04/2023    HYALINECASTS 3-5 (A) 06/04/2023    CREATRANDUR 127.0 04/12/2023    PROTEINURINE 14.0 04/12/2023    UPROTCREA 0.1 04/12/2023        Assessment:       ICD-10-CM ICD-9-CM   1. Asthma with COPD  J44.9 493.20   2. Chronic back pain, unspecified back location, unspecified back pain laterality  M54.9 724.5    G89.29 338.29   3. Spinal stenosis of lumbar region, unspecified whether neurogenic claudication present  M48.061 724.02        Plan:     1. Asthma with COPD  Pt was requesting to see Pulm cl. Informed pt Pulm clinic will only see pts with Lung masses or restrictive lung disorders.     2. Chronic back pain, unspecified back location, unspecified back pain laterality  Pt had CT Lumbar spine done 6-4-23 showing:  CT Lumbar Spine Without Contrast  Order: [id number]  Status: Final result     Visible to patient: Yes (not seen)     Next appt: 07/06/2023 at 03:00 PM in Ophthalmology ([facility],  [facility])     0 Result Notes  Details    Reading Physician Reading Date  Result Priority   Robson Hughes MD  [phone] 6/4/2023 STAT   Uche Anand MD  [phone] 6/5/2023      Narrative & Impression     Technique:CT of the lumbar spine was performed without intravenous contrast with direct axial as well as sagittal and coronal reconstruction images.     Comparison:Correlation is with CT abdomen study dated 2023-03-31 08:15:01.     Clinical history:Lumbar radiculopathy, symptoms persist with conservative treatment.     Findings:     Bone alignment:Unremarkable with no significant listhesis.     Curvature:There is straightening of the lumbar lordotic curvature.     Bone and bone marrow:The vertebral body heights are maintained.     Intervertebral disc spaces: Decreased disc height is seen at L1-L2 down through L5-S1.     Osteophytes:There are osteophytes at T12 down through S1.     Endplate Sclerosis: Endplate sclerosis is seen off the opposing endplates at L2-L3 and L5-S1.     Spinal canal:There is moderate spinal canal narrowing at L2 L3 a circumferential disc osteophyte complex with associated moderate left-sided neural foraminal narrowing. There is moderate spinal canal narrowing at L3 L4 circumferential disc osteophyte complex with associated moderate left-sided neural foraminal narrowing. There is moderate spinal canal narrowing at L4 L5 circumferential disc osteophyte complex with associated moderate bilateral neural foraminal narrowing. There is mild - moderate spinal canal narrowing at L5 S1 circumferential disc osteophyte complex with associated moderate bilateral neural foraminal narrowing.     Fractures:No acute fracture dislocation or subluxation is seen.     Visualized sacrum:Large osteophytes are seen in the bilateral sacroiliac joints.     Miscellaneous:Limited view of the abdomen demonstrates a dilated left ureter.     Impression:  Impression:     1. No acute fracture dislocation or subluxation is seen.     2. There is moderate spinal canal narrowing at L2 L3 a  circumferential disc osteophyte complex with associated moderate left-sided neural foraminal narrowing. There is mild spinal canal narrowing at L3 L4 circumferential disc osteophyte complex with associated moderate left-sided neural foraminal narrowing. There is moderate spinal canal narrowing at L4 L5 circumferential disc osteophyte complex with associated moderate bilateral neural foraminal narrowing. There is mild - moderate spinal canal narrowing at L5 S1 circumferential disc osteophyte complex with associated moderate bilateral neural foraminal narrowing.     3. Degenerative changes and other findings as above.     No significant discrepancy with overnight report.        Electronically signed by: Uche Anand  Date:                                            06/05/2023  Time:                                           10:41           Exam Ended: 06/04/23 19:49 Last Resulted: 06/05/23 10:41          Refer to Neuro surgery in [facility] for eval and mgmt. Pt ambulating with rollator. Cont to use rollator to assist with ambulation. Pt states he needs HH to assist with ADLs. Refer to case management to assist with obtaining HH services.     3. Spinal stenosis of lumbar region, unspecified whether neurogenic claudication present  Pt had CT Lumbar spine done 6-4-23 showing:  CT Lumbar Spine Without Contrast  Order: [id number]  Status: Final result     Visible to patient: Yes (not seen)     Next appt: 07/06/2023 at 03:00 PM in Ophthalmology ([facility], [facility])     0 Result Notes  Details    Reading Physician Reading Date Result Priority   Robson Hughes MD  [phone] 6/4/2023 STAT   Uche Anand MD  [phone] 6/5/2023      Narrative & Impression     Technique:CT of the lumbar spine was performed without intravenous contrast with direct axial as well as sagittal and coronal reconstruction images.     Comparison:Correlation is with CT abdomen study dated 2023-03-31 08:15:01.     Clinical history:Lumbar  radiculopathy, symptoms persist with conservative treatment.     Findings:     Bone alignment:Unremarkable with no significant listhesis.     Curvature:There is straightening of the lumbar lordotic curvature.     Bone and bone marrow:The vertebral body heights are maintained.     Intervertebral disc spaces: Decreased disc height is seen at L1-L2 down through L5-S1.     Osteophytes:There are osteophytes at T12 down through S1.     Endplate Sclerosis: Endplate sclerosis is seen off the opposing endplates at L2-L3 and L5-S1.     Spinal canal:There is moderate spinal canal narrowing at L2 L3 a circumferential disc osteophyte complex with associated moderate left-sided neural foraminal narrowing. There is moderate spinal canal narrowing at L3 L4 circumferential disc osteophyte complex with associated moderate left-sided neural foraminal narrowing. There is moderate spinal canal narrowing at L4 L5 circumferential disc osteophyte complex with associated moderate bilateral neural foraminal narrowing. There is mild - moderate spinal canal narrowing at L5 S1 circumferential disc osteophyte complex with associated moderate bilateral neural foraminal narrowing.     Fractures:No acute fracture dislocation or subluxation is seen.     Visualized sacrum:Large osteophytes are seen in the bilateral sacroiliac joints.     Miscellaneous:Limited view of the abdomen demonstrates a dilated left ureter.     Impression:  Impression:     1. No acute fracture dislocation or subluxation is seen.     2. There is moderate spinal canal narrowing at L2 L3 a circumferential disc osteophyte complex with associated moderate left-sided neural foraminal narrowing. There is mild spinal canal narrowing at L3 L4 circumferential disc osteophyte complex with associated moderate left-sided neural foraminal narrowing. There is moderate spinal canal narrowing at L4 L5 circumferential disc osteophyte complex with associated moderate bilateral neural foraminal  narrowing. There is mild - moderate spinal canal narrowing at L5 S1 circumferential disc osteophyte complex with associated moderate bilateral neural foraminal narrowing.     3. Degenerative changes and other findings as above.     No significant discrepancy with overnight report.        Electronically signed by: Uche Anand  Date:                                            06/05/2023  Time:                                           10:41           Exam Ended: 06/04/23 19:49 Last Resulted: 06/05/23 10:41          Refer to Neuro surgery in [facility] for eval and mgmt. Pt ambulating with rollator. Cont to use rollator to assist with ambulation. Pt states he needs HH to assist with ADLs. Refer to case management to assist with obtaining HH services.          Follow up for Keep f/u appt 9-12-23 as scheduled with labs 1 week prior to appt.. In addition to their scheduled follow up, the patient has also been instructed to follow up on as needed basis.     Future Appointments   Date Time Provider Department Center   7/6/2023  3:00 PM [facility] [place]    7/18/2023  9:30 AM PACEMAKER, [facility] CARDIOLOGY [facility] CARD [facility]    7/18/2023 10:00 AM Francisco Jackman MD [facility] CARD [facility]    9/12/2023  7:00 AM CARLO Mcgowan [facility] INTMED [facility]   9/12/2023  9:00 AM Francsico Weir NP [facility] UROLO [facility]   12/4/2023  9:15 AM CARLO Rivera [facility] NEPHR [facility]        CARLO Hamlin

## 2023-06-15 ENCOUNTER — PATIENT OUTREACH (OUTPATIENT)
Dept: ADMINISTRATIVE | Facility: OTHER | Age: 63
End: 2023-06-15
Payer: MEDICAID

## 2023-06-15 ENCOUNTER — OFFICE VISIT (OUTPATIENT)
Dept: UROLOGY | Facility: CLINIC | Age: 63
End: 2023-06-15
Payer: MEDICAID

## 2023-06-15 VITALS
DIASTOLIC BLOOD PRESSURE: 79 MMHG | HEART RATE: 58 BPM | OXYGEN SATURATION: 99 % | WEIGHT: 251.81 LBS | TEMPERATURE: 98 F | SYSTOLIC BLOOD PRESSURE: 108 MMHG | HEIGHT: 74 IN | RESPIRATION RATE: 18 BRPM | BODY MASS INDEX: 32.32 KG/M2

## 2023-06-15 DIAGNOSIS — R39.11 BENIGN PROSTATIC HYPERPLASIA WITH URINARY HESITANCY: Primary | ICD-10-CM

## 2023-06-15 DIAGNOSIS — N40.1 BENIGN PROSTATIC HYPERPLASIA WITH URINARY HESITANCY: Primary | ICD-10-CM

## 2023-06-15 PROBLEM — Q62.11 HYDRONEPHROSIS WITH URETEROPELVIC JUNCTION (UPJ) OBSTRUCTION: Status: ACTIVE | Noted: 2023-05-23

## 2023-06-15 LAB
BILIRUB SERPL-MCNC: NORMAL MG/DL
BLOOD URINE, POC: NORMAL
COLOR, POC UA: YELLOW
GLUCOSE UR QL STRIP: NORMAL
KETONES UR QL STRIP: NORMAL
LEUKOCYTE ESTERASE URINE, POC: NORMAL
NITRITE, POC UA: NORMAL
PH, POC UA: 6.5
PROTEIN, POC: NORMAL
SPECIFIC GRAVITY, POC UA: 1.02
UROBILINOGEN, POC UA: 1

## 2023-06-15 PROCEDURE — 99213 OFFICE O/P EST LOW 20 MIN: CPT | Mod: S$PBB,,, | Performed by: NURSE PRACTITIONER

## 2023-06-15 PROCEDURE — 1159F PR MEDICATION LIST DOCUMENTED IN MEDICAL RECORD: ICD-10-PCS | Mod: CPTII,,, | Performed by: NURSE PRACTITIONER

## 2023-06-15 PROCEDURE — 3008F BODY MASS INDEX DOCD: CPT | Mod: CPTII,,, | Performed by: NURSE PRACTITIONER

## 2023-06-15 PROCEDURE — 81001 URINALYSIS AUTO W/SCOPE: CPT | Mod: PBBFAC | Performed by: NURSE PRACTITIONER

## 2023-06-15 PROCEDURE — 3078F PR MOST RECENT DIASTOLIC BLOOD PRESSURE < 80 MM HG: ICD-10-PCS | Mod: CPTII,,, | Performed by: NURSE PRACTITIONER

## 2023-06-15 PROCEDURE — 1159F MED LIST DOCD IN RCRD: CPT | Mod: CPTII,,, | Performed by: NURSE PRACTITIONER

## 2023-06-15 PROCEDURE — 3078F DIAST BP <80 MM HG: CPT | Mod: CPTII,,, | Performed by: NURSE PRACTITIONER

## 2023-06-15 PROCEDURE — 3008F PR BODY MASS INDEX (BMI) DOCUMENTED: ICD-10-PCS | Mod: CPTII,,, | Performed by: NURSE PRACTITIONER

## 2023-06-15 PROCEDURE — 1160F RVW MEDS BY RX/DR IN RCRD: CPT | Mod: CPTII,,, | Performed by: NURSE PRACTITIONER

## 2023-06-15 PROCEDURE — 3066F PR DOCUMENTATION OF TREATMENT FOR NEPHROPATHY: ICD-10-PCS | Mod: CPTII,,, | Performed by: NURSE PRACTITIONER

## 2023-06-15 PROCEDURE — 4010F ACE/ARB THERAPY RXD/TAKEN: CPT | Mod: CPTII,,, | Performed by: NURSE PRACTITIONER

## 2023-06-15 PROCEDURE — 99215 OFFICE O/P EST HI 40 MIN: CPT | Mod: PBBFAC | Performed by: NURSE PRACTITIONER

## 2023-06-15 PROCEDURE — 3066F NEPHROPATHY DOC TX: CPT | Mod: CPTII,,, | Performed by: NURSE PRACTITIONER

## 2023-06-15 PROCEDURE — 99213 PR OFFICE/OUTPT VISIT, EST, LEVL III, 20-29 MIN: ICD-10-PCS | Mod: S$PBB,,, | Performed by: NURSE PRACTITIONER

## 2023-06-15 PROCEDURE — 3074F PR MOST RECENT SYSTOLIC BLOOD PRESSURE < 130 MM HG: ICD-10-PCS | Mod: CPTII,,, | Performed by: NURSE PRACTITIONER

## 2023-06-15 PROCEDURE — 1160F PR REVIEW ALL MEDS BY PRESCRIBER/CLIN PHARMACIST DOCUMENTED: ICD-10-PCS | Mod: CPTII,,, | Performed by: NURSE PRACTITIONER

## 2023-06-15 PROCEDURE — 4010F PR ACE/ARB THEARPY RXD/TAKEN: ICD-10-PCS | Mod: CPTII,,, | Performed by: NURSE PRACTITIONER

## 2023-06-15 PROCEDURE — 3074F SYST BP LT 130 MM HG: CPT | Mod: CPTII,,, | Performed by: NURSE PRACTITIONER

## 2023-06-15 NOTE — PROGRESS NOTES
Pt seen by SOFIA Weir; Pt instructed to follow up in September 2023 after RICHY; Discharge paperwork given w/pt verbalizing understanding

## 2023-06-15 NOTE — PROGRESS NOTES
Chief Complaint:   Chief Complaint   Patient presents with    F/u BPH, renal cyst & hydronephrosis w/[facility] referral       HPI: Patient is a 61-year-old male here for a 3 month F/U kidney stones and hydroureteronephrosis.  Patient treated in the past for chronic hydroureteronephrosis, renal cysts, BPH patient currently on Flomax 0.4 mg p.o. daily.  On last visit patient presented with improved urinary stream, mild ED. denied hesitancy, straining, interruption of stream, dribbling, frequency, nocturia. Creatinine fluctuating now at 1.79, CT shows severe hydroureteronephrosis to the UVJ. Patient referred to [facility] for Eval. & Treatment for severe hydroureteronephrosis to the UVJ.  PSA 0.76 on 03/24/2023.  Today patient presents with concerns of his moderate to severe hydronephrosis and would like to be seen about a sooner than August due to he needs to have a cardiac ablation and will for blood thinners and will be unable to do any surgeries after ablation for several months due to will be put on blood thinners I will reach out to Dr. Qureshi in [facility].  Allergies:  Review of patient's allergies indicates:  No Known Allergies    Medications:  Current Outpatient Medications   Medication Sig Dispense Refill    ACCU-CHEK FASTCLIX LANCET DRUM Misc Apply topically once daily.      ACCU-CHEK GUIDE TEST STRIPS Strp USE AS DIRECTED once daily (keep log)      albuterol (PROVENTIL) 2.5 mg /3 mL (0.083 %) nebulizer solution Take 3 mLs (2.5 mg total) by nebulization every 6 (six) hours as needed for Wheezing. Rescue 100 each 6    amiodarone (PACERONE) 200 MG Tab Take 1 tablet by mouth Daily.      atorvastatin (LIPITOR) 80 MG tablet Take 1 tablet (80 mg total) by mouth once daily. 90 tablet 3    cetirizine (ZYRTEC) 10 MG tablet Take 1 tablet (10 mg total) by mouth once daily. 90 tablet 1    cholecalciferol, vitamin D3, (VITAMIN D3) 50 mcg (2,000 unit) Tab Take 1 tablet (2,000 Units total) by mouth once daily. 30 tablet 0     clotrimazole-betamethasone 1-0.05% (LOTRISONE) cream Apply 2 g topically 2 (two) times daily.      famotidine (PEPCID) 20 MG tablet Take 1 tablet (20 mg total) by mouth 2 (two) times daily. 60 tablet 11    fluticasone propionate (FLONASE ALLERGY RELIEF) 50 mcg/actuation nasal spray 2 sprays (100 mcg total) by Each Nostril route Daily. 18 g 6    fluticasone-salmeterol 230-21 mcg/dose (ADVAIR HFA) 230-21 mcg/actuation HFAA inhaler Inhale 2 puffs into the lungs 2 (two) times daily. Controller 12 g 6    furosemide (LASIX) 40 MG tablet Take 1 tablet (40 mg total) by mouth once daily. Refilled per Cardio orders. 90 tablet 3    glipiZIDE (GLUCOTROL) 5 MG tablet Take 1 tablet (5 mg total) by mouth once daily. 30 tablet 3    ipratropium (ATROVENT) 0.02 % nebulizer solution Take 2.5 mLs (500 mcg total) by nebulization 4 (four) times daily. Rescue 100 mL 6    lancets-blood glucose strips 30 gauge Cmpk   Accucheck blood glucose test strips and lancets, See Instructions, Check CBG once daily and keep log., # 100 EA, 11 Refill(s), Pharmacy: [facility], 185, cm, Height/Length Dosing, 03/08/22 8:10:00 CST, 96, kg, Weight Dosing, 03/08/22 8:10:00 CST      metoprolol succinate (TOPROL-XL) 200 MG 24 hr tablet Take 1 tablet (200 mg total) by mouth 2 (two) times daily. 180 tablet 3    nicotine (NICODERM CQ) 14 mg/24 hr Place 1 patch onto the skin once daily. 28 patch 2    nicotine polacrilex (NICORETTE) 4 MG Gum Take 1 each (4 mg total) by mouth as needed (smoking urge). 170 each 3    promethazine-codeine 6.25-10 mg/5 ml (PHENERGAN WITH CODEINE) 6.25-10 mg/5 mL syrup Take 5 mLs by mouth every 4 (four) hours as needed for Cough.      sacubitriL-valsartan (ENTRESTO) 49-51 mg per tablet Take 1 tablet by mouth 2 (two) times daily. 60 tablet 11    traZODone (DESYREL) 50 MG tablet Take 1 tablet (50 mg total) by mouth nightly as needed for Insomnia. 90 tablet 1    warfarin (COUMADIN) 5 MG tablet Take 1 tablet (5 mg total) by mouth Daily.  90 tablet 3    apixaban (ELIQUIS) 5 mg Tab Eliquis 5 mg tablet       No current facility-administered medications for this visit.       Review of Systems:  General: No fever, chills, fatigability, or weight loss.  Skin: No rashes, itching, or changes in color or texture of skin.  Chest: Denies DONALDSON, cyanosis, wheezing, cough, and sputum production.  Abdomen: Appetite fine. No weight loss. Denies diarrhea, abdominal pain, hematemesis, or blood in stool.  Musculoskeletal: No joint stiffness or swelling. Denies back pain.  : As above.  All other review of systems negative.    PMH:  Past Medical History:   Diagnosis Date    Atrial fibrillation     CHF (congestive heart failure)     CKD (chronic kidney disease)     COPD (chronic obstructive pulmonary disease)     DM (diabetes mellitus)     Dyslipidemia     Essential (primary) hypertension     Heart failure, unspecified     Rheumatoid arthritis, unspecified     Smoking     Type 2 diabetes mellitus without complications        PSH:  Past Surgical History:   Procedure Laterality Date    CARDIOVERSION      coronary arteriograph      defibillator      ECHOCARDIOGRAM,TRANSESOPHAGEAL N/A 3/22/2023    Procedure: Transesophageal echo (LASHELL) intra-procedure log documentation;  [facility];  Location: [facility] CATH LAB;  Service: Cardiology;  Laterality: N/A;    INSERT / REPLACE / REMOVE PACEMAKER      left  heart catherization         FamHx:  Family History   Problem Relation Age of Onset    Heart failure Mother     Cataracts Mother     Heart disease Mother     Glaucoma Mother     Peripheral vascular disease Mother     Hypertension Father        SocHx:  Social History     Socioeconomic History    Marital status: Single   Tobacco Use    Smoking status: Never     Passive exposure: Never    Smokeless tobacco: Never    Tobacco comments:     Taking patches   Substance and Sexual Activity    Alcohol use: Never     Alcohol/week: 1.0 standard drink     Types: 1 Cans of  beer per week     Comment: not often    Drug use: Never    Sexual activity: Yes     Partners: Female     Social Determinants of Health     Financial Resource Strain: Low Risk     Difficulty of Paying Living Expenses: Not very hard   Food Insecurity: Food Insecurity Present    Worried About Running Out of Food in the Last Year: Sometimes true    Ran Out of Food in the Last Year: Never true   Transportation Needs: No Transportation Needs    Lack of Transportation (Medical): No    Lack of Transportation (Non-Medical): No   Physical Activity: Insufficiently Active    Days of Exercise per Week: 4 days    Minutes of Exercise per Session: 30 min   Stress: No Stress Concern Present    Feeling of Stress : Not at all   Social Connections: Moderately Isolated    Frequency of Communication with Friends and Family: More than three times a week    Frequency of Social Gatherings with Friends and Family: More than three times a week    Attends [employer] Services: More than 4 times per year    Active Member of Clubs or Organizations: No    Attends Club or Organization Meetings: Never    Marital Status: Never    Housing Stability: Low Risk     Unable to Pay for Housing in the Last Year: No    Number of Places Lived in the Last Year: 1    Unstable Housing in the Last Year: No       Physical Exam:  Vitals:    06/15/23 0959   BP: 108/79   Pulse: (!) 58   Resp: 18   Temp: 98 °F (36.7 °C)     General: A&Ox3, no apparent distress, no deformities  Neck: No masses, normal thyroid  Lungs: CTA patrick, no use of accessory muscles  Heart: RRR, no arrhythmias  Abdomen: Soft, NT, ND, no masses, no hernias, no hepatosplenomegaly  Lymphatic: Neck and groin nodes negative  Skin: The skin is warm and dry. No jaundice.  Ext: No c/c/e.      Imaging:  CT abdomen pelvis with oral contrast March of 2023: Grossly stable appearance of moderate to severe left hydroureteronephrosis.  Etiology of distal obstructive uropathy remains indeterminate, but could  be secondary to persistent stricture just at the level of the UVJ. No development of asymmetric renal enhancement to suggest new or worsening left renal function      Impression:  Hydronephrosis, distal obstructive uropathy suspicious UVJ      Plan:  I have reached out to Dr. Qureshi in [facility] regarding patient's treatment for moderate to severe hydroureternephrosis, due to patient needs to have a cardiac ablation and will prove blood thinners.  Instructed patient to keep scheduled appointment in September.

## 2023-06-16 NOTE — PROGRESS NOTES
CHW - Initial Contact    This Community Health Worker completed the Social Determinant of Health questionnaire with patient via telephone today.    Pt identified barriers of most importance are: needs CM, cane, yennifer, shower chair and HomeHealth  Referrals to community agencies completed with patient/caregiver consent outside of [facility] include: Insurance CM #, ConA  Referrals were put through [facility] - No  Other information discussed the patient needs / wants help with: we agreed to follow up in a few days.     Follow-up Outreach - Due: 6/21/2023

## 2023-06-19 PROBLEM — Z00.00 WELL ADULT EXAM: Status: RESOLVED | Noted: 2022-06-07 | Resolved: 2023-06-19

## 2023-06-21 ENCOUNTER — LAB VISIT (OUTPATIENT)
Dept: LAB | Facility: HOSPITAL | Age: 63
End: 2023-06-21
Attending: INTERNAL MEDICINE
Payer: MEDICAID

## 2023-06-21 DIAGNOSIS — I48.91 A-FIB: Primary | ICD-10-CM

## 2023-06-21 LAB
INR BLD: 4.29 (ref 0–1.3)
PROTHROMBIN TIME: 40.4 SECONDS (ref 12.5–14.5)

## 2023-06-21 PROCEDURE — 36415 COLL VENOUS BLD VENIPUNCTURE: CPT

## 2023-06-21 PROCEDURE — 85610 PROTHROMBIN TIME: CPT

## 2023-07-06 ENCOUNTER — OFFICE VISIT (OUTPATIENT)
Dept: OPHTHALMOLOGY | Facility: CLINIC | Age: 63
End: 2023-07-06
Payer: MEDICAID

## 2023-07-06 ENCOUNTER — LAB VISIT (OUTPATIENT)
Dept: LAB | Facility: HOSPITAL | Age: 63
End: 2023-07-06
Attending: NURSE PRACTITIONER
Payer: MEDICAID

## 2023-07-06 ENCOUNTER — TELEPHONE (OUTPATIENT)
Dept: INTERNAL MEDICINE | Facility: CLINIC | Age: 63
End: 2023-07-06
Payer: MEDICAID

## 2023-07-06 VITALS — WEIGHT: 251.75 LBS | HEIGHT: 74 IN | BODY MASS INDEX: 32.31 KG/M2

## 2023-07-06 DIAGNOSIS — H04.123 DRY EYE SYNDROME OF BOTH EYES: ICD-10-CM

## 2023-07-06 DIAGNOSIS — N18.31 CKD STAGE G3A/A2, GFR 45-59 AND ALBUMIN CREATININE RATIO 30-299 MG/G: ICD-10-CM

## 2023-07-06 DIAGNOSIS — I48.91 A-FIB: Primary | ICD-10-CM

## 2023-07-06 DIAGNOSIS — E11.9 TYPE 2 DIABETES MELLITUS WITHOUT RETINOPATHY: Primary | ICD-10-CM

## 2023-07-06 DIAGNOSIS — H25.13 AGE-RELATED NUCLEAR CATARACT OF BOTH EYES: ICD-10-CM

## 2023-07-06 LAB
ALBUMIN SERPL-MCNC: 4.3 G/DL (ref 3.4–4.8)
ALBUMIN/GLOB SERPL: 1.5 RATIO (ref 1.1–2)
ALP SERPL-CCNC: 156 UNIT/L (ref 40–150)
ALT SERPL-CCNC: 33 UNIT/L (ref 0–55)
APPEARANCE UR: CLEAR
AST SERPL-CCNC: 19 UNIT/L (ref 5–34)
BACTERIA #/AREA URNS AUTO: NORMAL /HPF
BILIRUB UR QL STRIP.AUTO: NEGATIVE MG/DL
BILIRUBIN DIRECT+TOT PNL SERPL-MCNC: 0.6 MG/DL
BUN SERPL-MCNC: 20 MG/DL (ref 8.4–25.7)
CALCIUM SERPL-MCNC: 9.5 MG/DL (ref 8.8–10)
CHLORIDE SERPL-SCNC: 108 MMOL/L (ref 98–107)
CO2 SERPL-SCNC: 26 MMOL/L (ref 23–31)
COLOR UR: YELLOW
CREAT SERPL-MCNC: 1.31 MG/DL (ref 0.73–1.18)
CREAT UR-MCNC: 170.7 MG/DL (ref 63–166)
GFR SERPLBLD CREATININE-BSD FMLA CKD-EPI: >60 MLS/MIN/1.73/M2
GLOBULIN SER-MCNC: 2.9 GM/DL (ref 2.4–3.5)
GLUCOSE SERPL-MCNC: 95 MG/DL (ref 82–115)
GLUCOSE UR QL STRIP.AUTO: NEGATIVE MG/DL
INR BLD: 5.11 (ref 0–1.3)
KETONES UR QL STRIP.AUTO: NEGATIVE MG/DL
LEUKOCYTE ESTERASE UR QL STRIP.AUTO: NEGATIVE UNIT/L
NITRITE UR QL STRIP.AUTO: NEGATIVE
PH UR STRIP.AUTO: 5.5 [PH]
PHOSPHATE SERPL-MCNC: 3.2 MG/DL (ref 2.3–4.7)
POTASSIUM SERPL-SCNC: 4.1 MMOL/L (ref 3.5–5.1)
PROT SERPL-MCNC: 7.2 GM/DL (ref 5.8–7.6)
PROT UR QL STRIP.AUTO: NEGATIVE MG/DL
PROT UR STRIP-MCNC: 13.1 MG/DL
PROTHROMBIN TIME: 47.8 SECONDS (ref 12.5–14.5)
RBC #/AREA URNS AUTO: NORMAL /HPF
RBC UR QL AUTO: NEGATIVE UNIT/L
SODIUM SERPL-SCNC: 144 MMOL/L (ref 136–145)
SP GR UR STRIP.AUTO: >=1.03
SQUAMOUS #/AREA URNS AUTO: NORMAL /HPF
URINE PROTEIN/CREATININE RATIO (OHS): 0.1
UROBILINOGEN UR STRIP-ACNC: 1 MG/DL
WBC #/AREA URNS AUTO: NORMAL /HPF

## 2023-07-06 PROCEDURE — 92250 FUNDUS PHOTOGRAPHY W/I&R: CPT | Mod: 59,PBBFAC,PO | Performed by: OPHTHALMOLOGY

## 2023-07-06 PROCEDURE — 82570 ASSAY OF URINE CREATININE: CPT

## 2023-07-06 PROCEDURE — 80053 COMPREHEN METABOLIC PANEL: CPT

## 2023-07-06 PROCEDURE — 99213 OFFICE O/P EST LOW 20 MIN: CPT | Mod: PBBFAC,PO | Performed by: STUDENT IN AN ORGANIZED HEALTH CARE EDUCATION/TRAINING PROGRAM

## 2023-07-06 PROCEDURE — 81001 URINALYSIS AUTO W/SCOPE: CPT

## 2023-07-06 PROCEDURE — 36415 COLL VENOUS BLD VENIPUNCTURE: CPT

## 2023-07-06 PROCEDURE — 85610 PROTHROMBIN TIME: CPT

## 2023-07-06 PROCEDURE — 92134 CPTRZ OPH DX IMG PST SGM RTA: CPT | Mod: PBBFAC,PO | Performed by: OPHTHALMOLOGY

## 2023-07-06 PROCEDURE — 92134 CPTRZ OPH DX IMG PST SGM RTA: CPT | Mod: PBBFAC,PO | Performed by: STUDENT IN AN ORGANIZED HEALTH CARE EDUCATION/TRAINING PROGRAM

## 2023-07-06 PROCEDURE — 84100 ASSAY OF PHOSPHORUS: CPT

## 2023-07-06 RX ORDER — IPRATROPIUM BROMIDE AND ALBUTEROL SULFATE 2.5; .5 MG/3ML; MG/3ML
SOLUTION RESPIRATORY (INHALATION)
COMMUNITY
End: 2023-09-18

## 2023-07-06 RX ORDER — PROMETHAZINE HYDROCHLORIDE AND DEXTROMETHORPHAN HYDROBROMIDE 6.25; 15 MG/5ML; MG/5ML
SYRUP ORAL
COMMUNITY
End: 2023-12-06 | Stop reason: SDUPTHER

## 2023-07-06 RX ORDER — LISINOPRIL 10 MG/1
1 TABLET ORAL DAILY
Status: ON HOLD | COMMUNITY
End: 2023-09-11 | Stop reason: HOSPADM

## 2023-07-06 RX ORDER — PREDNISONE 50 MG/1
TABLET ORAL
Status: ON HOLD | COMMUNITY
End: 2023-12-03 | Stop reason: HOSPADM

## 2023-07-06 NOTE — PROGRESS NOTES
HPI     Diabetic Eye Exam     Additional comments: A1C: 6.2 03/24/2023           Dry Eye     Additional comments: Patient states that he is using AFT OU BID- helps a   little            Blurred Vision     Additional comments: Patient states that he has trouble seeing small   print at near. Saw optometrist recently and ordered bifocals this morning.           Comments    Diabetic Eye Exam, dry eyes, blurry vision           Last edited by Kei Richardson MA on 7/6/2023  3:17 PM.            Assessment /Plan     For exam results, see Encounter Report.    Type 2 diabetes mellitus without retinopathy    Age-related nuclear cataract of both eyes    Dry eye syndrome of both eyes      OCT MAC 07/06/23  211//216, PFC, no IRF/SRF    OCT RNFL 07/06/23  93//94, all green quads     Type 2 Diabetes without reinopathy  - last A1C 6.2% 03/2023  - encouraged good control  - Yearly DFE/fundus photos due 07/2024    2. NSC OU  - nvs, monitor  - mrx w outside provider    3. ADELA OU  - Ats QID PRN  - WC/LS BID    RTC 1 yr or sooner PRN

## 2023-07-06 NOTE — TELEPHONE ENCOUNTER
[facility] called to give critical on patient which was taken by April for Dayami Squires. I called back because Dayami wasn't the ordering provider it was Dr. Cory Anguiano.

## 2023-07-07 ENCOUNTER — TELEPHONE (OUTPATIENT)
Dept: NEPHROLOGY | Facility: CLINIC | Age: 63
End: 2023-07-07
Payer: MEDICAID

## 2023-07-07 NOTE — TELEPHONE ENCOUNTER
----- Message from Nadira Buchanan, CARLO sent at 7/6/2023 12:56 PM CDT -----  Please let  Dr Silvio Stout  at [facility] know about INR result. Thank you

## 2023-07-10 ENCOUNTER — PATIENT OUTREACH (OUTPATIENT)
Dept: ADMINISTRATIVE | Facility: OTHER | Age: 63
End: 2023-07-10
Payer: MEDICAID

## 2023-07-10 NOTE — PROGRESS NOTES
CHW - Follow Up    This Community Health Worker completed a follow up visit with  patient and care giver  via telephone today.  Pt/Caregiver reported: haven't called about CM through insurance but plan to this week.   Community Health Worker provided: encouragement to call and that I would submit the referral to the Dr once they've contacted CM with [facility].    Follow-up Outreach - Due: 7/14/2023

## 2023-07-12 ENCOUNTER — LAB VISIT (OUTPATIENT)
Dept: LAB | Facility: HOSPITAL | Age: 63
End: 2023-07-12
Attending: INTERNAL MEDICINE
Payer: MEDICAID

## 2023-07-12 DIAGNOSIS — I48.91 A-FIB: Primary | ICD-10-CM

## 2023-07-12 LAB
INR BLD: 2.21 (ref 0–1.3)
PROTHROMBIN TIME: 21.5 SECONDS (ref 12.5–14.5)

## 2023-07-12 PROCEDURE — 85610 PROTHROMBIN TIME: CPT

## 2023-07-12 PROCEDURE — 36415 COLL VENOUS BLD VENIPUNCTURE: CPT

## 2023-07-18 ENCOUNTER — CLINICAL SUPPORT (OUTPATIENT)
Dept: CARDIOLOGY | Facility: CLINIC | Age: 63
End: 2023-07-18
Payer: MEDICAID

## 2023-07-18 ENCOUNTER — OFFICE VISIT (OUTPATIENT)
Dept: CARDIOLOGY | Facility: CLINIC | Age: 63
End: 2023-07-18
Payer: MEDICAID

## 2023-07-18 VITALS
DIASTOLIC BLOOD PRESSURE: 54 MMHG | BODY MASS INDEX: 31.01 KG/M2 | SYSTOLIC BLOOD PRESSURE: 100 MMHG | HEART RATE: 46 BPM | HEIGHT: 74 IN | WEIGHT: 241.63 LBS | RESPIRATION RATE: 20 BRPM | TEMPERATURE: 98 F | OXYGEN SATURATION: 96 %

## 2023-07-18 DIAGNOSIS — I51.3 THROMBUS OF LEFT ATRIAL APPENDAGE: ICD-10-CM

## 2023-07-18 DIAGNOSIS — R06.02 SHORTNESS OF BREATH: ICD-10-CM

## 2023-07-18 DIAGNOSIS — I10 PRIMARY HYPERTENSION: ICD-10-CM

## 2023-07-18 DIAGNOSIS — E78.5 DYSLIPIDEMIA: ICD-10-CM

## 2023-07-18 DIAGNOSIS — I42.8 NON-ISCHEMIC CARDIOMYOPATHY: Primary | ICD-10-CM

## 2023-07-18 DIAGNOSIS — I50.22 CHRONIC SYSTOLIC HEART FAILURE: ICD-10-CM

## 2023-07-18 DIAGNOSIS — I25.10 CORONARY ARTERIOSCLEROSIS: ICD-10-CM

## 2023-07-18 DIAGNOSIS — I48.19 PERSISTENT ATRIAL FIBRILLATION: Primary | ICD-10-CM

## 2023-07-18 DIAGNOSIS — E78.5 HYPERLIPIDEMIA LDL GOAL <70: ICD-10-CM

## 2023-07-18 DIAGNOSIS — F17.200 SMOKING: ICD-10-CM

## 2023-07-18 DIAGNOSIS — I48.91 ATRIAL FIBRILLATION WITH RVR: ICD-10-CM

## 2023-07-18 DIAGNOSIS — G47.33 OBSTRUCTIVE SLEEP APNEA OF ADULT: ICD-10-CM

## 2023-07-18 DIAGNOSIS — I42.8 NON-ISCHEMIC CARDIOMYOPATHY: ICD-10-CM

## 2023-07-18 PROCEDURE — 93282 PRGRMG EVAL IMPLANTABLE DFB: CPT | Mod: PBBFAC | Performed by: INTERNAL MEDICINE

## 2023-07-18 PROCEDURE — 99215 OFFICE O/P EST HI 40 MIN: CPT | Mod: PBBFAC

## 2023-07-18 RX ORDER — ALFUZOSIN HYDROCHLORIDE 10 MG/1
1 TABLET, EXTENDED RELEASE ORAL DAILY
COMMUNITY
Start: 2023-07-17 | End: 2024-07-16

## 2023-07-18 NOTE — PATIENT INSTRUCTIONS
Follow up in [facility]iology Clinic in 3 months or sooner if needed  Follow up in 3 months for device interrogation  Follow up with [facility]   Follow up with PCP as directed  Strict ED precautions

## 2023-07-18 NOTE — PROGRESS NOTES
CHIEF COMPLAINT:   Chief Complaint   Patient presents with    f/u denies chest pain since LV sts been in AFIB with SOB no    had device check this morning                                                  HPI:  Ezra Zhang 62 y.o. male  with atrial fibrillation status post LASHELL and cardioversion in February 2017 on Eliquis, nonischemic cardiomyopathy with recovered EF s/p ICD, COPD, hepatitis C, diabetes mellitus type II, and CKD stage III who presents for routine follow up and device check. Patient had his ICD generator changed in August 2018 due to recall. Of note, patient underwent LASHELL (3.22.23) prior to PVI but JOANNE thrombus was seen and PVI was cancelled. He was taken off Eliquis and started on Warfarin. At last appointment, his INR had been mildly supratherapeutic. He also reported gaining weight and having more exertional dyspnea and increased abdominal girth with early satiety. He also c/o claudication in BL LE for which a Venous US was ordered. Venous US without evidence of DVT in the veins of the BL LE.    Today the patient reports shortness of breath that occurs with minimal exertion.  He also states that he also reports palpitations with AFIB.  Reports that he has follow-up with [facility] in August 2023. He denies chest pain, dizziness, lightheadedness, syncope, or PND.  He states that he has difficulty completing ADLs secondary to the severe shortness of breath and deconditioning.  He is requesting referral for cardiac rehab.  He also reports limitations in ADLs due to numbness in lower extremities caused by spinal stenosis.  He reports needing rolling walker to get around.  He endorses compliance with all medications.  He states that he quit smoking 6 months ago.                                                                                                                                                                                                                                                                                                                                                                                                                                                      CARDIAC TESTING:  CV Venous US Bilateral Lower Extremities  The contralateral (left) common femoral vein is patent.  There is no evidence of a right lower extremity DVT.  The contralateral (right) common femoral vein is patent.  There is no evidence of a left lower extremity DVT.  There was no deep vein thrombosis identified in the visualized veins of the bilateral lower extremities    TTE 3.22.23  The left ventricle is moderately enlarged with moderate concentric hypertrophy and severely decreased systolic function.  The estimated ejection fraction is 15%.  Normal right ventricular size with normal right ventricular systolic function.  Mild left atrial enlargement.  No interatrial septal defect present.  Normal appearing left atrial appendage. Thrombus is present in the appendage. A small filamentous mass consisted with a thrombus was noted. Abnormal appendage velocities.  Mild right atrial enlargement.  Mild mitral regurgitation.  No tricupsid valve vegetation present.  There is severe left ventricular global hypokinesis.  LVEF is severely reduced.  Global hypokinesis.   There is a thrombus in the JOANNE.   PVI will be cancelled.      Echo 1.25.23  · Atrial fibrillation observed.  · Normal right ventricular size.  · Intermediate central venous pressure (8 mmHg).  · The estimated PA systolic pressure is 20 mmHg.  · IVC is dilated and collapses > 50% with inspiration.  · The left ventricle is normal in size with  · Moderate right atrial enlargement.  · Mild to moderate left atrial enlargement.  · The estimated ejection fraction is 35%.    Patient Active Problem List   Diagnosis    A-fib    Anemia    AR (allergic rhinitis)    Asthma with COPD    Bladder wall thickening    Chronic back pain    CKD (chronic kidney disease)    Type 2  diabetes mellitus without retinopathy    Elevated serum creatinine    HA (headache)    Chronic hepatitis C virus infection    Hyperlipidemia LDL goal <70    Hydroureteronephrosis    Joint pain    Plantar wart    Rheumatoid arthritis    Radiculopathy    Renal cyst, right    Umbilical hernia    Colon cancer screening    Hypertension    Smoking    Non-ischemic cardiomyopathy    History of hepatitis C    Chronic systolic heart failure    Diabetes mellitus    Recurrent sinusitis    Shortness of breath    Prostate cancer screening    Vitamin D deficiency    GERD (gastroesophageal reflux disease)    Atrial fibrillation with RVR    Acute on chronic congestive heart failure    Dyspnea    Insomnia    Thrombus of left atrial appendage    Congestive heart failure    Coronary arteriosclerosis    Dyslipidemia    Hydronephrosis with ureteropelvic junction (UPJ) obstruction    Obesity    Obstructive sleep apnea of adult    Spinal stenosis of lumbar region    Dry eye syndrome of both eyes    Age-related nuclear cataract of both eyes     Past Surgical History:   Procedure Laterality Date    CARDIOVERSION      coronary arteriograph      defibillator      ECHOCARDIOGRAM,TRANSESOPHAGEAL N/A 3/22/2023    Procedure: Transesophageal echo (LASHELL) intra-procedure log documentation;  Surgeon: Matthieu Diagnostic Provider;  Location: [facility] CATH LAB;  Service: Cardiology;  Laterality: N/A;    INSERT / REPLACE / REMOVE PACEMAKER      left  heart catherization       Social History     Socioeconomic History    Marital status: Single   Tobacco Use    Smoking status: Never     Passive exposure: Never    Smokeless tobacco: Never    Tobacco comments:     Taking patches   Substance and Sexual Activity    Alcohol use: Never     Alcohol/week: 1.0 standard drink     Types: 1 Cans of beer per week     Comment: not often    Drug use: Never    Sexual activity: Yes     Partners: Female     Social Determinants of Health     Financial Resource Strain: High Risk     Difficulty of Paying Living Expenses: Very hard   Food Insecurity: Food Insecurity Present    Worried About Running Out of Food in the Last Year: Often true    Ran Out of Food in the Last Year: Often true   Transportation Needs: No Transportation Needs    Lack of Transportation (Medical): No    Lack of Transportation (Non-Medical): No   Physical Activity: Inactive    Days of Exercise per Week: 0 days    Minutes of Exercise per Session: 0 min   Stress: Stress Concern Present    Feeling of Stress : Very much   Social Connections: Moderately Isolated    Frequency of Communication with Friends and Family: Twice a week    Frequency of Social Gatherings with Friends and Family: Once a week    Attends [employer] Services: More than 4 times per year    Active Member of Clubs or Organizations: No    Attends Club or Organization Meetings: Never    Marital Status: Never    Housing Stability: High Risk    Unable to Pay for Housing in the Last Year: Yes    Number of Places Lived in the Last Year: 1    Unstable Housing in the Last Year: No        Family History   Problem Relation Age of Onset    Heart failure Mother     Cataracts Mother     Heart disease Mother     Glaucoma Mother     Peripheral vascular disease Mother     Hypertension Father      Review of patient's allergies indicates:  No Known Allergies      ROS:  Review of Systems   Constitutional: Negative.    HENT: Negative.     Eyes: Negative.    Respiratory:  Positive for shortness of breath.    Cardiovascular:  Positive for palpitations. Negative for chest pain, orthopnea, claudication, leg swelling and PND.   Gastrointestinal: Negative.    Genitourinary: Negative.    Musculoskeletal:  Positive for back pain.        Bilateral lower extremity pain and numbness   Skin: Negative.    Neurological: Negative.    Endo/Heme/Allergies: Negative.    Psychiatric/Behavioral: Negative.                                                                                               "                                                                                  Negative except as stated in the history of present illness. See HPI for details.    PHYSICAL EXAM:  Visit Vitals  BP (!) 82/62 (BP Location: Left arm, Patient Position: Sitting, BP Method: Medium (Automatic))   Pulse (!) 46   Temp 97.5 °F (36.4 °C) (Oral)   Resp 20   Ht 6' 2" (1.88 m)   Wt 109.6 kg (241 lb 10 oz)   SpO2 96%   BMI 31.02 kg/m²       Physical Exam  Constitutional:       General: He is not in acute distress.     Appearance: Normal appearance. He is obese. He is not diaphoretic.   HENT:      Head: Normocephalic.      Nose: Nose normal.      Mouth/Throat:      Mouth: Mucous membranes are moist.   Eyes:      Extraocular Movements: Extraocular movements intact.   Cardiovascular:      Rate and Rhythm: Normal rate. Rhythm irregular.      Pulses: Normal pulses.      Heart sounds: Normal heart sounds. No murmur heard.  Pulmonary:      Effort: Pulmonary effort is normal. No respiratory distress.      Breath sounds: Normal breath sounds.   Abdominal:      Palpations: Abdomen is soft.   Musculoskeletal:      Cervical back: Normal range of motion.      Right lower leg: No edema.      Left lower leg: No edema.   Skin:     General: Skin is warm and dry.   Neurological:      Mental Status: He is alert and oriented to person, place, and time.   Psychiatric:         Mood and Affect: Mood is depressed.       Current Outpatient Medications   Medication Instructions    ACCU-CHEK FASTCLIX LANCET DRUM Misc Topical (Top), Daily    ACCU-CHEK GUIDE TEST STRIPS Strp USE AS DIRECTED once daily (keep log)    albuterol (PROVENTIL) 2.5 mg, Nebulization, Every 6 hours PRN, Rescue    albuterol-ipratropium (DUO-NEB) 2.5 mg-0.5 mg/3 mL nebulizer solution NEBULIZE 1 VIAL EVERY 6 HOURS AS NEEDED    alfuzosin (UROXATRAL) 10 mg Tb24 1 tablet, Oral, Daily    amiodarone (PACERONE) 200 MG Tab 1 tablet, Oral, Daily    apixaban (ELIQUIS) 5 mg Tab Eliquis 5 mg " tablet    atorvastatin (LIPITOR) 80 mg, Oral, Daily    cetirizine (ZYRTEC) 10 mg, Oral, Daily    cholecalciferol (vitamin D3) (VITAMIN D3) 2,000 Units, Oral, Daily    clotrimazole-betamethasone 1-0.05% (LOTRISONE) cream 2 g, Topical (Top), 2 times daily    famotidine (PEPCID) 20 mg, Oral, 2 times daily    fluticasone propionate (FLONASE ALLERGY RELIEF) 100 mcg, Each Nostril, Daily    fluticasone-salmeterol 230-21 mcg/dose (ADVAIR HFA) 230-21 mcg/actuation HFAA inhaler 2 puffs, Inhalation, 2 times daily, Controller    furosemide (LASIX) 40 mg, Oral, Daily, Refilled per Cardio orders.    glipiZIDE (GLUCOTROL) 5 mg, Oral, Daily    ipratropium (ATROVENT) 500 mcg, Nebulization, 4 times daily, Rescue    lancets-blood glucose strips 30 gauge Cmpk   Accucheck blood glucose test strips and lancets, See Instructions, Check CBG once daily and keep log., # 100 EA, 11 Refill(s), Pharmacy: [facility], 185, cm, Height/Length Dosing, 03/08/22 8:10:00 CST, 96, kg, Weight Dosing, 03/08/22 8:10:00 CST    lisinopriL 10 MG tablet 1 tablet, Oral, Daily    metoprolol succinate (TOPROL-XL) 200 mg, Oral, 2 times daily    nicotine (NICODERM CQ) 14 mg/24 hr 1 patch, Transdermal, Daily    nicotine polacrilex (NICORETTE) 4 mg, Oral, As needed (PRN)    predniSONE (DELTASONE) 50 MG Tab No dose, route, or frequency recorded.    promethazine-codeine 6.25-10 mg/5 ml (PHENERGAN WITH CODEINE) 6.25-10 mg/5 mL syrup 5 mLs, Oral, Every 4 hours PRN    promethazine-dextromethorphan (PROMETHAZINE-DM) 6.25-15 mg/5 mL Syrp TAKE 5 ml (cc) EVERY 4 HOURS AS NEEDED FOR cough    sacubitriL-valsartan (ENTRESTO) 49-51 mg per tablet 1 tablet, Oral, 2 times daily    traZODone (DESYREL) 50 mg, Oral, Nightly PRN    warfarin (COUMADIN) 5 mg, Oral, Daily        All medications, laboratory studies, cardiac diagnostic imaging reviewed.     Lab Results   Component Value Date    LDL 56.00 03/07/2023    LDL 88.00 09/06/2022    TRIG 55 03/07/2023    TRIG 75 09/06/2022     CREATININE 1.31 (H) 07/06/2023    MG 2.10 03/07/2023    K 4.1 07/06/2023        ASSESSMENT/PLAN:  Paroxysmal Atrial Fibrillation/flutter   - Reports palpitations secondary to AFIB, denies lightheadedness, dizziness, or syncope  - TE/DCCV aborted after JOANNE thrombus identified (March 2023)  - Eliquis changed to Warfarin, patient tolerating well  - Follow up with [facility] as scheduled (patient reports August 2023)  - Follow-up for device interrogation in 3 months  - Will defer any medication changes to the EP at this time given possibility for intervention     Non ischemic cardiomyopathy  Chronic Systolic HF (recovered in 2020 with new drop in 1/2023) s/p ICD   NYHA III  - Euvolemic today  - Daily shortness of breath with minimal intensity activity.  States that he has to take frequent breaks while performing activity.   - Reports that lower extremity edema has improved since increasing dose of Lasix  - Patient reports that he takes Lasix 40 mg BID and is doing well on this current dose.  Change made by Dr. Anguiano (EP)  - Watch fluid and salt intake    HTN  - Blood pressure is slightly soft today, patient feeling well, no signs or symptoms of hypotension  - Patient states that his blood pressure tends to low run low when in AFIB  - Continue Entresto to 49/51 mg BID and Toprol  200mg BID    Claudication  - BL Venous US without evidence of DVT in the veins of the bilateral lower extremities  - Patient does report ongoing bilateral extremity pain however believes that is due to spinal stenosis of lower back    Follow up in [facility] in 3 months or sooner if needed  Follow up in 3 months for device interrogation  Follow up with [facility]   Follow up with PCP as directed  Strict ED precautions

## 2023-07-26 ENCOUNTER — LAB VISIT (OUTPATIENT)
Dept: LAB | Facility: HOSPITAL | Age: 63
End: 2023-07-26
Attending: INTERNAL MEDICINE
Payer: MEDICAID

## 2023-07-26 DIAGNOSIS — I51.3 THROMBUS IN HEART CHAMBER: ICD-10-CM

## 2023-07-26 DIAGNOSIS — I48.19 PERSISTENT ATRIAL FIBRILLATION: Primary | ICD-10-CM

## 2023-07-26 LAB
INR PPP: 2.3
PROTHROMBIN TIME: 22 SECONDS (ref 12.5–14.5)

## 2023-07-26 PROCEDURE — 36415 COLL VENOUS BLD VENIPUNCTURE: CPT

## 2023-07-26 PROCEDURE — 85610 PROTHROMBIN TIME: CPT

## 2023-08-02 ENCOUNTER — PATIENT OUTREACH (OUTPATIENT)
Dept: ADMINISTRATIVE | Facility: OTHER | Age: 63
End: 2023-08-02
Payer: MEDICAID

## 2023-08-02 NOTE — PROGRESS NOTES
CHW - Follow Up    This Community Health Worker completed a follow up visit with patient via telephone today.  Caregiver reported: patient needs Ortho/Spine Dr as back has been hurting. Dr through [facility] is leaving and appt with new Dr is at the end of August.  Patient had referrals for cane, grabber, shower chair and home health. Also needs an indoor aluminum walker  Community Health Worker provided: encouragement to call CM through insurance and that I would also reach out to [facility] in [place]. Will follow up to confirm that someone has reached out.     Follow-up Outreach - Due: 8/4/2023

## 2023-08-03 ENCOUNTER — DOCUMENTATION ONLY (OUTPATIENT)
Dept: INTERNAL MEDICINE | Facility: CLINIC | Age: 63
End: 2023-08-03
Payer: MEDICAID

## 2023-08-03 NOTE — PROGRESS NOTES
Spoke with Mrs Lucia Harding in case management. Order form completed for folding walker and straight cane for DX Lumbar spinal stenosis. Pt was also referred to [facility] Neuro surgery during last visit on 6-14-23 and informed at that time that [facility] were the only [facility] that have neuro surgery that accept [facility] so pt chose [facility] for referral. Appt pending.

## 2023-08-05 PROCEDURE — G0180 MD CERTIFICATION HHA PATIENT: HCPCS | Mod: ,,, | Performed by: NURSE PRACTITIONER

## 2023-08-05 PROCEDURE — G0180 PR HOME HEALTH MD CERTIFICATION: ICD-10-PCS | Mod: ,,, | Performed by: NURSE PRACTITIONER

## 2023-08-10 ENCOUNTER — LAB VISIT (OUTPATIENT)
Dept: LAB | Facility: HOSPITAL | Age: 63
End: 2023-08-10
Attending: INTERNAL MEDICINE
Payer: MEDICAID

## 2023-08-10 DIAGNOSIS — I51.3 THROMBUS IN HEART CHAMBER: Primary | ICD-10-CM

## 2023-08-10 LAB
INR PPP: 3.5
PROTHROMBIN TIME: 32.8 SECONDS (ref 12.5–14.5)

## 2023-08-10 PROCEDURE — 36415 COLL VENOUS BLD VENIPUNCTURE: CPT

## 2023-08-10 PROCEDURE — 85610 PROTHROMBIN TIME: CPT

## 2023-08-16 LAB
INR PPP: 2.5
PROTHROMBIN TIME: 26.6 SECONDS (ref 11.4–14)

## 2023-08-18 ENCOUNTER — PATIENT OUTREACH (OUTPATIENT)
Dept: ADMINISTRATIVE | Facility: OTHER | Age: 63
End: 2023-08-18
Payer: MEDICAID

## 2023-08-18 NOTE — PROGRESS NOTES
"CHW - Follow Up    This Community Health Worker completed a follow up visit with caregiver via telephone today.  Pt/Caregiver reported: "receiving items one a a time, very grateful for my assistance which got the ball rolling.  Patient's back is also doing much better.  Only now waiting on the cane."   Community Health Worker provided: shared enthusiasm and we agreed to a follow up in a month     Follow-up Outreach - Due: 9/15/2023   "

## 2023-08-21 ENCOUNTER — EXTERNAL HOME HEALTH (OUTPATIENT)
Dept: HOME HEALTH SERVICES | Facility: HOSPITAL | Age: 63
End: 2023-08-21
Payer: MEDICAID

## 2023-08-24 ENCOUNTER — LAB VISIT (OUTPATIENT)
Dept: LAB | Facility: HOSPITAL | Age: 63
End: 2023-08-24
Attending: INTERNAL MEDICINE
Payer: MEDICAID

## 2023-08-24 DIAGNOSIS — I48.19 PERSISTENT ATRIAL FIBRILLATION: Primary | ICD-10-CM

## 2023-08-24 DIAGNOSIS — I51.3 THROMBUS IN HEART CHAMBER: ICD-10-CM

## 2023-08-24 LAB
INR PPP: 2.9
PROTHROMBIN TIME: 28 SECONDS (ref 12.5–14.5)

## 2023-08-24 PROCEDURE — 85610 PROTHROMBIN TIME: CPT

## 2023-08-24 PROCEDURE — 36415 COLL VENOUS BLD VENIPUNCTURE: CPT

## 2023-09-10 ENCOUNTER — LAB VISIT (OUTPATIENT)
Dept: LAB | Facility: HOSPITAL | Age: 63
End: 2023-09-10
Attending: INTERNAL MEDICINE
Payer: MEDICAID

## 2023-09-10 DIAGNOSIS — N40.1 BENIGN PROSTATIC HYPERPLASIA WITH URINARY FREQUENCY: ICD-10-CM

## 2023-09-10 DIAGNOSIS — R35.0 BENIGN PROSTATIC HYPERPLASIA WITH URINARY FREQUENCY: ICD-10-CM

## 2023-09-10 DIAGNOSIS — I48.91 ATRIAL FIBRILLATION: Primary | ICD-10-CM

## 2023-09-10 DIAGNOSIS — I51.3 THROMBUS IN HEART CHAMBER: ICD-10-CM

## 2023-09-10 LAB
INR PPP: 1.9
PROTHROMBIN TIME: 18.8 SECONDS (ref 12.5–14.5)

## 2023-09-10 PROCEDURE — 36415 COLL VENOUS BLD VENIPUNCTURE: CPT

## 2023-09-10 PROCEDURE — 85610 PROTHROMBIN TIME: CPT

## 2023-09-11 ENCOUNTER — HOSPITAL ENCOUNTER (OUTPATIENT)
Facility: HOSPITAL | Age: 63
Discharge: HOME | End: 2023-09-11
Attending: INTERNAL MEDICINE | Admitting: INTERNAL MEDICINE
Payer: MEDICAID

## 2023-09-11 VITALS
SYSTOLIC BLOOD PRESSURE: 137 MMHG | HEIGHT: 74 IN | RESPIRATION RATE: 9 BRPM | WEIGHT: 256.63 LBS | TEMPERATURE: 98 F | DIASTOLIC BLOOD PRESSURE: 94 MMHG | OXYGEN SATURATION: 99 % | BODY MASS INDEX: 32.94 KG/M2 | HEART RATE: 80 BPM

## 2023-09-11 DIAGNOSIS — I48.19 PERSISTENT ATRIAL FIBRILLATION: ICD-10-CM

## 2023-09-11 DIAGNOSIS — E11.9 TYPE 2 DIABETES MELLITUS WITHOUT RETINOPATHY: Primary | ICD-10-CM

## 2023-09-11 DIAGNOSIS — I50.21 ACUTE SYSTOLIC HEART FAILURE: ICD-10-CM

## 2023-09-11 DIAGNOSIS — I48.91 ATRIAL FIBRILLATION: ICD-10-CM

## 2023-09-11 LAB
FREE/TOTAL PSA (OLG): 0.2
INR PPP: 1.8
PROTHROMBIN TIME: 20.7 SECONDS (ref 12.5–14.5)
PSA FREE MFR SERPL: 25 %
PSA FREE SERPL-MCNC: 0.25 NG/ML
PSA SERPL-MCNC: 1.02 NG/ML

## 2023-09-11 PROCEDURE — 99152 MOD SED SAME PHYS/QHP 5/>YRS: CPT | Performed by: INTERNAL MEDICINE

## 2023-09-11 PROCEDURE — 63600175 PHARM REV CODE 636 W HCPCS

## 2023-09-11 PROCEDURE — C1900 LEAD, CORONARY VENOUS: HCPCS | Performed by: INTERNAL MEDICINE

## 2023-09-11 PROCEDURE — 93010 EKG 12-LEAD: ICD-10-PCS | Mod: 76,59,, | Performed by: STUDENT IN AN ORGANIZED HEALTH CARE EDUCATION/TRAINING PROGRAM

## 2023-09-11 PROCEDURE — C1732 CATH, EP, DIAG/ABL, 3D/VECT: HCPCS | Performed by: INTERNAL MEDICINE

## 2023-09-11 PROCEDURE — C1893 INTRO/SHEATH, FIXED,NON-PEEL: HCPCS | Performed by: INTERNAL MEDICINE

## 2023-09-11 PROCEDURE — C1769 GUIDE WIRE: HCPCS | Performed by: INTERNAL MEDICINE

## 2023-09-11 PROCEDURE — C1894 INTRO/SHEATH, NON-LASER: HCPCS | Performed by: INTERNAL MEDICINE

## 2023-09-11 PROCEDURE — 25500020 PHARM REV CODE 255: Performed by: INTERNAL MEDICINE

## 2023-09-11 PROCEDURE — C1819 TISSUE LOCALIZATION-EXCISION: HCPCS | Performed by: INTERNAL MEDICINE

## 2023-09-11 PROCEDURE — 36415 COLL VENOUS BLD VENIPUNCTURE: CPT

## 2023-09-11 PROCEDURE — 63600175 PHARM REV CODE 636 W HCPCS: Performed by: INTERNAL MEDICINE

## 2023-09-11 PROCEDURE — 25000003 PHARM REV CODE 250: Performed by: INTERNAL MEDICINE

## 2023-09-11 PROCEDURE — 85610 PROTHROMBIN TIME: CPT | Performed by: INTERNAL MEDICINE

## 2023-09-11 PROCEDURE — 27201423 OPTIME MED/SURG SUP & DEVICES STERILE SUPPLY: Performed by: INTERNAL MEDICINE

## 2023-09-11 PROCEDURE — C1725 CATH, TRANSLUMIN NON-LASER: HCPCS | Performed by: INTERNAL MEDICINE

## 2023-09-11 PROCEDURE — 93005 ELECTROCARDIOGRAM TRACING: CPT | Mod: 59

## 2023-09-11 PROCEDURE — C1882 AICD, OTHER THAN SING/DUAL: HCPCS | Performed by: INTERNAL MEDICINE

## 2023-09-11 PROCEDURE — 33263 RMVL & RPLCMT DFB GEN 2 LEAD: CPT | Performed by: INTERNAL MEDICINE

## 2023-09-11 PROCEDURE — 99153 MOD SED SAME PHYS/QHP EA: CPT | Performed by: INTERNAL MEDICINE

## 2023-09-11 PROCEDURE — 93650 ICAR CATH ABLTJ AV NODE FUNC: CPT | Performed by: INTERNAL MEDICINE

## 2023-09-11 PROCEDURE — C1760 CLOSURE DEV, VASC: HCPCS | Performed by: INTERNAL MEDICINE

## 2023-09-11 PROCEDURE — 33225 L VENTRIC PACING LEAD ADD-ON: CPT | Performed by: INTERNAL MEDICINE

## 2023-09-11 PROCEDURE — 93010 ELECTROCARDIOGRAM REPORT: CPT | Mod: 76,59,, | Performed by: STUDENT IN AN ORGANIZED HEALTH CARE EDUCATION/TRAINING PROGRAM

## 2023-09-11 PROCEDURE — C1730 CATH, EP, 19 OR FEW ELECT: HCPCS | Performed by: INTERNAL MEDICINE

## 2023-09-11 DEVICE — LEFT HEART LEAD
Type: IMPLANTABLE DEVICE | Site: OTHER (ADD COMMENT) | Status: FUNCTIONAL
Brand: QUARTET™

## 2023-09-11 DEVICE — CARDIAC RESYNCHRONISATION THERAPY DEFIBRILLATOR
Type: IMPLANTABLE DEVICE | Site: CHEST  WALL | Status: FUNCTIONAL
Brand: GALLANT™

## 2023-09-11 DEVICE — SYS CLSR VASCADE MVP ID6-12FR: Type: IMPLANTABLE DEVICE | Site: GROIN | Status: FUNCTIONAL

## 2023-09-11 RX ORDER — SODIUM CHLORIDE 9 MG/ML
INJECTION, SOLUTION INTRAVENOUS ONCE
Status: ACTIVE | OUTPATIENT
Start: 2023-09-11

## 2023-09-11 RX ORDER — FENTANYL CITRATE 50 UG/ML
INJECTION, SOLUTION INTRAMUSCULAR; INTRAVENOUS
Status: DISCONTINUED | OUTPATIENT
Start: 2023-09-11 | End: 2023-09-11 | Stop reason: HOSPADM

## 2023-09-11 RX ORDER — HYDROCODONE BITARTRATE AND ACETAMINOPHEN 5; 325 MG/1; MG/1
1 TABLET ORAL EVERY 4 HOURS PRN
Status: DISCONTINUED | OUTPATIENT
Start: 2023-09-11 | End: 2023-09-11 | Stop reason: HOSPADM

## 2023-09-11 RX ORDER — VANCOMYCIN HYDROCHLORIDE 1 G/20ML
1000 INJECTION, POWDER, LYOPHILIZED, FOR SOLUTION INTRAVENOUS
Status: DISPENSED | OUTPATIENT
Start: 2023-09-11

## 2023-09-11 RX ORDER — MIDAZOLAM HYDROCHLORIDE 1 MG/ML
INJECTION INTRAMUSCULAR; INTRAVENOUS
Status: DISCONTINUED | OUTPATIENT
Start: 2023-09-11 | End: 2023-09-11 | Stop reason: HOSPADM

## 2023-09-11 RX ORDER — LIDOCAINE HYDROCHLORIDE 10 MG/ML
INJECTION INFILTRATION; PERINEURAL
Status: DISCONTINUED | OUTPATIENT
Start: 2023-09-11 | End: 2023-09-11 | Stop reason: HOSPADM

## 2023-09-11 RX ORDER — ONDANSETRON 2 MG/ML
INJECTION INTRAMUSCULAR; INTRAVENOUS
Status: COMPLETED
Start: 2023-09-11 | End: 2023-09-11

## 2023-09-11 RX ORDER — SODIUM CHLORIDE 0.9 % (FLUSH) 0.9 %
10 SYRINGE (ML) INJECTION
Status: ACTIVE | OUTPATIENT
Start: 2023-09-11

## 2023-09-11 RX ORDER — ONDANSETRON 2 MG/ML
4 INJECTION INTRAMUSCULAR; INTRAVENOUS ONCE
Status: COMPLETED | OUTPATIENT
Start: 2023-09-11 | End: 2023-09-11

## 2023-09-11 RX ORDER — MORPHINE SULFATE 4 MG/ML
2 INJECTION, SOLUTION INTRAMUSCULAR; INTRAVENOUS EVERY 4 HOURS PRN
Status: DISCONTINUED | OUTPATIENT
Start: 2023-09-11 | End: 2023-09-11 | Stop reason: HOSPADM

## 2023-09-11 RX ORDER — ACETAMINOPHEN 500 MG
5000 TABLET ORAL DAILY
COMMUNITY

## 2023-09-11 RX ORDER — ACETAMINOPHEN 325 MG/1
650 TABLET ORAL EVERY 4 HOURS PRN
Status: DISCONTINUED | OUTPATIENT
Start: 2023-09-11 | End: 2023-09-11 | Stop reason: HOSPADM

## 2023-09-11 RX ORDER — HYDROMORPHONE HYDROCHLORIDE 1 MG/ML
INJECTION, SOLUTION INTRAMUSCULAR; INTRAVENOUS; SUBCUTANEOUS
Status: DISCONTINUED | OUTPATIENT
Start: 2023-09-11 | End: 2023-09-11 | Stop reason: HOSPADM

## 2023-09-11 RX ADMIN — ONDANSETRON 4 MG: 2 INJECTION INTRAMUSCULAR; INTRAVENOUS at 01:09

## 2023-09-11 RX ADMIN — ACETAMINOPHEN 650 MG: 325 TABLET ORAL at 02:09

## 2023-09-11 NOTE — Clinical Note
A generator pocket was made, opened and revised at the left lateral text with plasma blade and sharp dissection. n/a

## 2023-09-11 NOTE — Clinical Note
A dressing was applied to the incision. Left lateral chest wall covered with tegaderm and sterile gauze.

## 2023-09-11 NOTE — Clinical Note
A venogram was performed in the left subclavian vein. The vessel was injected via hand injection  with 25 mL of contrast. patient

## 2023-09-11 NOTE — DISCHARGE SUMMARY
[facility] - Cath Lab Services  Discharge Note  Short Stay    Procedure(s) (LRB):  Ablation (N/A)  INSERTION, ICD, BIVENTRICULAR (N/A)      OUTCOME: Patient tolerated treatment/procedure well without complication and is now ready for discharge.    DISPOSITION: Home or Self Care    FINAL DIAGNOSIS:  Persistent atrial fibrillation    FOLLOWUP: In clinic    DISCHARGE INSTRUCTIONS:  No discharge procedures on file.     TIME SPENT ON DISCHARGE: 15 minutes

## 2023-09-12 ENCOUNTER — HOSPITAL ENCOUNTER (EMERGENCY)
Facility: HOSPITAL | Age: 63
Discharge: HOME | End: 2023-09-12
Attending: INTERNAL MEDICINE
Payer: MEDICAID

## 2023-09-12 VITALS
DIASTOLIC BLOOD PRESSURE: 69 MMHG | TEMPERATURE: 97 F | OXYGEN SATURATION: 97 % | HEART RATE: 81 BPM | WEIGHT: 256 LBS | SYSTOLIC BLOOD PRESSURE: 92 MMHG | RESPIRATION RATE: 17 BRPM | HEIGHT: 74 IN | BODY MASS INDEX: 32.85 KG/M2

## 2023-09-12 DIAGNOSIS — R53.1 WEAKNESS: ICD-10-CM

## 2023-09-12 DIAGNOSIS — N17.9 AKI (ACUTE KIDNEY INJURY): Primary | ICD-10-CM

## 2023-09-12 DIAGNOSIS — E86.0 DEHYDRATION: ICD-10-CM

## 2023-09-12 PROBLEM — I95.9 HYPOTENSION: Status: ACTIVE | Noted: 2023-09-12

## 2023-09-12 LAB
ALBUMIN SERPL-MCNC: 4.1 G/DL (ref 3.4–4.8)
ALBUMIN/GLOB SERPL: 1.2 RATIO (ref 1.1–2)
ALP SERPL-CCNC: 148 UNIT/L (ref 40–150)
ALT SERPL-CCNC: 32 UNIT/L (ref 0–55)
AST SERPL-CCNC: 19 UNIT/L (ref 5–34)
BASOPHILS # BLD AUTO: 0.02 X10(3)/MCL
BASOPHILS NFR BLD AUTO: 0.3 %
BILIRUB SERPL-MCNC: 1.1 MG/DL
BNP BLD-MCNC: 175.5 PG/ML
BUN SERPL-MCNC: 29.2 MG/DL (ref 8.4–25.7)
CALCIUM SERPL-MCNC: 9.8 MG/DL (ref 8.8–10)
CHLORIDE SERPL-SCNC: 108 MMOL/L (ref 98–107)
CK MB SERPL-MCNC: 3.5 NG/ML
CK SERPL-CCNC: 190 U/L (ref 30–200)
CO2 SERPL-SCNC: 22 MMOL/L (ref 23–31)
CREAT SERPL-MCNC: 1.32 MG/DL (ref 0.73–1.18)
EOSINOPHIL # BLD AUTO: 0.08 X10(3)/MCL (ref 0–0.9)
EOSINOPHIL NFR BLD AUTO: 1 %
ERYTHROCYTE [DISTWIDTH] IN BLOOD BY AUTOMATED COUNT: 13.9 % (ref 11.5–17)
GFR SERPLBLD CREATININE-BSD FMLA CKD-EPI: >60 MLS/MIN/1.73/M2
GLOBULIN SER-MCNC: 3.3 GM/DL (ref 2.4–3.5)
GLUCOSE SERPL-MCNC: 75 MG/DL (ref 82–115)
HCT VFR BLD AUTO: 43.3 % (ref 42–52)
HGB BLD-MCNC: 14.2 G/DL (ref 14–18)
IMM GRANULOCYTES # BLD AUTO: 0.01 X10(3)/MCL (ref 0–0.04)
IMM GRANULOCYTES NFR BLD AUTO: 0.1 %
LYMPHOCYTES # BLD AUTO: 1.62 X10(3)/MCL (ref 0.6–4.6)
LYMPHOCYTES NFR BLD AUTO: 20.8 %
MCH RBC QN AUTO: 31.5 PG (ref 27–31)
MCHC RBC AUTO-ENTMCNC: 32.8 G/DL (ref 33–36)
MCV RBC AUTO: 96 FL (ref 80–94)
MONOCYTES # BLD AUTO: 0.72 X10(3)/MCL (ref 0.1–1.3)
MONOCYTES NFR BLD AUTO: 9.2 %
NEUTROPHILS # BLD AUTO: 5.34 X10(3)/MCL (ref 2.1–9.2)
NEUTROPHILS NFR BLD AUTO: 68.6 %
NRBC BLD AUTO-RTO: 0 %
PLATELET # BLD AUTO: 233 X10(3)/MCL (ref 130–400)
PMV BLD AUTO: 10.7 FL (ref 7.4–10.4)
POTASSIUM SERPL-SCNC: 4.4 MMOL/L (ref 3.5–5.1)
PROT SERPL-MCNC: 7.4 GM/DL (ref 5.8–7.6)
RBC # BLD AUTO: 4.51 X10(6)/MCL (ref 4.7–6.1)
SODIUM SERPL-SCNC: 141 MMOL/L (ref 136–145)
TROPONIN I SERPL-MCNC: 0.28 NG/ML (ref 0–0.04)
TROPONIN I SERPL-MCNC: 0.3 NG/ML (ref 0–0.04)
WBC # SPEC AUTO: 7.79 X10(3)/MCL (ref 4.5–11.5)

## 2023-09-12 PROCEDURE — 82553 CREATINE MB FRACTION: CPT | Performed by: INTERNAL MEDICINE

## 2023-09-12 PROCEDURE — 85025 COMPLETE CBC W/AUTO DIFF WBC: CPT | Performed by: NURSE PRACTITIONER

## 2023-09-12 PROCEDURE — 83880 ASSAY OF NATRIURETIC PEPTIDE: CPT | Performed by: INTERNAL MEDICINE

## 2023-09-12 PROCEDURE — 82550 ASSAY OF CK (CPK): CPT | Performed by: INTERNAL MEDICINE

## 2023-09-12 PROCEDURE — 63600175 PHARM REV CODE 636 W HCPCS: Performed by: INTERNAL MEDICINE

## 2023-09-12 PROCEDURE — 93005 ELECTROCARDIOGRAM TRACING: CPT

## 2023-09-12 PROCEDURE — 80053 COMPREHEN METABOLIC PANEL: CPT | Performed by: NURSE PRACTITIONER

## 2023-09-12 PROCEDURE — 96361 HYDRATE IV INFUSION ADD-ON: CPT

## 2023-09-12 PROCEDURE — 25000003 PHARM REV CODE 250: Performed by: INTERNAL MEDICINE

## 2023-09-12 PROCEDURE — 84484 ASSAY OF TROPONIN QUANT: CPT | Performed by: INTERNAL MEDICINE

## 2023-09-12 PROCEDURE — 84484 ASSAY OF TROPONIN QUANT: CPT | Performed by: NURSE PRACTITIONER

## 2023-09-12 PROCEDURE — 99285 EMERGENCY DEPT VISIT HI MDM: CPT | Mod: 25,27

## 2023-09-12 PROCEDURE — 96360 HYDRATION IV INFUSION INIT: CPT

## 2023-09-12 RX ORDER — NAPROXEN SODIUM 220 MG/1
324 TABLET, FILM COATED ORAL
Status: COMPLETED | OUTPATIENT
Start: 2023-09-12 | End: 2023-09-12

## 2023-09-12 RX ADMIN — SODIUM CHLORIDE, POTASSIUM CHLORIDE, SODIUM LACTATE AND CALCIUM CHLORIDE 250 ML: 600; 310; 30; 20 INJECTION, SOLUTION INTRAVENOUS at 08:09

## 2023-09-12 RX ADMIN — ASPIRIN 81 MG CHEWABLE TABLET 324 MG: 81 TABLET CHEWABLE at 09:09

## 2023-09-12 RX ADMIN — SODIUM CHLORIDE, POTASSIUM CHLORIDE, SODIUM LACTATE AND CALCIUM CHLORIDE 250 ML: 600; 310; 30; 20 INJECTION, SOLUTION INTRAVENOUS at 09:09

## 2023-09-12 NOTE — FIRST PROVIDER EVALUATION
"Medical screening examination initiated.  I have conducted a focused provider triage encounter, findings are as follows:    Brief history of present illness:  patient sent from clinic due to weakness, hypotension. From clinic note "Pt is s/p cardiac ablation and ICD placement done yesterday at [facility] cath lab. Pt states he feels very weak. Pt's manual BP 82/62"    There were no vitals filed for this visit.    Pertinent physical exam:  deferred    Brief workup plan:  ekg, cxr, labwork    Preliminary workup initiated; this workup will be continued and followed by the physician or advanced practice provider that is assigned to the patient when roomed.  "

## 2023-09-12 NOTE — ED PROVIDER NOTES
Encounter Date: 9/12/2023       History     Chief Complaint   Patient presents with    Hypotension     PT BROUGHT FROM [facility] FOR HYPOTENSION AND WEAKNESS SINCE THIS AM.  PT HAD ABLATION YESTERDAY, HX OF CHRONIC A FIB.  DENIES CP/SOB.  EKG OBTAINED.      Referred from clinic due to weakness associated to hypotension while in a F/U appointment today. Pt has an ICD placement yesterday at [facility]. Pt denies pain, denies fever, shortness of bretah, vomiting or swelling, states feeling weak and after his procedure was nauseated but improved after medication. States took his medications this morning including Entresto and Lasix.    The history is provided by the patient and medical records.     Review of patient's allergies indicates:  No Known Allergies  Past Medical History:   Diagnosis Date    Atrial fibrillation     CHF (congestive heart failure)     CKD (chronic kidney disease)     COPD (chronic obstructive pulmonary disease)     DM (diabetes mellitus)     Dyslipidemia     Essential (primary) hypertension     Heart failure, unspecified     Rheumatoid arthritis, unspecified     Smoking     Type 2 diabetes mellitus without complications      Past Surgical History:   Procedure Laterality Date    CARDIOVERSION      coronary arteriograph      defibillator      ECHOCARDIOGRAM,TRANSESOPHAGEAL N/A 3/22/2023    Procedure: Transesophageal echo (LASHELL) intra-procedure log documentation;  Surgeon: Matthieu Diagnostic Provider;  Location: [facility] CATH LAB;  Service: Cardiology;  Laterality: N/A;    INSERT / REPLACE / REMOVE PACEMAKER      left  heart catherization       Family History   Problem Relation Age of Onset    Heart failure Mother     Cataracts Mother     Heart disease Mother     Glaucoma Mother     Peripheral vascular disease Mother     Hypertension Father      Social History     Tobacco Use    Smoking status: Never     Passive exposure: Never    Smokeless tobacco: Never    Tobacco comments:     Taking patches   Substance Use  Topics    Alcohol use: Never     Alcohol/week: 1.0 standard drink of alcohol     Types: 1 Cans of beer per week     Comment: not often    Drug use: Never     Review of Systems   Constitutional:  Negative for fever.   HENT:  Negative for sore throat.    Respiratory:  Negative for shortness of breath.    Cardiovascular:  Negative for chest pain.   Gastrointestinal:  Positive for nausea.   Genitourinary:  Positive for difficulty urinating. Negative for dysuria.   Musculoskeletal:  Negative for back pain.   Skin:  Negative for rash.   Neurological:  Positive for weakness.   Hematological:  Does not bruise/bleed easily.   All other systems reviewed and are negative.      Physical Exam     Initial Vitals [09/12/23 0758]   BP Pulse Resp Temp SpO2   95/67 78 16 96.8 °F (36 °C) 98 %      MAP       --         Physical Exam    Nursing note and vitals reviewed.  Constitutional: He appears well-developed and well-nourished. No distress.   HENT:   Head: Normocephalic and atraumatic.   Mouth/Throat: Oropharynx is clear and moist.   Eyes: Conjunctivae and EOM are normal. Pupils are equal, round, and reactive to light.   Neck: Neck supple. No JVD present.   Normal range of motion.  Cardiovascular:  Normal rate, regular rhythm, normal heart sounds and intact distal pulses.           Pulmonary/Chest: Breath sounds normal. No respiratory distress. He exhibits no tenderness.   Surgical dressing on left chest area   Abdominal: Abdomen is soft. Bowel sounds are normal. He exhibits no distension. There is no abdominal tenderness. There is no rebound and no guarding.   Musculoskeletal:         General: No edema. Normal range of motion.      Cervical back: Normal range of motion and neck supple.     Neurological: He is alert and oriented to person, place, and time. He has normal strength. GCS score is 15. GCS eye subscore is 4. GCS verbal subscore is 5. GCS motor subscore is 6.   Skin: Skin is warm and dry. No rash noted.   Psychiatric: His  behavior is normal.         ED Course   Procedures  Labs Reviewed   COMPREHENSIVE METABOLIC PANEL - Abnormal; Notable for the following components:       Result Value    Chloride 108 (*)     Carbon Dioxide 22 (*)     Glucose Level 75 (*)     Blood Urea Nitrogen 29.2 (*)     Creatinine 1.32 (*)     All other components within normal limits   TROPONIN I - Abnormal; Notable for the following components:    Troponin-I 0.302 (*)     All other components within normal limits   CBC WITH DIFFERENTIAL - Abnormal; Notable for the following components:    RBC 4.51 (*)     MCV 96.0 (*)     MCH 31.5 (*)     MCHC 32.8 (*)     MPV 10.7 (*)     All other components within normal limits   B-TYPE NATRIURETIC PEPTIDE - Abnormal; Notable for the following components:    Natriuretic Peptide 175.5 (*)     All other components within normal limits   CK-MB - Normal   CK - Normal   CBC W/ AUTO DIFFERENTIAL    Narrative:     The following orders were created for panel order CBC auto differential.  Procedure                               Abnormality         Status                     ---------                               -----------         ------                     CBC with Differential[[id number]]       Abnormal            Final result                 Please view results for these tests on the individual orders.   EXTRA TUBES    Narrative:     The following orders were created for panel order EXTRA TUBES.  Procedure                               Abnormality         Status                     ---------                               -----------         ------                     Light Blue Top Hold[[id number]]                             In process                   Please view results for these tests on the individual orders.   LIGHT BLUE TOP HOLD     EKG Readings: (Independently Interpreted)   Initial Reading: No STEMI. Rhythm: Paced Rhythm. Heart Rate: 80. Ectopy: No Ectopy. Axis: Left Axis Deviation. Clinical Impression: Paced Rhythm        Imaging Results              X-Ray Chest AP Portable (Final result)  Result time 09/12/23 08:44:35      Final result by Khari Chao MD (09/12/23 08:44:35)                   Impression:      Question some pulmonary vascular congestion      Electronically signed by: Khari Chao  Date:    09/12/2023  Time:    08:44               Narrative:    EXAMINATION:  XR CHEST AP PORTABLE    CLINICAL HISTORY:  Weakness    COMPARISON:  11 September 2023    FINDINGS:  Portable frontal view of the chest was obtained. Stable left-sided AICD.  The heart is not significantly enlarged.  Mild interstitial prominence.  No pneumothorax.                                    X-Rays:   Independently Interpreted Readings:   Chest X-Ray: Normal heart size.  No infiltrates.  No acute abnormalities.     Medications   lactated ringers bolus 250 mL (250 mLs Intravenous New Bag 9/12/23 0905)   lactated ringers bolus 250 mL (0 mLs Intravenous Stopped 9/12/23 0928)   aspirin chewable tablet 324 mg (324 mg Oral Given 9/12/23 0902)     Medical Decision Making  Amount and/or Complexity of Data Reviewed  Labs: ordered. Decision-making details documented in ED Course.  Radiology: ordered and independent interpretation performed. Decision-making details documented in ED Course.  ECG/medicine tests: ordered and independent interpretation performed. Decision-making details documented in ED Course.    Risk  OTC drugs.      Additional MDM:   Differential Diagnosis:   Hypothyroidism, medication side effect, orthostatic weakness, kidney failure, stroke, among others                               Clinical Impression:   Final diagnoses:  [R53.1] Weakness               Victorino Baptiste MD  09/18/23 0702

## 2023-09-13 LAB — POCT GLUCOSE: 104 MG/DL (ref 70–110)

## 2023-09-18 ENCOUNTER — OFFICE VISIT (OUTPATIENT)
Dept: INTERNAL MEDICINE | Facility: CLINIC | Age: 63
End: 2023-09-18
Payer: MEDICAID

## 2023-09-18 VITALS
HEIGHT: 74 IN | WEIGHT: 255 LBS | HEART RATE: 81 BPM | RESPIRATION RATE: 18 BRPM | TEMPERATURE: 98 F | SYSTOLIC BLOOD PRESSURE: 96 MMHG | DIASTOLIC BLOOD PRESSURE: 73 MMHG | BODY MASS INDEX: 32.73 KG/M2

## 2023-09-18 DIAGNOSIS — E11.9 CONTROLLED TYPE 2 DIABETES MELLITUS WITHOUT COMPLICATION, WITHOUT LONG-TERM CURRENT USE OF INSULIN: ICD-10-CM

## 2023-09-18 DIAGNOSIS — M54.9 CHRONIC BACK PAIN, UNSPECIFIED BACK LOCATION, UNSPECIFIED BACK PAIN LATERALITY: ICD-10-CM

## 2023-09-18 DIAGNOSIS — M48.061 SPINAL STENOSIS OF LUMBAR REGION, UNSPECIFIED WHETHER NEUROGENIC CLAUDICATION PRESENT: ICD-10-CM

## 2023-09-18 DIAGNOSIS — G89.29 CHRONIC BACK PAIN, UNSPECIFIED BACK LOCATION, UNSPECIFIED BACK PAIN LATERALITY: ICD-10-CM

## 2023-09-18 DIAGNOSIS — J44.89 ASTHMA WITH COPD: Primary | ICD-10-CM

## 2023-09-18 DIAGNOSIS — R74.8 ELEVATED LIVER ENZYMES: ICD-10-CM

## 2023-09-18 DIAGNOSIS — N17.9 AKI (ACUTE KIDNEY INJURY): ICD-10-CM

## 2023-09-18 DIAGNOSIS — Z95.810 S/P ICD (INTERNAL CARDIAC DEFIBRILLATOR) PROCEDURE: ICD-10-CM

## 2023-09-18 DIAGNOSIS — E11.9 TYPE 2 DIABETES MELLITUS WITHOUT RETINOPATHY: ICD-10-CM

## 2023-09-18 PROCEDURE — 4010F PR ACE/ARB THEARPY RXD/TAKEN: ICD-10-PCS | Mod: CPTII,,, | Performed by: NURSE PRACTITIONER

## 2023-09-18 PROCEDURE — 1159F MED LIST DOCD IN RCRD: CPT | Mod: CPTII,,, | Performed by: NURSE PRACTITIONER

## 2023-09-18 PROCEDURE — 3066F PR DOCUMENTATION OF TREATMENT FOR NEPHROPATHY: ICD-10-PCS | Mod: CPTII,,, | Performed by: NURSE PRACTITIONER

## 2023-09-18 PROCEDURE — 99214 OFFICE O/P EST MOD 30 MIN: CPT | Mod: S$PBB,,, | Performed by: NURSE PRACTITIONER

## 2023-09-18 PROCEDURE — 99214 PR OFFICE/OUTPT VISIT, EST, LEVL IV, 30-39 MIN: ICD-10-PCS | Mod: S$PBB,,, | Performed by: NURSE PRACTITIONER

## 2023-09-18 PROCEDURE — 3078F PR MOST RECENT DIASTOLIC BLOOD PRESSURE < 80 MM HG: ICD-10-PCS | Mod: CPTII,,, | Performed by: NURSE PRACTITIONER

## 2023-09-18 PROCEDURE — 1160F PR REVIEW ALL MEDS BY PRESCRIBER/CLIN PHARMACIST DOCUMENTED: ICD-10-PCS | Mod: CPTII,,, | Performed by: NURSE PRACTITIONER

## 2023-09-18 PROCEDURE — 3044F PR MOST RECENT HEMOGLOBIN A1C LEVEL <7.0%: ICD-10-PCS | Mod: CPTII,,, | Performed by: NURSE PRACTITIONER

## 2023-09-18 PROCEDURE — 3066F NEPHROPATHY DOC TX: CPT | Mod: CPTII,,, | Performed by: NURSE PRACTITIONER

## 2023-09-18 PROCEDURE — 3008F PR BODY MASS INDEX (BMI) DOCUMENTED: ICD-10-PCS | Mod: CPTII,,, | Performed by: NURSE PRACTITIONER

## 2023-09-18 PROCEDURE — 3074F PR MOST RECENT SYSTOLIC BLOOD PRESSURE < 130 MM HG: ICD-10-PCS | Mod: CPTII,,, | Performed by: NURSE PRACTITIONER

## 2023-09-18 PROCEDURE — 3044F HG A1C LEVEL LT 7.0%: CPT | Mod: CPTII,,, | Performed by: NURSE PRACTITIONER

## 2023-09-18 PROCEDURE — 3008F BODY MASS INDEX DOCD: CPT | Mod: CPTII,,, | Performed by: NURSE PRACTITIONER

## 2023-09-18 PROCEDURE — 4010F ACE/ARB THERAPY RXD/TAKEN: CPT | Mod: CPTII,,, | Performed by: NURSE PRACTITIONER

## 2023-09-18 PROCEDURE — 3078F DIAST BP <80 MM HG: CPT | Mod: CPTII,,, | Performed by: NURSE PRACTITIONER

## 2023-09-18 PROCEDURE — 1160F RVW MEDS BY RX/DR IN RCRD: CPT | Mod: CPTII,,, | Performed by: NURSE PRACTITIONER

## 2023-09-18 PROCEDURE — 3074F SYST BP LT 130 MM HG: CPT | Mod: CPTII,,, | Performed by: NURSE PRACTITIONER

## 2023-09-18 PROCEDURE — 99215 OFFICE O/P EST HI 40 MIN: CPT | Mod: PBBFAC | Performed by: NURSE PRACTITIONER

## 2023-09-18 PROCEDURE — 1159F PR MEDICATION LIST DOCUMENTED IN MEDICAL RECORD: ICD-10-PCS | Mod: CPTII,,, | Performed by: NURSE PRACTITIONER

## 2023-09-18 RX ORDER — GLIPIZIDE 5 MG/1
5 TABLET ORAL DAILY
Qty: 90 TABLET | Refills: 1 | Status: SHIPPED | OUTPATIENT
Start: 2023-09-18 | End: 2024-01-09 | Stop reason: SDUPTHER

## 2023-09-18 RX ORDER — FAMOTIDINE 20 MG/1
20 TABLET, FILM COATED ORAL 2 TIMES DAILY
Qty: 60 TABLET | Refills: 11 | Status: SHIPPED | OUTPATIENT
Start: 2023-09-18 | End: 2024-09-17

## 2023-09-18 RX ORDER — IPRATROPIUM BROMIDE 0.5 MG/2.5ML
500 SOLUTION RESPIRATORY (INHALATION) 4 TIMES DAILY
Qty: 100 ML | Refills: 6 | Status: SHIPPED | OUTPATIENT
Start: 2023-09-18 | End: 2024-01-09

## 2023-09-18 RX ORDER — TRAZODONE HYDROCHLORIDE 50 MG/1
50 TABLET ORAL NIGHTLY PRN
Qty: 90 TABLET | Refills: 1 | Status: SHIPPED | OUTPATIENT
Start: 2023-09-18 | End: 2024-01-09 | Stop reason: SDUPTHER

## 2023-09-18 RX ORDER — DOXYCYCLINE HYCLATE 100 MG
100 TABLET ORAL 2 TIMES DAILY
COMMUNITY
Start: 2023-09-11 | End: 2023-12-04 | Stop reason: ALTCHOICE

## 2023-09-18 RX ORDER — CETIRIZINE HYDROCHLORIDE 10 MG/1
10 TABLET ORAL DAILY
Qty: 90 TABLET | Refills: 1 | Status: SHIPPED | OUTPATIENT
Start: 2023-09-18 | End: 2024-01-09 | Stop reason: SDUPTHER

## 2023-09-18 RX ORDER — HYDROCODONE BITARTRATE AND ACETAMINOPHEN 5; 325 MG/1; MG/1
2 TABLET ORAL 2 TIMES DAILY
COMMUNITY
Start: 2023-09-11

## 2023-09-18 RX ORDER — ALBUTEROL SULFATE 0.83 MG/ML
2.5 SOLUTION RESPIRATORY (INHALATION) EVERY 6 HOURS PRN
Qty: 100 EACH | Refills: 6 | Status: SHIPPED | OUTPATIENT
Start: 2023-09-18 | End: 2024-01-09

## 2023-09-18 RX ORDER — FLUTICASONE PROPIONATE 50 MCG
2 SPRAY, SUSPENSION (ML) NASAL DAILY
Qty: 18 G | Refills: 6 | Status: SHIPPED | OUTPATIENT
Start: 2023-09-18 | End: 2024-01-09 | Stop reason: SDUPTHER

## 2023-09-18 RX ORDER — FLUTICASONE PROPIONATE AND SALMETEROL XINAFOATE 230; 21 UG/1; UG/1
2 AEROSOL, METERED RESPIRATORY (INHALATION) 2 TIMES DAILY
Qty: 12 G | Refills: 6 | Status: SHIPPED | OUTPATIENT
Start: 2023-09-18 | End: 2024-01-09 | Stop reason: SDUPTHER

## 2023-09-18 NOTE — PROGRESS NOTES
Patient ID: [id number]     Chief Complaint: LAB RESULTS        HPI:     HPI      Ezra Zhang is a 62 y.o. male here today for a follow up. Pt s/p cardiac ablation and ICD placement since 9-11-23. Pt followed by [facility]. Pt has appt today.         ----------------------------  Atrial fibrillation  CHF (congestive heart failure)  CKD (chronic kidney disease)  COPD (chronic obstructive pulmonary disease)  DM (diabetes mellitus)  Dyslipidemia  Essential (primary) hypertension  Heart failure, unspecified  Rheumatoid arthritis, unspecified  Smoking  Type 2 diabetes mellitus without complications     Past Surgical History:   Procedure Laterality Date    ABLATION N/A 9/11/2023    Procedure: Ablation;  Surgeon: Álvaro Degroot MD;  Location: [facility] CATH LAB;  Service: Cardiology;  Laterality: N/A;  EPS + AV NODE ABLATION + UPGRADE TO BIV ICD (SJM)    CARDIOVERSION      coronary arteriograph      defibillator      ECHOCARDIOGRAM,TRANSESOPHAGEAL N/A 3/22/2023    Procedure: Transesophageal echo (LASHELL) intra-procedure log documentation;  Surgeon: Matthieu Diagnostic Provider;  Location: [facility] CATH LAB;  Service: Cardiology;  Laterality: N/A;    INSERT / REPLACE / REMOVE PACEMAKER      INSERTION OF BIVENTRICULAR IMPLANTABLE CARDIOVERTER-DEFIBRILLATOR (ICD) N/A 9/11/2023    Procedure: INSERTION, ICD, BIVENTRICULAR;  Surgeon: Álvaro Degroot MD;  Location: [facility] CATH LAB;  Service: Cardiology;  Laterality: N/A;    left  heart catherization         Review of patient's allergies indicates:  No Known Allergies    Current Outpatient Medications   Medication Instructions    ACCU-CHEK FASTCLIX LANCET DRUM Misc Topical (Top), Daily    ACCU-CHEK GUIDE TEST STRIPS Strp USE AS DIRECTED once daily (keep log)    albuterol (PROVENTIL) 2.5 mg, Nebulization, Every 6 hours PRN, Rescue    albuterol-ipratropium (DUO-NEB) 2.5 mg-0.5 mg/3 mL nebulizer solution NEBULIZE 1 VIAL EVERY 6 HOURS AS NEEDED    alfuzosin (UROXATRAL) 10 mg Tb24 1 tablet, Oral, Daily     atorvastatin (LIPITOR) 80 mg, Oral, Daily    cetirizine (ZYRTEC) 10 mg, Oral, Daily    cholecalciferol (vitamin D3) (VITAMIN D3) 5,000 Units, Oral, Daily    clotrimazole-betamethasone 1-0.05% (LOTRISONE) cream 2 g, Topical (Top), 2 times daily    doxycycline (VIBRA-TABS) 100 mg, Oral, 2 times daily    famotidine (PEPCID) 20 mg, Oral, 2 times daily    fluticasone propionate (FLONASE ALLERGY RELIEF) 100 mcg, Each Nostril, Daily    fluticasone-salmeterol 230-21 mcg/dose (ADVAIR HFA) 230-21 mcg/actuation HFAA inhaler 2 puffs, Inhalation, 2 times daily, Controller    furosemide (LASIX) 40 mg, Oral, Daily, Refilled per Cardio orders.    glipiZIDE (GLUCOTROL) 5 mg, Oral, Daily    HYDROcodone-acetaminophen (NORCO) 5-325 mg per tablet 2 tablets, Oral, 2 times daily    ipratropium (ATROVENT) 500 mcg, Nebulization, 4 times daily, Rescue    lancets-blood glucose strips 30 gauge Cmpk   Accucheck blood glucose test strips and lancets, See Instructions, Check CBG once daily and keep log., # 100 EA, 11 Refill(s), Pharmacy: [facility], 185, cm, Height/Length Dosing, 03/08/22 8:10:00 CST, 96, kg, Weight Dosing, 03/08/22 8:10:00 CST    metoprolol succinate (TOPROL-XL) 200 mg, Oral, 2 times daily    nicotine (NICODERM CQ) 14 mg/24 hr 1 patch, Transdermal, Daily    nicotine polacrilex (NICORETTE) 4 mg, Oral, As needed (PRN)    predniSONE (DELTASONE) 50 MG Tab No dose, route, or frequency recorded.    promethazine-codeine 6.25-10 mg/5 ml (PHENERGAN WITH CODEINE) 6.25-10 mg/5 mL syrup 5 mLs, Oral, Every 4 hours PRN    promethazine-dextromethorphan (PROMETHAZINE-DM) 6.25-15 mg/5 mL Syrp TAKE 5 ml (cc) EVERY 4 HOURS AS NEEDED FOR cough    sacubitriL-valsartan (ENTRESTO) 49-51 mg per tablet 1 tablet, Oral, 2 times daily    traZODone (DESYREL) 50 mg, Oral, Nightly PRN    warfarin (COUMADIN) 5 mg, Oral, Daily       Social History     Socioeconomic History    Marital status: Single   Tobacco Use    Smoking status: Never     Passive  exposure: Never    Smokeless tobacco: Never    Tobacco comments:     Taking patches   Substance and Sexual Activity    Alcohol use: Never     Alcohol/week: 1.0 standard drink of alcohol     Types: 1 Cans of beer per week     Comment: not often    Drug use: Never    Sexual activity: Yes     Partners: Female     Social Determinants of Health     Financial Resource Strain: High Risk (6/16/2023)    Overall Financial Resource Strain (CARDIA)     Difficulty of Paying Living Expenses: Very hard   Food Insecurity: Food Insecurity Present (6/16/2023)    Hunger Vital Sign     Worried About Running Out of Food in the Last Year: Often true     Ran Out of Food in the Last Year: Often true   Transportation Needs: No Transportation Needs (6/16/2023)    PRAPARE - Transportation     Lack of Transportation (Medical): No     Lack of Transportation (Non-Medical): No   Physical Activity: Inactive (6/16/2023)    Exercise Vital Sign     Days of Exercise per Week: 0 days     Minutes of Exercise per Session: 0 min   Stress: Stress Concern Present (6/16/2023)    [employer] - Occupational Stress Questionnaire     Feeling of Stress : Very much   Social Connections: Moderately Isolated (6/16/2023)    Social Connection and Isolation Panel [NHANES]     Frequency of Communication with Friends and Family: Twice a week     Frequency of Social Gatherings with Friends and Family: Once a week     Attends [employer] Services: More than 4 times per year     Active Member of Clubs or Organizations: No     Attends Club or Organization Meetings: Never     Marital Status: Never    Housing Stability: High Risk (6/16/2023)    Housing Stability Vital Sign     Unable to Pay for Housing in the Last Year: Yes     Number of Places Lived in the Last Year: 1     Unstable Housing in the Last Year: No        Family History   Problem Relation Age of Onset    Heart failure Mother     Cataracts Mother     Heart disease Mother      "Glaucoma Mother     Peripheral vascular disease Mother     Hypertension Father         Patient Care Team:  Dayami Squires FNP as PCP - General (Family Medicine)  Nadira Buchanan FNP as Nurse Practitioner (Nephrology)  Divya Cota as Community Health Worker  Cory Gan MD as Consulting Physician (Cardiology)     Subjective:     Review of Systems     See HPI for details    Constitutional: Denies Change in appetite. Denies Chills. Denies Fever. Denies Night sweats.  Eye: Denies Blurred vision. Denies Discharge. Denies Eye pain.  ENT: Denies Decreased hearing. Denies Sore throat. Denies Swollen glands.  Respiratory: Denies Cough. Denies Shortness of breath. Denies Shortness of breath with exertion. Denies Wheezing.  Cardiovascular: DeniesChest pain at rest. Denies Chest pain with exertion. Denies Irregular heartbeat. Denies Palpitations. Denies Edema.  Gastrointestinal: Denies Abdominal pain. DeniesDiarrhea. Denies Nausea. Denies Vomiting. Denies Hematemesis or Hematochezia.  Genitourinary: Denies Dysuria. Denies Urinary frequency. Denies Urinary urgency. Denies Blood in urine.  Endocrine: Denies Cold intolerance. Denies Excessive thirst. Denies Heat intolerance. Denies Weight loss. Denies Weight gain.  Musculoskeletal: Denies Painful joints. Denies Weakness.  Integumentary: Denies Rash. Denies Itching. Denies Dry skin.  Neurologic: Denies Dizziness. Denies Fainting. Denies Headache.  Psychiatric: Denies Depression. Denies Anxiety. Denies Suicidal/Homicidal ideations.    All Other ROS: Negative except as stated in HPI.       Objective:     Visit Vitals  BP 96/73 (BP Location: Right arm, Patient Position: Sitting, BP Method: Large (Automatic))   Pulse 81   Temp 97.9 °F (36.6 °C) (Oral)   Resp 18   Ht 6' 2" (1.88 m)   Wt 115.7 kg (255 lb)   BMI 32.74 kg/m²       Physical Exam    General: Alert and oriented, No acute distress.  Head: Normocephalic, Atraumatic.  Eye: Pupils are equal, round and " reactive to light, Extraocular movements are intact, Sclera non-icteric.  Ears/Nose/Throat: Normal, Mucosa moist,Clear.  Neck/Thyroid: Supple, Non-tender, No carotid bruit, No lymphadenopathy, No JVD, Full range of motion.  Respiratory: Clear to auscultation bilaterally; No wheezes, rales or rhonchi,Non-labored respirations, Symmetrical chest wall expansion.  Cardiovascular: Regular rate and rhythm, S1/S2 normal, No murmurs, rubs or gallops.  Gastrointestinal: Soft, Non-tender, Non-distended, Normal bowel sounds, No palpable organomegaly.  Musculoskeletal: Normal range of motion.  Integumentary: Warm, Dry, Intact, No suspicious lesions or rashes.  Extremities: No clubbing, cyanosis or edema  Neurologic: No focal deficits, Cranial Nerves II-XII are grossly intact, Motor strength normal upper and lower extremities, Sensory exam intact.  Psychiatric: Normal interaction, Coherent speech, Euthymic mood, Appropriate affect       Labs Reviewed:     Chemistry:  Lab Results   Component Value Date     09/18/2023    K 4.0 09/18/2023    CHLORIDE 108 (H) 09/18/2023    BUN 19.5 09/18/2023    CREATININE 1.39 (H) 09/18/2023    EGFRNORACEVR 57 09/18/2023    GLUCOSE 105 09/18/2023    CALCIUM 9.1 09/18/2023    ALKPHOS 158 (H) 09/18/2023    LABPROT 7.0 09/18/2023    ALBUMIN 3.8 09/18/2023    BILIDIR 0.2 08/25/2021    IBILI 0.20 08/25/2021    AST 27 09/18/2023    ALT 33 09/18/2023    MG 2.10 03/07/2023    PHOS 3.2 07/06/2023    QOGQEUKE27ZY 35.4 04/12/2023        Lab Results   Component Value Date    HGBA1C 5.7 09/18/2023        Hematology:  Lab Results   Component Value Date    WBC 7.79 09/12/2023    HGB 14.2 09/12/2023    HCT 43.3 09/12/2023     09/12/2023       Lipid Panel:  Lab Results   Component Value Date    CHOL 100 03/07/2023    HDL 33 (L) 03/07/2023    LDL 56.00 03/07/2023    TRIG 55 03/07/2023    TOTALCHOLEST 3 03/07/2023        Urine:  Lab Results   Component Value Date    COLORUA Yellow 07/06/2023     APPEARANCEUA Clear 07/06/2023    SGUA >=1.030 07/06/2023    PHUA 5.5 07/06/2023    PROTEINUA Negative 07/06/2023    GLUCOSEUA Negative 07/06/2023    KETONESUA Negative 07/06/2023    BLOODUA Negative 07/06/2023    NITRITESUA Negative 07/06/2023    LEUKOCYTESUR Negative 07/06/2023    RBCUA None Seen 07/06/2023    WBCUA 0-2 07/06/2023    BACTERIA None Seen 07/06/2023    SQEPUA Trace (A) 06/04/2023    HYALINECASTS 3-5 (A) 06/04/2023    CREATRANDUR 170.7 (H) 07/06/2023    PROTEINURINE 13.1 07/06/2023    UPROTCREA 0.1 07/06/2023        Assessment:       ICD-10-CM ICD-9-CM   1. Asthma with COPD  J44.9 493.20   2. ISABELA (acute kidney injury)  N17.9 584.9   3. S/P ICD (internal cardiac defibrillator) procedure  Z95.810 V45.02   4. Chronic back pain, unspecified back location, unspecified back pain laterality  M54.9 724.5    G89.29 338.29   5. Type 2 diabetes mellitus without retinopathy  E11.9 250.00        Plan:     1. ISABELA (acute kidney injury)  Encouraged low protein low salt diet, drink plenty of water, maintain good BP and CBG control, avoid NSAIDs.     2. S/P ICD (internal cardiac defibrillator) procedure  Pt followed by Cardiologist. Keep appts.     3. Asthma with COPD  Pt was requesting to see Pulm cl. Informed pt Pulm clinic will only see pts with Lung masses or restrictive lung disorders. PFT done 9-6-22 Mod obstruction without significant improvement post BD, Decreased DLCO suggest altered Pulm gas exchange surface, possibly emphysema.  UTD with CXR- done 8-26-22 WNL. Cont meds as prescribed. Pt UTD with pneumovax.    4. Chronic back pain, unspecified back location, unspecified back pain laterality  Pt had CT Lumbar spine done 6-4-23 showing:  CT Lumbar Spine Without Contrast  Order: [id number]  Status: Final result     Visible to patient: Yes (not seen)     Next appt: 07/06/2023 at 03:00 PM in Ophthalmology ([facility])     0 Result Notes  Details     Reading Physician Reading Date Result Priority   Robson KNOX  MD Ellen  [phone] 6/4/2023 STAT   Uche Anand MD  [phone] 6/5/2023        Narrative & Impression     Technique:CT of the lumbar spine was performed without intravenous contrast with direct axial as well as sagittal and coronal reconstruction images.     Comparison:Correlation is with CT abdomen study dated 2023-03-31 08:15:01.     Clinical history:Lumbar radiculopathy, symptoms persist with conservative treatment.     Findings:     Bone alignment:Unremarkable with no significant listhesis.     Curvature:There is straightening of the lumbar lordotic curvature.     Bone and bone marrow:The vertebral body heights are maintained.     Intervertebral disc spaces: Decreased disc height is seen at L1-L2 down through L5-S1.     Osteophytes:There are osteophytes at T12 down through S1.     Endplate Sclerosis: Endplate sclerosis is seen off the opposing endplates at L2-L3 and L5-S1.     Spinal canal:There is moderate spinal canal narrowing at L2 L3 a circumferential disc osteophyte complex with associated moderate left-sided neural foraminal narrowing. There is moderate spinal canal narrowing at L3 L4 circumferential disc osteophyte complex with associated moderate left-sided neural foraminal narrowing. There is moderate spinal canal narrowing at L4 L5 circumferential disc osteophyte complex with associated moderate bilateral neural foraminal narrowing. There is mild - moderate spinal canal narrowing at L5 S1 circumferential disc osteophyte complex with associated moderate bilateral neural foraminal narrowing.     Fractures:No acute fracture dislocation or subluxation is seen.     Visualized sacrum:Large osteophytes are seen in the bilateral sacroiliac joints.     Miscellaneous:Limited view of the abdomen demonstrates a dilated left ureter.     Impression:  Impression:     1. No acute fracture dislocation or subluxation is seen.     2. There is moderate spinal canal narrowing at L2 L3 a circumferential disc  osteophyte complex with associated moderate left-sided neural foraminal narrowing. There is mild spinal canal narrowing at L3 L4 circumferential disc osteophyte complex with associated moderate left-sided neural foraminal narrowing. There is moderate spinal canal narrowing at L4 L5 circumferential disc osteophyte complex with associated moderate bilateral neural foraminal narrowing. There is mild - moderate spinal canal narrowing at L5 S1 circumferential disc osteophyte complex with associated moderate bilateral neural foraminal narrowing.     3. Degenerative changes and other findings as above.     No significant discrepancy with overnight report.        Electronically signed by: Uche Anand  Date:                                            06/05/2023  Time:                                           10:41             Exam Ended: 06/04/23 19:49 Last Resulted: 06/05/23 10:41          Referred to Neuro surgery in [facility] for eval and mgmt. Pt ambulating with rollator. Cont to use rollator to assist with ambulation. Pt has HH services for PT and requesting nursing care. Pt currently staying at   Pt requesting RX for 4 prong cane as he has a straight cane but needs more stability. RX written and given to pt for 4 prong cane.       5. Type 2 diabetes mellitus without retinopathy  A1c 5.7. ADA diet and exercise. Cont Glipizide as prescribed. A1c in 6 months. Urine microalbumin 11-30-22, DM eye exam 7-7-23. DM foot done 3-14-23. Pt states he was following Dr Mazariegos for podiatry but states he no longer takes his insurance. Referred to Dr Conklin in [place] for eval and mgmt for DM foot care.          Follow up in about 3 months (around 12/18/2023) for with labs 1 week prior to appt. . In addition to their scheduled follow up, the patient has also been instructed to follow up on as needed basis.     Future Appointments   Date Time Provider Department Center   10/17/2023  9:15 AM PACEMAKER, [facility] C[facility]IOLOG[facility] CARD  [facility]   10/17/2023  9:45 AM Allyson Guillen PA-C [facility] CARD [facility]   12/4/2023  9:15 AM Nadira Buchanan FNP [facility]   7/8/2024  1:30 PM [facility], CARLO Serrano

## 2023-09-19 ENCOUNTER — LAB VISIT (OUTPATIENT)
Dept: LAB | Facility: HOSPITAL | Age: 63
End: 2023-09-19
Attending: INTERNAL MEDICINE
Payer: MEDICAID

## 2023-09-19 DIAGNOSIS — I48.91 A-FIB: Primary | ICD-10-CM

## 2023-09-19 DIAGNOSIS — I51.3 THROMBUS IN HEART CHAMBER: ICD-10-CM

## 2023-09-19 LAB
INR PPP: 2.7
PROTHROMBIN TIME: 26.3 SECONDS (ref 12.5–14.5)

## 2023-09-19 PROCEDURE — 36415 COLL VENOUS BLD VENIPUNCTURE: CPT

## 2023-09-19 PROCEDURE — 85610 PROTHROMBIN TIME: CPT

## 2023-10-03 ENCOUNTER — LAB VISIT (OUTPATIENT)
Dept: LAB | Facility: HOSPITAL | Age: 63
End: 2023-10-03
Attending: INTERNAL MEDICINE
Payer: MEDICAID

## 2023-10-03 DIAGNOSIS — I48.91 A-FIB: Primary | ICD-10-CM

## 2023-10-03 LAB
INR PPP: 5.4
PROTHROMBIN TIME: 50 SECONDS (ref 12.5–14.5)

## 2023-10-03 PROCEDURE — 85610 PROTHROMBIN TIME: CPT

## 2023-10-03 PROCEDURE — 36415 COLL VENOUS BLD VENIPUNCTURE: CPT

## 2023-10-04 ENCOUNTER — PATIENT OUTREACH (OUTPATIENT)
Dept: ADMINISTRATIVE | Facility: OTHER | Age: 63
End: 2023-10-04
Payer: MEDICAID

## 2023-10-04 NOTE — PROGRESS NOTES
CHW - Follow Up and Case Closure    This Community Health Worker completed a follow up visit with caregiver via telephone today.  Caregiver reported: that pt received cane and they might change patient's insurance. I provided them with the [email or web address] phone number.   Caregiver denied any additional needs at this time and agrees with episode closure at this time.  Provided caregiver with Community Health Worker's contact information and encouraged her to contact this Community Health Worker if additional needs arise.

## 2023-10-11 ENCOUNTER — LAB VISIT (OUTPATIENT)
Dept: LAB | Facility: HOSPITAL | Age: 63
End: 2023-10-11
Attending: INTERNAL MEDICINE
Payer: MEDICAID

## 2023-10-11 DIAGNOSIS — I48.91 A-FIB: Primary | ICD-10-CM

## 2023-10-11 LAB
INR PPP: 4.1
PROTHROMBIN TIME: 38.8 SECONDS (ref 12.5–14.5)

## 2023-10-11 PROCEDURE — 85610 PROTHROMBIN TIME: CPT

## 2023-10-11 PROCEDURE — 36415 COLL VENOUS BLD VENIPUNCTURE: CPT

## 2023-10-11 NOTE — PROGRESS NOTES
CHIEF COMPLAINT:   Chief Complaint   Patient presents with    Follow-up     3 mos f/u denies cardiac targets                                                  HPI:  Ezra Zhang 62 y.o. male  with atrial fibrillation status post LASHELL and cardioversion in February 2017 on Eliquis, nonischemic cardiomyopathy with recovered EF s/p ICD, COPD, hepatitis C, diabetes mellitus type II, and CKD stage III who presents for routine follow up and ongoing care. Patient had his ICD generator changed in August 2018 due to recall. Of note, patient underwent LASHELL (3.22.23) prior to PVI but JOANNE thrombus was seen and PVI was cancelled. He was taken off Eliquis and started on Warfarin.  He completed venous ultrasound of bilateral lower extremities for previous complaints of swelling and discomfort.  Venous US without evidence of DVT in the veins of the BL LE.  At last appointment patient complained of shortness of breath that occurred with medical exertion, he also reported palpitations with AFib.  He continues to follow with [facility], Dr. Degroot.  Of note, the patient underwent AV node ablation and ICD upgrade on September 11, 2023.       Today the patient states that he is feeling very well overall.  He states that since his procedure he has had no cardiac symptoms.  He states that his previously reported shortness of breath and palpitations have resolved.  He denies any chest pain, shortness of breath, palpitations, PND, orthopnea, lightheadedness, dizziness, syncope, or claudication symptoms.  He states that he is able to complete his ADLs without any issues or ischemic symptoms.  He continues to use rolling walker for mobility purposes.  He endorses ongoing lower extremity numbness caused by spinal stenosis that is limiting factor for physical activity as well.  Denies any recent tobacco use.                                                                                                                                                                                                                                                                                                                                                                                                                                                  CARDIAC TESTING:    ICD Upgrade 9.11.23    Successful device upgrade.    The patient tolerated procedure well.    The patient left the lab in stable condition.    There were no immediate complications.    Successful AV node ablation.    Upgrade of single chamber ICD to BIV ICD.    3D mapping performed with Ensite.    His bundle recording.    CV Venous US Bilateral Lower Extremities  The contralateral (left) common femoral vein is patent.  There is no evidence of a right lower extremity DVT.  The contralateral (right) common femoral vein is patent.  There is no evidence of a left lower extremity DVT.  There was no deep vein thrombosis identified in the visualized veins of the bilateral lower extremities    TTE 3.22.23  The left ventricle is moderately enlarged with moderate concentric hypertrophy and severely decreased systolic function.  The estimated ejection fraction is 15%.  Normal right ventricular size with normal right ventricular systolic function.  Mild left atrial enlargement.  No interatrial septal defect present.  Normal appearing left atrial appendage. Thrombus is present in the appendage. A small filamentous mass consisted with a thrombus was noted. Abnormal appendage velocities.  Mild right atrial enlargement.  Mild mitral regurgitation.  No tricupsid valve vegetation present.  There is severe left ventricular global hypokinesis.  LVEF is severely reduced.  Global hypokinesis.   There is a thrombus in the JOANNE.   PVI will be cancelled.      Echo 1.25.23  · Atrial fibrillation observed.  · Normal right ventricular size.  · Intermediate central venous pressure (8 mmHg).  · The estimated PA systolic pressure is 20 mmHg.  ·  IVC is dilated and collapses > 50% with inspiration.  · The left ventricle is normal in size with  · Moderate right atrial enlargement.  · Mild to moderate left atrial enlargement.  · The estimated ejection fraction is 35%.    Patient Active Problem List   Diagnosis    Persistent atrial fibrillation    Anemia    AR (allergic rhinitis)    Asthma with COPD    Bladder wall thickening    Chronic back pain    CKD (chronic kidney disease)    Type 2 diabetes mellitus without retinopathy    Elevated serum creatinine    HA (headache)    Chronic hepatitis C virus infection    Hyperlipidemia LDL goal <70    Hydroureteronephrosis    Joint pain    Plantar wart    Rheumatoid arthritis    Radiculopathy    Renal cyst, right    Umbilical hernia    Well adult exam    Colon cancer screening    Hypertension    Smoking    Non-ischemic cardiomyopathy    History of hepatitis C    Chronic systolic heart failure    Diabetes mellitus    Recurrent sinusitis    Shortness of breath    Prostate cancer screening    Vitamin D deficiency    GERD (gastroesophageal reflux disease)    Atrial fibrillation with RVR    Acute on chronic congestive heart failure    Dyspnea    Insomnia    Thrombus of left atrial appendage    CHF (congestive heart failure)    Coronary arteriosclerosis    Dyslipidemia    Hydronephrosis with ureteropelvic junction (UPJ) obstruction    Obesity    Obstructive sleep apnea of adult    Spinal stenosis of lumbar region    Dry eye syndrome of both eyes    Age-related nuclear cataract of both eyes    Hypotension    ISABELA (acute kidney injury)    S/P ICD (internal cardiac defibrillator) procedure     Past Surgical History:   Procedure Laterality Date    ABLATION N/A 9/11/2023    Procedure: Ablation;  Surgeon: Álvaro Degroot MD;  Location: [facility] CATH LAB;  Service: Cardiology;  Laterality: N/A;  EPS + AV NODE ABLATION + UPGRADE TO BIV ICD (SJM)    CARDIOVERSION      coronary arteriograph      defibillator      ECHOCARDIOGRAM,TRANSESOPHAGEAL  N/A 3/22/2023    Procedure: Transesophageal echo (LASHELL) intra-procedure log documentation;  Surgeon: Matthieu Diagnostic Provider;  Location: [facility] CATH LAB;  Service: Cardiology;  Laterality: N/A;    INSERT / REPLACE / REMOVE PACEMAKER      INSERTION OF BIVENTRICULAR IMPLANTABLE CARDIOVERTER-DEFIBRILLATOR (ICD) N/A 9/11/2023    Procedure: INSERTION, ICD, BIVENTRICULAR;  Surgeon: Álvaro Degroot MD;  Location: [facility] CATH LAB;  Service: Cardiology;  Laterality: N/A;    left  heart catherization       Social History     Socioeconomic History    Marital status: Single   Tobacco Use    Smoking status: Never     Passive exposure: Never    Smokeless tobacco: Never    Tobacco comments:     Taking patches   Substance and Sexual Activity    Alcohol use: Never     Alcohol/week: 1.0 standard drink of alcohol     Types: 1 Cans of beer per week     Comment: not often    Drug use: Never    Sexual activity: Yes     Partners: Female     Social Determinants of Health     Financial Resource Strain: High Risk (6/16/2023)    Overall Financial Resource Strain (CARDIA)     Difficulty of Paying Living Expenses: Very hard   Food Insecurity: Food Insecurity Present (6/16/2023)    Hunger Vital Sign     Worried About Running Out of Food in the Last Year: Often true     Ran Out of Food in the Last Year: Often true   Transportation Needs: No Transportation Needs (6/16/2023)    PRAPARE - Transportation     Lack of Transportation (Medical): No     Lack of Transportation (Non-Medical): No   Physical Activity: Inactive (6/16/2023)    Exercise Vital Sign     Days of Exercise per Week: 0 days     Minutes of Exercise per Session: 0 min   Stress: Stress Concern Present (6/16/2023)    [employer] - Occupational Stress Questionnaire     Feeling of Stress : Very much   Social Connections: Moderately Isolated (6/16/2023)    Social Connection and Isolation Panel [NHANES]     Frequency of Communication with Friends and Family: Twice a  "week     Frequency of Social Gatherings with Friends and Family: Once a week     Attends [employer] Services: More than 4 times per year     Active Member of Clubs or Organizations: No     Attends Club or Organization Meetings: Never     Marital Status: Never    Housing Stability: High Risk (6/16/2023)    Housing Stability Vital Sign     Unable to Pay for Housing in the Last Year: Yes     Number of Places Lived in the Last Year: 1     Unstable Housing in the Last Year: No        Family History   Problem Relation Age of Onset    Heart failure Mother     Cataracts Mother     Heart disease Mother     Glaucoma Mother     Peripheral vascular disease Mother     Hypertension Father      Review of patient's allergies indicates:  No Known Allergies      ROS:  Review of Systems   Constitutional: Negative.    HENT: Negative.     Eyes: Negative.    Respiratory:  Negative for shortness of breath.    Cardiovascular:  Negative for chest pain, palpitations, orthopnea, claudication, leg swelling and PND.   Gastrointestinal: Negative.    Genitourinary: Negative.    Musculoskeletal:  Positive for back pain and myalgias.        Bilateral lower extremity pain and numbness   Skin: Negative.    Neurological:  Positive for sensory change.   Endo/Heme/Allergies: Negative.    Psychiatric/Behavioral: Negative.                                                                                                                                                                                  Negative except as stated in the history of present illness. See HPI for details.    PHYSICAL EXAM:  Visit Vitals  /83 (BP Location: Right arm, Patient Position: Sitting, BP Method: Medium (Automatic))   Pulse 76   Temp 98.1 °F (36.7 °C)   Resp 18   Ht 6' 2" (1.88 m)   Wt 115.8 kg (255 lb 4.7 oz)   SpO2 98%   BMI 32.78 kg/m²         Physical Exam  Constitutional:       General: He is not in acute distress.     Appearance: Normal appearance. He is " obese. He is not diaphoretic.   HENT:      Head: Normocephalic.      Nose: Nose normal.      Mouth/Throat:      Mouth: Mucous membranes are moist.   Eyes:      Extraocular Movements: Extraocular movements intact.   Cardiovascular:      Rate and Rhythm: Normal rate and regular rhythm.      Pulses: Normal pulses.      Heart sounds: Normal heart sounds. No murmur heard.  Pulmonary:      Effort: Pulmonary effort is normal. No respiratory distress.      Breath sounds: Normal breath sounds.   Abdominal:      Palpations: Abdomen is soft.   Musculoskeletal:      Cervical back: Normal range of motion.      Right lower leg: No edema.      Left lower leg: No edema.   Skin:     General: Skin is warm and dry.   Neurological:      Mental Status: He is alert and oriented to person, place, and time.   Psychiatric:         Mood and Affect: Mood is depressed.         Current Outpatient Medications   Medication Instructions    ACCU-CHEK FASTCLIX LANCET DRUM Misc Topical (Top), Daily    ACCU-CHEK GUIDE TEST STRIPS Strp USE AS DIRECTED once daily (keep log)    albuterol (PROVENTIL) 2.5 mg, Nebulization, Every 6 hours PRN, Rescue    alfuzosin (UROXATRAL) 10 mg Tb24 1 tablet, Oral, Daily    atorvastatin (LIPITOR) 80 mg, Oral, Daily    cetirizine (ZYRTEC) 10 mg, Oral, Daily    cholecalciferol (vitamin D3) (VITAMIN D3) 5,000 Units, Oral, Daily    clotrimazole-betamethasone 1-0.05% (LOTRISONE) cream 2 g, Topical (Top), 2 times daily    doxycycline (VIBRA-TABS) 100 mg, Oral, 2 times daily    famotidine (PEPCID) 20 mg, Oral, 2 times daily    fluticasone propionate (FLONASE ALLERGY RELIEF) 100 mcg, Each Nostril, Daily    fluticasone-salmeterol 230-21 mcg/dose (ADVAIR HFA) 230-21 mcg/actuation HFAA inhaler 2 puffs, Inhalation, 2 times daily, Controller    furosemide (LASIX) 40 mg, Oral, Daily, Refilled per Cardio orders.    glipiZIDE (GLUCOTROL) 5 mg, Oral, Daily    HYDROcodone-acetaminophen (NORCO) 5-325 mg per tablet 2 tablets, Oral, 2 times  daily    ipratropium (ATROVENT) 500 mcg, Nebulization, 4 times daily, Rescue    lancets-blood glucose strips 30 gauge Cmpk   Accucheck blood glucose test strips and lancets, See Instructions, Check CBG once daily and keep log., # 100 EA, 11 Refill(s), Pharmacy: [facility], 185, cm, Height/Length Dosing, 03/08/22 8:10:00 CST, 96, kg, Weight Dosing, 03/08/22 8:10:00 CST    metoprolol succinate (TOPROL-XL) 200 mg, Oral, 2 times daily    metoprolol succinate (TOPROL-XL) 100 mg, Oral, Daily    nicotine (NICODERM CQ) 14 mg/24 hr 1 patch, Transdermal, Daily    nicotine polacrilex (NICORETTE) 4 mg, Oral, As needed (PRN)    predniSONE (DELTASONE) 50 MG Tab No dose, route, or frequency recorded.    promethazine-codeine 6.25-10 mg/5 ml (PHENERGAN WITH CODEINE) 6.25-10 mg/5 mL syrup 5 mLs, Oral, Every 4 hours PRN    promethazine-dextromethorphan (PROMETHAZINE-DM) 6.25-15 mg/5 mL Syrp TAKE 5 ml (cc) EVERY 4 HOURS AS NEEDED FOR cough    sacubitriL-valsartan (ENTRESTO) 49-51 mg per tablet 1 tablet, Oral, 2 times daily    traZODone (DESYREL) 50 mg, Oral, Nightly PRN    warfarin (COUMADIN) 5 mg, Oral, Daily        All medications, laboratory studies, cardiac diagnostic imaging reviewed.     Lab Results   Component Value Date    LDL 56.00 03/07/2023    LDL 88.00 09/06/2022    TRIG 55 03/07/2023    TRIG 75 09/06/2022    CREATININE 1.39 (H) 09/18/2023    MG 2.10 03/07/2023    K 4.0 09/18/2023        ASSESSMENT/PLAN:  Paroxysmal Atrial Fibrillation/flutter   - Denies palpitations, lightheadedness, dizziness, or syncope  - Patient underwent AV angie ablation and ICD upgrade September 2023.  States that he is feeling much better following procedure and denies any cardiac complaints  - TE/DCCV aborted after JOANNE thrombus identified (March 2023)  - Continue Warfarin, patient tolerating well  - Follow up with [facility], Dr. Degroot as scheduled- next clinic visit with them 2.21.24  - Device interrogation today in clinic which revealed no  cardiac events and no changes were made  - Follow-up for routine device interrogation in 3 months  - Metoprolol Succinate decreased to 100 MG daily per Dr. Degroot; patient tolerating well     Non ischemic cardiomyopathy  Chronic Systolic HF (recovered in 2020 with new drop in 1/2023) s/p ICD   NYHA III  - Euvolemic today  - Denies shortness of breath  - Reports that lower extremity edema has improved since increasing dose of Lasix  - Patient reports that he takes Lasix 40 mg BID and is doing well on this current dose.  Change made by Dr. Anguiano (EP)  - Watch fluid and salt intake    HTN  - BP at goal today 108/83  - Continue Entresto to 49/51 mg BID and Toprol 100 MG daily  - Counseled on importance of following a low-sodium, heart healthy diet and exercise as tolerated    Claudication  - Denies any claudication symptoms today  - BL Venous US without evidence of DVT in the veins of the bilateral lower extremities  - Patient does report ongoing bilateral extremity pain however believes that is due to spinal stenosis of lower back    Follow up in cardiology clinic in 5 months or sooner as needed   Follow up in cardiology clinic for device checks as directed   Follow up with Dr. Degroot at [facility] on February 21, 2024  Follow up with PCP as directed

## 2023-10-17 ENCOUNTER — OFFICE VISIT (OUTPATIENT)
Dept: CARDIOLOGY | Facility: CLINIC | Age: 63
End: 2023-10-17
Payer: MEDICAID

## 2023-10-17 ENCOUNTER — CLINICAL SUPPORT (OUTPATIENT)
Dept: URGENT CARE | Facility: CLINIC | Age: 63
End: 2023-10-17
Payer: MEDICAID

## 2023-10-17 ENCOUNTER — CLINICAL SUPPORT (OUTPATIENT)
Dept: CARDIOLOGY | Facility: CLINIC | Age: 63
End: 2023-10-17
Payer: MEDICAID

## 2023-10-17 VITALS
HEART RATE: 76 BPM | BODY MASS INDEX: 32.77 KG/M2 | RESPIRATION RATE: 18 BRPM | SYSTOLIC BLOOD PRESSURE: 108 MMHG | HEIGHT: 74 IN | OXYGEN SATURATION: 98 % | DIASTOLIC BLOOD PRESSURE: 83 MMHG | TEMPERATURE: 98 F | WEIGHT: 255.31 LBS

## 2023-10-17 DIAGNOSIS — I51.3 THROMBUS OF LEFT ATRIAL APPENDAGE: ICD-10-CM

## 2023-10-17 DIAGNOSIS — Z95.810 S/P ICD (INTERNAL CARDIAC DEFIBRILLATOR) PROCEDURE: ICD-10-CM

## 2023-10-17 DIAGNOSIS — I10 PRIMARY HYPERTENSION: ICD-10-CM

## 2023-10-17 DIAGNOSIS — I42.8 NON-ISCHEMIC CARDIOMYOPATHY: Primary | ICD-10-CM

## 2023-10-17 DIAGNOSIS — E78.5 DYSLIPIDEMIA: ICD-10-CM

## 2023-10-17 DIAGNOSIS — I50.22 CHRONIC SYSTOLIC HEART FAILURE: ICD-10-CM

## 2023-10-17 DIAGNOSIS — Z23 ENCOUNTER FOR ADMINISTRATION OF VACCINE: Primary | ICD-10-CM

## 2023-10-17 DIAGNOSIS — E78.5 HYPERLIPIDEMIA LDL GOAL <70: Primary | ICD-10-CM

## 2023-10-17 DIAGNOSIS — I48.19 PERSISTENT ATRIAL FIBRILLATION: ICD-10-CM

## 2023-10-17 DIAGNOSIS — I48.91 ATRIAL FIBRILLATION WITH RVR: ICD-10-CM

## 2023-10-17 DIAGNOSIS — I42.8 NON-ISCHEMIC CARDIOMYOPATHY: ICD-10-CM

## 2023-10-17 PROCEDURE — 99214 OFFICE O/P EST MOD 30 MIN: CPT | Mod: PBBFAC

## 2023-10-17 PROCEDURE — 90686 IIV4 VACC NO PRSV 0.5 ML IM: CPT | Mod: PBBFAC

## 2023-10-17 PROCEDURE — 99211 OFF/OP EST MAY X REQ PHY/QHP: CPT | Mod: PBBFAC,27

## 2023-10-17 PROCEDURE — 93284 PRGRMG EVAL IMPLANTABLE DFB: CPT | Mod: PBBFAC | Performed by: INTERNAL MEDICINE

## 2023-10-17 RX ORDER — METOPROLOL SUCCINATE 100 MG/1
100 TABLET, EXTENDED RELEASE ORAL DAILY
COMMUNITY
End: 2024-03-18 | Stop reason: SDUPTHER

## 2023-10-17 NOTE — PROGRESS NOTES
Subjective:      Patient ID: Ezra Zhang is a 62 y.o. male.    Vitals:  vitals were not taken for this visit.     Chief Complaint: Immunizations (FLU VAC)    HPI  ROS   Objective:     Physical Exam    Assessment:     1. Encounter for administration of vaccine        Plan:       Encounter for administration of vaccine  -     Influenza - Quadrivalent (PF)

## 2023-10-17 NOTE — PATIENT INSTRUCTIONS
Follow up in cardiology clinic in 5 months or sooner as needed   Follow up in cardiology clinic for device checks as directed   Follow up with Dr. Degroot at [facility] on February 21, 2024  Follow up with PCP as directed

## 2023-10-24 ENCOUNTER — LAB VISIT (OUTPATIENT)
Dept: LAB | Facility: HOSPITAL | Age: 63
End: 2023-10-24
Attending: INTERNAL MEDICINE
Payer: MEDICAID

## 2023-10-24 DIAGNOSIS — I48.91 A-FIB: Primary | ICD-10-CM

## 2023-10-24 DIAGNOSIS — I51.3 THROMBUS IN HEART CHAMBER: ICD-10-CM

## 2023-10-24 LAB
INR PPP: 2.1
PROTHROMBIN TIME: 20.3 SECONDS (ref 12.5–14.5)

## 2023-10-24 PROCEDURE — 36415 COLL VENOUS BLD VENIPUNCTURE: CPT

## 2023-10-24 PROCEDURE — 85610 PROTHROMBIN TIME: CPT

## 2023-11-01 ENCOUNTER — LAB VISIT (OUTPATIENT)
Dept: LAB | Facility: HOSPITAL | Age: 63
End: 2023-11-01
Attending: INTERNAL MEDICINE
Payer: MEDICAID

## 2023-11-01 DIAGNOSIS — I51.3 THROMBUS IN HEART CHAMBER: Primary | ICD-10-CM

## 2023-11-01 LAB
INR PPP: 3.1
PROTHROMBIN TIME: 29.9 SECONDS (ref 12.5–14.5)

## 2023-11-01 PROCEDURE — 36415 COLL VENOUS BLD VENIPUNCTURE: CPT

## 2023-11-01 PROCEDURE — 85610 PROTHROMBIN TIME: CPT

## 2023-11-14 ENCOUNTER — LAB VISIT (OUTPATIENT)
Dept: LAB | Facility: HOSPITAL | Age: 63
End: 2023-11-14
Attending: NURSE PRACTITIONER
Payer: MEDICAID

## 2023-11-14 DIAGNOSIS — R74.8 ELEVATED LIVER ENZYMES: ICD-10-CM

## 2023-11-14 DIAGNOSIS — E11.9 TYPE 2 DIABETES MELLITUS WITHOUT RETINOPATHY: ICD-10-CM

## 2023-11-14 DIAGNOSIS — E11.9 CONTROLLED TYPE 2 DIABETES MELLITUS WITHOUT COMPLICATION, WITHOUT LONG-TERM CURRENT USE OF INSULIN: ICD-10-CM

## 2023-11-14 LAB
ALBUMIN SERPL-MCNC: 4 G/DL (ref 3.4–4.8)
ALBUMIN/GLOB SERPL: 1.4 RATIO (ref 1.1–2)
ALP SERPL-CCNC: 161 UNIT/L (ref 40–150)
ALT SERPL-CCNC: 27 UNIT/L (ref 0–55)
AST SERPL-CCNC: 18 UNIT/L (ref 5–34)
BASOPHILS # BLD AUTO: 0.03 X10(3)/MCL
BASOPHILS NFR BLD AUTO: 0.4 %
BILIRUB SERPL-MCNC: 1.4 MG/DL
BUN SERPL-MCNC: 15 MG/DL (ref 8.4–25.7)
CALCIUM SERPL-MCNC: 9.3 MG/DL (ref 8.8–10)
CHLORIDE SERPL-SCNC: 108 MMOL/L (ref 98–107)
CO2 SERPL-SCNC: 23 MMOL/L (ref 23–31)
CREAT SERPL-MCNC: 1.41 MG/DL (ref 0.73–1.18)
CREAT UR-MCNC: 109.4 MG/DL (ref 63–166)
EOSINOPHIL # BLD AUTO: 0.13 X10(3)/MCL (ref 0–0.9)
EOSINOPHIL NFR BLD AUTO: 1.8 %
ERYTHROCYTE [DISTWIDTH] IN BLOOD BY AUTOMATED COUNT: 13.9 % (ref 11.5–17)
EST. AVERAGE GLUCOSE BLD GHB EST-MCNC: 128.4 MG/DL
GFR SERPLBLD CREATININE-BSD FMLA CKD-EPI: 56 MLS/MIN/1.73/M2
GLOBULIN SER-MCNC: 2.8 GM/DL (ref 2.4–3.5)
GLUCOSE SERPL-MCNC: 70 MG/DL (ref 82–115)
HBA1C MFR BLD: 6.1 %
HCT VFR BLD AUTO: 47 % (ref 42–52)
HGB BLD-MCNC: 14.3 G/DL (ref 14–18)
IMM GRANULOCYTES # BLD AUTO: 0 X10(3)/MCL (ref 0–0.04)
IMM GRANULOCYTES NFR BLD AUTO: 0 %
INR PPP: 4.8
LYMPHOCYTES # BLD AUTO: 1.98 X10(3)/MCL (ref 0.6–4.6)
LYMPHOCYTES NFR BLD AUTO: 27.7 %
MCH RBC QN AUTO: 30.3 PG (ref 27–31)
MCHC RBC AUTO-ENTMCNC: 30.4 G/DL (ref 33–36)
MCV RBC AUTO: 99.6 FL (ref 80–94)
MICROALBUMIN UR-MCNC: 14.3 UG/ML
MICROALBUMIN/CREAT RATIO PNL UR: 13.1 MG/GM CR (ref 0–30)
MONOCYTES # BLD AUTO: 0.58 X10(3)/MCL (ref 0.1–1.3)
MONOCYTES NFR BLD AUTO: 8.1 %
NEUTROPHILS # BLD AUTO: 4.43 X10(3)/MCL (ref 2.1–9.2)
NEUTROPHILS NFR BLD AUTO: 62 %
PLATELET # BLD AUTO: 282 X10(3)/MCL (ref 130–400)
PMV BLD AUTO: 10.7 FL (ref 7.4–10.4)
POTASSIUM SERPL-SCNC: 4.1 MMOL/L (ref 3.5–5.1)
PROT SERPL-MCNC: 6.8 GM/DL (ref 5.8–7.6)
PROTHROMBIN TIME: 44.8 SECONDS (ref 12.5–14.5)
RBC # BLD AUTO: 4.72 X10(6)/MCL (ref 4.7–6.1)
SODIUM SERPL-SCNC: 141 MMOL/L (ref 136–145)
T4 FREE SERPL-MCNC: 1.03 NG/DL (ref 0.7–1.48)
TSH SERPL-ACNC: 2.36 UIU/ML (ref 0.35–4.94)
WBC # SPEC AUTO: 7.15 X10(3)/MCL (ref 4.5–11.5)

## 2023-11-14 PROCEDURE — 85610 PROTHROMBIN TIME: CPT

## 2023-11-14 PROCEDURE — 80053 COMPREHEN METABOLIC PANEL: CPT

## 2023-11-14 PROCEDURE — 36415 COLL VENOUS BLD VENIPUNCTURE: CPT

## 2023-11-14 PROCEDURE — 82043 UR ALBUMIN QUANTITATIVE: CPT

## 2023-11-14 PROCEDURE — 85025 COMPLETE CBC W/AUTO DIFF WBC: CPT

## 2023-11-14 PROCEDURE — 83036 HEMOGLOBIN GLYCOSYLATED A1C: CPT

## 2023-11-14 PROCEDURE — 84439 ASSAY OF FREE THYROXINE: CPT

## 2023-11-14 PROCEDURE — 84443 ASSAY THYROID STIM HORMONE: CPT

## 2023-11-29 ENCOUNTER — LAB VISIT (OUTPATIENT)
Dept: LAB | Facility: HOSPITAL | Age: 63
End: 2023-11-29
Attending: INTERNAL MEDICINE
Payer: MEDICAID

## 2023-11-29 DIAGNOSIS — I48.11 LONGSTANDING PERSISTENT ATRIAL FIBRILLATION: Primary | ICD-10-CM

## 2023-11-29 DIAGNOSIS — I48.20 CHRONIC ATRIAL FIBRILLATION: ICD-10-CM

## 2023-11-29 LAB
INR PPP: 2.8
PROTHROMBIN TIME: 26.9 SECONDS (ref 12.5–14.5)

## 2023-11-29 PROCEDURE — 36415 COLL VENOUS BLD VENIPUNCTURE: CPT

## 2023-11-29 PROCEDURE — 85610 PROTHROMBIN TIME: CPT

## 2023-12-01 ENCOUNTER — HOSPITAL ENCOUNTER (OUTPATIENT)
Facility: HOSPITAL | Age: 63
Discharge: HOME | End: 2023-12-03
Attending: FAMILY MEDICINE | Admitting: INTERNAL MEDICINE
Payer: MEDICAID

## 2023-12-01 DIAGNOSIS — J44.9 CHRONIC OBSTRUCTIVE PULMONARY DISEASE, UNSPECIFIED COPD TYPE: Primary | ICD-10-CM

## 2023-12-01 DIAGNOSIS — I50.9 CHRONIC CONGESTIVE HEART FAILURE, UNSPECIFIED HEART FAILURE TYPE: ICD-10-CM

## 2023-12-01 DIAGNOSIS — R06.00 DYSPNEA: ICD-10-CM

## 2023-12-01 DIAGNOSIS — R07.9 CHEST PAIN: ICD-10-CM

## 2023-12-01 PROBLEM — J44.1 COPD EXACERBATION: Status: ACTIVE | Noted: 2023-12-01

## 2023-12-01 LAB
ALBUMIN SERPL-MCNC: 4.2 G/DL (ref 3.4–4.8)
ALBUMIN/GLOB SERPL: 1.4 RATIO (ref 1.1–2)
ALP SERPL-CCNC: 168 UNIT/L (ref 40–150)
ALT SERPL-CCNC: 27 UNIT/L (ref 0–55)
APTT PPP: 36.1 SECONDS (ref 23.2–33.7)
AST SERPL-CCNC: 27 UNIT/L (ref 5–34)
B PERT.PT PRMT NPH QL NAA+NON-PROBE: NOT DETECTED
BASOPHILS # BLD AUTO: 0.03 X10(3)/MCL
BASOPHILS NFR BLD AUTO: 0.2 %
BILIRUB SERPL-MCNC: 0.8 MG/DL
BNP BLD-MCNC: 123.2 PG/ML
BUN SERPL-MCNC: 26.6 MG/DL (ref 8.4–25.7)
C PNEUM DNA NPH QL NAA+NON-PROBE: NOT DETECTED
CALCIUM SERPL-MCNC: 8.7 MG/DL (ref 8.8–10)
CHLORIDE SERPL-SCNC: 108 MMOL/L (ref 98–107)
CK MB SERPL-MCNC: 1.4 NG/ML
CK SERPL-CCNC: 189 U/L (ref 30–200)
CO2 SERPL-SCNC: 21 MMOL/L (ref 23–31)
CREAT SERPL-MCNC: 1.53 MG/DL (ref 0.73–1.18)
EOSINOPHIL # BLD AUTO: 0.02 X10(3)/MCL (ref 0–0.9)
EOSINOPHIL NFR BLD AUTO: 0.1 %
ERYTHROCYTE [DISTWIDTH] IN BLOOD BY AUTOMATED COUNT: 13.4 % (ref 11.5–17)
FLUAV AG UPPER RESP QL IA.RAPID: NOT DETECTED
FLUBV AG UPPER RESP QL IA.RAPID: NOT DETECTED
GFR SERPLBLD CREATININE-BSD FMLA CKD-EPI: 50 MLS/MIN/1.73/M2
GLOBULIN SER-MCNC: 3 GM/DL (ref 2.4–3.5)
GLUCOSE SERPL-MCNC: 166 MG/DL (ref 82–115)
HADV DNA NPH QL NAA+NON-PROBE: NOT DETECTED
HCOV 229E RNA NPH QL NAA+NON-PROBE: NOT DETECTED
HCOV HKU1 RNA NPH QL NAA+NON-PROBE: NOT DETECTED
HCOV NL63 RNA NPH QL NAA+NON-PROBE: NOT DETECTED
HCOV OC43 RNA NPH QL NAA+NON-PROBE: NOT DETECTED
HCT VFR BLD AUTO: 41.5 % (ref 42–52)
HGB BLD-MCNC: 14.1 G/DL (ref 14–18)
HMPV RNA NPH QL NAA+NON-PROBE: NOT DETECTED
HOLD SPECIMEN: NORMAL
HPIV1 RNA NPH QL NAA+NON-PROBE: NOT DETECTED
HPIV2 RNA NPH QL NAA+NON-PROBE: NOT DETECTED
HPIV3 RNA NPH QL NAA+NON-PROBE: NOT DETECTED
HPIV4 RNA NPH QL NAA+NON-PROBE: NOT DETECTED
IMM GRANULOCYTES # BLD AUTO: 0.05 X10(3)/MCL (ref 0–0.04)
IMM GRANULOCYTES NFR BLD AUTO: 0.4 %
INR PPP: 2.3
LYMPHOCYTES # BLD AUTO: 1.19 X10(3)/MCL (ref 0.6–4.6)
LYMPHOCYTES NFR BLD AUTO: 8.7 %
M PNEUMO DNA NPH QL NAA+NON-PROBE: NOT DETECTED
MCH RBC QN AUTO: 31.8 PG (ref 27–31)
MCHC RBC AUTO-ENTMCNC: 34 G/DL (ref 33–36)
MCV RBC AUTO: 93.5 FL (ref 80–94)
MONOCYTES # BLD AUTO: 0.88 X10(3)/MCL (ref 0.1–1.3)
MONOCYTES NFR BLD AUTO: 6.4 %
NEUTROPHILS # BLD AUTO: 11.54 X10(3)/MCL (ref 2.1–9.2)
NEUTROPHILS NFR BLD AUTO: 84.2 %
NRBC BLD AUTO-RTO: 0 %
PLATELET # BLD AUTO: 295 X10(3)/MCL (ref 130–400)
PMV BLD AUTO: 10.8 FL (ref 7.4–10.4)
POCT GLUCOSE: 132 MG/DL (ref 70–110)
POCT GLUCOSE: 179 MG/DL (ref 70–110)
POCT GLUCOSE: 183 MG/DL (ref 70–110)
POCT GLUCOSE: 279 MG/DL (ref 70–110)
POTASSIUM SERPL-SCNC: 3.8 MMOL/L (ref 3.5–5.1)
PROT SERPL-MCNC: 7.2 GM/DL (ref 5.8–7.6)
PROTHROMBIN TIME: 25.5 SECONDS (ref 11.4–14)
RBC # BLD AUTO: 4.44 X10(6)/MCL (ref 4.7–6.1)
RSV RNA NPH QL NAA+NON-PROBE: NOT DETECTED
RV+EV RNA NPH QL NAA+NON-PROBE: DETECTED
SARS-COV-2 RNA RESP QL NAA+PROBE: NOT DETECTED
SODIUM SERPL-SCNC: 140 MMOL/L (ref 136–145)
TROPONIN I SERPL-MCNC: 0.03 NG/ML (ref 0–0.04)
WBC # SPEC AUTO: 13.71 X10(3)/MCL (ref 4.5–11.5)

## 2023-12-01 PROCEDURE — 63600175 PHARM REV CODE 636 W HCPCS: Performed by: FAMILY MEDICINE

## 2023-12-01 PROCEDURE — 82550 ASSAY OF CK (CPK): CPT | Performed by: FAMILY MEDICINE

## 2023-12-01 PROCEDURE — 83880 ASSAY OF NATRIURETIC PEPTIDE: CPT | Performed by: FAMILY MEDICINE

## 2023-12-01 PROCEDURE — 96374 THER/PROPH/DIAG INJ IV PUSH: CPT

## 2023-12-01 PROCEDURE — 99900035 HC TECH TIME PER 15 MIN (STAT)

## 2023-12-01 PROCEDURE — 80053 COMPREHEN METABOLIC PANEL: CPT | Performed by: FAMILY MEDICINE

## 2023-12-01 PROCEDURE — 94640 AIRWAY INHALATION TREATMENT: CPT | Mod: XB

## 2023-12-01 PROCEDURE — 99285 EMERGENCY DEPT VISIT HI MDM: CPT | Mod: 25

## 2023-12-01 PROCEDURE — 94640 AIRWAY INHALATION TREATMENT: CPT

## 2023-12-01 PROCEDURE — 25000242 PHARM REV CODE 250 ALT 637 W/ HCPCS: Performed by: FAMILY MEDICINE

## 2023-12-01 PROCEDURE — 93005 ELECTROCARDIOGRAM TRACING: CPT

## 2023-12-01 PROCEDURE — G0378 HOSPITAL OBSERVATION PER HR: HCPCS

## 2023-12-01 PROCEDURE — 87633 RESP VIRUS 12-25 TARGETS: CPT

## 2023-12-01 PROCEDURE — 87798 DETECT AGENT NOS DNA AMP: CPT

## 2023-12-01 PROCEDURE — 96372 THER/PROPH/DIAG INJ SC/IM: CPT

## 2023-12-01 PROCEDURE — 84484 ASSAY OF TROPONIN QUANT: CPT | Performed by: FAMILY MEDICINE

## 2023-12-01 PROCEDURE — 85025 COMPLETE CBC W/AUTO DIFF WBC: CPT | Performed by: FAMILY MEDICINE

## 2023-12-01 PROCEDURE — 27000221 HC OXYGEN, UP TO 24 HOURS

## 2023-12-01 PROCEDURE — 85610 PROTHROMBIN TIME: CPT | Performed by: FAMILY MEDICINE

## 2023-12-01 PROCEDURE — 25000242 PHARM REV CODE 250 ALT 637 W/ HCPCS

## 2023-12-01 PROCEDURE — 96376 TX/PRO/DX INJ SAME DRUG ADON: CPT

## 2023-12-01 PROCEDURE — 94660 CPAP INITIATION&MGMT: CPT

## 2023-12-01 PROCEDURE — 94761 N-INVAS EAR/PLS OXIMETRY MLT: CPT

## 2023-12-01 PROCEDURE — 85730 THROMBOPLASTIN TIME PARTIAL: CPT | Performed by: FAMILY MEDICINE

## 2023-12-01 PROCEDURE — 0240U COVID/FLU A&B PCR: CPT | Performed by: FAMILY MEDICINE

## 2023-12-01 PROCEDURE — 25000003 PHARM REV CODE 250

## 2023-12-01 PROCEDURE — 97162 PT EVAL MOD COMPLEX 30 MIN: CPT

## 2023-12-01 PROCEDURE — 27000190 HC CPAP FULL FACE MASK W/VALVE

## 2023-12-01 PROCEDURE — 63600175 PHARM REV CODE 636 W HCPCS

## 2023-12-01 PROCEDURE — 82553 CREATINE MB FRACTION: CPT | Performed by: FAMILY MEDICINE

## 2023-12-01 RX ORDER — FUROSEMIDE 20 MG/1
40 TABLET ORAL DAILY
Status: DISCONTINUED | OUTPATIENT
Start: 2023-12-01 | End: 2023-12-03 | Stop reason: HOSPADM

## 2023-12-01 RX ORDER — GLUCAGON 1 MG
1 KIT INJECTION
Status: DISCONTINUED | OUTPATIENT
Start: 2023-12-01 | End: 2023-12-03 | Stop reason: HOSPADM

## 2023-12-01 RX ORDER — FLUTICASONE FUROATE AND VILANTEROL 200; 25 UG/1; UG/1
1 POWDER RESPIRATORY (INHALATION) DAILY
Status: DISCONTINUED | OUTPATIENT
Start: 2023-12-01 | End: 2023-12-03 | Stop reason: HOSPADM

## 2023-12-01 RX ORDER — ALBUTEROL SULFATE 0.83 MG/ML
2.5 SOLUTION RESPIRATORY (INHALATION) EVERY 6 HOURS PRN
Status: DISCONTINUED | OUTPATIENT
Start: 2023-12-01 | End: 2023-12-03 | Stop reason: HOSPADM

## 2023-12-01 RX ORDER — BENZONATATE 100 MG/1
200 CAPSULE ORAL 3 TIMES DAILY PRN
Status: DISCONTINUED | OUTPATIENT
Start: 2023-12-01 | End: 2023-12-03 | Stop reason: HOSPADM

## 2023-12-01 RX ORDER — GUAIFENESIN 600 MG/1
600 TABLET, EXTENDED RELEASE ORAL 2 TIMES DAILY
Status: DISCONTINUED | OUTPATIENT
Start: 2023-12-01 | End: 2023-12-02

## 2023-12-01 RX ORDER — WARFARIN 2.5 MG/1
2.5 TABLET ORAL DAILY
Status: COMPLETED | OUTPATIENT
Start: 2023-12-01 | End: 2023-12-02

## 2023-12-01 RX ORDER — WARFARIN SODIUM 5 MG/1
5 TABLET ORAL
Status: DISCONTINUED | OUTPATIENT
Start: 2023-12-06 | End: 2023-12-01

## 2023-12-01 RX ORDER — DOXAZOSIN 2 MG/1
4 TABLET ORAL DAILY
Status: DISCONTINUED | OUTPATIENT
Start: 2023-12-01 | End: 2023-12-03 | Stop reason: HOSPADM

## 2023-12-01 RX ORDER — IBUPROFEN 200 MG
24 TABLET ORAL
Status: DISCONTINUED | OUTPATIENT
Start: 2023-12-01 | End: 2023-12-03 | Stop reason: HOSPADM

## 2023-12-01 RX ORDER — GLUCAGON 1 MG
1 KIT INJECTION
Status: DISCONTINUED | OUTPATIENT
Start: 2023-12-01 | End: 2023-12-01

## 2023-12-01 RX ORDER — HYDRALAZINE HYDROCHLORIDE 20 MG/ML
10 INJECTION INTRAMUSCULAR; INTRAVENOUS EVERY 6 HOURS PRN
Status: DISCONTINUED | OUTPATIENT
Start: 2023-12-01 | End: 2023-12-03 | Stop reason: HOSPADM

## 2023-12-01 RX ORDER — FAMOTIDINE 20 MG/1
20 TABLET, FILM COATED ORAL 2 TIMES DAILY
Status: DISCONTINUED | OUTPATIENT
Start: 2023-12-01 | End: 2023-12-03 | Stop reason: HOSPADM

## 2023-12-01 RX ORDER — INSULIN ASPART 100 [IU]/ML
0-10 INJECTION, SOLUTION INTRAVENOUS; SUBCUTANEOUS
Status: DISCONTINUED | OUTPATIENT
Start: 2023-12-01 | End: 2023-12-03 | Stop reason: HOSPADM

## 2023-12-01 RX ORDER — METHYLPREDNISOLONE SOD SUCC 125 MG
80 VIAL (EA) INJECTION
Status: COMPLETED | OUTPATIENT
Start: 2023-12-01 | End: 2023-12-01

## 2023-12-01 RX ORDER — IPRATROPIUM BROMIDE AND ALBUTEROL SULFATE 2.5; .5 MG/3ML; MG/3ML
3 SOLUTION RESPIRATORY (INHALATION)
Status: COMPLETED | OUTPATIENT
Start: 2023-12-01 | End: 2023-12-01

## 2023-12-01 RX ORDER — WARFARIN SODIUM 5 MG/1
5 TABLET ORAL DAILY
Status: DISCONTINUED | OUTPATIENT
Start: 2023-12-01 | End: 2023-12-01

## 2023-12-01 RX ORDER — IBUPROFEN 200 MG
24 TABLET ORAL
Status: DISCONTINUED | OUTPATIENT
Start: 2023-12-01 | End: 2023-12-01

## 2023-12-01 RX ORDER — ATORVASTATIN CALCIUM 40 MG/1
80 TABLET, FILM COATED ORAL DAILY
Status: DISCONTINUED | OUTPATIENT
Start: 2023-12-01 | End: 2023-12-03 | Stop reason: HOSPADM

## 2023-12-01 RX ORDER — IPRATROPIUM BROMIDE AND ALBUTEROL SULFATE 2.5; .5 MG/3ML; MG/3ML
3 SOLUTION RESPIRATORY (INHALATION)
Status: DISCONTINUED | OUTPATIENT
Start: 2023-12-01 | End: 2023-12-03 | Stop reason: HOSPADM

## 2023-12-01 RX ORDER — TRAZODONE HYDROCHLORIDE 50 MG/1
50 TABLET ORAL NIGHTLY PRN
Status: DISCONTINUED | OUTPATIENT
Start: 2023-12-01 | End: 2023-12-03 | Stop reason: HOSPADM

## 2023-12-01 RX ORDER — IBUPROFEN 200 MG
16 TABLET ORAL
Status: DISCONTINUED | OUTPATIENT
Start: 2023-12-01 | End: 2023-12-01

## 2023-12-01 RX ORDER — WARFARIN 2.5 MG/1
2.5 TABLET ORAL DAILY
Status: DISCONTINUED | OUTPATIENT
Start: 2023-12-04 | End: 2023-12-03 | Stop reason: HOSPADM

## 2023-12-01 RX ORDER — SODIUM CHLORIDE 0.9 % (FLUSH) 0.9 %
2 SYRINGE (ML) INJECTION EVERY 12 HOURS PRN
Status: DISCONTINUED | OUTPATIENT
Start: 2023-12-01 | End: 2023-12-03 | Stop reason: HOSPADM

## 2023-12-01 RX ORDER — METOPROLOL SUCCINATE 50 MG/1
100 TABLET, EXTENDED RELEASE ORAL DAILY
Status: DISCONTINUED | OUTPATIENT
Start: 2023-12-01 | End: 2023-12-03 | Stop reason: HOSPADM

## 2023-12-01 RX ORDER — IBUPROFEN 200 MG
16 TABLET ORAL
Status: DISCONTINUED | OUTPATIENT
Start: 2023-12-01 | End: 2023-12-03 | Stop reason: HOSPADM

## 2023-12-01 RX ORDER — WARFARIN SODIUM 5 MG/1
5 TABLET ORAL
Status: DISCONTINUED | OUTPATIENT
Start: 2023-12-03 | End: 2023-12-03 | Stop reason: HOSPADM

## 2023-12-01 RX ORDER — NALOXONE HCL 0.4 MG/ML
0.02 VIAL (ML) INJECTION
Status: DISCONTINUED | OUTPATIENT
Start: 2023-12-01 | End: 2023-12-03 | Stop reason: HOSPADM

## 2023-12-01 RX ADMIN — INSULIN ASPART 2 UNITS: 100 INJECTION, SOLUTION INTRAVENOUS; SUBCUTANEOUS at 12:12

## 2023-12-01 RX ADMIN — METHYLPREDNISOLONE SODIUM SUCCINATE 40 MG: 40 INJECTION, POWDER, FOR SOLUTION INTRAMUSCULAR; INTRAVENOUS at 12:12

## 2023-12-01 RX ADMIN — IPRATROPIUM BROMIDE AND ALBUTEROL SULFATE 3 ML: .5; 3 SOLUTION RESPIRATORY (INHALATION) at 03:12

## 2023-12-01 RX ADMIN — TRAZODONE HYDROCHLORIDE 50 MG: 50 TABLET ORAL at 08:12

## 2023-12-01 RX ADMIN — METHYLPREDNISOLONE SODIUM SUCCINATE 40 MG: 40 INJECTION, POWDER, FOR SOLUTION INTRAMUSCULAR; INTRAVENOUS at 08:12

## 2023-12-01 RX ADMIN — IPRATROPIUM BROMIDE AND ALBUTEROL SULFATE 3 ML: .5; 3 SOLUTION RESPIRATORY (INHALATION) at 07:12

## 2023-12-01 RX ADMIN — WARFARIN SODIUM 2.5 MG: 5 TABLET ORAL at 05:12

## 2023-12-01 RX ADMIN — FAMOTIDINE 20 MG: 20 TABLET ORAL at 08:12

## 2023-12-01 RX ADMIN — IPRATROPIUM BROMIDE AND ALBUTEROL SULFATE 3 ML: .5; 3 SOLUTION RESPIRATORY (INHALATION) at 12:12

## 2023-12-01 RX ADMIN — INSULIN ASPART 3 UNITS: 100 INJECTION, SOLUTION INTRAVENOUS; SUBCUTANEOUS at 08:12

## 2023-12-01 RX ADMIN — FLUTICASONE FUROATE AND VILANTEROL TRIFENATATE 1 PUFF: 200; 25 POWDER RESPIRATORY (INHALATION) at 03:12

## 2023-12-01 RX ADMIN — FUROSEMIDE 40 MG: 20 TABLET ORAL at 08:12

## 2023-12-01 RX ADMIN — BENZONATATE 200 MG: 100 CAPSULE ORAL at 10:12

## 2023-12-01 RX ADMIN — DOXAZOSIN 4 MG: 2 TABLET ORAL at 03:12

## 2023-12-01 RX ADMIN — METOPROLOL SUCCINATE 100 MG: 50 TABLET, EXTENDED RELEASE ORAL at 08:12

## 2023-12-01 RX ADMIN — METHYLPREDNISOLONE SODIUM SUCCINATE 80 MG: 125 INJECTION, POWDER, FOR SOLUTION INTRAMUSCULAR; INTRAVENOUS at 06:12

## 2023-12-01 RX ADMIN — ATORVASTATIN CALCIUM 80 MG: 40 TABLET, FILM COATED ORAL at 08:12

## 2023-12-01 RX ADMIN — IPRATROPIUM BROMIDE AND ALBUTEROL SULFATE 3 ML: 2.5; .5 SOLUTION RESPIRATORY (INHALATION) at 06:12

## 2023-12-01 RX ADMIN — BENZONATATE 200 MG: 100 CAPSULE ORAL at 08:12

## 2023-12-01 RX ADMIN — GUAIFENESIN 600 MG: 600 TABLET, EXTENDED RELEASE ORAL at 08:12

## 2023-12-01 NOTE — ED PROVIDER NOTES
Encounter Date: 12/1/2023       History     Chief Complaint   Patient presents with    Shortness of Breath     Patient is a 63-year-old gentleman presenting to the emergency room with complaints of increasing shortness of breath over the last few days.  Patient reports shortness of breath has worsened to the point where now he was short of breath at rest.  Patient reports compliance with home medications, history of chronic kidney disease, CHF, diabetes, COPD.  Patient reports he does have white productive sputum.  Denies chest pain abdominal pain nausea or vomiting.  Denies fever or chills.    The history is provided by the patient.     Review of patient's allergies indicates:  No Known Allergies  Past Medical History:   Diagnosis Date    Atrial fibrillation     CHF (congestive heart failure)     CKD (chronic kidney disease)     COPD (chronic obstructive pulmonary disease)     DM (diabetes mellitus)     Dyslipidemia     Essential (primary) hypertension     Heart failure, unspecified     Rheumatoid arthritis, unspecified     Smoking     Type 2 diabetes mellitus without complications      Past Surgical History:   Procedure Laterality Date    ABLATION N/A 9/11/2023    Procedure: Ablation;  Surgeon: Álvaro Degroot MD;  Location: [facility] CATH LAB;  Service: Cardiology;  Laterality: N/A;  EPS + AV NODE ABLATION + UPGRADE TO BIV ICD ([facility])    CARDIOVERSION      coronary arteriograph      defibillator      ECHOCARDIOGRAM,TRANSESOPHAGEAL N/A 3/22/2023    Procedure: Transesophageal echo (LASHELL) intra-procedure log documentation;  Surgeon: Matthieu Diagnostic Provider;  Location: [facility] CATH LAB;  Service: Cardiology;  Laterality: N/A;    INSERT / REPLACE / REMOVE PACEMAKER      INSERTION OF BIVENTRICULAR IMPLANTABLE CARDIOVERTER-DEFIBRILLATOR (ICD) N/A 9/11/2023    Procedure: INSERTION, ICD, BIVENTRICULAR;  Surgeon: Álvaro Degroot MD;  Location: [facility] CATH LAB;  Service: Cardiology;  Laterality: N/A;    left  heart catherization        Family History   Problem Relation Age of Onset    Heart failure Mother     Cataracts Mother     Heart disease Mother     Glaucoma Mother     Peripheral vascular disease Mother     Hypertension Father      Social History     Tobacco Use    Smoking status: Never     Passive exposure: Never    Smokeless tobacco: Never    Tobacco comments:     Taking patches   Substance Use Topics    Alcohol use: Never     Alcohol/week: 1.0 standard drink of alcohol     Types: 1 Cans of beer per week     Comment: not often    Drug use: Never     Review of Systems   Constitutional:  Positive for fatigue. Negative for chills and fever.   HENT:  Negative for ear pain, rhinorrhea and sore throat.    Eyes:  Negative for photophobia and pain.   Respiratory:  Positive for cough, shortness of breath and wheezing.    Cardiovascular:  Negative for chest pain.   Gastrointestinal:  Negative for abdominal pain, diarrhea, nausea and vomiting.   Genitourinary:  Negative for dysuria.   Neurological:  Negative for dizziness, weakness and headaches.   All other systems reviewed and are negative.      Physical Exam     Initial Vitals [12/01/23 0514]   BP Pulse Resp Temp SpO2   (!) 148/87 75 20 99.9 °F (37.7 °C) (!) 94 %      MAP       --         Physical Exam    Nursing note and vitals reviewed.  Constitutional: He appears well-developed and well-nourished.   HENT:   Head: Normocephalic and atraumatic.   Mouth/Throat: Oropharynx is clear and moist.   Eyes: EOM are normal. Pupils are equal, round, and reactive to light.   Neck: Neck supple.   Normal range of motion.  Cardiovascular:  Normal rate, regular rhythm and normal heart sounds.     Exam reveals no gallop and no friction rub.       No murmur heard.  Pulmonary/Chest: No respiratory distress. He has no rales.   Diminished breath sounds bilateral posterior lung field; no expiratory wheezing or crackles appreciated   Abdominal: Abdomen is soft. Bowel sounds are normal. He exhibits no distension.  There is no abdominal tenderness.   Musculoskeletal:         General: Normal range of motion.      Cervical back: Normal range of motion and neck supple.     Neurological: He is alert and oriented to person, place, and time. He has normal strength.   Skin: Skin is warm and dry. Capillary refill takes less than 2 seconds.   Psychiatric: He has a normal mood and affect. His behavior is normal. Judgment and thought content normal.         ED Course   Critical Care    Date/Time: 12/1/2023 6:26 AM    Performed by: Juan C Pulliam MD  Authorized by: Juan C Pulliam MD  Direct patient critical care time: 10 minutes  Ordering / reviewing critical care time: 10 minutes  Documentation critical care time: 10 minutes  Consulting other physicians critical care time: 5 minutes  Total critical care time (exclusive of procedural time) : 35 minutes  Critical care time was exclusive of teaching time and separately billable procedures and treating other patients.  Critical care was necessary to treat or prevent imminent or life-threatening deterioration of the following conditions: respiratory failure.  Critical care was time spent personally by me on the following activities: examination of patient, ordering and performing treatments and interventions, ordering and review of laboratory studies, ordering and review of radiographic studies, pulse oximetry and re-evaluation of patient's condition.        Labs Reviewed   COMPREHENSIVE METABOLIC PANEL - Abnormal; Notable for the following components:       Result Value    Chloride 108 (*)     Carbon Dioxide 21 (*)     Glucose Level 166 (*)     Blood Urea Nitrogen 26.6 (*)     Creatinine 1.53 (*)     Calcium Level Total 8.7 (*)     Alkaline Phosphatase 168 (*)     All other components within normal limits   B-TYPE NATRIURETIC PEPTIDE - Abnormal; Notable for the following components:    Natriuretic Peptide 123.2 (*)     All other components within normal limits   CBC WITH  DIFFERENTIAL - Abnormal; Notable for the following components:    WBC 13.71 (*)     RBC 4.44 (*)     Hct 41.5 (*)     MCH 31.8 (*)     MPV 10.8 (*)     Neut # 11.54 (*)     IG# 0.05 (*)     All other components within normal limits   CK - Normal   CK-MB - Normal   TROPONIN I - Normal   CBC W/ AUTO DIFFERENTIAL    Narrative:     The following orders were created for panel order CBC Auto Differential.  Procedure                               Abnormality         Status                     ---------                               -----------         ------                     CBC with Differential[[id number]]       Abnormal            Final result                 Please view results for these tests on the individual orders.   COVID/FLU A&B PCR   PROTIME-INR   APTT     EKG Readings: (Independently Interpreted)   My Independent EKG Interpretation  12/01/2023 5:13 AM  Rate: 76 bpm  Rhythm: Ventricular paced  Axis: Leftward  Intervals: QRS 132ms  ST Changes: Nonspecific  Impression: Biventricular pacemaker with underlying atrial fibrillation/flutter           Imaging Results              X-Ray Chest 1 View (In process)                      Medications   albuterol-ipratropium 2.5 mg-0.5 mg/3 mL nebulizer solution 3 mL (3 mLs Nebulization Given 12/1/23 0629)   methylPREDNISolone sodium succinate injection 80 mg (80 mg Intravenous Given 12/1/23 0637)     Medical Decision Making  Patient is 63-year-old gentleman presents emergency room complaints of shortness of breath, history of CHF with a 15% ejection fraction, also history of COPD.  Currently poor breath sounds heard on lung auscultation.  Findings could be either, but leaning more towards congestive heart failure due to the worsening of symptoms, and feeling improved with sitting upright.  Will obtain laboratory evaluation including CBC CMP cardiac workup chest x-ray EKG.  Will continue to monitor.  Will place on CPAP as this will help with either COPD or CHF.  Currently  patient is normotensive.  Will continue to monitor    Differential diagnosis:  CHF exacerbation, COPD exacerbation, community-acquired pneumonia, ISABELA    Amount and/or Complexity of Data Reviewed  Labs: ordered.  Radiology: ordered.    Risk  Prescription drug management.               ED Course as of 12/01/23 0640   Fri Dec 01, 2023   0626 Patient has a 13,000 white count, has 84% neutrophils.  No acute pneumonia is appreciated on chest x-ray.  Patient does report having productive sputum over the last few days that is thicker, but still mostly whites.  Patient does have a reduced ejection fraction of approximately 15%.  Patient has 1+ bilateral lower extremity edema.  No acute pulmonary edema noted on chest x-ray.  Patient reports feeling fatigued, and likely symptoms related to viral syndrome versus COPD, further complicated by patient's history of CHF with diminished reserve.  Currently patient feels much more comfortable on CPAP.  Patient reports that he felt that he was tiring out at home.  For this reason, recommend observation admission to provide CPAP along with nebulizer treatments for COPD. [MW]   0632 Patient is taking coumadin - will obtain a PT/INR for the Internal Medicine Team. [MW]      ED Course User Index  [MW] Juan C Pulliam MD                           Clinical Impression:  Final diagnoses:  [R06.00] Dyspnea  [J44.9] Chronic obstructive pulmonary disease, unspecified COPD type (Primary)  [I50.9] Chronic congestive heart failure, unspecified heart failure type          ED Disposition Condition    Observation Stable                Juan C Pulliam MD  12/01/23 0640       Juan C Pulliam MD  01/09/24 0535

## 2023-12-01 NOTE — H&P
U Internal Medicine History and Physical       Chief Complaint:      Shortness of Breath    Subjective:     History of Present Illness:  Ezra Zhang is a 63 y.o. male with PMH of COPD, BPH, DM II, HTN, HFref (EF of 15% - ICD placed 9/2023), atrial fibrillation s/p LASHELL and cardioversion in 2017 (on Coumadin), RYAN, nonischemic cardiomyopathy (C in 2014)  who presented to [facility] ED on 12/1/2023 with complaint of dry cough, shortness on breath at rest, and chest pain associated with cough x 2 days.  Patient states that symptoms have worsened since yesterday.  He also reports sore throat, fatigue, runny nose, loss of appetite.  He denies any fever, chills, abdominal pain, nausea or vomiting, leg swelling.    Social history:  Patient is a former smoker, states that he quit a year ago.  Reports smoking 2 packs of cigarette a week.  He denies alcohol or drug use.    ED course:  At the ED, CXR was unremarkable, COVID/flu was negative.  CBC revealed leukocytosis of 13.71.  Creatinine was 1.53 (baseline), BNP was 123.  PT/INR was 25.5/2.3.  CK, CK-MB, troponins were normal.  He was saturating 94% on room air and tachypneic.  Patient was put on CPAP to assist with breathing with O2 saturation at 100%.  He was later weaned to 3 L NC with O2 sat between 93% to 96%.  He was also given DuoNeb treatment and IV Solu-Medrol 80 at the ED.    Review of Systems:  As per HPI.    Past Medical History:   ----------------------------  Atrial fibrillation  CHF (congestive heart failure)  CKD (chronic kidney disease)  COPD (chronic obstructive pulmonary disease)  DM (diabetes mellitus)  Dyslipidemia  Essential (primary) hypertension  Heart failure, unspecified  Rheumatoid arthritis, unspecified  Smoking  Type 2 diabetes mellitus without complications    Past Surgical History:  Past Surgical History:   Procedure Laterality Date    ABLATION N/A 9/11/2023    Procedure: Ablation;  Surgeon: Álvaro Degroot MD;  Location: [facility] CATH LAB;   Service: Cardiology;  Laterality: N/A;  EPS + AV NODE ABLATION + UPGRADE TO BIV ICD ([facility])    CARDIOVERSION      coronary arteriograph      defibillator      ECHOCARDIOGRAM,TRANSESOPHAGEAL N/A 3/22/2023    Procedure: Transesophageal echo (LASHELL) intra-procedure log documentation;  Surgeon: Matthieu Diagnostic Provider;  Location: [facility] CATH LAB;  Service: Cardiology;  Laterality: N/A;    INSERT / REPLACE / REMOVE PACEMAKER      INSERTION OF BIVENTRICULAR IMPLANTABLE CARDIOVERTER-DEFIBRILLATOR (ICD) N/A 9/11/2023    Procedure: INSERTION, ICD, BIVENTRICULAR;  Surgeon: Álvaro Degroot MD;  Location: [facility] CATH LAB;  Service: Cardiology;  Laterality: N/A;    left  heart catherization         Allergies:  Review of patient's allergies indicates:  No Known Allergies    Home Medications:  Prior to Admission medications    Medication Sig Start Date End Date Taking? Authorizing Provider   ACCU-CHEK FASTCLIX LANCET DRUM Misc Apply topically once daily. 3/8/22   Provider, Historical   ACCU-CHEK GUIDE TEST STRIPS Strp USE AS DIRECTED once daily (keep log) 3/8/22   Provider, Historical   albuterol (PROVENTIL) 2.5 mg /3 mL (0.083 %) nebulizer solution Take 3 mLs (2.5 mg total) by nebulization every 6 (six) hours as needed for Wheezing. Rescue 9/18/23 9/17/24  Dayami Squires FNP   alfuzosin (UROXATRAL) 10 mg Tb24 Take 1 tablet by mouth once daily. 7/17/23 7/16/24  Provider, Historical   atorvastatin (LIPITOR) 80 MG tablet Take 1 tablet (80 mg total) by mouth once daily. 10/4/22   Mandi Garza MD   cetirizine (ZYRTEC) 10 MG tablet Take 1 tablet (10 mg total) by mouth once daily. 9/18/23   Dayami Squires FNP   cholecalciferol, vitamin D3, (VITAMIN D3) 125 mcg (5,000 unit) Tab Take 5,000 Units by mouth once daily.    Provider, Historical   clotrimazole-betamethasone 1-0.05% (LOTRISONE) cream Apply 2 g topically 2 (two) times daily. 2/18/22   Provider, Historical   doxycycline (VIBRA-TABS) 100 MG tablet Take 100 mg by mouth 2 (two)  times daily. 9/11/23   Provider, Historical   famotidine (PEPCID) 20 MG tablet Take 1 tablet (20 mg total) by mouth 2 (two) times daily. 9/18/23 9/17/24  Dayami Squires FNP   fluticasone propionate (FLONASE ALLERGY RELIEF) 50 mcg/actuation nasal spray 2 sprays (100 mcg total) by Each Nostril route Daily. 9/18/23   Dayami Squires FNP   fluticasone-salmeterol 230-21 mcg/dose (ADVAIR HFA) 230-21 mcg/actuation HFAA inhaler Inhale 2 puffs into the lungs 2 (two) times daily. Controller 9/18/23 9/17/24  Dayami Squires FNP   furosemide (LASIX) 40 MG tablet Take 1 tablet (40 mg total) by mouth once daily. Refilled per Cardio orders. 2/16/23 9/11/23  Mandi Garza MD   glipiZIDE (GLUCOTROL) 5 MG tablet Take 1 tablet (5 mg total) by mouth once daily. 9/18/23   Dayami Squires FNP   HYDROcodone-acetaminophen (NORCO) 5-325 mg per tablet Take 2 tablets by mouth 2 (two) times daily. 9/11/23   Provider, Historical   ipratropium (ATROVENT) 0.02 % nebulizer solution Take 2.5 mLs (500 mcg total) by nebulization 4 (four) times daily. Rescue 9/18/23 9/17/24  Dayami Squires FNP   [facility]-blood glucose strips [facility]   Accucheck blood glucose test strips and lancets, See Instructions, Check CBG once daily and keep log., # 100 EA, 11 Refill(s), Pharmacy: [facility], 185, cm, Height/Length Dosing, 03/08/22 8:10:00 CST, 96, kg, Weight Dosing, 03/08/22 8:10:00 CST 3/8/22   Provider, Historical   metoprolol succinate (TOPROL-XL) 100 MG 24 hr tablet Take 100 mg by mouth once daily.    Provider, Historical   nicotine (NICODERM CQ) 14 mg/24 hr Place 1 patch onto the skin once daily.  Patient not taking: Reported on 7/18/2023 7/19/22   Mandi Garza MD   nicotine polacrilex (NICORETTE) 4 MG Gum Take 1 each (4 mg total) by mouth as needed (smoking urge).  Patient not taking: Reported on 7/18/2023 7/19/22   Mandi Garza MD   predniSONE (DELTASONE) 50 MG Tab     Provider, Historical  "  promethazine-codeine 6.25-10 mg/5 ml (PHENERGAN WITH CODEINE) 6.25-10 mg/5 mL syrup Take 5 mLs by mouth every 4 (four) hours as needed for Cough.    Provider, Historical   promethazine-dextromethorphan (PROMETHAZINE-DM) 6.25-15 mg/5 mL Syrp TAKE 5 ml (cc) EVERY 4 HOURS AS NEEDED FOR cough    Provider, Historical   sacubitriL-valsartan (ENTRESTO) 49-51 mg per tablet Take 1 tablet by mouth 2 (two) times daily. 4/4/23   Tad Levin MD   traZODone (DESYREL) 50 MG tablet Take 1 tablet (50 mg total) by mouth nightly as needed for Insomnia. 9/18/23 9/17/24  Dayami Squires FNP   warfarin (COUMADIN) 5 MG tablet Take 1 tablet (5 mg total) by mouth Daily. 3/22/23 3/21/24  Cory Gan MD       Family History:  Family History   Problem Relation Age of Onset    Heart failure Mother     Cataracts Mother     Heart disease Mother     Glaucoma Mother     Peripheral vascular disease Mother     Hypertension Father        Social History:  Social History     Tobacco Use    Smoking status: Never     Passive exposure: Never    Smokeless tobacco: Never    Tobacco comments:     Taking patches   Substance Use Topics    Alcohol use: Never     Alcohol/week: 1.0 standard drink of alcohol     Types: 1 Cans of beer per week     Comment: not often    Drug use: Never       Objective:     Vitals  Vitals  BP: 119/80  Temp: 97.5 °F (36.4 °C)  Temp Source: Oral  Pulse: 74  Resp: 18  SpO2: 96 %  Height: 6' 2.02" (188 cm)  Weight: 115.8 kg (255 lb 4.7 oz)    Physical Exam  Vitals reviewed.   Constitutional:       General: He is not in acute distress.  Cardiovascular:      Rate and Rhythm: Normal rate and regular rhythm.      Pulses: Normal pulses.      Heart sounds: Normal heart sounds.   Pulmonary:      Effort: Pulmonary effort is normal.      Breath sounds: No stridor. Wheezing present. No rhonchi or rales.   Abdominal:      General: Bowel sounds are normal.      Palpations: Abdomen is soft.      Tenderness: There is no " "abdominal tenderness.   Musculoskeletal:         General: No swelling or tenderness.      Right lower leg: No edema.      Left lower leg: No edema.   Neurological:      General: No focal deficit present.      Mental Status: He is alert and oriented to person, place, and time.        Recent Labs     12/01/23  0522   WBC 13.71*   RBC 4.44*   HGB 14.1   HCT 41.5*   MCV 93.5   MCH 31.8*   MCHC 34.0   RDW 13.4        No results for input(s): "LACTIC" in the last 72 hours.  Recent Labs     12/01/23  0705   INR 2.3*     No results for input(s): "HGBA1C", "CHOL", "TRIG", "LDL", "VLDL", "HDL" in the last 72 hours.  No results for input(s): "PH", "PCO2", "PO2", "HCO3", "POCSATURATED", "BE" in the last 72 hours.    Recent Labs     12/01/23  0522      K 3.8   CHLORIDE 108*   CO2 21*   BUN 26.6*   CREATININE 1.53*   GLUCOSE 166*   CALCIUM 8.7*   ALBUMIN 4.2   GLOBULIN 3.0   ALKPHOS 168*   ALT 27   AST 27   BILITOT 0.8     Recent Labs     12/01/23  0522   .2*      TROPONINI 0.025            Radiology:  X-Ray Chest 1 View    Result Date: 12/1/2023  NO ACUTE CARDIOPULMONARY PROCESS IDENTIFIED. Electronically signed by: Uche Anand Date:    12/01/2023 Time:    07:15       Assessment & Plan:   COPD exacerbation  - chest x-ray revealed no acute cardiopulmonary findings. COVID/flu pending.  - we will give IV Solu-Medrol 40 t.i.d. today.  And resume b.i.d. dosage tomorrow.  - duo nebs q.4 while awake  - incentive spirometry  - Mucinex scheduled to aid expectoration.  William mauricio  - continue home Advair  - supplemental oxygen  - O2 saturation goal is 88-92%  - PT OT consult placed  - respiratory panel pending    CHF with reduced ejection fraction  - LASHELL in 03/2023 revealed EF of 15%.  Also revealed a thrombus in JOANNE.  - patient had ICD insertion and AV node ablation done in 09/2023.  - continue Lasix 40 daily, Toprol    Atrial fibrillation   - on Coumadin; due to failure of Eliquis therapy.  Per " chart review, patient developed a JOANNE thrombus despite being on Eliquis.  - continue Coumadin per last Coumadin clinic visit; warfarin 5 mg Wed & Sun. 2.5 mg on Mon, Tue, Thurs, Fri, Sat. - confirmed by pharmacy.  Appreciate their assistance.   - patient is on Coumadin diet  - daily PT/INR   - EKG today revealed ventricular paced rhythm with biventricular pacemaker.    DM II  - A1c 6.1  - hold home glipizide  - SSI    HTN  - patient not on any home blood pressure medication, per chart review  - p.r.n. antihypertensives    BPH  - terazosin 4 mg daily ordered., as patient's home alfuzosin unavailable.      CODE STATUS:  Full  Access:  PIV  Antibiotics:  None  Diet:  Coumadin, low-sodium diet  DVT ppx:  Coumadin  GI ppx:  Pepcid  Fluids:  None    Disposition:  Patient admitted for COPD exacerbation.  Duo nebs q.4 and IV Solu-Medrol started.  Further dispo planning pending.    Joselin Braun M.D  [facility] Family Medicine Resident, [facility]

## 2023-12-01 NOTE — PROGRESS NOTES
"Inpatient Nutrition Evaluation    Admit Date: 2023   Total duration of encounter: 1 day    Nutrition Recommendation/Prescription     Diabetic, Low Na, Coumadin diet  Monitor Weight Weekly     Nutrition Assessment     Chart Review    Reason Seen: continuous nutrition monitoring    Malnutrition Screening Tool Results   Have you recently lost weight without trying?: No  Have you been eating poorly because of a decreased appetite?: No   MST Score: 0     Diagnosis:  Dyspnea, COPD, HCF    Relevant Medical History: CHF, DM, COPD, CKD    Nutrition-Related Medications: Atorvastatin, Famotidine, Lasix, Coumadin, ISS    Nutrition-Related Labs:  23 -- Glu 166 H, Na 140, K 3.8, BUN 26.6 H, Cr 1.5 H    Diet Order: Diet Adult Regular Coumadin Meal Plan  Oral Supplement Order: none  Appetite/Oral Intake: good/% of meals  Factors Affecting Nutritional Intake: none identified  Food/Jew/Cultural Preferences: none reported  Food Allergies: no known food allergies       Wound(s):   skin intact    Comments    23 -- Pt reports good appetite denies difficulty eating; no n/v, LBM ; no weight loss; Glu (H) -- h/o DM, order diabetic diet for best glucose control with coumadin restriciton    Anthropometrics    Height: 6' 2.02" (188 cm)    Last Weight: 115.8 kg (255 lb 4.7 oz) (23 0800) Weight Method: Standard Scale  BMI (Calculated): 32.8  BMI Classification: obese grade I (BMI 30-34.9)        Ideal Body Weight (IBW), Male: 190.12 lb     % Ideal Body Weight, Male (lb): 134.28 %                 Usual Body Weight (UBW), k.6 kg  % Usual Body Weight: 102.15     Usual Weight Provided By: patient    Wt Readings from Last 5 Encounters:   23 115.8 kg (255 lb 4.7 oz)   10/17/23 115.8 kg (255 lb 4.7 oz)   23 115.7 kg (255 lb)   23 116.1 kg (256 lb)   23 115.7 kg (255 lb)     Weight Change(s) Since Admission:  Admit Weight: 115.8 kg (255 lb 4.7 oz) (23 0800)      Patient " Education    Not applicable.    Monitoring & Evaluation     Dietitian will monitor food and beverage intake, weight change, and electrolyte/renal panel.  Nutrition Risk/Follow-Up: low (follow-up in 5-7 days)  Patients assigned 'low nutrition risk' status do not qualify for a full nutritional assessment but will be monitored and re-evaluated in a 5-7 day time period. Please consult if re-evaluation needed sooner.

## 2023-12-01 NOTE — PLAN OF CARE
12/01/23 1436   Discharge Assessment   Assessment Type Discharge Planning Assessment   Confirmed/corrected address, phone number and insurance Yes   Confirmed Demographics Correct on Facesheet   Source of Information patient;health record   When was your last doctors appointment?   (PCP: Dayami Squires)   Does patient/caregiver understand observation status Yes   Communicated SIVAKUMAR with patient/caregiver Date not available/Unable to determine   Reason For Admission Dyspnea; Chest pain; COPD; CHF   People in Home alone   Facility Arrived From: Home   Do you expect to return to your current living situation? Yes   Do you have help at home or someone to help you manage your care at home? No   Prior to hospitilization cognitive status: Alert/Oriented;No Deficits   Current cognitive status: Alert/Oriented;No Deficits   Walking or Climbing Stairs ambulation difficulty, requires equipment   Mobility Management RW, Cane   Equipment Currently Used at Home walker, rolling;cane, quad;nebulizer;blood pressure machine;glucometer;other (see comments)  (Pulse oximeter)   Readmission within 30 days? No   Patient currently being followed by outpatient case management? No   Do you currently have service(s) that help you manage your care at home? No   Do you take prescription medications? Yes  ([facility] in [place])   Do you have prescription coverage? Yes   Coverage [employer]   Do you have any problems affording any of your prescribed medications? No   Is the patient taking medications as prescribed? yes   Who is going to help you get home at discharge? Family   How do you get to doctors appointments? family or friend will provide   Are you on dialysis? No   Do you take coumadin? No   DME Needed Upon Discharge  none   Discharge Plan discussed with: Patient   Transition of Care Barriers None   Discharge Plan A Home     Patient is single with no children; independent with ADL's; patient requesting LTCPA,  number to call in order to apply for a waiver for LTPCA has been provided to patient: [phone], Option 1.

## 2023-12-02 LAB
ADV 40+41 DNA STL QL NAA+NON-PROBE: NOT DETECTED
ALBUMIN SERPL-MCNC: 3.4 G/DL (ref 3.4–4.8)
ALBUMIN/GLOB SERPL: 1 RATIO (ref 1.1–2)
ALP SERPL-CCNC: 131 UNIT/L (ref 40–150)
ALT SERPL-CCNC: 26 UNIT/L (ref 0–55)
AST SERPL-CCNC: 21 UNIT/L (ref 5–34)
ASTRO TYP 1-8 RNA STL QL NAA+NON-PROBE: NOT DETECTED
BASOPHILS # BLD AUTO: 0.01 X10(3)/MCL
BASOPHILS NFR BLD AUTO: 0.1 %
BILIRUB SERPL-MCNC: 0.5 MG/DL
BUN SERPL-MCNC: 21.5 MG/DL (ref 8.4–25.7)
C CAYETANENSIS DNA STL QL NAA+NON-PROBE: NOT DETECTED
C COLI+JEJ+UPSA DNA STL QL NAA+NON-PROBE: NOT DETECTED
CALCIUM SERPL-MCNC: 9.2 MG/DL (ref 8.8–10)
CHLORIDE SERPL-SCNC: 107 MMOL/L (ref 98–107)
CO2 SERPL-SCNC: 24 MMOL/L (ref 23–31)
CREAT SERPL-MCNC: 1.31 MG/DL (ref 0.73–1.18)
CRYPTOSP DNA STL QL NAA+NON-PROBE: NOT DETECTED
E HISTOLYT DNA STL QL NAA+NON-PROBE: NOT DETECTED
EAEC PAA PLAS AGGR+AATA ST NAA+NON-PRB: NOT DETECTED
EC STX1+STX2 GENES STL QL NAA+NON-PROBE: NOT DETECTED
EOSINOPHIL # BLD AUTO: 0 X10(3)/MCL (ref 0–0.9)
EOSINOPHIL NFR BLD AUTO: 0 %
EPEC EAE GENE STL QL NAA+NON-PROBE: NOT DETECTED
ERYTHROCYTE [DISTWIDTH] IN BLOOD BY AUTOMATED COUNT: 13.6 % (ref 11.5–17)
ETEC LTA+ST1A+ST1B TOX ST NAA+NON-PROBE: NOT DETECTED
FOLATE SERPL-MCNC: 4.4 NG/ML (ref 7–31.4)
G LAMBLIA DNA STL QL NAA+NON-PROBE: NOT DETECTED
GFR SERPLBLD CREATININE-BSD FMLA CKD-EPI: >60 MLS/MIN/1.73/M2
GLOBULIN SER-MCNC: 3.4 GM/DL (ref 2.4–3.5)
GLUCOSE SERPL-MCNC: 163 MG/DL (ref 82–115)
HCT VFR BLD AUTO: 38.6 % (ref 42–52)
HGB BLD-MCNC: 12.6 G/DL (ref 14–18)
HOLD SPECIMEN: NORMAL
IMM GRANULOCYTES # BLD AUTO: 0.04 X10(3)/MCL (ref 0–0.04)
IMM GRANULOCYTES NFR BLD AUTO: 0.3 %
INR PPP: 2.2
LYMPHOCYTES # BLD AUTO: 1.03 X10(3)/MCL (ref 0.6–4.6)
LYMPHOCYTES NFR BLD AUTO: 7.2 %
MCH RBC QN AUTO: 31.3 PG (ref 27–31)
MCHC RBC AUTO-ENTMCNC: 32.6 G/DL (ref 33–36)
MCV RBC AUTO: 95.8 FL (ref 80–94)
MONOCYTES # BLD AUTO: 0.3 X10(3)/MCL (ref 0.1–1.3)
MONOCYTES NFR BLD AUTO: 2.1 %
NEUTROPHILS # BLD AUTO: 12.9 X10(3)/MCL (ref 2.1–9.2)
NEUTROPHILS NFR BLD AUTO: 90.3 %
NOROVIRUS GI+II RNA STL QL NAA+NON-PROBE: NOT DETECTED
NRBC BLD AUTO-RTO: 0 %
P SHIGELLOIDES DNA STL QL NAA+NON-PROBE: NOT DETECTED
PLATELET # BLD AUTO: 275 X10(3)/MCL (ref 130–400)
PMV BLD AUTO: 10.7 FL (ref 7.4–10.4)
POCT GLUCOSE: 128 MG/DL (ref 70–110)
POCT GLUCOSE: 137 MG/DL (ref 70–110)
POCT GLUCOSE: 154 MG/DL (ref 70–110)
POCT GLUCOSE: 159 MG/DL (ref 70–110)
POTASSIUM SERPL-SCNC: 3.9 MMOL/L (ref 3.5–5.1)
PROT SERPL-MCNC: 6.8 GM/DL (ref 5.8–7.6)
PROTHROMBIN TIME: 24.6 SECONDS (ref 11.4–14)
RBC # BLD AUTO: 4.03 X10(6)/MCL (ref 4.7–6.1)
RVA RNA STL QL NAA+NON-PROBE: NOT DETECTED
S ENT+BONG DNA STL QL NAA+NON-PROBE: NOT DETECTED
SAPO I+II+IV+V RNA STL QL NAA+NON-PROBE: NOT DETECTED
SHIGELLA SP+EIEC IPAH ST NAA+NON-PROBE: NOT DETECTED
SODIUM SERPL-SCNC: 139 MMOL/L (ref 136–145)
V CHOL+PARA+VUL DNA STL QL NAA+NON-PROBE: NOT DETECTED
V CHOLERAE DNA STL QL NAA+NON-PROBE: NOT DETECTED
VIT B12 SERPL-MCNC: 420 PG/ML (ref 213–816)
WBC # SPEC AUTO: 14.28 X10(3)/MCL (ref 4.5–11.5)
Y ENTEROCOL DNA STL QL NAA+NON-PROBE: NOT DETECTED

## 2023-12-02 PROCEDURE — 82746 ASSAY OF FOLIC ACID SERUM: CPT

## 2023-12-02 PROCEDURE — 87507 IADNA-DNA/RNA PROBE TQ 12-25: CPT

## 2023-12-02 PROCEDURE — 27000221 HC OXYGEN, UP TO 24 HOURS

## 2023-12-02 PROCEDURE — 85025 COMPLETE CBC W/AUTO DIFF WBC: CPT | Performed by: STUDENT IN AN ORGANIZED HEALTH CARE EDUCATION/TRAINING PROGRAM

## 2023-12-02 PROCEDURE — 63600175 PHARM REV CODE 636 W HCPCS

## 2023-12-02 PROCEDURE — 80053 COMPREHEN METABOLIC PANEL: CPT | Performed by: STUDENT IN AN ORGANIZED HEALTH CARE EDUCATION/TRAINING PROGRAM

## 2023-12-02 PROCEDURE — 99900035 HC TECH TIME PER 15 MIN (STAT)

## 2023-12-02 PROCEDURE — 96376 TX/PRO/DX INJ SAME DRUG ADON: CPT

## 2023-12-02 PROCEDURE — 85610 PROTHROMBIN TIME: CPT

## 2023-12-02 PROCEDURE — 25000003 PHARM REV CODE 250

## 2023-12-02 PROCEDURE — 94660 CPAP INITIATION&MGMT: CPT

## 2023-12-02 PROCEDURE — 94640 AIRWAY INHALATION TREATMENT: CPT | Mod: XB

## 2023-12-02 PROCEDURE — G0378 HOSPITAL OBSERVATION PER HR: HCPCS

## 2023-12-02 PROCEDURE — 25000242 PHARM REV CODE 250 ALT 637 W/ HCPCS

## 2023-12-02 PROCEDURE — 94761 N-INVAS EAR/PLS OXIMETRY MLT: CPT

## 2023-12-02 PROCEDURE — 82607 VITAMIN B-12: CPT

## 2023-12-02 RX ORDER — GUAIFENESIN 600 MG/1
600 TABLET, EXTENDED RELEASE ORAL 2 TIMES DAILY PRN
Status: DISCONTINUED | OUTPATIENT
Start: 2023-12-02 | End: 2023-12-03 | Stop reason: HOSPADM

## 2023-12-02 RX ORDER — GUAIFENESIN/DEXTROMETHORPHAN 100-10MG/5
10 SYRUP ORAL EVERY 6 HOURS
Status: DISCONTINUED | OUTPATIENT
Start: 2023-12-02 | End: 2023-12-03 | Stop reason: HOSPADM

## 2023-12-02 RX ORDER — FOLIC ACID 1 MG/1
1 TABLET ORAL DAILY
Status: DISCONTINUED | OUTPATIENT
Start: 2023-12-02 | End: 2023-12-03 | Stop reason: HOSPADM

## 2023-12-02 RX ADMIN — DOXAZOSIN 4 MG: 2 TABLET ORAL at 09:12

## 2023-12-02 RX ADMIN — GUAIFENESIN AND DEXTROMETHORPHAN 10 ML: 100; 10 SYRUP ORAL at 02:12

## 2023-12-02 RX ADMIN — METHYLPREDNISOLONE SODIUM SUCCINATE 40 MG: 40 INJECTION, POWDER, FOR SOLUTION INTRAMUSCULAR; INTRAVENOUS at 09:12

## 2023-12-02 RX ADMIN — TRAZODONE HYDROCHLORIDE 50 MG: 50 TABLET ORAL at 09:12

## 2023-12-02 RX ADMIN — IPRATROPIUM BROMIDE AND ALBUTEROL SULFATE 3 ML: .5; 3 SOLUTION RESPIRATORY (INHALATION) at 07:12

## 2023-12-02 RX ADMIN — FOLIC ACID 1 MG: 1 TABLET ORAL at 02:12

## 2023-12-02 RX ADMIN — FLUTICASONE FUROATE AND VILANTEROL TRIFENATATE 1 PUFF: 200; 25 POWDER RESPIRATORY (INHALATION) at 08:12

## 2023-12-02 RX ADMIN — FUROSEMIDE 40 MG: 20 TABLET ORAL at 09:12

## 2023-12-02 RX ADMIN — IPRATROPIUM BROMIDE AND ALBUTEROL SULFATE 3 ML: .5; 3 SOLUTION RESPIRATORY (INHALATION) at 11:12

## 2023-12-02 RX ADMIN — IPRATROPIUM BROMIDE AND ALBUTEROL SULFATE 3 ML: .5; 3 SOLUTION RESPIRATORY (INHALATION) at 08:12

## 2023-12-02 RX ADMIN — FAMOTIDINE 20 MG: 20 TABLET ORAL at 09:12

## 2023-12-02 RX ADMIN — GUAIFENESIN 600 MG: 600 TABLET, EXTENDED RELEASE ORAL at 09:12

## 2023-12-02 RX ADMIN — METHYLPREDNISOLONE SODIUM SUCCINATE 40 MG: 40 INJECTION, POWDER, FOR SOLUTION INTRAMUSCULAR; INTRAVENOUS at 08:12

## 2023-12-02 RX ADMIN — IPRATROPIUM BROMIDE AND ALBUTEROL SULFATE 3 ML: .5; 3 SOLUTION RESPIRATORY (INHALATION) at 03:12

## 2023-12-02 RX ADMIN — METOPROLOL SUCCINATE 100 MG: 50 TABLET, EXTENDED RELEASE ORAL at 09:12

## 2023-12-02 RX ADMIN — GUAIFENESIN AND DEXTROMETHORPHAN 10 ML: 100; 10 SYRUP ORAL at 11:12

## 2023-12-02 RX ADMIN — WARFARIN SODIUM 2.5 MG: 5 TABLET ORAL at 04:12

## 2023-12-02 RX ADMIN — ATORVASTATIN CALCIUM 80 MG: 40 TABLET, FILM COATED ORAL at 09:12

## 2023-12-02 NOTE — PROGRESS NOTES
"[facility] Medicine Wards Progress Note     Resident Team: [facility] Medicine List 2  Attending Physician: Pema Gann MD  Resident:  Elias Hilliard MD  Intern:  Joselin Braun MD    Subjective:      Brief HPI:  Ezra Zhang is a 63 y.o. male with PMH of COPD, BPH, DM II, HTN, HFref (EF of 15% - ICD placed 2023), atrial fibrillation s/p LASHELL and cardioversion in  (on Coumadin), RYAN (on home CPAP), nonischemic cardiomyopathy ([facility] in )  who presented to [facility] ED on 2023 with complaint of dry cough, shortness on breath at rest, and chest pain associated with cough x 2 days.  Patient states that symptoms have worsened since yesterday.  He also reports sore throat, fatigue, runny nose, loss of appetite.  He denies any fever, chills, abdominal pain, nausea or vomiting, leg swelling.     Social history:  Patient is a former smoker, states that he quit a year ago.  Reports smoking 2 packs of cigarette a week.  He denies alcohol or drug use.    Interval History:   No acute events overnight.  Patient still reports dry cough and shortness on breath at rest.  He has Tessalon Perles ordered p.r.n. however, he states that this has not helped him in the past.  He is requesting promethazine-codeine.   Patient also reports diarrhea for the past few days prior to hospitalization.  Two bowel movements overnight.  Denies any abdominal pain, nausea or vomiting, fever, chills, chest pain.    Objective:     Last 24 Hour Vital Signs:  BP  Min: 101/70  Max: 119/80  Temp  Av °F (36.7 °C)  Min: 97.4 °F (36.3 °C)  Max: 98.7 °F (37.1 °C)  Pulse  Av.4  Min: 70  Max: 82  Resp  Av.8  Min: 18  Max: 22  SpO2  Av.5 %  Min: 93 %  Max: 100 %  Height  Av' 2.02" (188 cm)  Min: 6' 2.02" (188 cm)  Max: 6' 2.02" (188 cm)  Weight  Av.8 kg (255 lb 4.7 oz)  Min: 115.8 kg (255 lb 4.7 oz)  Max: 115.8 kg (255 lb 4.7 oz)      Physical Exam  Vitals reviewed.   Constitutional:       General: He is not in acute " distress.     Appearance: He is obese. He is not ill-appearing.   Cardiovascular:      Rate and Rhythm: Normal rate and regular rhythm.      Pulses: Normal pulses.      Heart sounds: Normal heart sounds.   Pulmonary:      Effort: Pulmonary effort is normal. No respiratory distress.      Breath sounds: Wheezing present.      Comments: Wheezing bilaterally  Abdominal:      General: Bowel sounds are normal.      Palpations: Abdomen is soft.      Tenderness: There is no abdominal tenderness.   Skin:     General: Skin is warm.   Neurological:      General: No focal deficit present.      Mental Status: He is alert and oriented to person, place, and time.          Laboratory:  Most Recent Data:  CBC:   Lab Results   Component Value Date    WBC 14.28 (H) 12/02/2023    HGB 12.6 (L) 12/02/2023    HCT 38.6 (L) 12/02/2023     12/02/2023    MCV 95.8 (H) 12/02/2023    RDW 13.6 12/02/2023     WBC Differential:   Recent Labs   Lab 12/01/23  0522 12/02/23  0449   WBC 13.71* 14.28*   HGB 14.1 12.6*   HCT 41.5* 38.6*    275   MCV 93.5 95.8*     BMP:   Lab Results   Component Value Date     12/02/2023    K 3.9 12/02/2023    CO2 24 12/02/2023    BUN 21.5 12/02/2023    CREATININE 1.31 (H) 12/02/2023    CALCIUM 9.2 12/02/2023    MG 2.10 03/07/2023    PHOS 3.2 07/06/2023     LFTs:   Lab Results   Component Value Date    ALBUMIN 3.4 12/02/2023    BILITOT 0.5 12/02/2023    AST 21 12/02/2023    ALKPHOS 131 12/02/2023    ALT 26 12/02/2023     Coags:   Lab Results   Component Value Date    INR 2.2 (H) 12/02/2023    PROTIME 24.6 (H) 12/02/2023    PTT 36.1 (H) 12/01/2023     FLP:   Lab Results   Component Value Date    CHOL 100 03/07/2023    HDL 33 (L) 03/07/2023    TRIG 55 03/07/2023     DM:   Lab Results   Component Value Date    HGBA1C 6.1 11/14/2023    HGBA1C 5.7 09/18/2023    HGBA1C 6.2 03/24/2023    CREATININE 1.31 (H) 12/02/2023     Thyroid:   Lab Results   Component Value Date    TSH 2.363 11/14/2023      Anemia:   Lab  Results   Component Value Date    IRON 83 03/27/2018    TIBC 177 (L) 03/27/2018    FERRITIN 295.1 03/27/2018       Lab Results   Component Value Date    HNQKLXHU64 242 03/27/2018       Lab Results   Component Value Date    FOLATE 19.8 (H) 03/27/2018        Cardiac:   Lab Results   Component Value Date    TROPONINI 0.025 12/01/2023    .2 (H) 12/01/2023         Microbiology Data:  Microbiology Results (last 7 days)       ** No results found for the last 168 hours. **               Radiology:  Imaging Results              X-Ray Chest 1 View (Final result)  Result time 12/01/23 07:15:25      Final result by Uche Anand MD (12/01/23 07:15:25)                   Impression:      NO ACUTE CARDIOPULMONARY PROCESS IDENTIFIED.      Electronically signed by: Uche Anand  Date:    12/01/2023  Time:    07:15               Narrative:    EXAMINATION:  XR CHEST 1 VIEW    CLINICAL HISTORY:  Dyspnea;    TECHNIQUE:  One view    COMPARISON:  September 12, 2023.    FINDINGS:  Cardiopericardial silhouette is within normal limits.  Left chest implanted cardiac device electrodes are in similar location.  No acute dense focal or segmental consolidation, congestive process, pleural effusions or pneumothorax.                                      Current Medications:     Infusions:       Scheduled:   albuterol-ipratropium  3 mL Nebulization Q4H WAKE    atorvastatin  80 mg Oral Daily    doxazosin  4 mg Oral Daily    famotidine  20 mg Oral BID    fluticasone furoate-vilanteroL  1 puff Inhalation Daily    furosemide  40 mg Oral Daily    guaiFENesin  600 mg Oral BID    methylPREDNISolone sodium succinate injection  40 mg Intravenous BID    metoprolol succinate  100 mg Oral Daily    warfarin  2.5 mg Oral Daily    [START ON 12/4/2023] warfarin  2.5 mg Oral Daily    [START ON 12/3/2023] warfarin  5 mg Oral Every Sun        PRN:  albuterol, benzonatate, dextrose 10%, dextrose 10%, glucagon (human recombinant), glucose, glucose,  hydrALAZINE, insulin aspart U-100, naloxone, sodium chloride 0.9%, traZODone    Assessment & Plan:     COPD exacerbation  Rhinovirus  - chest x-ray revealed no acute cardiopulmonary findings. COVID/flu negative  - IV Solu-Medrol 40 b.i.d. start weaning steroids on Monday.  - WBC elevated 14.28 (2/2 steroid use)  - duo nebs q.4 while awake  - incentive spirometry  - respiratory panel - positive for rhinovirus; continue symptomatic management  - Robitussin scheduled.  Mucinex and tessalon Perles p.r.n. prefer not to give promethazine-codeine.  - continue home Advair  - supplemental oxygen  - O2 saturation goal is 88-92%  - PT OT   - CPAP q.h.s.     Diarrhea  - unlikely C diff  - GI panel pending    CHF with reduced ejection fraction  - LASHELL in 03/2023 revealed EF of 15%.  Also revealed a thrombus in JOANNE.  - patient had ICD insertion and AV node ablation done in 09/2023.  - continue Lasix 40 daily, Toprol       Atrial fibrillation   - on Coumadin; due to failure of Eliquis therapy.  Per chart review, patient developed a JOANNE thrombus despite being on Eliquis.  - continue Coumadin per last Coumadin clinic visit; warfarin 5 mg Wed & Sun. 2.5 mg on Mon, Tue, Thurs, Fri, Sat. - confirmed by pharmacy.  Appreciate their assistance.   - patient is on Coumadin diet  - daily PT/INR; 24.6/2.2 today       DM II  - A1c 6.1  - hold home glipizide  - SSI     Microcytic anemia  Folate deficiency  - folate level 4.4; B12 WNL  - daily folic acid tablets ordered      HTN  - patient not on any home blood pressure medication, per chart review  - p.r.n. antihypertensives     BPH  - terazosin 4 mg daily ordered., as patient's home alfuzosin unavailable.        CODE STATUS:  Full  Access:  PIV  Antibiotics:  None  Diet:  Coumadin, low-sodium diet  DVT ppx:  Coumadin  GI ppx:  Pepcid  Fluids:  None    Disposition:  Patient admitted for COPD exacerbation.  Duo nebs q.4 and IV Solu-Medrol started.  Further dispo planning pending.      Joselin ABAD  KASSY Braun  [facility] Resident, [facility]

## 2023-12-02 NOTE — PLAN OF CARE
Problem: Adult Inpatient Plan of Care  Goal: Plan of Care Review  Outcome: Ongoing, Progressing  Flowsheets (Taken 12/2/2023 1550)  Plan of Care Reviewed With: patient  Goal: Absence of Hospital-Acquired Illness or Injury  Outcome: Ongoing, Progressing  Intervention: Identify and Manage Fall Risk  Flowsheets (Taken 12/2/2023 1550)  Safety Promotion/Fall Prevention:   assistive device/personal item within reach   high risk medications identified   lighting adjusted   medications reviewed   nonskid shoes/socks when out of bed   side rails raised x 2  Intervention: Prevent Skin Injury  Flowsheets (Taken 12/2/2023 1550)  Body Position: position changed independently  Skin Protection:   adhesive use limited   tubing/devices free from skin contact  Intervention: Prevent and Manage VTE (Venous Thromboembolism) Risk  Flowsheets (Taken 12/2/2023 1550)  Activity Management: Ambulated to bathroom - L4  VTE Prevention/Management: remove, assess skin, and reapply sequential compression device  Range of Motion: active ROM (range of motion) encouraged  Intervention: Prevent Infection  Flowsheets (Taken 12/2/2023 1550)  Infection Prevention: hand hygiene promoted

## 2023-12-03 VITALS
TEMPERATURE: 98 F | HEIGHT: 74 IN | WEIGHT: 255.31 LBS | RESPIRATION RATE: 18 BRPM | OXYGEN SATURATION: 97 % | HEART RATE: 75 BPM | DIASTOLIC BLOOD PRESSURE: 78 MMHG | BODY MASS INDEX: 32.77 KG/M2 | SYSTOLIC BLOOD PRESSURE: 129 MMHG

## 2023-12-03 LAB
ALBUMIN SERPL-MCNC: 3.2 G/DL (ref 3.4–4.8)
ALBUMIN/GLOB SERPL: 1 RATIO (ref 1.1–2)
ALP SERPL-CCNC: 115 UNIT/L (ref 40–150)
ALT SERPL-CCNC: 27 UNIT/L (ref 0–55)
AST SERPL-CCNC: 19 UNIT/L (ref 5–34)
BASOPHILS # BLD AUTO: 0.01 X10(3)/MCL
BASOPHILS NFR BLD AUTO: 0.1 %
BILIRUB SERPL-MCNC: 0.4 MG/DL
BUN SERPL-MCNC: 24.5 MG/DL (ref 8.4–25.7)
CALCIUM SERPL-MCNC: 8.8 MG/DL (ref 8.8–10)
CHLORIDE SERPL-SCNC: 109 MMOL/L (ref 98–107)
CO2 SERPL-SCNC: 25 MMOL/L (ref 23–31)
CREAT SERPL-MCNC: 1.18 MG/DL (ref 0.73–1.18)
EOSINOPHIL # BLD AUTO: 0 X10(3)/MCL (ref 0–0.9)
EOSINOPHIL NFR BLD AUTO: 0 %
ERYTHROCYTE [DISTWIDTH] IN BLOOD BY AUTOMATED COUNT: 13.9 % (ref 11.5–17)
GFR SERPLBLD CREATININE-BSD FMLA CKD-EPI: >60 MLS/MIN/1.73/M2
GLOBULIN SER-MCNC: 3.1 GM/DL (ref 2.4–3.5)
GLUCOSE SERPL-MCNC: 169 MG/DL (ref 82–115)
HCT VFR BLD AUTO: 37 % (ref 42–52)
HGB BLD-MCNC: 12.1 G/DL (ref 14–18)
HOLD SPECIMEN: NORMAL
IMM GRANULOCYTES # BLD AUTO: 0.08 X10(3)/MCL (ref 0–0.04)
IMM GRANULOCYTES NFR BLD AUTO: 0.6 %
INR PPP: 2
LYMPHOCYTES # BLD AUTO: 0.93 X10(3)/MCL (ref 0.6–4.6)
LYMPHOCYTES NFR BLD AUTO: 6.7 %
MCH RBC QN AUTO: 31.4 PG (ref 27–31)
MCHC RBC AUTO-ENTMCNC: 32.7 G/DL (ref 33–36)
MCV RBC AUTO: 96.1 FL (ref 80–94)
MONOCYTES # BLD AUTO: 0.43 X10(3)/MCL (ref 0.1–1.3)
MONOCYTES NFR BLD AUTO: 3.1 %
NEUTROPHILS # BLD AUTO: 12.42 X10(3)/MCL (ref 2.1–9.2)
NEUTROPHILS NFR BLD AUTO: 89.5 %
NRBC BLD AUTO-RTO: 0 %
PLATELET # BLD AUTO: 293 X10(3)/MCL (ref 130–400)
PMV BLD AUTO: 11.3 FL (ref 7.4–10.4)
POCT GLUCOSE: 106 MG/DL (ref 70–110)
POCT GLUCOSE: 163 MG/DL (ref 70–110)
POTASSIUM SERPL-SCNC: 4.2 MMOL/L (ref 3.5–5.1)
PROT SERPL-MCNC: 6.3 GM/DL (ref 5.8–7.6)
PROTHROMBIN TIME: 22.3 SECONDS (ref 11.4–14)
RBC # BLD AUTO: 3.85 X10(6)/MCL (ref 4.7–6.1)
SODIUM SERPL-SCNC: 140 MMOL/L (ref 136–145)
WBC # SPEC AUTO: 13.87 X10(3)/MCL (ref 4.5–11.5)

## 2023-12-03 PROCEDURE — 94761 N-INVAS EAR/PLS OXIMETRY MLT: CPT

## 2023-12-03 PROCEDURE — 25000242 PHARM REV CODE 250 ALT 637 W/ HCPCS

## 2023-12-03 PROCEDURE — 80053 COMPREHEN METABOLIC PANEL: CPT | Performed by: STUDENT IN AN ORGANIZED HEALTH CARE EDUCATION/TRAINING PROGRAM

## 2023-12-03 PROCEDURE — G0378 HOSPITAL OBSERVATION PER HR: HCPCS

## 2023-12-03 PROCEDURE — 94640 AIRWAY INHALATION TREATMENT: CPT | Mod: XB

## 2023-12-03 PROCEDURE — 85610 PROTHROMBIN TIME: CPT

## 2023-12-03 PROCEDURE — 85025 COMPLETE CBC W/AUTO DIFF WBC: CPT | Performed by: STUDENT IN AN ORGANIZED HEALTH CARE EDUCATION/TRAINING PROGRAM

## 2023-12-03 PROCEDURE — 25000003 PHARM REV CODE 250

## 2023-12-03 PROCEDURE — 97116 GAIT TRAINING THERAPY: CPT

## 2023-12-03 RX ORDER — METHYLPREDNISOLONE 4 MG/1
TABLET ORAL
Qty: 21 EACH | Refills: 0 | Status: SHIPPED | OUTPATIENT
Start: 2023-12-03 | End: 2023-12-03 | Stop reason: SDUPTHER

## 2023-12-03 RX ORDER — METHYLPREDNISOLONE 4 MG/1
TABLET ORAL
Qty: 21 EACH | Refills: 0 | Status: SHIPPED | OUTPATIENT
Start: 2023-12-03 | End: 2023-12-24

## 2023-12-03 RX ORDER — METHYLPREDNISOLONE 4 MG/1
TABLET ORAL
Qty: 21 EACH | Refills: 0
Start: 2023-12-03 | End: 2023-12-03 | Stop reason: SDUPTHER

## 2023-12-03 RX ADMIN — FLUTICASONE FUROATE AND VILANTEROL TRIFENATATE 1 PUFF: 200; 25 POWDER RESPIRATORY (INHALATION) at 07:12

## 2023-12-03 RX ADMIN — FUROSEMIDE 40 MG: 20 TABLET ORAL at 08:12

## 2023-12-03 RX ADMIN — GUAIFENESIN AND DEXTROMETHORPHAN 10 ML: 100; 10 SYRUP ORAL at 11:12

## 2023-12-03 RX ADMIN — FAMOTIDINE 20 MG: 20 TABLET ORAL at 08:12

## 2023-12-03 RX ADMIN — GUAIFENESIN AND DEXTROMETHORPHAN 10 ML: 100; 10 SYRUP ORAL at 06:12

## 2023-12-03 RX ADMIN — METOPROLOL SUCCINATE 100 MG: 50 TABLET, EXTENDED RELEASE ORAL at 08:12

## 2023-12-03 RX ADMIN — IPRATROPIUM BROMIDE AND ALBUTEROL SULFATE 3 ML: .5; 3 SOLUTION RESPIRATORY (INHALATION) at 07:12

## 2023-12-03 RX ADMIN — DOXAZOSIN 4 MG: 2 TABLET ORAL at 08:12

## 2023-12-03 RX ADMIN — FOLIC ACID 1 MG: 1 TABLET ORAL at 08:12

## 2023-12-03 RX ADMIN — IPRATROPIUM BROMIDE AND ALBUTEROL SULFATE 3 ML: .5; 3 SOLUTION RESPIRATORY (INHALATION) at 11:12

## 2023-12-03 RX ADMIN — ATORVASTATIN CALCIUM 80 MG: 40 TABLET, FILM COATED ORAL at 08:12

## 2023-12-03 NOTE — NURSING
Patient ambulated on the unit with physical therapy. Patient maintained an oxygen saturation above 98% while on room air.

## 2023-12-03 NOTE — PT/OT/SLP PROGRESS
Physical Therapy Treatment and Discharge Note    Patient Name:  Ezra Zhang   MRN:  [id number]    Recommendations     Therapy Intensity Recommendations at Discharge: No Therapy Indicated  Discharge Equipment Recommendations: none   Equipment to be obtained for discharge: none.  Barriers to discharge: none    Assessment     Ezra Zhang is a 63 y.o. male admitted with a medical diagnosis of COPD exacerbation.    He presents with the following impairments/functional limitations:   .    Rehab Prognosis: Good.    Patient's current level of function is at baseline (PLOF).  Patient does not require further acute PT services.   Hospital discharge anticipated today.    Recent Surgery: * No surgery found *      Plan     Patient discharged from acute PT Services on 12/03/23.    Based on current functional levels observed, patient does not require further acute PT services.    Re-consult PT Services if patient's status changes and therapy services warranted.    Subjective     Communicated with patient's nurse Keri prior to session.    Patient agreeable to participate in treatment session.    Chief Complaint: None stated   Patient/Family Comments/goals: To get a grabber tool to assist   Pain/Comfort:  Pain Rating 1: 0/10  Pain Rating Post-Intervention 1: 0/10    Objective     Patient found sitting in chair with    upon PT entry to room.    General Precautions: Standard, fall   Orthopedic Precautions:N/A   Braces:    Respiratory Status: room air    98%-100% throughout gait     Functional Mobility:    Bed Mobility:  Seated in chair at start of session and returned to chair at end of session    Transfers:  Sit to Stand: modified independence with rolling walker    Gait:  Patient ambulated 130ft x2 with rolling walker and modified independence.  Patient demonstrates :       steady gait       no loss of balance.    Other Mobility:  not assessed    Patient left sitting in chair with patient' nurse notified.    Goals      Multidisciplinary Problems       Physical Therapy Goals       Not on file              Multidisciplinary Problems (Resolved)          Problem: Physical Therapy    Goal Priority Disciplines Outcome Goal Variances Interventions   Physical Therapy Goal   (Resolved)     PT, PT/OT Met     Description: Goals to be met by: Discharge     Patient will increase functional independence with mobility by performing:    -. Supine to sit with Modified [place] - NOT MET  -. Sit to stand transfer with Modified [place] with RW MET  -. Bed to chair transfer with Modified [place] using Rolling Walker MET   -. Gait  x 130 feet with Modified [place] using Rolling Walker. MET                        Time Tracking     PT Received On: 12/03/23  PT Start Time: 0932     PT Stop Time: 0944  PT Total Time (min): 12 min     Billable Minutes: Gait Training 12    12/03/2023

## 2023-12-04 ENCOUNTER — OFFICE VISIT (OUTPATIENT)
Dept: NEPHROLOGY | Facility: CLINIC | Age: 63
End: 2023-12-04
Payer: MEDICAID

## 2023-12-04 VITALS
HEART RATE: 70 BPM | TEMPERATURE: 98 F | WEIGHT: 255 LBS | SYSTOLIC BLOOD PRESSURE: 100 MMHG | HEIGHT: 74 IN | RESPIRATION RATE: 18 BRPM | BODY MASS INDEX: 32.73 KG/M2 | OXYGEN SATURATION: 100 % | DIASTOLIC BLOOD PRESSURE: 70 MMHG

## 2023-12-04 DIAGNOSIS — I10 PRIMARY HYPERTENSION: ICD-10-CM

## 2023-12-04 DIAGNOSIS — N13.30 HYDRONEPHROSIS OF LEFT KIDNEY: ICD-10-CM

## 2023-12-04 DIAGNOSIS — N18.31 CKD STAGE G3A/A1, GFR 45-59 AND ALBUMIN CREATININE RATIO <30 MG/G: Primary | ICD-10-CM

## 2023-12-04 PROBLEM — G47.31 CENTRAL SLEEP APNEA SYNDROME: Status: ACTIVE | Noted: 2023-08-14

## 2023-12-04 PROCEDURE — 3074F SYST BP LT 130 MM HG: CPT | Mod: CPTII,,, | Performed by: NURSE PRACTITIONER

## 2023-12-04 PROCEDURE — 4010F ACE/ARB THERAPY RXD/TAKEN: CPT | Mod: CPTII,,, | Performed by: NURSE PRACTITIONER

## 2023-12-04 PROCEDURE — 3066F NEPHROPATHY DOC TX: CPT | Mod: CPTII,,, | Performed by: NURSE PRACTITIONER

## 2023-12-04 PROCEDURE — 3008F BODY MASS INDEX DOCD: CPT | Mod: CPTII,,, | Performed by: NURSE PRACTITIONER

## 2023-12-04 PROCEDURE — 1160F PR REVIEW ALL MEDS BY PRESCRIBER/CLIN PHARMACIST DOCUMENTED: ICD-10-PCS | Mod: CPTII,,, | Performed by: NURSE PRACTITIONER

## 2023-12-04 PROCEDURE — 1159F PR MEDICATION LIST DOCUMENTED IN MEDICAL RECORD: ICD-10-PCS | Mod: CPTII,,, | Performed by: NURSE PRACTITIONER

## 2023-12-04 PROCEDURE — 3044F PR MOST RECENT HEMOGLOBIN A1C LEVEL <7.0%: ICD-10-PCS | Mod: CPTII,,, | Performed by: NURSE PRACTITIONER

## 2023-12-04 PROCEDURE — 99215 OFFICE O/P EST HI 40 MIN: CPT | Mod: PBBFAC | Performed by: NURSE PRACTITIONER

## 2023-12-04 PROCEDURE — 99214 PR OFFICE/OUTPT VISIT, EST, LEVL IV, 30-39 MIN: ICD-10-PCS | Mod: S$PBB,,, | Performed by: NURSE PRACTITIONER

## 2023-12-04 PROCEDURE — 3061F NEG MICROALBUMINURIA REV: CPT | Mod: CPTII,,, | Performed by: NURSE PRACTITIONER

## 2023-12-04 PROCEDURE — 4010F PR ACE/ARB THEARPY RXD/TAKEN: ICD-10-PCS | Mod: CPTII,,, | Performed by: NURSE PRACTITIONER

## 2023-12-04 PROCEDURE — 3066F PR DOCUMENTATION OF TREATMENT FOR NEPHROPATHY: ICD-10-PCS | Mod: CPTII,,, | Performed by: NURSE PRACTITIONER

## 2023-12-04 PROCEDURE — 3078F PR MOST RECENT DIASTOLIC BLOOD PRESSURE < 80 MM HG: ICD-10-PCS | Mod: CPTII,,, | Performed by: NURSE PRACTITIONER

## 2023-12-04 PROCEDURE — 3044F HG A1C LEVEL LT 7.0%: CPT | Mod: CPTII,,, | Performed by: NURSE PRACTITIONER

## 2023-12-04 PROCEDURE — 3061F PR NEG MICROALBUMINURIA RESULT DOCUMENTED/REVIEW: ICD-10-PCS | Mod: CPTII,,, | Performed by: NURSE PRACTITIONER

## 2023-12-04 PROCEDURE — 3008F PR BODY MASS INDEX (BMI) DOCUMENTED: ICD-10-PCS | Mod: CPTII,,, | Performed by: NURSE PRACTITIONER

## 2023-12-04 PROCEDURE — 3078F DIAST BP <80 MM HG: CPT | Mod: CPTII,,, | Performed by: NURSE PRACTITIONER

## 2023-12-04 PROCEDURE — 99214 OFFICE O/P EST MOD 30 MIN: CPT | Mod: S$PBB,,, | Performed by: NURSE PRACTITIONER

## 2023-12-04 PROCEDURE — 1159F MED LIST DOCD IN RCRD: CPT | Mod: CPTII,,, | Performed by: NURSE PRACTITIONER

## 2023-12-04 PROCEDURE — 3074F PR MOST RECENT SYSTOLIC BLOOD PRESSURE < 130 MM HG: ICD-10-PCS | Mod: CPTII,,, | Performed by: NURSE PRACTITIONER

## 2023-12-04 PROCEDURE — 1160F RVW MEDS BY RX/DR IN RCRD: CPT | Mod: CPTII,,, | Performed by: NURSE PRACTITIONER

## 2023-12-04 NOTE — PROGRESS NOTES
[facility]  Nephrology Clinic Note    Chief complaint: Chronic Kidney Disease (RTC, took meds, d'cd from hospital 12/3-resp viral; edema patrick le)    History of present illness:   Ezra Zhang is a 63 y.o. Black or  male with past medical history of chronic kidney disease,  left hydronephrosis (followed by [facility] and [facility]), diabetes mellitus type 2 (diagnosed in 2008), heart failure with reduced ejection fraction (15% per echo in March of 2023), treated hepatitis C, dyslipidemia, atrial fibrillation (anticoagulated with warfarin), COPD,  RYAN, and past history of tobacco abuse. Patient presents for follow-up appointment in nephrology clinic today.  Patient was admitted on 12/01/2023 for treatment of upper respiratory infection and COPD exacerbation, was discharged on 12/03/2023.  States he is slowly getting better.  Denies fever, chills, nausea, vomiting, or shortness of breath today.    Review of Systems  12 point review of systems conducted, negative except as stated in the history of present illness.    Allergies: Patient has No Known Allergies.     Past Medical History:  has a past medical history of Atrial fibrillation, CHF (congestive heart failure), CKD (chronic kidney disease), COPD (chronic obstructive pulmonary disease), DM (diabetes mellitus), Dyslipidemia, Essential (primary) hypertension, Heart failure, unspecified, Rheumatoid arthritis, unspecified, Smoking, and Type 2 diabetes mellitus without complications.    Procedure History:  has a past surgical history that includes coronary arteriograph; left  heart catherization; Insert / replace / remove pacemaker; Cardioversion; defibillator; echocardiogram,transesophageal (N/A, 3/22/2023); Ablation (N/A, 9/11/2023); and Insertion of biventricular implantable cardioverter-defibrillator (ICD) (N/A, 9/11/2023).    Family History: family history includes Cataracts in his mother; Glaucoma in his  "mother; Heart disease in his mother; Heart failure in his mother; Hypertension in his father; Peripheral vascular disease in his mother.    Social History:  reports that he has never smoked. He has never been exposed to tobacco smoke. He has never used smokeless tobacco. He reports that he does not drink alcohol and does not use drugs.    Physical exam  /70 (BP Location: Right arm, Patient Position: Sitting, BP Method: Large (Automatic))   Pulse 70   Temp 97.5 °F (36.4 °C) (Oral)   Resp 18   Ht 6' 2.02" (1.88 m)   Wt 115.7 kg (255 lb)   SpO2 100%   BMI 32.73 kg/m²   General appearance: Patient is in no acute distress.  Skin: No rashes or wounds.  HEENT: PERRLA, EOMI, no scleral icterus, no JVD. Neck is supple.  Chest: Respirations are unlabored. Lungs sounds are clear.   Heart: S1, S2.   Abdomen: Benign.  : Deferred.  Extremities: No edema, peripheral pulses are palpable.   Neuro: No focal deficits.     Home Medications:    Current Outpatient Medications:     ACCU-CHEK FASTCLIX LANCET DRUM Misc, Apply topically once daily., Disp: , Rfl:     ACCU-CHEK GUIDE TEST STRIPS Strp, USE AS DIRECTED once daily (keep log), Disp: , Rfl:     albuterol (PROVENTIL) 2.5 mg /3 mL (0.083 %) nebulizer solution, Take 3 mLs (2.5 mg total) by nebulization every 6 (six) hours as needed for Wheezing. Rescue, Disp: 100 each, Rfl: 6    alfuzosin (UROXATRAL) 10 mg Tb24, Take 1 tablet by mouth once daily., Disp: , Rfl:     atorvastatin (LIPITOR) 80 MG tablet, Take 1 tablet (80 mg total) by mouth once daily., Disp: 90 tablet, Rfl: 3    cetirizine (ZYRTEC) 10 MG tablet, Take 1 tablet (10 mg total) by mouth once daily., Disp: 90 tablet, Rfl: 1    cholecalciferol, vitamin D3, (VITAMIN D3) 125 mcg (5,000 unit) Tab, Take 5,000 Units by mouth once daily., Disp: , Rfl:     clotrimazole-betamethasone 1-0.05% (LOTRISONE) cream, Apply 2 g topically 2 (two) times daily., Disp: , Rfl:     codeine-guaiFENesin 6.3-100 mg/5 mL Liqd, Take 10 mLs " by mouth daily as needed (Cough)., Disp: 473 mL, Rfl: 0    famotidine (PEPCID) 20 MG tablet, Take 1 tablet (20 mg total) by mouth 2 (two) times daily., Disp: 60 tablet, Rfl: 11    fluticasone propionate (FLONASE ALLERGY RELIEF) 50 mcg/actuation nasal spray, 2 sprays (100 mcg total) by Each Nostril route Daily., Disp: 18 g, Rfl: 6    fluticasone-salmeterol 230-21 mcg/dose (ADVAIR HFA) 230-21 mcg/actuation HFAA inhaler, Inhale 2 puffs into the lungs 2 (two) times daily. Controller, Disp: 12 g, Rfl: 6    furosemide (LASIX) 40 MG tablet, Take 1 tablet (40 mg total) by mouth once daily. Refilled per Cardio orders., Disp: 90 tablet, Rfl: 3    glipiZIDE (GLUCOTROL) 5 MG tablet, Take 1 tablet (5 mg total) by mouth once daily., Disp: 90 tablet, Rfl: 1    ipratropium (ATROVENT) 0.02 % nebulizer solution, Take 2.5 mLs (500 mcg total) by nebulization 4 (four) times daily. Rescue, Disp: 100 mL, Rfl: 6    lancets-blood glucose strips 30 gauge Cmpk,  Accucheck blood glucose test strips and lancets, See Instructions, Check CBG once daily and keep log., # 100 EA, 11 Refill(s), Pharmacy: [facility], 185, cm, Height/Length Dosing, 03/08/22 8:10:00 CST, 96, kg, Weight Dosing, 03/08/22 8:10:00 CST, Disp: , Rfl:     methylPREDNISolone (MEDROL DOSEPACK) 4 mg tablet, Follow directions and use as directed. Not for long term use., Disp: 21 each, Rfl: 0    metoprolol succinate (TOPROL-XL) 100 MG 24 hr tablet, Take 100 mg by mouth once daily., Disp: , Rfl:     promethazine-codeine 6.25-10 mg/5 ml (PHENERGAN WITH CODEINE) 6.25-10 mg/5 mL syrup, Take 5 mLs by mouth every 4 (four) hours as needed for Cough., Disp: , Rfl:     promethazine-dextromethorphan (PROMETHAZINE-DM) 6.25-15 mg/5 mL Syrp, TAKE 5 ml (cc) EVERY 4 HOURS AS NEEDED FOR cough, Disp: , Rfl:     sacubitriL-valsartan (ENTRESTO) 49-51 mg per tablet, Take 1 tablet by mouth 2 (two) times daily., Disp: 60 tablet, Rfl: 11    traZODone (DESYREL) 50 MG tablet, Take 1 tablet (50 mg  total) by mouth nightly as needed for Insomnia., Disp: 90 tablet, Rfl: 1    warfarin (COUMADIN) 5 MG tablet, Take 1 tablet (5 mg total) by mouth Daily., Disp: 90 tablet, Rfl: 3    doxycycline (VIBRA-TABS) 100 MG tablet, Take 100 mg by mouth 2 (two) times daily., Disp: , Rfl:     HYDROcodone-acetaminophen (NORCO) 5-325 mg per tablet, Take 2 tablets by mouth 2 (two) times daily., Disp: , Rfl:   No current facility-administered medications for this visit.    Facility-Administered Medications Ordered in Other Visits:     0.9%  NaCl infusion, , Intravenous, Once, Álvaro Degroot MD    sodium chloride 0.9% flush 10 mL, 10 mL, Intravenous, PRN, Álvaro Degroot MD    vancomycin injection 1,000 mg, 1,000 mg, Intravenous, On Call Procedure, Álvaro Degroot MD, 1,000 mg at 09/11/23 1046    Laboratory data    Lab Results   Component Value Date    WBC 13.87 (H) 12/03/2023    HGB 12.1 (L) 12/03/2023    HCT 37.0 (L) 12/03/2023     12/03/2023    IRON 83 03/27/2018    TIBC 177 (L) 03/27/2018    TRANS 212.0 03/27/2018    LABIRON 46.9 03/27/2018    FERRITIN 295.1 03/27/2018    FOLATE 4.4 (L) 12/02/2023    XKNJXMER41 420 12/02/2023     12/03/2023    K 4.2 12/03/2023    CHLORIDE 109 (H) 12/03/2023    CO2 25 12/03/2023    BUN 24.5 12/03/2023    CREATININE 1.18 12/03/2023    EGFRNORACEVR >60 12/03/2023    GLUCOSE 169 (H) 12/03/2023    CALCIUM 8.8 12/03/2023    ALKPHOS 115 12/03/2023    LABPROT 6.3 12/03/2023    ALBUMIN 3.2 (L) 12/03/2023    BILIDIR 0.2 08/25/2021    IBILI 0.20 08/25/2021    AST 19 12/03/2023    ALT 27 12/03/2023    MG 2.10 03/07/2023    PHOS 3.2 07/06/2023      Lab Results   Component Value Date    HGBA1C 6.1 11/14/2023    OMIFMUXV32DR 35.4 04/12/2023    HIV Nonreactive 01/09/2018    HEPCAB Reactive (A) 08/23/2019    HEPBSURFAG Negative 08/23/2019    HEPBCAB Nonreactive 08/23/2019     Urine:  Lab Results   Component Value Date    COLORUA Yellow 07/06/2023    APPEARANCEUA Clear 07/06/2023    SGUA >=1.030 07/06/2023     PHUA 5.5 07/06/2023    PROTEINUA Negative 07/06/2023    GLUCOSEUA Negative 07/06/2023    KETONESUA Negative 07/06/2023    BLOODUA Negative 07/06/2023    NITRITESUA Negative 07/06/2023    LEUKOCYTESUR Negative 07/06/2023    RBCUA None Seen 07/06/2023    WBCUA 0-2 07/06/2023    BACTERIA None Seen 07/06/2023    SQEPUA Trace (A) 06/04/2023    HYALINECASTS 3-5 (A) 06/04/2023    CREATRANDUR 109.4 11/14/2023    PROTEINURINE 13.1 07/06/2023    UPROTCREA 0.1 07/06/2023         Imaging  CT Abdomen Pelvis W Wo Contrast 03/31/2023    Images were reviewed in soft tissue, lung, and bone windows.  Exam quality: adequate for evaluation  Lines/tubes: none visualized  Chest: Stable heart chamber size. No pericardial effusion. No interval change of the visualized vasculature. No airspace consolidation or suspicious lung lesion. No worsening pleural effusion or evidence for loculation.  Hepatobiliary/Pancreas: No new or enlarging hepatic lesion. No suspicious findings of the gallbladder or biliary system. No acute process or suspicious interval change of the pancreas.  Spleen: No interval change.  Adrenal/: No new, enlarging, or otherwise suspicious adrenal or renal lesion.  Small simple appearing bilateral renal cortex cysts are unchanged.  Both kidneys enhance in temporally symmetric fashion.  Persistent moderate to severe left hydroureteronephrosis is appreciated, with similar degree of dilatation throughout the course of the ureter to the level of the ureterovesicular junction.  No new or worsening focal abnormality to indicate etiology of distal obstructive uropathy is identified.  No interval development of right hydronephrosis or ureteral dilatation is identified.  Mural contour of the urinary bladder is unremarkable.  There is no convincing intraluminal filling defect within limitations of lacking intravesicular contrast opacification.  The prostate is similar in size and appearance.  Esophagus/GI tract: The included lower  esophagus is unremarkable. No evidence of gastric outlet or small bowel obstruction. No acute inflammatory process or convincing focal lesion.  Musculoskeletal: No significant interval changes appreciated.  Similar degenerative alterations are noted throughout the spinal column and bony pelvis, as well as early stage changes of bilateral femoral head avascular necrosis.  Other findings: No free fluid or air. No drainable collections. Aortoiliac vascular structures are similar in comparison. No development of pathologic angie enlargement or necrotic adenopathy.    IMPRESSION  1. Grossly stable appearance of moderate to severe left hydroureteronephrosis.  Etiology of distal obstructive uropathy remains indeterminate, but could be secondary to persistent stricture just at the level of the UVJ.  2. No development of asymmetric renal enhancement to suggest new or worsening left renal function.  3. Additional chronic secondary findings are grossly unchanged from the April 2022 CT appearance.  Electronically signed by: Xavier Wisdom  Date:    03/31/2023  Time:    15:31    NM KIDNEY W FLOW AND FUNCTION W PHARMACOLOGICAL   There was mildly delayed perfusion to the right kidney during flow phase of the exam.  Right kidney cortical plateau was achieved in 5.73 minutes.  There was subsequent accumulation of radioisotope within nondilated right kidney collecting system.  There was some drop of the right kidney excretory curve prior to administration of Lasix.  Post intravenous challenge with Lasix, there was adequate prompt drop of the right kidney excretory curve and time half was achieved within 3.75 minutes.     There was also delayed perfusion to the left kidney.  Left kidney cortical plateau was achieved within 9.38 minutes.  There was left kidney central photopenia with subsequently filled in and is consistent with dilated left kidney collecting system.  There is also left ureteral diffuse dilatation.  There was partial  drop of the left kidney excretory curve prior to administration of Lasix and further drop of the left kidney excretory curve post administration of Lasix.  Left kidney time half was achieved within 5.23 minutes     Quantitative analysis of the kidneys was performed and show split function discrepancy. The right kidney total function is 58 % and left kidney 42 %. The right renal normalized effective plasma flow is 30 ml/minute and the left kidney 22 ml/minute.     Impression:  1. Left hydroureteronephrosis with adequate response post administration of Lasix and drop of the excretory curve.  2. Bilateral kidneys diminished function.      Impression    ICD-10-CM ICD-9-CM   1. CKD stage G3a/A1, GFR 45-59 and albumin creatinine ratio <30 mg/g  N18.31 585.3   2. Primary hypertension  I10 401.9   3. Hydronephrosis of left kidney  N13.30 591   4. BMI 32.0-32.9,adult  Z68.32 V85.32        Plan     CKD stage G3a/A1, GFR 45-59 and albumin creatinine ratio <30 mg/g  -     Comprehensive Metabolic Panel; Future; Expected date: 05/25/2024  -     Protein/Creatinine Ratio, Urine; Future; Expected date: 05/25/2024  -     Urinalysis, Reflex to Urine Culture; Future; Expected date: 05/25/2024  Renal indices are relatively stable, there is no significant proteinuria.  Continue renal sparing activities:  -Follow low sodium diet (2 grams a day)  -Control high blood pressure ( goal BP < 130/80, please record BP at home every day and bring log to next office visit)  -Exercise at least 30 minutes a day, 5 days a week.  -Maintain healthy weight.  -Decrease or stop alcohol use  -Do not smoke  -Stay well hydrated  -Receive Pneumovax, Flu, and HBV vaccines if indicated.  -Do not take NSAIDs (Ibuprofen, Naproxen, Aleve, Advil, Toradol, Mobic), may take only Tylenol as needed for pain/headaches.  -Take cholesterol-lowering medications as prescribed (LDL goal <100)  [facility] with routine labs in 6 months    Primary  hypertension  Blood pressure reading is at goal, continue current antihypertensive regimen and 2 g a day dietary sodium restriction.      Hydronephrosis of left kidney  Follow up with Urology as scheduled.    BMI 32.0-32.9,adult  Lifestyle and dietary interventions discussed, patient encouraged to maintain non-sedentary lifestyle and well-balanced diet.        12/4/2023  Nadira Buchanan NP  [facility] Nephrology

## 2023-12-04 NOTE — PROGRESS NOTES
12/04/23 0812   Missed Time Reason   Missed Treatment Reason Patient discharged prior to initiation of evaluation

## 2023-12-04 NOTE — DISCHARGE SUMMARY
[facility] [address] [place] [address] Medicine  Discharge Summary      Patient Name: Ezra Zhang  MRN: [id number]  Admission Date: 12/1/2023  Hospital Length of Stay: 0 days  Discharge Date and Time:  12/03/2023 6:38 PM  Attending Physician: No att. providers found   Discharging Provider: Elias Hilliard MD  Discharge Provider Team: Networked reference to record PCT   Primary Care Provider: Dayami Squires FNP        HPI: Ezra Zhang is a 63 y.o. male with PMH of COPD, BPH, DM II, HTN, HFref (EF of 15% - ICD placed 9/2023), atrial fibrillation s/p LASHELL and cardioversion in 2017 (on Coumadin), RYAN (on home CPAP), nonischemic cardiomyopathy ([facility] in 2014)  who presented to [facility] ED on 12/1/2023 with complaint of dry cough, shortness on breath at rest, and chest pain associated with cough x 2 days.  Patient states that symptoms have worsened since yesterday.  He also reports sore throat, fatigue, runny nose, loss of appetite.  He denies any fever, chills, abdominal pain, nausea or vomiting, leg swelling.     * No surgery found *      Hospital Course: Course uneventful. He was treated for his COPD with nebulizer treatments and steroids. Did not require abx. He's stable on room air. Wheezing improved with DuoNeb treatments and steroids. He will be discharged to home with a short course of steroids and cough suppressant. Advised to follow-up with [facility] before returning to his PCP. All questions were answered at the time of discharge.    Consults:     Final Active Diagnoses:    Diagnosis Date Noted POA    PRINCIPAL PROBLEM:  COPD exacerbation [J44.1] 12/01/2023 Yes      Problems Resolved During this Admission:      Discharged Condition: stable    Disposition: Home or Self Care    Follow Up:   Follow-up Information       Joselin Braun MD Follow up in 1 week(s).    Specialty: Family Medicine  Why: Post hospital discharge.  Contact information:  [address] [facility]  [address]  [phone]                           Patient Instructions:   No discharge procedures on file.  Medications:  Reconciled Home Medications:      Medication List        START taking these medications      codeine-guaiFENesin 6.3-100 mg/5 mL Liqd  Take 10 mLs by mouth as needed (Cough).     methylPREDNISolone 4 mg tablet  Commonly known as: MEDROL DOSEPACK  Follow directions and use as directed. Not for long term use.            CONTINUE taking these medications      ACCU-CHEK FASTCLIX LANCET DRUM Misc  Generic drug: lancets  Apply topically once daily.     ACCU-CHEK GUIDE TEST STRIPS Strp  Generic drug: blood sugar diagnostic  USE AS DIRECTED once daily (keep log)     albuterol 2.5 mg /3 mL (0.083 %) nebulizer solution  Commonly known as: PROVENTIL  Take 3 mLs (2.5 mg total) by nebulization every 6 (six) hours as needed for Wheezing. Rescue     alfuzosin 10 mg Tb24  Commonly known as: UROXATRAL  Take 1 tablet by mouth once daily.     atorvastatin 80 MG tablet  Commonly known as: LIPITOR  Take 1 tablet (80 mg total) by mouth once daily.     cetirizine 10 MG tablet  Commonly known as: ZYRTEC  Take 1 tablet (10 mg total) by mouth once daily.     clotrimazole-betamethasone 1-0.05% cream  Commonly known as: LOTRISONE  Apply 2 g topically 2 (two) times daily.     doxycycline 100 MG tablet  Commonly known as: VIBRA-TABS  Take 100 mg by mouth 2 (two) times daily.     ENTRESTO 49-51 mg per tablet  Generic drug: sacubitriL-valsartan  Take 1 tablet by mouth 2 (two) times daily.     famotidine 20 MG tablet  Commonly known as: PEPCID  Take 1 tablet (20 mg total) by mouth 2 (two) times daily.     fluticasone propionate 50 mcg/actuation nasal spray  Commonly known as: FLONASE ALLERGY RELIEF  2 sprays (100 mcg total) by Each Nostril route Daily.     fluticasone-salmeterol 230-21 mcg/dose 230-21 mcg/actuation Hfaa inhaler  Commonly known as: ADVAIR HFA  Inhale 2 puffs into the lungs 2 (two) times daily. Controller      furosemide 40 MG tablet  Commonly known as: LASIX  Take 1 tablet (40 mg total) by mouth once daily. Refilled per Cardio orders.     glipiZIDE 5 MG tablet  Commonly known as: GLUCOTROL  Take 1 tablet (5 mg total) by mouth once daily.     HYDROcodone-acetaminophen 5-325 mg per tablet  Commonly known as: NORCO  Take 2 tablets by mouth 2 (two) times daily.     ipratropium 0.02 % nebulizer solution  Commonly known as: ATROVENT  Take 2.5 mLs (500 mcg total) by nebulization 4 (four) times daily. Rescue     lancets-blood glucose strips 30 gauge Cmpk  Accucheck blood glucose test strips and lancets, See Instructions, Check CBG once daily and keep log., # 100 EA, 11 Refill(s), Pharmacy: [facility], 185, cm, Height/Length Dosing, 03/08/22 8:10:00 CST, 96, kg, Weight Dosing, 03/08/22 8:10:00 CST     metoprolol succinate 100 MG 24 hr tablet  Commonly known as: TOPROL-XL  Take 100 mg by mouth once daily.     promethazine-codeine 6.25-10 mg/5 ml 6.25-10 mg/5 mL syrup  Commonly known as: PHENERGAN with CODEINE  Take 5 mLs by mouth every 4 (four) hours as needed for Cough.     promethazine-dextromethorphan 6.25-15 mg/5 mL Syrp  Commonly known as: PROMETHAZINE-DM  TAKE 5 ml (cc) EVERY 4 HOURS AS NEEDED FOR cough     traZODone 50 MG tablet  Commonly known as: DESYREL  Take 1 tablet (50 mg total) by mouth nightly as needed for Insomnia.     VITAMIN D3 125 mcg (5,000 unit) Tab  Generic drug: cholecalciferol (vitamin D3)  Take 5,000 Units by mouth once daily.     warfarin 5 MG tablet  Commonly known as: COUMADIN  Take 1 tablet (5 mg total) by mouth Daily.            STOP taking these medications      nicotine 14 mg/24 hr  Commonly known as: NICODERM CQ     nicotine polacrilex 4 MG Gum  Commonly known as: NICORETTE     predniSONE 50 MG Tab  Commonly known as: DELTASONE              Significant Diagnostic Studies: N/A    Pending Diagnostic Studies:       None          Indwelling Lines/Drains at time of discharge:    Lines/Drains/Airways       None                   Time spent on the discharge of patient: 20 minutes         Elias Hilliard MD  Department of Hospital Medicine  [facility] Med Surg Telemetry

## 2023-12-05 ENCOUNTER — TELEPHONE (OUTPATIENT)
Dept: INTERNAL MEDICINE | Facility: CLINIC | Age: 63
End: 2023-12-05
Payer: MEDICAID

## 2023-12-05 NOTE — TELEPHONE ENCOUNTER
Pt was discharged from the ER on 12/3/23, he is requesting a rx for cough. He states the rx from the ER was written incorrectly. I called to verify with Pharmacist, but its closed at this time.   473 mL       Disp Refills Start End LUNA    codeine-guaiFENesin 6.3-100 mg/5 mL Liqd 473 mL 0 12/4/2023 12/14/2023 Yes   Sig - Route: Take 10 mLs by mouth daily as needed (Cough). - Oral   Sent to pharmacy as: codeine-guaiFENesin 6.3-100 mg/5 mL Liqd   Class: Normal   Order: [id number]   Date/Time Signed: 12/4/2023 08:33       E-Prescribing Status: Receipt confirmed by pharmacy (12/4/2023  8:33 AM CST)   Renewals    Renewal requests to authorizing provider (Elias Hilliard MD) prohibited

## 2023-12-06 DIAGNOSIS — R05.9 COUGH, UNSPECIFIED TYPE: Primary | ICD-10-CM

## 2023-12-06 RX ORDER — PROMETHAZINE HYDROCHLORIDE AND DEXTROMETHORPHAN HYDROBROMIDE 6.25; 15 MG/5ML; MG/5ML
SYRUP ORAL
Qty: 120 ML | Refills: 1 | Status: SHIPPED | OUTPATIENT
Start: 2023-12-06

## 2023-12-06 NOTE — PROGRESS NOTES
Pt called clinic stating he was seen in in ER for chronic COPD/ cough and was prescribed Guiafenisen with codiene cough syrrup but was an issue with script and is asking me to fill med. Informed pt on prior visit I informed him I could not longer prescribe codiene cough syrup due to frequecy pt was requesting med as it is a controlled substance and no further refills will be give for codiene cough med and only Promethazine DM. Pt requesting refill of Promethazine DM due to cough. Refilled Promethazine DM as previously prescribed prn cough. Pt verbalized understanding.

## 2023-12-13 ENCOUNTER — LAB VISIT (OUTPATIENT)
Dept: LAB | Facility: HOSPITAL | Age: 63
End: 2023-12-13
Attending: INTERNAL MEDICINE
Payer: MEDICAID

## 2023-12-13 DIAGNOSIS — T48.201A: Primary | ICD-10-CM

## 2023-12-13 LAB
INR PPP: 3.8
PROTHROMBIN TIME: 35.8 SECONDS (ref 12.5–14.5)

## 2023-12-13 PROCEDURE — 36415 COLL VENOUS BLD VENIPUNCTURE: CPT

## 2023-12-13 PROCEDURE — 85610 PROTHROMBIN TIME: CPT

## 2023-12-18 PROBLEM — N17.9 AKI (ACUTE KIDNEY INJURY): Status: RESOLVED | Noted: 2023-09-18 | Resolved: 2023-12-18

## 2023-12-18 PROBLEM — Z00.00 WELL ADULT EXAM: Status: RESOLVED | Noted: 2022-06-07 | Resolved: 2023-12-18

## 2023-12-20 ENCOUNTER — LAB VISIT (OUTPATIENT)
Dept: LAB | Facility: HOSPITAL | Age: 63
End: 2023-12-20
Attending: INTERNAL MEDICINE
Payer: MEDICAID

## 2023-12-20 DIAGNOSIS — I48.20 CHRONIC ATRIAL FIBRILLATION: Primary | ICD-10-CM

## 2023-12-20 LAB
INR PPP: 5.2
PROTHROMBIN TIME: 48.4 SECONDS (ref 12.5–14.5)

## 2023-12-20 PROCEDURE — 36415 COLL VENOUS BLD VENIPUNCTURE: CPT

## 2023-12-20 PROCEDURE — 85610 PROTHROMBIN TIME: CPT

## 2023-12-27 ENCOUNTER — LAB VISIT (OUTPATIENT)
Dept: LAB | Facility: HOSPITAL | Age: 63
End: 2023-12-27
Attending: INTERNAL MEDICINE
Payer: MEDICAID

## 2023-12-27 DIAGNOSIS — I48.20 CHRONIC ATRIAL FIBRILLATION: Primary | ICD-10-CM

## 2023-12-27 LAB
INR PPP: 2.3
PROTHROMBIN TIME: 22.1 SECONDS (ref 12.5–14.5)

## 2023-12-27 PROCEDURE — 85610 PROTHROMBIN TIME: CPT

## 2023-12-27 PROCEDURE — 36415 COLL VENOUS BLD VENIPUNCTURE: CPT

## 2024-01-02 ENCOUNTER — CLINICAL SUPPORT (OUTPATIENT)
Dept: CARDIOLOGY | Facility: CLINIC | Age: 64
End: 2024-01-02
Payer: MEDICAID

## 2024-01-02 DIAGNOSIS — Z45.02 ENCOUNTER FOR INTERROGATION OF CARDIAC DEFIBRILLATOR: Primary | ICD-10-CM

## 2024-01-02 PROCEDURE — 93284 PRGRMG EVAL IMPLANTABLE DFB: CPT | Mod: PBBFAC | Performed by: INTERNAL MEDICINE

## 2024-01-02 PROCEDURE — 99211 OFF/OP EST MAY X REQ PHY/QHP: CPT | Mod: PBBFAC

## 2024-01-02 RX ORDER — ATORVASTATIN CALCIUM 80 MG/1
80 TABLET, FILM COATED ORAL DAILY
Qty: 90 TABLET | Refills: 3 | Status: SHIPPED | OUTPATIENT
Start: 2024-01-02

## 2024-01-02 NOTE — TELEPHONE ENCOUNTER
Candy Suarez [facility] Cardiology Clinical Support Staff  Caller: Unspecified (Today,  8:54 AM)  Mr. Munguia was in clinic this morning for Pacemaker check and stated that the pharmacy will not refill his Atorvastatin due to it being canceled.  Please advise patient.    Thank you,

## 2024-01-09 ENCOUNTER — OFFICE VISIT (OUTPATIENT)
Dept: INTERNAL MEDICINE | Facility: CLINIC | Age: 64
End: 2024-01-09
Payer: MEDICAID

## 2024-01-09 ENCOUNTER — LAB VISIT (OUTPATIENT)
Dept: LAB | Facility: HOSPITAL | Age: 64
End: 2024-01-09
Attending: INTERNAL MEDICINE
Payer: MEDICAID

## 2024-01-09 VITALS
WEIGHT: 244 LBS | SYSTOLIC BLOOD PRESSURE: 109 MMHG | BODY MASS INDEX: 31.32 KG/M2 | TEMPERATURE: 98 F | RESPIRATION RATE: 18 BRPM | DIASTOLIC BLOOD PRESSURE: 77 MMHG | HEART RATE: 80 BPM | HEIGHT: 74 IN

## 2024-01-09 DIAGNOSIS — M54.9 CHRONIC BACK PAIN, UNSPECIFIED BACK LOCATION, UNSPECIFIED BACK PAIN LATERALITY: Primary | ICD-10-CM

## 2024-01-09 DIAGNOSIS — J44.89 ASTHMA WITH COPD: ICD-10-CM

## 2024-01-09 DIAGNOSIS — R79.89 ABNORMAL CBC: ICD-10-CM

## 2024-01-09 DIAGNOSIS — R05.9 COUGH, UNSPECIFIED TYPE: ICD-10-CM

## 2024-01-09 DIAGNOSIS — R06.2 WHEEZING: ICD-10-CM

## 2024-01-09 DIAGNOSIS — I48.20 CHRONIC ATRIAL FIBRILLATION: Primary | ICD-10-CM

## 2024-01-09 DIAGNOSIS — E11.9 TYPE 2 DIABETES MELLITUS WITHOUT RETINOPATHY: ICD-10-CM

## 2024-01-09 DIAGNOSIS — E11.9 CONTROLLED TYPE 2 DIABETES MELLITUS WITHOUT COMPLICATION, WITHOUT LONG-TERM CURRENT USE OF INSULIN: ICD-10-CM

## 2024-01-09 DIAGNOSIS — G89.29 CHRONIC BACK PAIN, UNSPECIFIED BACK LOCATION, UNSPECIFIED BACK PAIN LATERALITY: Primary | ICD-10-CM

## 2024-01-09 LAB
INR PPP: 3.5
PROTHROMBIN TIME: 32.8 SECONDS (ref 12.5–14.5)

## 2024-01-09 PROCEDURE — 85610 PROTHROMBIN TIME: CPT

## 2024-01-09 PROCEDURE — 1160F RVW MEDS BY RX/DR IN RCRD: CPT | Mod: CPTII,,, | Performed by: NURSE PRACTITIONER

## 2024-01-09 PROCEDURE — 1159F MED LIST DOCD IN RCRD: CPT | Mod: CPTII,,, | Performed by: NURSE PRACTITIONER

## 2024-01-09 PROCEDURE — 3078F DIAST BP <80 MM HG: CPT | Mod: CPTII,,, | Performed by: NURSE PRACTITIONER

## 2024-01-09 PROCEDURE — 3008F BODY MASS INDEX DOCD: CPT | Mod: CPTII,,, | Performed by: NURSE PRACTITIONER

## 2024-01-09 PROCEDURE — 3074F SYST BP LT 130 MM HG: CPT | Mod: CPTII,,, | Performed by: NURSE PRACTITIONER

## 2024-01-09 PROCEDURE — 99214 OFFICE O/P EST MOD 30 MIN: CPT | Mod: PBBFAC | Performed by: NURSE PRACTITIONER

## 2024-01-09 PROCEDURE — 36415 COLL VENOUS BLD VENIPUNCTURE: CPT

## 2024-01-09 PROCEDURE — 99214 OFFICE O/P EST MOD 30 MIN: CPT | Mod: S$PBB,,, | Performed by: NURSE PRACTITIONER

## 2024-01-09 RX ORDER — IPRATROPIUM BROMIDE AND ALBUTEROL SULFATE 2.5; .5 MG/3ML; MG/3ML
3 SOLUTION RESPIRATORY (INHALATION) EVERY 6 HOURS PRN
Qty: 100 ML | Refills: 6 | Status: SHIPPED | OUTPATIENT
Start: 2024-01-09 | End: 2025-01-08

## 2024-01-09 RX ORDER — FLUTICASONE PROPIONATE AND SALMETEROL XINAFOATE 230; 21 UG/1; UG/1
2 AEROSOL, METERED RESPIRATORY (INHALATION) 2 TIMES DAILY
Qty: 12 G | Refills: 6 | Status: SHIPPED | OUTPATIENT
Start: 2024-01-09 | End: 2025-01-08

## 2024-01-09 RX ORDER — ALBUTEROL SULFATE 90 UG/1
2 AEROSOL, METERED RESPIRATORY (INHALATION) EVERY 6 HOURS PRN
Qty: 18 G | Refills: 6 | Status: SHIPPED | OUTPATIENT
Start: 2024-01-09 | End: 2025-01-08

## 2024-01-09 RX ORDER — TRAZODONE HYDROCHLORIDE 50 MG/1
50 TABLET ORAL NIGHTLY PRN
Qty: 90 TABLET | Refills: 1 | Status: SHIPPED | OUTPATIENT
Start: 2024-01-09 | End: 2025-01-08

## 2024-01-09 RX ORDER — GLIPIZIDE 5 MG/1
5 TABLET ORAL DAILY
Qty: 90 TABLET | Refills: 1 | Status: SHIPPED | OUTPATIENT
Start: 2024-01-09

## 2024-01-09 RX ORDER — CETIRIZINE HYDROCHLORIDE 10 MG/1
10 TABLET ORAL DAILY
Qty: 90 TABLET | Refills: 1 | Status: SHIPPED | OUTPATIENT
Start: 2024-01-09

## 2024-01-09 RX ORDER — FLUTICASONE PROPIONATE 50 MCG
2 SPRAY, SUSPENSION (ML) NASAL DAILY
Qty: 18 G | Refills: 6 | Status: SHIPPED | OUTPATIENT
Start: 2024-01-09

## 2024-01-09 RX ORDER — AZITHROMYCIN 250 MG/1
TABLET, FILM COATED ORAL
Qty: 6 TABLET | Refills: 0 | Status: SHIPPED | OUTPATIENT
Start: 2024-01-09 | End: 2024-01-14

## 2024-01-09 NOTE — PROGRESS NOTES
Patient ID: [id number]     Chief Complaint: LAB RESULTS        HPI:     HPI      Ezra Zhang is a 63 y.o. male here today for a follow up.         Immunizations:   Immunization History   Administered Date(s) Administered    COVID-19 Vaccine 03/08/2021, 04/05/2021, 12/07/2021    COVID-19, MRNA, LN-S, PF (MODERNA FULL 0.5 ML DOSE) 03/08/2021, 04/05/2021, 12/07/2021    Influenza 10/11/2012, 12/21/2015, 10/15/2020    Influenza - Quadrivalent 12/20/2019, 03/08/2022    Influenza - Quadrivalent - PF *Preferred* (6 months and older) 09/26/2022, 10/17/2023    Influenza - Trivalent - PF (ADULT) 12/21/2015    Pneumococcal 10/11/2012    Pneumococcal Polysaccharide - 23 Valent 10/11/2012    Tdap 12/21/2016        ----------------------------  Atrial fibrillation  CHF (congestive heart failure)  CKD (chronic kidney disease)  COPD (chronic obstructive pulmonary disease)  DM (diabetes mellitus)  Dyslipidemia  Essential (primary) hypertension  Heart failure, unspecified  Rheumatoid arthritis, unspecified  Smoking  Type 2 diabetes mellitus without complications     Past Surgical History:   Procedure Laterality Date    ABLATION N/A 9/11/2023    Procedure: Ablation;  Surgeon: Álvaro Degroot MD;  Location: [facility] CATH LAB;  Service: Cardiology;  Laterality: N/A;  EPS + AV NODE ABLATION + UPGRADE TO BIV ICD (SJM)    CARDIOVERSION      coronary arteriograph      defibillator      ECHOCARDIOGRAM,TRANSESOPHAGEAL N/A 3/22/2023    Procedure: Transesophageal echo (LASHELL) intra-procedure log documentation;  Surgeon: Matthieu Diagnostic Provider;  Location: [facility] CATH LAB;  Service: Cardiology;  Laterality: N/A;    INSERT / REPLACE / REMOVE PACEMAKER      INSERTION OF BIVENTRICULAR IMPLANTABLE CARDIOVERTER-DEFIBRILLATOR (ICD) N/A 9/11/2023    Procedure: INSERTION, ICD, BIVENTRICULAR;  Surgeon: Álvaro Degroot MD;  Location: [facility] CATH LAB;  Service: Cardiology;  Laterality: N/A;    left  heart catherization         Review of patient's allergies  indicates:  No Known Allergies    Current Outpatient Medications   Medication Instructions    ACCU-CHEK FASTCLIX LANCET DRUM Misc Topical (Top), Daily    ACCU-CHEK GUIDE TEST STRIPS Strp USE AS DIRECTED once daily (keep log)    albuterol (VENTOLIN HFA) 90 mcg/actuation inhaler 2 puffs, Inhalation, Every 6 hours PRN, Rescue    albuterol-ipratropium (DUO-NEB) 2.5 mg-0.5 mg/3 mL nebulizer solution 3 mLs, Nebulization, Every 6 hours PRN, Rescue    alfuzosin (UROXATRAL) 10 mg Tb24 1 tablet, Oral, Daily    atorvastatin (LIPITOR) 80 mg, Oral, Daily    azithromycin (Z-VETO) 250 MG tablet Take 2 tablets by mouth on day 1; Take 1 tablet by mouth on days 2-5<BR>    cetirizine (ZYRTEC) 10 mg, Oral, Daily    cholecalciferol (vitamin D3) (VITAMIN D3) 5,000 Units, Oral, Daily    clotrimazole-betamethasone 1-0.05% (LOTRISONE) cream 2 g, Topical (Top), 2 times daily    famotidine (PEPCID) 20 mg, Oral, 2 times daily    fluticasone propionate (FLONASE ALLERGY RELIEF) 100 mcg, Each Nostril, Daily    fluticasone-salmeterol 230-21 mcg/dose (ADVAIR HFA) 230-21 mcg/actuation HFAA inhaler 2 puffs, Inhalation, 2 times daily, Controller    furosemide (LASIX) 40 mg, Oral, Daily, Refilled per Cardio orders.    glipiZIDE (GLUCOTROL) 5 mg, Oral, Daily    HYDROcodone-acetaminophen (NORCO) 5-325 mg per tablet 2 tablets, Oral, 2 times daily    lancets-blood glucose strips 30 gauge Cmpk   Accucheck blood glucose test strips and lancets, See Instructions, Check CBG once daily and keep log., # 100 EA, 11 Refill(s), Pharmacy: [facility], 185, cm, Height/Length Dosing, 03/08/22 8:10:00 CST, 96, kg, Weight Dosing, 03/08/22 8:10:00 CST    metoprolol succinate (TOPROL-XL) 100 mg, Oral, Daily    promethazine-dextromethorphan (PROMETHAZINE-DM) 6.25-15 mg/5 mL Syrp TAKE 5 ml (cc) EVERY 4 HOURS AS NEEDED FOR cough    sacubitriL-valsartan (ENTRESTO) 49-51 mg per tablet 1 tablet, Oral, 2 times daily    traZODone (DESYREL) 50 mg, Oral, Nightly PRN    warfarin  (COUMADIN) 5 mg, Oral, Daily       Social History     Socioeconomic History    Marital status: Single   Tobacco Use    Smoking status: Never     Passive exposure: Never    Smokeless tobacco: Never    Tobacco comments:     Taking patches   Substance and Sexual Activity    Alcohol use: Never     Alcohol/week: 1.0 standard drink of alcohol     Types: 1 Cans of beer per week     Comment: not often    Drug use: Never    Sexual activity: Yes     Partners: Female     Social Determinants of Health     Financial Resource Strain: High Risk (6/16/2023)    Overall Financial Resource Strain (CARDIA)     Difficulty of Paying Living Expenses: Very hard   Food Insecurity: Food Insecurity Present (6/16/2023)    Hunger Vital Sign     Worried About Running Out of Food in the Last Year: Often true     Ran Out of Food in the Last Year: Often true   Transportation Needs: No Transportation Needs (6/16/2023)    PRAPARE - Transportation     Lack of Transportation (Medical): No     Lack of Transportation (Non-Medical): No   Physical Activity: Inactive (6/16/2023)    Exercise Vital Sign     Days of Exercise per Week: 0 days     Minutes of Exercise per Session: 0 min   Stress: Stress Concern Present (6/16/2023)    [employer] - Occupational Stress Questionnaire     Feeling of Stress : Very much   Social Connections: Moderately Isolated (6/16/2023)    Social Connection and Isolation Panel [NHANES]     Frequency of Communication with Friends and Family: Twice a week     Frequency of Social Gatherings with Friends and Family: Once a week     Attends Mu-ism Services: More than 4 times per year     Active Member of Clubs or Organizations: No     Attends Club or Organization Meetings: Never     Marital Status: Never    Housing Stability: High Risk (6/16/2023)    Housing Stability Vital Sign     Unable to Pay for Housing in the Last Year: Yes     Number of Places Lived in the Last Year: 1     Unstable Housing in  the Last Year: No        Family History   Problem Relation Age of Onset    Heart failure Mother     Cataracts Mother     Heart disease Mother     Glaucoma Mother     Peripheral vascular disease Mother     Hypertension Father         Patient Care Team:  Dayami Squires FNP as PCP - General (Family Medicine)  Nadira Buchanan FNP as Nurse Practitioner (Nephrology)  Cory Gan MD as Consulting Physician (Cardiology)  Álvaro Degroot MD as Consulting Physician (Cardiology)  Francisco Jackman MD as Consulting Physician (Cardiology)  Braxton Schmitz MD as Consulting Physician (Cardiology)     Subjective:     Review of Systems     See HPI for details    Constitutional: Denies Change in appetite. Denies Chills. Denies Fever. Denies Night sweats.  Eye: Denies Blurred vision. Denies Discharge. Denies Eye pain.  ENT: Denies Decreased hearing. Denies Sore throat. Denies Swollen glands.  Respiratory: Denies Cough. Denies Shortness of breath. Denies Shortness of breath with exertion. Denies Wheezing.  Cardiovascular: DeniesChest pain at rest. Denies Chest pain with exertion. Denies Irregular heartbeat. Denies Palpitations. Denies Edema.  Gastrointestinal: Denies Abdominal pain. DeniesDiarrhea. Denies Nausea. Denies Vomiting. Denies Hematemesis or Hematochezia.  Genitourinary: Denies Dysuria. Denies Urinary frequency. Denies Urinary urgency. Denies Blood in urine.  Endocrine: Denies Cold intolerance. Denies Excessive thirst. Denies Heat intolerance. Denies Weight loss. Denies Weight gain.  Musculoskeletal: Denies Painful joints. Denies Weakness.  Integumentary: Denies Rash. Denies Itching. Denies Dry skin.  Neurologic: Denies Dizziness. Denies Fainting. Denies Headache.  Psychiatric: Denies Depression. Denies Anxiety. Denies Suicidal/Homicidal ideations.    All Other ROS: Negative except as stated in HPI.       Objective:     Visit Vitals  /77 (BP Location: Right arm, Patient Position: Sitting, BP Method:  "Large (Automatic))   Pulse 80   Temp 98.1 °F (36.7 °C) (Oral)   Resp 18   Ht 6' 2" (1.88 m)   Wt 110.7 kg (244 lb)   BMI 31.33 kg/m²       Physical Exam    General: Alert and oriented, No acute distress.  Head: Normocephalic, Atraumatic.  Eye: Pupils are equal, round and reactive to light, Extraocular movements are intact, Sclera non-icteric.  Ears/Nose/Throat: Normal, Mucosa moist,Clear.  Neck/Thyroid: Supple, Non-tender, No carotid bruit, No lymphadenopathy, No JVD, Full range of motion.  Respiratory: +  wheezes to bilat upper lung lobes anteriorly. Non-labored respirations, Symmetrical chest wall expansion.  Cardiovascular: Regular rate and rhythm, S1/S2 normal, No murmurs, rubs or gallops.  Gastrointestinal: Soft, Non-tender, Non-distended, Normal bowel sounds, No palpable organomegaly.  Musculoskeletal: Normal range of motion.  Integumentary: Warm, Dry, Intact, No suspicious lesions or rashes.  Extremities: No clubbing, cyanosis or edema  Neurologic: No focal deficits, Cranial Nerves II-XII are grossly intact, Motor strength normal upper and lower extremities, Sensory exam intact.  Psychiatric: Normal interaction, Coherent speech, Euthymic mood, Appropriate affect       Labs Reviewed:     Chemistry:  Lab Results   Component Value Date     12/03/2023    K 4.2 12/03/2023    CHLORIDE 109 (H) 12/03/2023    BUN 24.5 12/03/2023    CREATININE 1.18 12/03/2023    EGFRNORACEVR >60 12/03/2023    GLUCOSE 169 (H) 12/03/2023    CALCIUM 8.8 12/03/2023    ALKPHOS 115 12/03/2023    LABPROT 6.3 12/03/2023    ALBUMIN 3.2 (L) 12/03/2023    BILIDIR 0.2 08/25/2021    IBILI 0.20 08/25/2021    AST 19 12/03/2023    ALT 27 12/03/2023    MG 2.10 03/07/2023    PHOS 3.2 07/06/2023    EIVWJIQX29JF 35.4 04/12/2023        Lab Results   Component Value Date    HGBA1C 6.1 11/14/2023        Hematology:  Lab Results   Component Value Date    WBC 13.87 (H) 12/03/2023    HGB 12.1 (L) 12/03/2023    HCT 37.0 (L) 12/03/2023     12/03/2023 "       Lipid Panel:  Lab Results   Component Value Date    CHOL 100 03/07/2023    HDL 33 (L) 03/07/2023    LDL 56.00 03/07/2023    TRIG 55 03/07/2023    TOTALCHOLEST 3 03/07/2023        Urine:  Lab Results   Component Value Date    COLORUA Yellow 07/06/2023    APPEARANCEUA Clear 07/06/2023    SGUA >=1.030 07/06/2023    PHUA 5.5 07/06/2023    PROTEINUA Negative 07/06/2023    GLUCOSEUA Negative 07/06/2023    KETONESUA Negative 07/06/2023    BLOODUA Negative 07/06/2023    NITRITESUA Negative 07/06/2023    LEUKOCYTESUR Negative 07/06/2023    RBCUA None Seen 07/06/2023    WBCUA 0-2 07/06/2023    BACTERIA None Seen 07/06/2023    SQEPUA Trace (A) 06/04/2023    HYALINECASTS 3-5 (A) 06/04/2023    CREATRANDUR 109.4 11/14/2023    PROTEINURINE 13.1 07/06/2023    UPROTCREA 0.1 07/06/2023        Assessment:       ICD-10-CM ICD-9-CM   1. Chronic back pain, unspecified back location, unspecified back pain laterality  M54.9 724.5    G89.29 338.29   2. Type 2 diabetes mellitus without retinopathy  E11.9 250.00   3. Abnormal CBC  R79.89 790.6   4. Asthma with COPD  J44.89 493.20   5. Controlled type 2 diabetes mellitus without complication, without long-term current use of insulin  E11.9 250.00   6. Cough, unspecified type  R05.9 786.2   7. Wheezing  R06.2 786.07        Plan:     1. Chronic back pain, unspecified back location, unspecified back pain laterality  Pt had CT Lumbar spine done 6-4-23 showing:  CT Lumbar Spine Without Contrast  Order: [id number]  Status: Final result     Visible to patient: Yes (not seen)     Next appt: 07/06/2023 at 03:00 PM in Ophthalmology ([facility], [facility])     0 Result Notes  Details     Reading Physician Reading Date Result Priority   Robson Hughes MD  [phone] 6/4/2023 STAT   Uche Anand MD  [phone] 6/5/2023        Narrative & Impression     Technique:CT of the lumbar spine was performed without intravenous contrast with direct axial as well as sagittal and coronal reconstruction  images.     Comparison:Correlation is with CT abdomen study dated 2023-03-31 08:15:01.     Clinical history:Lumbar radiculopathy, symptoms persist with conservative treatment.     Findings:     Bone alignment:Unremarkable with no significant listhesis.     Curvature:There is straightening of the lumbar lordotic curvature.     Bone and bone marrow:The vertebral body heights are maintained.     Intervertebral disc spaces: Decreased disc height is seen at L1-L2 down through L5-S1.     Osteophytes:There are osteophytes at T12 down through S1.     Endplate Sclerosis: Endplate sclerosis is seen off the opposing endplates at L2-L3 and L5-S1.     Spinal canal:There is moderate spinal canal narrowing at L2 L3 a circumferential disc osteophyte complex with associated moderate left-sided neural foraminal narrowing. There is moderate spinal canal narrowing at L3 L4 circumferential disc osteophyte complex with associated moderate left-sided neural foraminal narrowing. There is moderate spinal canal narrowing at L4 L5 circumferential disc osteophyte complex with associated moderate bilateral neural foraminal narrowing. There is mild - moderate spinal canal narrowing at L5 S1 circumferential disc osteophyte complex with associated moderate bilateral neural foraminal narrowing.     Fractures:No acute fracture dislocation or subluxation is seen.     Visualized sacrum:Large osteophytes are seen in the bilateral sacroiliac joints.     Miscellaneous:Limited view of the abdomen demonstrates a dilated left ureter.     Impression:  Impression:     1. No acute fracture dislocation or subluxation is seen.     2. There is moderate spinal canal narrowing at L2 L3 a circumferential disc osteophyte complex with associated moderate left-sided neural foraminal narrowing. There is mild spinal canal narrowing at L3 L4 circumferential disc osteophyte complex with associated moderate left-sided neural foraminal narrowing. There is moderate spinal  canal narrowing at L4 L5 circumferential disc osteophyte complex with associated moderate bilateral neural foraminal narrowing. There is mild - moderate spinal canal narrowing at L5 S1 circumferential disc osteophyte complex with associated moderate bilateral neural foraminal narrowing.     3. Degenerative changes and other findings as above.     No significant discrepancy with overnight report.        Electronically signed by: Uche Anand  Date:                                            06/05/2023  Time:                                           10:41             Exam Ended: 06/04/23 19:49 Last Resulted: 06/05/23 10:41          Referred to Neuro surgery in [facility] for eval and mgmt. Pt ambulating with rollator. Cont to use rollator to assist with ambulation. Pt has  services for PT and requesting nursing care.     2. Type 2 diabetes mellitus without retinopathy  A1c 6.1. ADA diet and exercise. Cont Glipizide as prescribed. A1c in 5 months. Urine microalbumin 11--14-23, DM eye exam 7-7-23. DM foot done 3-14-23. Pt states he was following Dr Mazariegos for podiatry but states he no longer takes his insurance. Referred to Dr Conklin in [place] for eval and mgmt for DM foot care.       3. Abnormal CBC  CBC in 3 months.      4. Asthma COPD   PFT done 9-6-22 Mod obstruction without significant improvement post BD, Decreased DLCO suggest altered Pulm gas exchange surface, possibly emphysema.  UTD with CXR- done 8-26-22 WNL. Cont meds as prescribed. Pt UTD with pneumovax.     5. Cough  Encouraged Mucinex Blue tabs OTC 1 tab po BID. Drink plenty of water. Pt coughing up yellow mucous. Pt requesting RX as he was hospitalized for 3 days due to chest congestion and does not want to repeat admission. RX Zpack take as directed per package. Pt states he recently finished a medrol dose pack last month. Informed if symptoms persist to let me know and will re-order if needed. Pt verbalized understanding. Pt had CXR 12-1-23 WNL.      6. Wheezing  Pt states he has not been able to get Albuterol neb solution and only given Ipatropium neb solution by the pharmacy. Informed will D/C Albuterol and Ipatropium neb solution and switch to Duoneb 1 nebule Q 6 hrs prn Wheezing, SOB.     Follow up in about 3 months (around 4/9/2024) for with labs 1 week prior to appt. . In addition to their scheduled follow up, the patient has also been instructed to follow up on as needed basis.     Future Appointments   Date Time Provider Department Center   3/18/2024  8:45 AM DameghanniveAllyson PA-C [facility] [place]    4/2/2024  9:15 AM PACEMAKER, [facility] CARDIOLOGY [facility] [place]    6/4/2024  9:15 AM Nadira Buchanan FNP [facility] NEPHR [place]    7/8/2024  1:30 PM [facility], [facility] Serrano

## 2024-02-09 ENCOUNTER — LAB VISIT (OUTPATIENT)
Dept: LAB | Facility: HOSPITAL | Age: 64
End: 2024-02-09
Attending: INTERNAL MEDICINE
Payer: MEDICAID

## 2024-02-09 DIAGNOSIS — I51.3 THROMBUS IN HEART CHAMBER: Primary | ICD-10-CM

## 2024-02-09 LAB
INR PPP: 3.1
PROTHROMBIN TIME: 30 SECONDS (ref 12.5–14.5)

## 2024-02-09 PROCEDURE — 36415 COLL VENOUS BLD VENIPUNCTURE: CPT

## 2024-02-09 PROCEDURE — 85610 PROTHROMBIN TIME: CPT

## 2024-03-01 NOTE — PROGRESS NOTES
CHIEF COMPLAINT:   Chief Complaint   Patient presents with    Follow-up     5 mos f/u denies cardiac targets                                                  HPI:  Ezra Zhang 63 y.o. male  with atrial fibrillation status post LASHELL and cardioversion in February 2017 on Eliquis, nonischemic cardiomyopathy with recovered EF s/p ICD, COPD, hepatitis C, diabetes mellitus type II, and CKD stage III who presents for routine follow up and ongoing care. Patient had his ICD generator changed in August 2018 due to recall. Of note, patient underwent LASHELL (3.22.23) prior to PVI but JOANNE thrombus was seen and PVI was cancelled. He was taken off Eliquis and started on Warfarin.  He completed venous ultrasound of bilateral lower extremities for previous complaints of swelling and discomfort.  Venous US without evidence of DVT in the veins of the BL LE.  At last appointment patient complained of shortness of breath that occurred with medical exertion, he also reported palpitations with AFib.  He continues to follow with [facility], Dr. Degroot.  Of note, the patient underwent AV node ablation and ICD upgrade on September 11, 2023.  Patient recently saw Dr. Degroot in February 2024, at that time repeat echocardiogram was completed which revealed an improved EF of 40%, grade 1 diastolic dysfunction, but overall much improved from prior.    Today patient states that he is feeling well overall.  He states that he is feeling much previously.  Continues to endorse some shortness of breath and some bendopnea, but otherwise states that he feels stable.  He denies any chest pain, palpitations, PND, orthopnea, lightheadedness, dizziness, syncope, claudication symptoms, peripheral edema.  He states that he is able to complete his ADLs without any issues or ischemic symptoms.  He states that he has been increasing his walking recently and through dietary changes and exercise he has been able to lose weight.  He continues to use his rolling walker for  mobility purposes given chronic low back pain caused by spinal stenosis.  He he states that he does ambulate on his own at home and when he does not need to go far distances.  Reports compliance with all his current medications.  He denies any tobacco or other illicit drug use.                                                                                                                                                                                                                                                                                                                                                                                                                                                CARDIAC TESTING:  TTE 2.8.24  1. The study quality is average.   2. The left ventricle is normal in size. Global left ventricular systolic function is moderately decreased. The left ventricular ejection fraction is 40%. The left ventricle diastolic function is impaired (Grade I) with normal left atrial pressure.   3. No significant valvular dysfunction noted.  4. A linear echo density is noted in the right ventricle, suggestive of a pacemaker lead.   5. The pulmonary artery systolic pressure is 27 mmHg.     ICD Upgrade 9.11.23    Successful device upgrade.    The patient tolerated procedure well.    The patient left the lab in stable condition.    There were no immediate complications.    Successful AV node ablation.    Upgrade of single chamber ICD to BIV ICD.    3D mapping performed with Ensite.    His bundle recording.    CV Venous US Bilateral Lower Extremities  The contralateral (left) common femoral vein is patent.  There is no evidence of a right lower extremity DVT.  The contralateral (right) common femoral vein is patent.  There is no evidence of a left lower extremity DVT.  There was no deep vein thrombosis identified in the visualized veins of the bilateral lower extremities    TTE 3.22.23  The left ventricle  is moderately enlarged with moderate concentric hypertrophy and severely decreased systolic function.  The estimated ejection fraction is 15%.  Normal right ventricular size with normal right ventricular systolic function.  Mild left atrial enlargement.  No interatrial septal defect present.  Normal appearing left atrial appendage. Thrombus is present in the appendage. A small filamentous mass consisted with a thrombus was noted. Abnormal appendage velocities.  Mild right atrial enlargement.  Mild mitral regurgitation.  No tricupsid valve vegetation present.  There is severe left ventricular global hypokinesis.  LVEF is severely reduced.  Global hypokinesis.   There is a thrombus in the JOANNE.   PVI will be cancelled.      Echo 1.25.23  · Atrial fibrillation observed.  · Normal right ventricular size.  · Intermediate central venous pressure (8 mmHg).  · The estimated PA systolic pressure is 20 mmHg.  · IVC is dilated and collapses > 50% with inspiration.  · The left ventricle is normal in size with  · Moderate right atrial enlargement.  · Mild to moderate left atrial enlargement.  · The estimated ejection fraction is 35%.    Patient Active Problem List   Diagnosis    Persistent atrial fibrillation    Anemia    AR (allergic rhinitis)    Asthma with COPD    Bladder wall thickening    Chronic back pain    CKD (chronic kidney disease)    Type 2 diabetes mellitus without retinopathy    Elevated serum creatinine    HA (headache)    Chronic hepatitis C virus infection    Hyperlipidemia LDL goal <70    Hydroureteronephrosis    Joint pain    Plantar wart    Rheumatoid arthritis    Radiculopathy    Renal cyst, right    Umbilical hernia    Colon cancer screening    Hypertension    Smoking    Non-ischemic cardiomyopathy    History of hepatitis C    Chronic systolic heart failure    Diabetes mellitus    Recurrent sinusitis    Shortness of breath    Prostate cancer screening    Vitamin D deficiency    GERD (gastroesophageal reflux  disease)    Atrial fibrillation with RVR    Acute on chronic congestive heart failure    Dyspnea    Insomnia    Thrombus of left atrial appendage    CHF (congestive heart failure)    Coronary arteriosclerosis    Dyslipidemia    Hydronephrosis with ureteropelvic junction (UPJ) obstruction    Obesity    Obstructive sleep apnea of adult    Spinal stenosis of lumbar region    Dry eye syndrome of both eyes    Age-related nuclear cataract of both eyes    Hypotension    S/P ICD (internal cardiac defibrillator) procedure    COPD exacerbation    Central sleep apnea syndrome     Past Surgical History:   Procedure Laterality Date    ABLATION N/A 9/11/2023    Procedure: Ablation;  Surgeon: Álvaro Degroot MD;  Location: [facility] CATH LAB;  Service: Cardiology;  Laterality: N/A;  EPS + AV NODE ABLATION + UPGRADE TO BIV ICD (SJM)    CARDIOVERSION      coronary arteriograph      defibillator      ECHOCARDIOGRAM,TRANSESOPHAGEAL N/A 3/22/2023    Procedure: Transesophageal echo (LASHELL) intra-procedure log documentation;  Surgeon: Matthieu Diagnostic Provider;  Location: [facility] CATH LAB;  Service: Cardiology;  Laterality: N/A;    INSERT / REPLACE / REMOVE PACEMAKER      INSERTION OF BIVENTRICULAR IMPLANTABLE CARDIOVERTER-DEFIBRILLATOR (ICD) N/A 9/11/2023    Procedure: INSERTION, ICD, BIVENTRICULAR;  Surgeon: Álvaro Degroot MD;  Location: [facility] CATH LAB;  Service: Cardiology;  Laterality: N/A;    left  heart catherization       Social History     Socioeconomic History    Marital status: Single   Tobacco Use    Smoking status: Never     Passive exposure: Never    Smokeless tobacco: Never    Tobacco comments:     Taking patches   Substance and Sexual Activity    Alcohol use: Never     Alcohol/week: 1.0 standard drink of alcohol     Types: 1 Cans of beer per week     Comment: not often    Drug use: Never    Sexual activity: Yes     Partners: Female     Social Determinants of Health     Financial Resource Strain: High Risk (6/16/2023)    Overall Financial  Resource Strain (CARDIA)     Difficulty of Paying Living Expenses: Very hard   Food Insecurity: Food Insecurity Present (6/16/2023)    Hunger Vital Sign     Worried About Running Out of Food in the Last Year: Often true     Ran Out of Food in the Last Year: Often true   Transportation Needs: No Transportation Needs (6/16/2023)    PRAPARE - Transportation     Lack of Transportation (Medical): No     Lack of Transportation (Non-Medical): No   Physical Activity: Inactive (6/16/2023)    Exercise Vital Sign     Days of Exercise per Week: 0 days     Minutes of Exercise per Session: 0 min   Stress: Stress Concern Present (6/16/2023)    [place] [employer] - Occupational Stress Questionnaire     Feeling of Stress : Very much   Social Connections: Moderately Isolated (6/16/2023)    Social Connection and Isolation Panel [NHANES]     Frequency of Communication with Friends and Family: Twice a week     Frequency of Social Gatherings with Friends and Family: Once a week     Attends Yarsanism Services: More than 4 times per year     Active Member of Clubs or Organizations: No     Attends Club or Organization Meetings: Never     Marital Status: Never    Housing Stability: High Risk (6/16/2023)    Housing Stability Vital Sign     Unable to Pay for Housing in the Last Year: Yes     Number of Places Lived in the Last Year: 1     Unstable Housing in the Last Year: No        Family History   Problem Relation Age of Onset    Heart failure Mother     Cataracts Mother     Heart disease Mother     Glaucoma Mother     Peripheral vascular disease Mother     Hypertension Father      Review of patient's allergies indicates:  No Known Allergies      ROS:  Review of Systems   Constitutional: Negative.    HENT: Negative.     Eyes: Negative.    Respiratory:  Positive for shortness of breath (Occasional SOB, bendopnea).    Cardiovascular:  Negative for chest pain, palpitations, orthopnea, claudication, leg swelling and  "PND.   Gastrointestinal: Negative.    Genitourinary: Negative.    Musculoskeletal:  Positive for back pain and myalgias.        Bilateral lower extremity pain and numbness   Skin: Negative.    Neurological:  Positive for sensory change.   Endo/Heme/Allergies: Negative.    Psychiatric/Behavioral: Negative.                                                                                                                                                                                  Negative except as stated in the history of present illness. See HPI for details.    PHYSICAL EXAM:  Visit Vitals  /81 (BP Location: Right arm, Patient Position: Sitting, BP Method: Medium (Automatic))   Pulse 74   Temp 98.5 °F (36.9 °C) (Oral)   Resp 18   Ht 6' 2" (1.88 m)   Wt 102.5 kg (225 lb 15.5 oz)   SpO2 100%   BMI 29.01 kg/m²           Physical Exam  Constitutional:       General: He is not in acute distress.     Appearance: Normal appearance. He is obese. He is not diaphoretic.   HENT:      Head: Normocephalic.      Nose: Nose normal.      Mouth/Throat:      Mouth: Mucous membranes are moist.   Eyes:      Extraocular Movements: Extraocular movements intact.   Cardiovascular:      Rate and Rhythm: Normal rate and regular rhythm.      Pulses: Normal pulses.      Heart sounds: Normal heart sounds. No murmur heard.  Pulmonary:      Effort: Pulmonary effort is normal. No respiratory distress.      Breath sounds: Normal breath sounds.   Abdominal:      General: There is no distension.      Palpations: Abdomen is soft.   Musculoskeletal:      Cervical back: Normal range of motion.      Right lower leg: No edema.      Left lower leg: No edema.   Skin:     General: Skin is warm and dry.   Neurological:      Mental Status: He is alert and oriented to person, place, and time.   Psychiatric:         Mood and Affect: Mood is depressed.         Current Outpatient Medications   Medication Instructions    ACCU-CHEK FASTCLIX LANCET DRUM Misc " Topical (Top), Daily    ACCU-CHEK GUIDE TEST STRIPS Strp USE AS DIRECTED once daily (keep log)    albuterol (PROVENTIL) 2.5 mg /3 mL (0.083 %) nebulizer solution Nebulization    albuterol-ipratropium (DUO-NEB) 2.5 mg-0.5 mg/3 mL nebulizer solution 3 mLs, Nebulization, Every 6 hours PRN, Rescue    alfuzosin (UROXATRAL) 10 mg Tb24 1 tablet, Oral, Daily    atorvastatin (LIPITOR) 80 mg, Oral, Daily    azithromycin (Z-VETO) 250 MG tablet     cetirizine (ZYRTEC) 10 mg, Oral, Daily    cholecalciferol (vitamin D3) (VITAMIN D3) 5,000 Units, Oral, Daily    clotrimazole-betamethasone 1-0.05% (LOTRISONE) cream 2 g, Topical (Top), 2 times daily    famotidine (PEPCID) 20 mg, Oral, 2 times daily    fluticasone propionate (FLONASE ALLERGY RELIEF) 100 mcg, Each Nostril, Daily    fluticasone-salmeterol 230-21 mcg/dose (ADVAIR HFA) 230-21 mcg/actuation HFAA inhaler 2 puffs, Inhalation, 2 times daily, Controller    furosemide (LASIX) 40 mg, Oral, Daily, Refilled per Cardio orders.    glipiZIDE (GLUCOTROL) 5 mg, Oral, Daily    HYDROcodone-acetaminophen (NORCO) 5-325 mg per tablet 2 tablets, Oral, 2 times daily    lancets-blood glucose strips 30 gauge Cmpk   Accucheck blood glucose test strips and lancets, See Instructions, Check CBG once daily and keep log., # 100 EA, 11 Refill(s), Pharmacy: [facility], 185, cm, Height/Length Dosing, 03/08/22 8:10:00 CST, 96, kg, Weight Dosing, 03/08/22 8:10:00 CST    metoprolol succinate (TOPROL-XL) 100 mg, Oral, Daily    promethazine-dextromethorphan (PROMETHAZINE-DM) 6.25-15 mg/5 mL Syrp TAKE 5 ml (cc) EVERY 4 HOURS AS NEEDED FOR cough    sacubitriL-valsartan (ENTRESTO) 49-51 mg per tablet 1 tablet, Oral, 2 times daily    traZODone (DESYREL) 50 mg, Oral, Nightly PRN    warfarin (COUMADIN) 5 mg, Oral, Daily        All medications, laboratory studies, cardiac diagnostic imaging reviewed.     Lab Results   Component Value Date    LDL 56.00 03/07/2023    LDL 88.00 09/06/2022    TRIG 55 03/07/2023     TRIG 75 09/06/2022    CREATININE 1.18 12/03/2023    MG 2.10 03/07/2023    K 4.2 12/03/2023        ASSESSMENT/PLAN:  Paroxysmal Atrial Fibrillation/flutter   - Denies palpitations, lightheadedness, dizziness, or syncope  - Patient underwent AV angie ablation and ICD upgrade September 2023.  States that he is feeling much better following procedure and denies any cardiac complaints  - TE/DCCV aborted after JOANNE thrombus identified (March 2023)  - Continue Warfarin, patient tolerating well  - Follow up with [facility], Dr. Degroot as scheduled- every 6 months   - Next device interrogation April 2, 2024  - Metoprolol Succinate 100 MG daily per Dr. Degroot; patient tolerating well  - Continue to follow [facility] with [facility] for Coumadin management     Non ischemic cardiomyopathy  Chronic Systolic HF s/p ICD  EF improved to 40% per TTE February 2024   NYHA III  - Euvolemic and warm today  - Reports occasional shortness of breath and bendopnea   - Reports that lower extremity edema has improved since increasing dose of Lasix  - EF improved to 40% per Echo 2024.  Patient states that he is feeling much better overall. See full report above   - Patient reports that he takes Lasix 40 mg BID and is doing well on this current dose  - Watch fluid and salt intake    HTN  - BP at goal today 106/81  - Continue Entresto to 49/51 mg BID and Toprol 100 MG daily  - Counseled on importance of following a low-sodium, heart healthy diet and exercise as tolerated    Claudication  - Denies any claudication symptoms today- stable from prior visit   - BL Venous US without evidence of DVT in the veins of the bilateral lower extremities  - Patient does report ongoing bilateral extremity pain however believes that is due to spinal stenosis of lower back      Complete FLP/CMP  Follow up in cardiology clinic in 5 months or sooner if needed  Follow up with PCP as directed

## 2024-03-06 ENCOUNTER — LAB VISIT (OUTPATIENT)
Dept: LAB | Facility: HOSPITAL | Age: 64
End: 2024-03-06
Attending: INTERNAL MEDICINE
Payer: MEDICAID

## 2024-03-06 DIAGNOSIS — E11.9 TYPE 2 DIABETES MELLITUS WITHOUT RETINOPATHY: ICD-10-CM

## 2024-03-06 DIAGNOSIS — I48.91 ATRIAL FIBRILLATION, UNSPECIFIED TYPE: ICD-10-CM

## 2024-03-06 DIAGNOSIS — E11.9 CONTROLLED TYPE 2 DIABETES MELLITUS WITHOUT COMPLICATION, WITHOUT LONG-TERM CURRENT USE OF INSULIN: ICD-10-CM

## 2024-03-06 DIAGNOSIS — I48.20 CHRONIC ATRIAL FIBRILLATION: ICD-10-CM

## 2024-03-06 DIAGNOSIS — I48.11 LONGSTANDING PERSISTENT ATRIAL FIBRILLATION: ICD-10-CM

## 2024-03-06 DIAGNOSIS — R79.89 ABNORMAL CBC: ICD-10-CM

## 2024-03-06 DIAGNOSIS — I51.3 THROMBUS IN HEART CHAMBER: Primary | ICD-10-CM

## 2024-03-06 LAB
BASOPHILS # BLD AUTO: 0.02 X10(3)/MCL
BASOPHILS NFR BLD AUTO: 0.4 %
EOSINOPHIL # BLD AUTO: 0.04 X10(3)/MCL (ref 0–0.9)
EOSINOPHIL NFR BLD AUTO: 0.7 %
ERYTHROCYTE [DISTWIDTH] IN BLOOD BY AUTOMATED COUNT: 15.2 % (ref 11.5–17)
HCT VFR BLD AUTO: 44 % (ref 42–52)
HGB BLD-MCNC: 14.3 G/DL (ref 14–18)
IMM GRANULOCYTES # BLD AUTO: 0.01 X10(3)/MCL (ref 0–0.04)
IMM GRANULOCYTES NFR BLD AUTO: 0.2 %
INR PPP: 2.6
LYMPHOCYTES # BLD AUTO: 2.13 X10(3)/MCL (ref 0.6–4.6)
LYMPHOCYTES NFR BLD AUTO: 39 %
MCH RBC QN AUTO: 32.8 PG (ref 27–31)
MCHC RBC AUTO-ENTMCNC: 32.5 G/DL (ref 33–36)
MCV RBC AUTO: 100.9 FL (ref 80–94)
MONOCYTES # BLD AUTO: 0.42 X10(3)/MCL (ref 0.1–1.3)
MONOCYTES NFR BLD AUTO: 7.7 %
NEUTROPHILS # BLD AUTO: 2.84 X10(3)/MCL (ref 2.1–9.2)
NEUTROPHILS NFR BLD AUTO: 52 %
PLATELET # BLD AUTO: 269 X10(3)/MCL (ref 130–400)
PMV BLD AUTO: 10.6 FL (ref 7.4–10.4)
PROTHROMBIN TIME: 25.1 SECONDS (ref 12.5–14.5)
RBC # BLD AUTO: 4.36 X10(6)/MCL (ref 4.7–6.1)
WBC # SPEC AUTO: 5.46 X10(3)/MCL (ref 4.5–11.5)

## 2024-03-06 PROCEDURE — 36415 COLL VENOUS BLD VENIPUNCTURE: CPT

## 2024-03-06 PROCEDURE — 85025 COMPLETE CBC W/AUTO DIFF WBC: CPT

## 2024-03-06 PROCEDURE — 85610 PROTHROMBIN TIME: CPT

## 2024-03-18 ENCOUNTER — OFFICE VISIT (OUTPATIENT)
Dept: CARDIOLOGY | Facility: CLINIC | Age: 64
End: 2024-03-18
Payer: MEDICAID

## 2024-03-18 VITALS
RESPIRATION RATE: 18 BRPM | HEART RATE: 74 BPM | BODY MASS INDEX: 29 KG/M2 | OXYGEN SATURATION: 100 % | DIASTOLIC BLOOD PRESSURE: 81 MMHG | TEMPERATURE: 99 F | SYSTOLIC BLOOD PRESSURE: 106 MMHG | WEIGHT: 226 LBS | HEIGHT: 74 IN

## 2024-03-18 DIAGNOSIS — I25.10 CORONARY ARTERIOSCLEROSIS: ICD-10-CM

## 2024-03-18 DIAGNOSIS — E78.5 HYPERLIPIDEMIA LDL GOAL <70: Primary | ICD-10-CM

## 2024-03-18 DIAGNOSIS — I50.20 HFREF (HEART FAILURE WITH REDUCED EJECTION FRACTION): ICD-10-CM

## 2024-03-18 DIAGNOSIS — I51.3 THROMBUS OF LEFT ATRIAL APPENDAGE: ICD-10-CM

## 2024-03-18 DIAGNOSIS — I48.91 ATRIAL FIBRILLATION WITH RVR: ICD-10-CM

## 2024-03-18 DIAGNOSIS — I50.9 CONGESTIVE HEART FAILURE, UNSPECIFIED HF CHRONICITY, UNSPECIFIED HEART FAILURE TYPE: ICD-10-CM

## 2024-03-18 DIAGNOSIS — I10 PRIMARY HYPERTENSION: ICD-10-CM

## 2024-03-18 DIAGNOSIS — Z95.810 S/P ICD (INTERNAL CARDIAC DEFIBRILLATOR) PROCEDURE: ICD-10-CM

## 2024-03-18 DIAGNOSIS — I48.19 PERSISTENT ATRIAL FIBRILLATION: ICD-10-CM

## 2024-03-18 DIAGNOSIS — I50.22 CHRONIC SYSTOLIC HEART FAILURE: ICD-10-CM

## 2024-03-18 DIAGNOSIS — I42.8 NON-ISCHEMIC CARDIOMYOPATHY: ICD-10-CM

## 2024-03-18 PROCEDURE — 3079F DIAST BP 80-89 MM HG: CPT | Mod: CPTII,,,

## 2024-03-18 PROCEDURE — 99215 OFFICE O/P EST HI 40 MIN: CPT | Mod: PBBFAC

## 2024-03-18 PROCEDURE — 1160F RVW MEDS BY RX/DR IN RCRD: CPT | Mod: CPTII,,,

## 2024-03-18 PROCEDURE — 1159F MED LIST DOCD IN RCRD: CPT | Mod: CPTII,,,

## 2024-03-18 PROCEDURE — 99214 OFFICE O/P EST MOD 30 MIN: CPT | Mod: S$PBB,,,

## 2024-03-18 PROCEDURE — 3008F BODY MASS INDEX DOCD: CPT | Mod: CPTII,,,

## 2024-03-18 PROCEDURE — 4010F ACE/ARB THERAPY RXD/TAKEN: CPT | Mod: CPTII,,,

## 2024-03-18 PROCEDURE — 3074F SYST BP LT 130 MM HG: CPT | Mod: CPTII,,,

## 2024-03-18 RX ORDER — AZITHROMYCIN 250 MG/1
TABLET, FILM COATED ORAL
COMMUNITY

## 2024-03-18 RX ORDER — ALBUTEROL SULFATE 0.83 MG/ML
SOLUTION RESPIRATORY (INHALATION)
COMMUNITY
Start: 2024-03-12

## 2024-03-18 RX ORDER — SACUBITRIL AND VALSARTAN 49; 51 MG/1; MG/1
1 TABLET, FILM COATED ORAL 2 TIMES DAILY
Qty: 60 TABLET | Refills: 11 | Status: SHIPPED | OUTPATIENT
Start: 2024-03-18 | End: 2025-03-18

## 2024-03-18 RX ORDER — METOPROLOL SUCCINATE 100 MG/1
100 TABLET, EXTENDED RELEASE ORAL DAILY
Qty: 90 TABLET | Refills: 3 | Status: SHIPPED | OUTPATIENT
Start: 2024-03-18 | End: 2025-03-18

## 2024-03-18 NOTE — PATIENT INSTRUCTIONS
Complete FLP/CMP  Follow up in cardiology clinic in 5 months or sooner if needed  Follow up with PCP as directed

## 2024-04-02 ENCOUNTER — CLINICAL SUPPORT (OUTPATIENT)
Dept: CARDIOLOGY | Facility: CLINIC | Age: 64
End: 2024-04-02
Payer: MEDICAID

## 2024-04-02 DIAGNOSIS — Z95.810 AICD (AUTOMATIC CARDIOVERTER/DEFIBRILLATOR) PRESENT: Primary | ICD-10-CM

## 2024-04-02 PROCEDURE — 93284 PRGRMG EVAL IMPLANTABLE DFB: CPT | Mod: PBBFAC

## 2024-04-04 ENCOUNTER — LAB VISIT (OUTPATIENT)
Dept: LAB | Facility: HOSPITAL | Age: 64
End: 2024-04-04
Attending: INTERNAL MEDICINE
Payer: MEDICAID

## 2024-04-04 DIAGNOSIS — I50.20 HFREF (HEART FAILURE WITH REDUCED EJECTION FRACTION): ICD-10-CM

## 2024-04-04 DIAGNOSIS — I48.92 ATRIAL FIB/FLUTTER, TRANSIENT: Primary | ICD-10-CM

## 2024-04-04 DIAGNOSIS — E78.5 HYPERLIPIDEMIA LDL GOAL <70: ICD-10-CM

## 2024-04-04 DIAGNOSIS — I48.91 ATRIAL FIB/FLUTTER, TRANSIENT: Primary | ICD-10-CM

## 2024-04-04 LAB
ALBUMIN SERPL-MCNC: 3.8 G/DL (ref 3.4–4.8)
ALBUMIN/GLOB SERPL: 1.5 RATIO (ref 1.1–2)
ALP SERPL-CCNC: 139 UNIT/L (ref 40–150)
ALT SERPL-CCNC: 21 UNIT/L (ref 0–55)
AST SERPL-CCNC: 15 UNIT/L (ref 5–34)
BILIRUB SERPL-MCNC: 0.7 MG/DL
BUN SERPL-MCNC: 17.2 MG/DL (ref 8.4–25.7)
CALCIUM SERPL-MCNC: 9.4 MG/DL (ref 8.8–10)
CHLORIDE SERPL-SCNC: 109 MMOL/L (ref 98–107)
CHOLEST SERPL-MCNC: 106 MG/DL
CHOLEST/HDLC SERPL: 3 {RATIO} (ref 0–5)
CO2 SERPL-SCNC: 27 MMOL/L (ref 23–31)
CREAT SERPL-MCNC: 1.23 MG/DL (ref 0.73–1.18)
GFR SERPLBLD CREATININE-BSD FMLA CKD-EPI: >60 MLS/MIN/1.73/M2
GLOBULIN SER-MCNC: 2.6 GM/DL (ref 2.4–3.5)
GLUCOSE SERPL-MCNC: 112 MG/DL (ref 82–115)
HDLC SERPL-MCNC: 34 MG/DL (ref 35–60)
INR PPP: 2.1
LDLC SERPL CALC-MCNC: 58 MG/DL (ref 50–140)
POTASSIUM SERPL-SCNC: 4.1 MMOL/L (ref 3.5–5.1)
PROT SERPL-MCNC: 6.4 GM/DL (ref 5.8–7.6)
PROTHROMBIN TIME: 20.4 SECONDS (ref 12.5–14.5)
SODIUM SERPL-SCNC: 143 MMOL/L (ref 136–145)
TRIGL SERPL-MCNC: 70 MG/DL (ref 34–140)
VLDLC SERPL CALC-MCNC: 14 MG/DL

## 2024-04-04 PROCEDURE — 80061 LIPID PANEL: CPT

## 2024-04-04 PROCEDURE — 80053 COMPREHEN METABOLIC PANEL: CPT

## 2024-04-04 PROCEDURE — 85610 PROTHROMBIN TIME: CPT

## 2024-04-04 PROCEDURE — 36415 COLL VENOUS BLD VENIPUNCTURE: CPT

## 2024-04-30 ENCOUNTER — LAB VISIT (OUTPATIENT)
Dept: LAB | Facility: HOSPITAL | Age: 64
End: 2024-04-30
Attending: NURSE PRACTITIONER
Payer: MEDICAID

## 2024-04-30 DIAGNOSIS — E11.9 CONTROLLED TYPE 2 DIABETES MELLITUS WITHOUT COMPLICATION, WITHOUT LONG-TERM CURRENT USE OF INSULIN: ICD-10-CM

## 2024-04-30 DIAGNOSIS — I48.91 A-FIB: Primary | ICD-10-CM

## 2024-04-30 DIAGNOSIS — N18.31 CKD STAGE G3A/A1, GFR 45-59 AND ALBUMIN CREATININE RATIO <30 MG/G: ICD-10-CM

## 2024-04-30 LAB
ALBUMIN SERPL-MCNC: 4.1 G/DL (ref 3.4–4.8)
ALBUMIN/GLOB SERPL: 1.5 RATIO (ref 1.1–2)
ALP SERPL-CCNC: 151 UNIT/L (ref 40–150)
ALT SERPL-CCNC: 18 UNIT/L (ref 0–55)
APPEARANCE UR: CLEAR
AST SERPL-CCNC: 19 UNIT/L (ref 5–34)
BACTERIA #/AREA URNS AUTO: NORMAL /HPF
BILIRUB SERPL-MCNC: 1.1 MG/DL
BILIRUB UR QL STRIP.AUTO: NEGATIVE
BUN SERPL-MCNC: 9.3 MG/DL (ref 8.4–25.7)
CALCIUM SERPL-MCNC: 9.3 MG/DL (ref 8.8–10)
CHLORIDE SERPL-SCNC: 107 MMOL/L (ref 98–107)
CO2 SERPL-SCNC: 26 MMOL/L (ref 23–31)
COLOR UR AUTO: YELLOW
CREAT SERPL-MCNC: 1.07 MG/DL (ref 0.73–1.18)
CREAT UR-MCNC: 58.4 MG/DL (ref 63–166)
EST. AVERAGE GLUCOSE BLD GHB EST-MCNC: 108.3 MG/DL
GFR SERPLBLD CREATININE-BSD FMLA CKD-EPI: >60 MLS/MIN/1.73/M2
GLOBULIN SER-MCNC: 2.8 GM/DL (ref 2.4–3.5)
GLUCOSE SERPL-MCNC: 60 MG/DL (ref 82–115)
GLUCOSE UR QL STRIP.AUTO: NEGATIVE
HBA1C MFR BLD: 5.4 %
INR PPP: 2.5
KETONES UR QL STRIP.AUTO: NEGATIVE
LEUKOCYTE ESTERASE UR QL STRIP.AUTO: NEGATIVE
NITRITE UR QL STRIP.AUTO: NEGATIVE
PH UR STRIP.AUTO: 5.5 [PH]
POTASSIUM SERPL-SCNC: 4.1 MMOL/L (ref 3.5–5.1)
PROT SERPL-MCNC: 6.9 GM/DL (ref 5.8–7.6)
PROT UR QL STRIP.AUTO: NEGATIVE
PROT UR STRIP-MCNC: <6.8 MG/DL
PROTHROMBIN TIME: 24.3 SECONDS (ref 12.5–14.5)
RBC #/AREA URNS AUTO: NORMAL /HPF
RBC UR QL AUTO: NEGATIVE
SODIUM SERPL-SCNC: 143 MMOL/L (ref 136–145)
SP GR UR STRIP.AUTO: 1.01 (ref 1–1.03)
SQUAMOUS #/AREA URNS AUTO: NORMAL /HPF
UROBILINOGEN UR STRIP-ACNC: 0.2
WBC #/AREA URNS AUTO: NORMAL /HPF

## 2024-04-30 PROCEDURE — 36415 COLL VENOUS BLD VENIPUNCTURE: CPT

## 2024-04-30 PROCEDURE — 83036 HEMOGLOBIN GLYCOSYLATED A1C: CPT

## 2024-04-30 PROCEDURE — 80053 COMPREHEN METABOLIC PANEL: CPT

## 2024-04-30 PROCEDURE — 85610 PROTHROMBIN TIME: CPT

## 2024-04-30 PROCEDURE — 81003 URINALYSIS AUTO W/O SCOPE: CPT

## 2024-04-30 PROCEDURE — 82570 ASSAY OF URINE CREATININE: CPT

## 2024-05-28 ENCOUNTER — LAB VISIT (OUTPATIENT)
Dept: LAB | Facility: HOSPITAL | Age: 64
End: 2024-05-28
Attending: INTERNAL MEDICINE
Payer: MEDICAID

## 2024-05-28 DIAGNOSIS — N18.9 CHRONIC KIDNEY DISEASE, UNSPECIFIED CKD STAGE: Primary | ICD-10-CM

## 2024-05-28 DIAGNOSIS — I48.20 CHRONIC ATRIAL FIBRILLATION: Primary | ICD-10-CM

## 2024-05-28 LAB
INR PPP: 2.8
PROTHROMBIN TIME: 26.9 SECONDS (ref 12.5–14.5)

## 2024-05-28 PROCEDURE — 36415 COLL VENOUS BLD VENIPUNCTURE: CPT

## 2024-05-28 PROCEDURE — 85610 PROTHROMBIN TIME: CPT

## 2024-06-04 ENCOUNTER — DOCUMENTATION ONLY (OUTPATIENT)
Dept: NEPHROLOGY | Facility: CLINIC | Age: 64
End: 2024-06-04
Payer: MEDICAID

## 2024-06-04 NOTE — PROGRESS NOTES
Patient was no-show to clinic today. If patient calls back to reschedule, please schedule within 6 months.  Thank you

## 2024-06-26 ENCOUNTER — LAB VISIT (OUTPATIENT)
Dept: LAB | Facility: HOSPITAL | Age: 64
End: 2024-06-26
Attending: INTERNAL MEDICINE
Payer: MEDICAID

## 2024-06-26 DIAGNOSIS — I48.91 A-FIB: Primary | ICD-10-CM

## 2024-06-26 DIAGNOSIS — N18.9 CHRONIC KIDNEY DISEASE, UNSPECIFIED CKD STAGE: ICD-10-CM

## 2024-06-26 LAB
ALBUMIN SERPL-MCNC: 3.8 G/DL (ref 3.4–4.8)
ALBUMIN/GLOB SERPL: 1.5 RATIO (ref 1.1–2)
ALP SERPL-CCNC: 153 UNIT/L (ref 40–150)
ALT SERPL-CCNC: 17 UNIT/L (ref 0–55)
ANION GAP SERPL CALC-SCNC: 6 MEQ/L
AST SERPL-CCNC: 18 UNIT/L (ref 5–34)
BACTERIA #/AREA URNS AUTO: NORMAL /HPF
BILIRUB SERPL-MCNC: 0.7 MG/DL
BILIRUB UR QL STRIP.AUTO: NEGATIVE
BUN SERPL-MCNC: 16.5 MG/DL (ref 8.4–25.7)
CALCIUM SERPL-MCNC: 9.4 MG/DL (ref 8.8–10)
CHLORIDE SERPL-SCNC: 110 MMOL/L (ref 98–107)
CLARITY UR: CLEAR
CO2 SERPL-SCNC: 27 MMOL/L (ref 23–31)
COLOR UR AUTO: YELLOW
CREAT SERPL-MCNC: 1.49 MG/DL (ref 0.73–1.18)
CREAT UR-MCNC: 201.3 MG/DL (ref 63–166)
CREAT/UREA NIT SERPL: 11
GFR SERPLBLD CREATININE-BSD FMLA CKD-EPI: 52 ML/MIN/1.73/M2
GLOBULIN SER-MCNC: 2.5 GM/DL (ref 2.4–3.5)
GLUCOSE SERPL-MCNC: 118 MG/DL (ref 82–115)
GLUCOSE UR QL STRIP: NEGATIVE
HGB UR QL STRIP: NEGATIVE
INR PPP: 2.7
KETONES UR QL STRIP: NEGATIVE
LEUKOCYTE ESTERASE UR QL STRIP: NEGATIVE
NITRITE UR QL STRIP: NEGATIVE
PH UR STRIP: 5.5 [PH]
POTASSIUM SERPL-SCNC: 4.5 MMOL/L (ref 3.5–5.1)
PROT SERPL-MCNC: 6.3 GM/DL (ref 5.8–7.6)
PROT UR QL STRIP: NEGATIVE
PROT UR STRIP-MCNC: 15.7 MG/DL
PROTHROMBIN TIME: 26.1 SECONDS (ref 12.5–14.5)
RBC #/AREA URNS AUTO: NORMAL /HPF
SODIUM SERPL-SCNC: 143 MMOL/L (ref 136–145)
SP GR UR STRIP.AUTO: 1.02 (ref 1–1.03)
SQUAMOUS #/AREA URNS AUTO: NORMAL /HPF
URINE PROTEIN/CREATININE RATIO (OLG): 0.1
UROBILINOGEN UR STRIP-ACNC: 1
WBC #/AREA URNS AUTO: NORMAL /HPF

## 2024-06-26 PROCEDURE — 36415 COLL VENOUS BLD VENIPUNCTURE: CPT

## 2024-06-26 PROCEDURE — 84156 ASSAY OF PROTEIN URINE: CPT

## 2024-06-26 PROCEDURE — 81003 URINALYSIS AUTO W/O SCOPE: CPT

## 2024-06-26 PROCEDURE — 82570 ASSAY OF URINE CREATININE: CPT

## 2024-06-26 PROCEDURE — 85610 PROTHROMBIN TIME: CPT

## 2024-06-26 PROCEDURE — 80053 COMPREHEN METABOLIC PANEL: CPT

## 2024-07-11 ENCOUNTER — OFFICE VISIT (OUTPATIENT)
Dept: INTERNAL MEDICINE | Facility: CLINIC | Age: 64
End: 2024-07-11
Payer: MEDICAID

## 2024-07-11 VITALS
HEIGHT: 74 IN | HEART RATE: 72 BPM | SYSTOLIC BLOOD PRESSURE: 113 MMHG | TEMPERATURE: 98 F | DIASTOLIC BLOOD PRESSURE: 78 MMHG | BODY MASS INDEX: 27.98 KG/M2 | WEIGHT: 218 LBS | RESPIRATION RATE: 18 BRPM

## 2024-07-11 DIAGNOSIS — J44.89 ASTHMA WITH COPD: ICD-10-CM

## 2024-07-11 DIAGNOSIS — Z12.5 SCREENING FOR PROSTATE CANCER: ICD-10-CM

## 2024-07-11 DIAGNOSIS — G47.00 INSOMNIA, UNSPECIFIED TYPE: ICD-10-CM

## 2024-07-11 DIAGNOSIS — E11.9 TYPE 2 DIABETES MELLITUS WITHOUT RETINOPATHY: ICD-10-CM

## 2024-07-11 DIAGNOSIS — R79.89 ABNORMAL CBC: ICD-10-CM

## 2024-07-11 DIAGNOSIS — R05.9 COUGH, UNSPECIFIED TYPE: ICD-10-CM

## 2024-07-11 DIAGNOSIS — M54.50 CHRONIC MIDLINE LOW BACK PAIN WITHOUT SCIATICA: ICD-10-CM

## 2024-07-11 DIAGNOSIS — M48.061 SPINAL STENOSIS OF LUMBAR REGION, UNSPECIFIED WHETHER NEUROGENIC CLAUDICATION PRESENT: Primary | ICD-10-CM

## 2024-07-11 DIAGNOSIS — G89.29 CHRONIC MIDLINE LOW BACK PAIN WITHOUT SCIATICA: ICD-10-CM

## 2024-07-11 DIAGNOSIS — E11.9 CONTROLLED TYPE 2 DIABETES MELLITUS WITHOUT COMPLICATION, WITHOUT LONG-TERM CURRENT USE OF INSULIN: ICD-10-CM

## 2024-07-11 DIAGNOSIS — E55.9 VITAMIN D DEFICIENCY: ICD-10-CM

## 2024-07-11 PROCEDURE — 99215 OFFICE O/P EST HI 40 MIN: CPT | Mod: PBBFAC | Performed by: NURSE PRACTITIONER

## 2024-07-11 RX ORDER — CETIRIZINE HYDROCHLORIDE 10 MG/1
10 TABLET ORAL DAILY
Qty: 90 TABLET | Refills: 1 | Status: SHIPPED | OUTPATIENT
Start: 2024-07-11

## 2024-07-11 RX ORDER — FLUTICASONE PROPIONATE 50 MCG
2 SPRAY, SUSPENSION (ML) NASAL DAILY
Qty: 18 G | Refills: 6 | Status: SHIPPED | OUTPATIENT
Start: 2024-07-11

## 2024-07-11 RX ORDER — MONTELUKAST SODIUM 10 MG/1
10 TABLET ORAL NIGHTLY
Qty: 90 TABLET | Refills: 1 | Status: SHIPPED | OUTPATIENT
Start: 2024-07-11 | End: 2025-07-11

## 2024-07-11 RX ORDER — FLUTICASONE PROPIONATE AND SALMETEROL XINAFOATE 230; 21 UG/1; UG/1
2 AEROSOL, METERED RESPIRATORY (INHALATION) 2 TIMES DAILY
Qty: 12 G | Refills: 6 | Status: SHIPPED | OUTPATIENT
Start: 2024-07-11 | End: 2025-07-11

## 2024-07-11 RX ORDER — TRAZODONE HYDROCHLORIDE 50 MG/1
50 TABLET ORAL NIGHTLY PRN
Qty: 90 TABLET | Refills: 1 | Status: SHIPPED | OUTPATIENT
Start: 2024-07-11 | End: 2025-07-11

## 2024-07-11 RX ORDER — IPRATROPIUM BROMIDE AND ALBUTEROL SULFATE 2.5; .5 MG/3ML; MG/3ML
3 SOLUTION RESPIRATORY (INHALATION) EVERY 6 HOURS PRN
Qty: 100 ML | Refills: 6 | Status: SHIPPED | OUTPATIENT
Start: 2024-07-11 | End: 2025-07-11

## 2024-07-11 RX ORDER — PROMETHAZINE HYDROCHLORIDE AND DEXTROMETHORPHAN HYDROBROMIDE 6.25; 15 MG/5ML; MG/5ML
SYRUP ORAL
Qty: 240 ML | Refills: 2 | Status: SHIPPED | OUTPATIENT
Start: 2024-07-11

## 2024-07-11 RX ORDER — GLIPIZIDE 5 MG/1
5 TABLET ORAL DAILY
Qty: 90 TABLET | Refills: 1 | Status: SHIPPED | OUTPATIENT
Start: 2024-07-11

## 2024-07-11 RX ORDER — ACETAMINOPHEN 500 MG
5000 TABLET ORAL DAILY
Qty: 90 TABLET | Refills: 1 | Status: SHIPPED | OUTPATIENT
Start: 2024-07-11

## 2024-07-11 RX ORDER — FAMOTIDINE 20 MG/1
20 TABLET, FILM COATED ORAL 2 TIMES DAILY
Qty: 60 TABLET | Refills: 11 | Status: SHIPPED | OUTPATIENT
Start: 2024-07-11 | End: 2025-07-11

## 2024-07-11 NOTE — PROGRESS NOTES
Patient ID: [id number]     Chief Complaint: lab results        HPI:     HPI      Ezra Zhang is a 63 y.o. male here today for a follow up.         Immunizations:   Immunization History   Administered Date(s) Administered    COVID-19 Vaccine 03/08/2021, 04/05/2021, 12/07/2021    COVID-19, MRNA, LN-S, PF (MODERNA FULL 0.5 ML DOSE) 03/08/2021, 04/05/2021, 12/07/2021    Influenza 10/11/2012, 12/21/2015, 10/15/2020    Influenza - Quadrivalent 12/20/2019, 03/08/2022    Influenza - Quadrivalent - PF *Preferred* (6 months and older) 09/26/2022, 10/17/2023    Influenza - Trivalent - PF (ADULT) 12/21/2015    Pneumococcal 10/11/2012    Pneumococcal Polysaccharide - 23 Valent 10/11/2012    Tdap 12/21/2016        -------------------------------------    Atrial fibrillation    CHF (congestive heart failure)    CKD (chronic kidney disease)    COPD (chronic obstructive pulmonary disease)    DM (diabetes mellitus)    Dyslipidemia    Essential (primary) hypertension    Heart failure, unspecified    Rheumatoid arthritis, unspecified    Smoking    Type 2 diabetes mellitus without complications        Past Surgical History:   Procedure Laterality Date    ABLATION N/A 9/11/2023    Procedure: Ablation;  Surgeon: Álvaro Degroot MD;  Location: [facility] CATH LAB;  Service: Cardiology;  Laterality: N/A;  EPS + AV NODE ABLATION + UPGRADE TO BIV ICD ([facility])    CARDIOVERSION      coronary arteriograph      defibillator      ECHOCARDIOGRAM,TRANSESOPHAGEAL N/A 3/22/2023    Procedure: Transesophageal echo (LASHELL) intra-procedure log documentation;  Surgeon: Matthieu Diagnostic Provider;  Location: [facility] CATH LAB;  Service: Cardiology;  Laterality: N/A;    INSERT / REPLACE / REMOVE PACEMAKER      INSERTION OF BIVENTRICULAR IMPLANTABLE CARDIOVERTER-DEFIBRILLATOR (ICD) N/A 9/11/2023    Procedure: INSERTION, ICD, BIVENTRICULAR;  Surgeon: Álvaro Degroot MD;  Location: [facility] CATH LAB;  Service: Cardiology;  Laterality: N/A;    left  heart catherization          Review of patient's allergies indicates:  No Known Allergies    Current Outpatient Medications   Medication Instructions    ACCU-CHEK FASTCLIX LANCET DRUM Misc Topical (Top), Daily    ACCU-CHEK GUIDE TEST STRIPS Strp USE AS DIRECTED once daily (keep log)    albuterol (PROVENTIL) 2.5 mg /3 mL (0.083 %) nebulizer solution Nebulization    albuterol-ipratropium (DUO-NEB) 2.5 mg-0.5 mg/3 mL nebulizer solution 3 mLs, Nebulization, Every 6 hours PRN, Rescue    alfuzosin (UROXATRAL) 10 mg Tb24 1 tablet, Oral, Daily    atorvastatin (LIPITOR) 80 mg, Oral, Daily    azithromycin (Z-VETO) 250 MG tablet     cetirizine (ZYRTEC) 10 mg, Oral, Daily    cholecalciferol (vitamin D3) (VITAMIN D3) 5,000 Units, Oral, Daily    clotrimazole-betamethasone 1-0.05% (LOTRISONE) cream 2 g, Topical (Top), 2 times daily    famotidine (PEPCID) 20 mg, Oral, 2 times daily    fluticasone propionate (FLONASE ALLERGY RELIEF) 100 mcg, Each Nostril, Daily    fluticasone-salmeterol 230-21 mcg/dose (ADVAIR HFA) 230-21 mcg/actuation HFAA inhaler 2 puffs, Inhalation, 2 times daily, Controller    furosemide (LASIX) 40 mg, Oral, Daily, Refilled per Cardio orders.    glipiZIDE (GLUCOTROL) 5 mg, Oral, Daily    HYDROcodone-acetaminophen (NORCO) 5-325 mg per tablet 2 tablets, Oral, 2 times daily    lancets-blood glucose strips 30 gauge Cmpk   Accucheck blood glucose test strips and lancets, See Instructions, Check CBG once daily and keep log., # 100 EA, 11 Refill(s), Pharmacy: [facility], 185, cm, Height/Length Dosing, 03/08/22 8:10:00 CST, 96, kg, Weight Dosing, 03/08/22 8:10:00 CST    metoprolol succinate (TOPROL-XL) 100 mg, Oral, Daily    promethazine-dextromethorphan (PROMETHAZINE-DM) 6.25-15 mg/5 mL Syrp TAKE 5 ml (cc) EVERY 4 HOURS AS NEEDED FOR cough    sacubitriL-valsartan (ENTRESTO) 49-51 mg per tablet 1 tablet, Oral, 2 times daily    traZODone (DESYREL) 50 mg, Oral, Nightly PRN    warfarin (COUMADIN) 5 mg, Oral, Daily       Social History      Socioeconomic History    Marital status: Single   Tobacco Use    Smoking status: Never     Passive exposure: Never    Smokeless tobacco: Never    Tobacco comments:     Taking patches   Substance and Sexual Activity    Alcohol use: Never     Alcohol/week: 1.0 standard drink of alcohol     Types: 1 Cans of beer per week     Comment: not often    Drug use: Never    Sexual activity: Yes     Partners: Female     Social Determinants of Health     Financial Resource Strain: High Risk (6/16/2023)    Overall Financial Resource Strain (CARDIA)     Difficulty of Paying Living Expenses: Very hard   Food Insecurity: Food Insecurity Present (6/16/2023)    Hunger Vital Sign     Worried About Running Out of Food in the Last Year: Often true     Ran Out of Food in the Last Year: Often true   Transportation Needs: No Transportation Needs (6/16/2023)    PRAPARE - Transportation     Lack of Transportation (Medical): No     Lack of Transportation (Non-Medical): No   Physical Activity: Inactive (6/16/2023)    Exercise Vital Sign     Days of Exercise per Week: 0 days     Minutes of Exercise per Session: 0 min   Stress: Stress Concern Present (6/16/2023)    [employer] - Occupational Stress Questionnaire     Feeling of Stress : Very much   Housing Stability: High Risk (6/16/2023)    Housing Stability Vital Sign     Unable to Pay for Housing in the Last Year: Yes     Number of Places Lived in the Last Year: 1     Unstable Housing in the Last Year: No        Family History   Problem Relation Name Age of Onset    Heart failure Mother      Cataracts Mother      Heart disease Mother      Glaucoma Mother      Peripheral vascular disease Mother      Hypertension Father          Patient Care Team:  Dayami Squires FNP as PCP - General (Family Medicine)  Nadira Buchanan FNP as Nurse Practitioner (Nephrology)  Cory Gan MD as Consulting Physician (Cardiology)  Álvaro Degroot MD as Consulting  "Physician (Cardiology)  Francisco Jackman MD as Consulting Physician (Cardiology)  Braxton Schmitz MD as Consulting Physician (Cardiology)     Subjective:     Review of Systems     See HPI for details    Constitutional: Denies Change in appetite. Denies Chills. Denies Fever. Denies Night sweats.  Eye: Denies Blurred vision. Denies Discharge. Denies Eye pain.  ENT: Denies Decreased hearing. Denies Sore throat. Denies Swollen glands.  Respiratory: Denies Cough. Denies Shortness of breath. Denies Shortness of breath with exertion. Denies Wheezing.  Cardiovascular: DeniesChest pain at rest. Denies Chest pain with exertion. Denies Irregular heartbeat. Denies Palpitations. Denies Edema.  Gastrointestinal: Denies Abdominal pain. DeniesDiarrhea. Denies Nausea. Denies Vomiting. Denies Hematemesis or Hematochezia.  Genitourinary: Denies Dysuria. Denies Urinary frequency. Denies Urinary urgency. Denies Blood in urine.  Endocrine: Denies Cold intolerance. Denies Excessive thirst. Denies Heat intolerance. Denies Weight loss. Denies Weight gain.  Musculoskeletal: Denies Painful joints. Denies Weakness.  Integumentary: Denies Rash. Denies Itching. Denies Dry skin.  Neurologic: Denies Dizziness. Denies Fainting. Denies Headache.  Psychiatric: Denies Depression. Denies Anxiety. Denies Suicidal/Homicidal ideations.    All Other ROS: Negative except as stated in HPI.       Objective:     Visit Vitals  /78 (BP Location: Right arm, Patient Position: Sitting, BP Method: Large (Automatic))   Pulse 72   Temp 98.2 °F (36.8 °C) (Oral)   Resp 18   Ht 6' 2" (1.88 m)   Wt 98.9 kg (218 lb)   BMI 27.99 kg/m²       Physical Exam    General: Alert and oriented, No acute distress.  Head: Normocephalic, Atraumatic.  Eye: Pupils are equal, round and reactive to light, Extraocular movements are intact, Sclera non-icteric.  Ears/Nose/Throat: Normal, Mucosa moist,Clear.  Neck/Thyroid: Supple, Non-tender, No carotid bruit, No lymphadenopathy, No JVD, " Full range of motion.  Respiratory: Clear to auscultation bilaterally; No wheezes, rales or rhonchi,Non-labored respirations, Symmetrical chest wall expansion.  Cardiovascular: Regular rate and rhythm, S1/S2 normal, No murmurs, rubs or gallops.  Gastrointestinal: Soft, Non-tender, Non-distended, Normal bowel sounds, No palpable organomegaly.  Musculoskeletal: Normal range of motion.  Integumentary: Warm, Dry, Intact, No suspicious lesions or rashes.  Extremities: No clubbing, cyanosis or edema  Neurologic: No focal deficits, Cranial Nerves II-XII are grossly intact, Motor strength normal upper and lower extremities, Sensory exam intact.  Psychiatric: Normal interaction, Coherent speech, Euthymic mood, Appropriate affect       Labs Reviewed:     Chemistry:  Lab Results   Component Value Date     06/26/2024    K 4.5 06/26/2024    BUN 16.5 06/26/2024    CREATININE 1.49 (H) 06/26/2024    EGFRNORACEVR 52 06/26/2024    GLUCOSE 118 (H) 06/26/2024    CALCIUM 9.4 06/26/2024    ALKPHOS 153 (H) 06/26/2024    LABPROT 6.3 06/26/2024    LABPROT 26.1 (H) 06/26/2024    ALBUMIN 3.8 06/26/2024    BILIDIR 0.2 08/25/2021    IBILI 0.20 08/25/2021    AST 18 06/26/2024    ALT 17 06/26/2024    MG 2.10 03/07/2023    PHOS 3.2 07/06/2023    ATZPRJHN27GL 35.4 04/12/2023        Lab Results   Component Value Date    HGBA1C 5.4 04/30/2024        Hematology:  Lab Results   Component Value Date    WBC 5.46 03/06/2024    HGB 14.3 03/06/2024    HCT 44.0 03/06/2024     03/06/2024       Lipid Panel:  Lab Results   Component Value Date    CHOL 106 04/04/2024    HDL 34 (L) 04/04/2024    LDL 58.00 04/04/2024    TRIG 70 04/04/2024    TOTALCHOLEST 3 04/04/2024        Urine:  Lab Results   Component Value Date    APPEARANCEUA Clear 06/26/2024    SGUA 1.025 06/26/2024    PROTEINUA Negative 06/26/2024    KETONESUA Negative 06/26/2024    LEUKOCYTESUR Negative 06/26/2024    RBCUA None Seen 06/26/2024    WBCUA None Seen 06/26/2024    BACTERIA None  Seen 06/26/2024    SQEPUA Trace (A) 06/04/2023    HYALINECASTS 3-5 (A) 06/04/2023    CREATRANDUR 201.3 (H) 06/26/2024    PROTEINURINE 15.7 06/26/2024    UPROTCREA 0.1 06/26/2024        Assessment:       ICD-10-CM ICD-9-CM   1. Spinal stenosis of lumbar region, unspecified whether neurogenic claudication present  M48.061 724.02   2. Type 2 diabetes mellitus without retinopathy  E11.9 250.00   3. Abnormal CBC  R79.89 790.6   4. Asthma with COPD  J44.89 493.20   5. Screening for prostate cancer  Z12.5 V76.44        Plan:     1. Spinal stenosis of lumbar region, unspecified whether neurogenic claudication present  Pt had CT Lumbar spine done 6-4-23 showing:  CT Lumbar Spine Without Contrast  Order: [id number]  Status: Final result     Visible to patient: Yes (not seen)     Next appt: 07/06/2023 at 03:00 PM in Ophthalmology ([facility])     0 Result Notes  Details     Reading Physician Reading Date Result Priority   Robson Hughes MD  [phone] 6/4/2023 STAT   Uche Anand MD  [phone] 6/5/2023        Narrative & Impression     Technique:CT of the lumbar spine was performed without intravenous contrast with direct axial as well as sagittal and coronal reconstruction images.     Comparison:Correlation is with CT abdomen study dated 2023-03-31 08:15:01.     Clinical history:Lumbar radiculopathy, symptoms persist with conservative treatment.     Findings:     Bone alignment:Unremarkable with no significant listhesis.     Curvature:There is straightening of the lumbar lordotic curvature.     Bone and bone marrow:The vertebral body heights are maintained.     Intervertebral disc spaces: Decreased disc height is seen at L1-L2 down through L5-S1.     Osteophytes:There are osteophytes at T12 down through S1.     Endplate Sclerosis: Endplate sclerosis is seen off the opposing endplates at L2-L3 and L5-S1.     Spinal canal:There is moderate spinal canal narrowing at L2 L3 a circumferential disc osteophyte  complex with associated moderate left-sided neural foraminal narrowing. There is moderate spinal canal narrowing at L3 L4 circumferential disc osteophyte complex with associated moderate left-sided neural foraminal narrowing. There is moderate spinal canal narrowing at L4 L5 circumferential disc osteophyte complex with associated moderate bilateral neural foraminal narrowing. There is mild - moderate spinal canal narrowing at L5 S1 circumferential disc osteophyte complex with associated moderate bilateral neural foraminal narrowing.     Fractures:No acute fracture dislocation or subluxation is seen.     Visualized sacrum:Large osteophytes are seen in the bilateral sacroiliac joints.     Miscellaneous:Limited view of the abdomen demonstrates a dilated left ureter.     Impression:       1. No acute fracture dislocation or subluxation is seen.     2. There is moderate spinal canal narrowing at L2 L3 a circumferential disc osteophyte complex with associated moderate left-sided neural foraminal narrowing. There is mild spinal canal narrowing at L3 L4 circumferential disc osteophyte complex with associated moderate left-sided neural foraminal narrowing. There is moderate spinal canal narrowing at L4 L5 circumferential disc osteophyte complex with associated moderate bilateral neural foraminal narrowing. There is mild - moderate spinal canal narrowing at L5 S1 circumferential disc osteophyte complex with associated moderate bilateral neural foraminal narrowing.     3. Degenerative changes and other findings as above.     No significant discrepancy with overnight report.        Electronically signed by: Uche Anand  Date:                                            06/05/2023  Time:                                           10:41             Exam Ended: 06/04/23 19:49 Last Resulted: 06/05/23 10:41          Referred to Neuro surgery in [facility] for eval and mgmt. Pt ambulating with rollator. Cont to use rollator to assist with  ambulation. Pt has  services for PT and requesting nursing care. Pt followed by Dr Bonifacio Ash at [facility] Neurosurgery Dept. Pt states he was referral to a pain mgmt doctor in [facility] but medicaid transportation will not take him that far. Refer to Dr Francisco Kearney in [place] for pain mgmt eval and txmt. Keep f/u appts with Neuro surgery as well as pain mgmt.     2. Type 2 diabetes mellitus without retinopathy  A1c 6.1. ADA diet and exercise. Cont Glipizide as prescribed. A1c in 5 months. Urine microalbumin 11-14-23, DM eye exam 7-7-23- pt has appt 8-27-24. DM foot done today. Pt states he was following Dr Mazariegos for podiatry but states he no longer takes his insurance. Referred to Dr Conklin in [place] for eval and mgmt for DM foot care.      Protective Sensation (w/ 10 gram monofilament):  Right: Intact  Left: Intact    Visual Inspection:  Normal -  Bilateral    Pedal Pulses:   Right: Present  Left: Present    Posterior Tibialis Pulses:   Right:Present  Left: Present     3. Abnormal CBC  CBC in 3 months.     4. Asthma with COPD   PFT done 9-6-22 Mod obstruction without significant improvement post BD, Decreased DLCO suggest altered Pulm gas exchange surface, possibly emphysema.  UTD with CXR- done 8-26-22 WNL. Cont meds as prescribed. Pt UTD with pneumovax. Pt states he is still having symptoms this time of year due to allergy and asthma. Will add Singulair 10 mg 1 tab po Q hs.     5. Screening for prostate cancer  PSA in 3 months.          Follow up in about 3 months (around 10/11/2024) for with labs 1 week prior to appt. . In addition to their scheduled follow up, the patient has also been instructed to follow up on as needed basis.     Future Appointments   Date Time Provider Department Center   7/16/2024  8:30 AM [facility]    8/19/2024  9:45 AM Allyson Guillen PA-C [facility] [place] [facility]   8/27/2024  2:30 PM [facility]    10/22/2024 10:30 AM Nadira Buchanan FNP [facility]

## 2024-07-16 ENCOUNTER — CLINICAL SUPPORT (OUTPATIENT)
Dept: CARDIOLOGY | Facility: CLINIC | Age: 64
End: 2024-07-16
Payer: MEDICAID

## 2024-07-16 DIAGNOSIS — Z95.810 AICD (AUTOMATIC CARDIOVERTER/DEFIBRILLATOR) PRESENT: Primary | ICD-10-CM

## 2024-07-16 PROCEDURE — 93284 PRGRMG EVAL IMPLANTABLE DFB: CPT | Mod: PBBFAC | Performed by: INTERNAL MEDICINE

## 2024-07-18 ENCOUNTER — TELEPHONE (OUTPATIENT)
Dept: UROLOGY | Facility: CLINIC | Age: 64
End: 2024-07-18
Payer: MEDICAID

## 2024-07-18 NOTE — TELEPHONE ENCOUNTER
Tried calling w/no answer. Left voicemail explaining that medication cannot be refilled until pt is seen in clinic because it has been over 1 year since last appt & to have pt call to schedule appt ASAP    ----- Message from Gayle Vargas sent at 2024  8:56 AM CDT -----  Frances,    Ms. Luna called on behalf of her brother Mr. Ezra Zhang ( 1960) asking if NP Francisco Weir could send in a prescription for his prostate. She would like for it to be sent to [facility] in [place].    Thank You

## 2024-07-24 ENCOUNTER — LAB VISIT (OUTPATIENT)
Dept: LAB | Facility: HOSPITAL | Age: 64
End: 2024-07-24
Attending: INTERNAL MEDICINE
Payer: MEDICAID

## 2024-07-24 DIAGNOSIS — I48.20 CHRONIC ATRIAL FIBRILLATION: Primary | ICD-10-CM

## 2024-07-24 LAB
INR PPP: 3.3
PROTHROMBIN TIME: 31.4 SECONDS (ref 12.5–14.5)

## 2024-07-24 PROCEDURE — 85610 PROTHROMBIN TIME: CPT

## 2024-07-24 PROCEDURE — 36415 COLL VENOUS BLD VENIPUNCTURE: CPT

## 2024-07-29 ENCOUNTER — HOSPITAL ENCOUNTER (EMERGENCY)
Facility: HOSPITAL | Age: 64
Discharge: HOME | End: 2024-07-29
Attending: EMERGENCY MEDICINE
Payer: MEDICAID

## 2024-07-29 VITALS
DIASTOLIC BLOOD PRESSURE: 86 MMHG | WEIGHT: 207.25 LBS | HEIGHT: 74 IN | RESPIRATION RATE: 16 BRPM | BODY MASS INDEX: 26.6 KG/M2 | TEMPERATURE: 99 F | HEART RATE: 74 BPM | SYSTOLIC BLOOD PRESSURE: 111 MMHG | OXYGEN SATURATION: 98 %

## 2024-07-29 DIAGNOSIS — J44.1 COPD EXACERBATION: Primary | ICD-10-CM

## 2024-07-29 DIAGNOSIS — R05.9 COUGH: ICD-10-CM

## 2024-07-29 LAB
ALBUMIN SERPL-MCNC: 4.3 G/DL (ref 3.4–4.8)
ALBUMIN/GLOB SERPL: 1.3 RATIO (ref 1.1–2)
ALP SERPL-CCNC: 143 UNIT/L (ref 40–150)
ALT SERPL-CCNC: 18 UNIT/L (ref 0–55)
ANION GAP SERPL CALC-SCNC: 10 MEQ/L
AST SERPL-CCNC: 14 UNIT/L (ref 5–34)
BASOPHILS # BLD AUTO: 0.04 X10(3)/MCL
BASOPHILS NFR BLD AUTO: 0.3 %
BILIRUB SERPL-MCNC: 1.8 MG/DL
BNP BLD-MCNC: 198.7 PG/ML
BUN SERPL-MCNC: 13.3 MG/DL (ref 8.4–25.7)
CALCIUM SERPL-MCNC: 9.8 MG/DL (ref 8.8–10)
CHLORIDE SERPL-SCNC: 104 MMOL/L (ref 98–107)
CO2 SERPL-SCNC: 27 MMOL/L (ref 23–31)
CREAT SERPL-MCNC: 1.36 MG/DL (ref 0.73–1.18)
CREAT/UREA NIT SERPL: 10
EOSINOPHIL # BLD AUTO: 0.04 X10(3)/MCL (ref 0–0.9)
EOSINOPHIL NFR BLD AUTO: 0.3 %
ERYTHROCYTE [DISTWIDTH] IN BLOOD BY AUTOMATED COUNT: 14.7 % (ref 11.5–17)
FLUAV AG UPPER RESP QL IA.RAPID: NOT DETECTED
FLUBV AG UPPER RESP QL IA.RAPID: NOT DETECTED
GFR SERPLBLD CREATININE-BSD FMLA CKD-EPI: 58 ML/MIN/1.73/M2
GLOBULIN SER-MCNC: 3.4 GM/DL (ref 2.4–3.5)
GLUCOSE SERPL-MCNC: 95 MG/DL (ref 82–115)
HCT VFR BLD AUTO: 47.5 % (ref 42–52)
HGB BLD-MCNC: 16.5 G/DL (ref 14–18)
IMM GRANULOCYTES # BLD AUTO: 0.05 X10(3)/MCL (ref 0–0.04)
IMM GRANULOCYTES NFR BLD AUTO: 0.4 %
LYMPHOCYTES # BLD AUTO: 1.69 X10(3)/MCL (ref 0.6–4.6)
LYMPHOCYTES NFR BLD AUTO: 13.8 %
MAGNESIUM SERPL-MCNC: 1.8 MG/DL (ref 1.6–2.6)
MCH RBC QN AUTO: 34.2 PG (ref 27–31)
MCHC RBC AUTO-ENTMCNC: 34.7 G/DL (ref 33–36)
MCV RBC AUTO: 98.5 FL (ref 80–94)
MONOCYTES # BLD AUTO: 1.07 X10(3)/MCL (ref 0.1–1.3)
MONOCYTES NFR BLD AUTO: 8.8 %
NEUTROPHILS # BLD AUTO: 9.33 X10(3)/MCL (ref 2.1–9.2)
NEUTROPHILS NFR BLD AUTO: 76.4 %
NRBC BLD AUTO-RTO: 0 %
PLATELET # BLD AUTO: 219 X10(3)/MCL (ref 130–400)
PMV BLD AUTO: 11 FL (ref 7.4–10.4)
POTASSIUM SERPL-SCNC: 3.9 MMOL/L (ref 3.5–5.1)
PROT SERPL-MCNC: 7.7 GM/DL (ref 5.8–7.6)
RBC # BLD AUTO: 4.82 X10(6)/MCL (ref 4.7–6.1)
SARS-COV-2 RNA RESP QL NAA+PROBE: NOT DETECTED
SODIUM SERPL-SCNC: 141 MMOL/L (ref 136–145)
TROPONIN I SERPL-MCNC: 0.03 NG/ML (ref 0–0.04)
WBC # BLD AUTO: 12.22 X10(3)/MCL (ref 4.5–11.5)

## 2024-07-29 PROCEDURE — 84484 ASSAY OF TROPONIN QUANT: CPT | Performed by: EMERGENCY MEDICINE

## 2024-07-29 PROCEDURE — 96365 THER/PROPH/DIAG IV INF INIT: CPT

## 2024-07-29 PROCEDURE — 94640 AIRWAY INHALATION TREATMENT: CPT | Mod: XB

## 2024-07-29 PROCEDURE — 25000003 PHARM REV CODE 250: Performed by: EMERGENCY MEDICINE

## 2024-07-29 PROCEDURE — 83735 ASSAY OF MAGNESIUM: CPT | Performed by: EMERGENCY MEDICINE

## 2024-07-29 PROCEDURE — 85025 COMPLETE CBC W/AUTO DIFF WBC: CPT | Performed by: EMERGENCY MEDICINE

## 2024-07-29 PROCEDURE — 93005 ELECTROCARDIOGRAM TRACING: CPT

## 2024-07-29 PROCEDURE — 83880 ASSAY OF NATRIURETIC PEPTIDE: CPT | Performed by: EMERGENCY MEDICINE

## 2024-07-29 PROCEDURE — 0240U COVID/FLU A&B PCR: CPT | Performed by: EMERGENCY MEDICINE

## 2024-07-29 PROCEDURE — 63600175 PHARM REV CODE 636 W HCPCS: Performed by: EMERGENCY MEDICINE

## 2024-07-29 PROCEDURE — 25000242 PHARM REV CODE 250 ALT 637 W/ HCPCS: Performed by: EMERGENCY MEDICINE

## 2024-07-29 PROCEDURE — 99285 EMERGENCY DEPT VISIT HI MDM: CPT | Mod: 25

## 2024-07-29 PROCEDURE — 80053 COMPREHEN METABOLIC PANEL: CPT | Performed by: EMERGENCY MEDICINE

## 2024-07-29 RX ORDER — DOXYCYCLINE 100 MG/1
100 CAPSULE ORAL 2 TIMES DAILY
Qty: 10 CAPSULE | Refills: 0 | Status: SHIPPED | OUTPATIENT
Start: 2024-07-29 | End: 2024-08-03

## 2024-07-29 RX ORDER — PREDNISONE 20 MG/1
40 TABLET ORAL DAILY
Qty: 10 TABLET | Refills: 0 | Status: SHIPPED | OUTPATIENT
Start: 2024-07-29 | End: 2024-08-03

## 2024-07-29 RX ORDER — ALBUTEROL SULFATE 90 UG/1
1-2 AEROSOL, METERED RESPIRATORY (INHALATION) EVERY 4 HOURS PRN
Qty: 18 G | Refills: 0 | Status: SHIPPED | OUTPATIENT
Start: 2024-07-29 | End: 2025-07-29

## 2024-07-29 RX ORDER — IPRATROPIUM BROMIDE AND ALBUTEROL SULFATE 2.5; .5 MG/3ML; MG/3ML
3 SOLUTION RESPIRATORY (INHALATION)
Status: COMPLETED | OUTPATIENT
Start: 2024-07-29 | End: 2024-07-29

## 2024-07-29 RX ORDER — PROMETHAZINE HYDROCHLORIDE AND CODEINE PHOSPHATE 6.25; 1 MG/5ML; MG/5ML
5 SOLUTION ORAL EVERY 6 HOURS PRN
Qty: 140 ML | Refills: 0 | Status: SHIPPED | OUTPATIENT
Start: 2024-07-29 | End: 2024-08-08

## 2024-07-29 RX ADMIN — DOXYCYCLINE 100 MG: 100 INJECTION, POWDER, LYOPHILIZED, FOR SOLUTION INTRAVENOUS at 07:07

## 2024-07-29 RX ADMIN — PREDNISONE 60 MG: 50 TABLET ORAL at 07:07

## 2024-07-29 RX ADMIN — IPRATROPIUM BROMIDE AND ALBUTEROL SULFATE 3 ML: .5; 3 SOLUTION RESPIRATORY (INHALATION) at 07:07

## 2024-07-29 NOTE — ED PROVIDER NOTES
Encounter Date: 7/29/2024       History     Chief Complaint   Patient presents with    Cough    Shortness of Breath     C/o productive clear mucous cough , sob x 2 days with abdominal and back pain when coughing.     Abdominal Pain     Patient with a history of CHF with pacemaker defibrillator, AFib, COPD, diabetes presents emergency department 1 day of cough and congestion cough with white mucus production and pain in after abdomen with coughing.  Unsure of any fevers although he thinks he was running when yesterday.  No sick contacts in his symptoms started yesterday.  He states that he was medically compliant.  Feels like he can not take a deep breath.  No nausea vomiting or diarrhea.  No other systemic complaints      Review of patient's allergies indicates:  No Known Allergies  Past Medical History:   Diagnosis Date    Atrial fibrillation     CHF (congestive heart failure)     CKD (chronic kidney disease)     COPD (chronic obstructive pulmonary disease)     DM (diabetes mellitus)     Dyslipidemia     Essential (primary) hypertension     Heart failure, unspecified     Rheumatoid arthritis, unspecified     Smoking     Type 2 diabetes mellitus without complications      Past Surgical History:   Procedure Laterality Date    ABLATION N/A 9/11/2023    Procedure: Ablation;  Surgeon: Álvaro Degroot MD;  Location: [facility] CATH LAB;  Service: Cardiology;  Laterality: N/A;  EPS + AV NODE ABLATION + UPGRADE TO BIV ICD (SJ)    CARDIOVERSION      coronary arteriograph      defibillator      ECHOCARDIOGRAM,TRANSESOPHAGEAL N/A 3/22/2023    Procedure: Transesophageal echo (LASHELL) intra-procedure log documentation;  Surgeon: Matthieu Diagnostic Provider;  Location: [facility] CATH LAB;  Service: Cardiology;  Laterality: N/A;    INSERT / REPLACE / REMOVE PACEMAKER      INSERTION OF BIVENTRICULAR IMPLANTABLE CARDIOVERTER-DEFIBRILLATOR (ICD) N/A 9/11/2023    Procedure: INSERTION, ICD, BIVENTRICULAR;  Surgeon: Álvaro Degroot MD;  Location: [facility]  CATH LAB;  Service: Cardiology;  Laterality: N/A;    left  heart catherization       Family History   Problem Relation Name Age of Onset    Heart failure Mother      Cataracts Mother      Heart disease Mother      Glaucoma Mother      Peripheral vascular disease Mother      Hypertension Father       Social History     Tobacco Use    Smoking status: Never     Passive exposure: Never    Smokeless tobacco: Never    Tobacco comments:     Taking patches   Substance Use Topics    Alcohol use: Never     Alcohol/week: 1.0 standard drink of alcohol     Types: 1 Cans of beer per week     Comment: not often    Drug use: Never     Review of Systems   Respiratory:  Positive for cough.         White sputum with the cough       Physical Exam     Initial Vitals [07/29/24 1734]   BP Pulse Resp Temp SpO2   (!) 149/101 76 (!) 24 99.1 °F (37.3 °C) 96 %      MAP       --         Physical Exam    Constitutional: He appears well-developed and well-nourished.   HENT:   Head: Normocephalic and atraumatic.   Eyes: EOM are normal. Pupils are equal, round, and reactive to light.   Neck:   Normal range of motion.  Cardiovascular:  Normal rate and regular rhythm.           Pulmonary/Chest: No respiratory distress. He has wheezes. He has no rales.   Adequate air movement but diffuse wheezing and prolonged end-expiratory phase bilaterally   Abdominal: Abdomen is soft. Bowel sounds are normal. He exhibits no distension. There is no abdominal tenderness. There is no rebound.   Musculoskeletal:         General: Normal range of motion.      Cervical back: Normal range of motion.     Neurological: He is alert and oriented to person, place, and time.   Psychiatric: He has a normal mood and affect.         ED Course   Procedures  Labs Reviewed   COMPREHENSIVE METABOLIC PANEL - Abnormal       Result Value    Sodium 141      Potassium 3.9      Chloride 104      CO2 27      Glucose 95      Blood Urea Nitrogen 13.3      Creatinine 1.36 (*)     Calcium 9.8       Protein Total 7.7 (*)     Albumin 4.3      Globulin 3.4      Albumin/Globulin Ratio 1.3      Bilirubin Total 1.8 (*)           ALT 18      AST 14      eGFR 58      Anion Gap 10.0      BUN/Creatinine Ratio 10     B-TYPE NATRIURETIC PEPTIDE - Abnormal    Natriuretic Peptide 198.7 (*)    CBC WITH DIFFERENTIAL - Abnormal    WBC 12.22 (*)     RBC 4.82      Hgb 16.5      Hct 47.5      MCV 98.5 (*)     MCH 34.2 (*)     MCHC 34.7      RDW 14.7      Platelet 219      MPV 11.0 (*)     Neut % 76.4      Lymph % 13.8      Mono % 8.8      Eos % 0.3      Basophil % 0.3      Lymph # 1.69      Neut # 9.33 (*)     Mono # 1.07      Eos # 0.04      Baso # 0.04      IG# 0.05 (*)     IG% 0.4      NRBC% 0.0     MAGNESIUM - Normal    Magnesium Level 1.80     TROPONIN I - Normal    Troponin-I 0.026     COVID/FLU A&B PCR - Normal    Influenza A PCR Not Detected      Influenza B PCR Not Detected      SARS-CoV-2 PCR Not Detected      Narrative:     The Xpert Xpress SARS-CoV-2/FLU/RSV plus is a rapid, multiplexed real-time PCR test intended for the simultaneous qualitative detection and differentiation of SARS-CoV-2, Influenza A, Influenza B, and respiratory syncytial virus (RSV) viral RNA in either nasopharyngeal swab or nasal swab specimens.         CBC W/ AUTO DIFFERENTIAL    Narrative:     The following orders were created for panel order CBC auto differential.  Procedure                               Abnormality         Status                     ---------                               -----------         ------                     CBC with Differential[[id number]]       Abnormal            Final result                 Please view results for these tests on the individual orders.   POCT INFLUENZA A/B MOLECULAR     EKG Readings: (Independently Interpreted)   Time 6:17 p.m.   Rate 74, sinus rhythm, right bundle-branch block, left axis deviation, no STEMI     ECG Results              EKG 12-lead (In process)        Collection Time  Result Time QRS Duration OHS QTC Calculation    07/29/24 18:17:20 07/29/24 18:28:49 134 470                     In process by Interface, Lab In [facility] (07/29/24 18:28:54)                   Narrative:    Test Reason : R05.9,    Vent. Rate : 074 BPM     Atrial Rate : 416 BPM     P-R Int : 000 ms          QRS Dur : 134 ms      QT Int : 424 ms       P-R-T Axes : 000 270 087 degrees     QTc Int : 470 ms    Wide QRS rhythm  Left axis deviation  Right bundle branch block  Possible Lateral infarct ,age undetermined  Abnormal ECG  When compared with ECG of 01-DEC-2023 05:13,  Wide QRS rhythm has replaced Electronic ventricular pacemaker    Referred By: [email or web address]   SELF           Confirmed By:                                   Imaging Results              X-Ray Chest 1 View (Final result)  Result time 07/29/24 18:41:02      Final result by Uche Anand MD (07/29/24 18:41:02)                   Impression:    .  No acute cardiopulmonary process identified.      Electronically signed by: Uche Anand  Date:    07/29/2024  Time:    18:41               Narrative:    EXAMINATION:  XR CHEST 1 VIEW    CLINICAL HISTORY:  cough;    TECHNIQUE:  One view    COMPARISON:  December 1, 2023.    FINDINGS:  Cardiopericardial silhouette is within normal limits.  Left chest implanted cardiac device electrodes are in similar location.  Lungs are without dense focal or segmental consolidation, congestive process, pleural effusions or pneumothorax.                                       Medications   albuterol-ipratropium 2.5 mg-0.5 mg/3 mL nebulizer solution 3 mL (3 mLs Nebulization Given 7/29/24 1920)   predniSONE tablet 60 mg (60 mg Oral Given 7/29/24 1908)   doxycycline 100 mg in D5W 100 mL IVPB (MB+) (0 mg Intravenous Stopped 7/29/24 2015)     Medical Decision Making  Amount and/or Complexity of Data Reviewed  Labs: ordered.  Radiology: ordered.    Risk  Prescription drug management.                          Medical Decision Making:    Initial Assessment:   Workup in progress.  Due to wheezing will provide prednisone at this time  Differential Diagnosis:   COPD exacerbation, CHF exacerbation, pneumonia, bronchitis, COVID, influenza  ED Management:  All patient's labs within normal limits nonsignificant trending with in his baseline no concerning findings on EKG inpatient chest x-ray shows no acute disease.  Patient improved significantly with nebulizer treatments on repeat exam patient has stable vitals saturating 98% on room air.  Very little to no wheezing bilaterally with good air movement.  Patient states he is ready for discharge.  Will treat as COPD exacerbation with 5 days of steroids antibiotics albuterol inhaler to augment with his other medications.  Return precautions were provided and patient was understands and agrees with plan of care             Clinical Impression:  Final diagnoses:  [R05.9] Cough  [J44.1] COPD exacerbation (Primary)          ED Disposition Condition    Discharge Stable          ED Prescriptions       Medication Sig Dispense Start Date End Date Auth. Provider    predniSONE (DELTASONE) 20 MG tablet Take 2 tablets (40 mg total) by mouth once daily. for 5 days 10 tablet 7/29/2024 8/3/2024 Alejandro Fay MD    doxycycline (VIBRAMYCIN) 100 MG Cap Take 1 capsule (100 mg total) by mouth 2 (two) times daily. for 5 days 10 capsule 7/29/2024 8/3/2024 Alejandro Fay MD    albuterol (PROAIR HFA) 90 mcg/actuation inhaler Inhale 1-2 puffs into the lungs every 4 (four) hours as needed for Wheezing. Rescue 18 g 7/29/2024 7/29/2025 Alejandro Fay MD          Follow-up Information    None          Alejandro Fay MD  07/29/24 1816       Alejandro Fay MD  07/29/24 [id number]

## 2024-07-30 LAB
OHS QRS DURATION: 134 MS
OHS QTC CALCULATION: 470 MS

## 2024-07-30 NOTE — DISCHARGE INSTRUCTIONS
Please use all medications as prescribed.    In regards to albuterol inhaler, use 1 puff every 2 hours for the next 2 days while awake in addition all your other COPD medications    As we discussed, his symptoms are worsening despite medical therapy he please return to the emergency department for re-evaluation.  Otherwise, follow up with the family doctor in the next 3-4 days for medical re-evaluation to let them know you were in the emergency department

## 2024-08-07 ENCOUNTER — OFFICE VISIT (OUTPATIENT)
Dept: UROLOGY | Facility: CLINIC | Age: 64
End: 2024-08-07
Payer: MEDICAID

## 2024-08-07 VITALS
WEIGHT: 216.5 LBS | HEIGHT: 74 IN | HEART RATE: 70 BPM | SYSTOLIC BLOOD PRESSURE: 103 MMHG | OXYGEN SATURATION: 98 % | BODY MASS INDEX: 27.78 KG/M2 | DIASTOLIC BLOOD PRESSURE: 66 MMHG | RESPIRATION RATE: 20 BRPM | TEMPERATURE: 98 F

## 2024-08-07 DIAGNOSIS — R39.11 BENIGN PROSTATIC HYPERPLASIA WITH URINARY HESITANCY: Primary | ICD-10-CM

## 2024-08-07 DIAGNOSIS — N40.1 BENIGN PROSTATIC HYPERPLASIA WITH URINARY HESITANCY: Primary | ICD-10-CM

## 2024-08-07 LAB
BILIRUB SERPL-MCNC: NORMAL MG/DL
BLOOD URINE, POC: NORMAL
COLOR, POC UA: YELLOW
GLUCOSE UR QL STRIP: NORMAL
KETONES UR QL STRIP: NORMAL
LEUKOCYTE ESTERASE URINE, POC: NORMAL
NITRITE, POC UA: NORMAL
PH, POC UA: 5.5
PROTEIN, POC: NORMAL
SPECIFIC GRAVITY, POC UA: 1.02
UROBILINOGEN, POC UA: 0.2

## 2024-08-07 PROCEDURE — 3008F BODY MASS INDEX DOCD: CPT | Mod: CPTII,,, | Performed by: NURSE PRACTITIONER

## 2024-08-07 PROCEDURE — 1160F RVW MEDS BY RX/DR IN RCRD: CPT | Mod: CPTII,,, | Performed by: NURSE PRACTITIONER

## 2024-08-07 PROCEDURE — 3044F HG A1C LEVEL LT 7.0%: CPT | Mod: CPTII,,, | Performed by: NURSE PRACTITIONER

## 2024-08-07 PROCEDURE — 99213 OFFICE O/P EST LOW 20 MIN: CPT | Mod: S$PBB,,, | Performed by: NURSE PRACTITIONER

## 2024-08-07 PROCEDURE — 4010F ACE/ARB THERAPY RXD/TAKEN: CPT | Mod: CPTII,,, | Performed by: NURSE PRACTITIONER

## 2024-08-07 PROCEDURE — 3074F SYST BP LT 130 MM HG: CPT | Mod: CPTII,,, | Performed by: NURSE PRACTITIONER

## 2024-08-07 PROCEDURE — 1159F MED LIST DOCD IN RCRD: CPT | Mod: CPTII,,, | Performed by: NURSE PRACTITIONER

## 2024-08-07 PROCEDURE — 99215 OFFICE O/P EST HI 40 MIN: CPT | Mod: PBBFAC | Performed by: NURSE PRACTITIONER

## 2024-08-07 PROCEDURE — 81001 URINALYSIS AUTO W/SCOPE: CPT | Mod: PBBFAC | Performed by: NURSE PRACTITIONER

## 2024-08-07 PROCEDURE — 3078F DIAST BP <80 MM HG: CPT | Mod: CPTII,,, | Performed by: NURSE PRACTITIONER

## 2024-08-07 RX ORDER — ALFUZOSIN HYDROCHLORIDE 10 MG/1
10 TABLET, EXTENDED RELEASE ORAL DAILY
Qty: 90 TABLET | Refills: 3 | Status: SHIPPED | OUTPATIENT
Start: 2024-08-07 | End: 2025-08-07

## 2024-08-19 ENCOUNTER — OFFICE VISIT (OUTPATIENT)
Dept: CARDIOLOGY | Facility: CLINIC | Age: 64
End: 2024-08-19
Payer: MEDICAID

## 2024-08-19 VITALS
HEART RATE: 70 BPM | HEIGHT: 74 IN | BODY MASS INDEX: 26.54 KG/M2 | DIASTOLIC BLOOD PRESSURE: 75 MMHG | OXYGEN SATURATION: 98 % | RESPIRATION RATE: 18 BRPM | WEIGHT: 206.81 LBS | SYSTOLIC BLOOD PRESSURE: 99 MMHG | TEMPERATURE: 98 F

## 2024-08-19 DIAGNOSIS — I50.9 CONGESTIVE HEART FAILURE, UNSPECIFIED HF CHRONICITY, UNSPECIFIED HEART FAILURE TYPE: ICD-10-CM

## 2024-08-19 DIAGNOSIS — Z95.810 S/P ICD (INTERNAL CARDIAC DEFIBRILLATOR) PROCEDURE: ICD-10-CM

## 2024-08-19 DIAGNOSIS — I10 PRIMARY HYPERTENSION: ICD-10-CM

## 2024-08-19 DIAGNOSIS — I50.22 CHRONIC SYSTOLIC HEART FAILURE: ICD-10-CM

## 2024-08-19 DIAGNOSIS — R06.02 SHORTNESS OF BREATH: Primary | ICD-10-CM

## 2024-08-19 DIAGNOSIS — E78.5 DYSLIPIDEMIA: ICD-10-CM

## 2024-08-19 DIAGNOSIS — I48.19 PERSISTENT ATRIAL FIBRILLATION: ICD-10-CM

## 2024-08-19 DIAGNOSIS — I95.9 HYPOTENSION, UNSPECIFIED HYPOTENSION TYPE: ICD-10-CM

## 2024-08-19 DIAGNOSIS — I25.10 CORONARY ARTERIOSCLEROSIS: ICD-10-CM

## 2024-08-19 DIAGNOSIS — R06.00 DYSPNEA, UNSPECIFIED TYPE: ICD-10-CM

## 2024-08-19 DIAGNOSIS — E78.5 HYPERLIPIDEMIA LDL GOAL <70: ICD-10-CM

## 2024-08-19 DIAGNOSIS — I48.91 ATRIAL FIBRILLATION WITH RVR: ICD-10-CM

## 2024-08-19 DIAGNOSIS — I51.3 THROMBUS OF LEFT ATRIAL APPENDAGE: ICD-10-CM

## 2024-08-19 DIAGNOSIS — I42.8 NON-ISCHEMIC CARDIOMYOPATHY: ICD-10-CM

## 2024-08-19 PROCEDURE — 99215 OFFICE O/P EST HI 40 MIN: CPT | Mod: PBBFAC

## 2024-08-19 PROCEDURE — 3074F SYST BP LT 130 MM HG: CPT | Mod: CPTII,,,

## 2024-08-19 PROCEDURE — 3044F HG A1C LEVEL LT 7.0%: CPT | Mod: CPTII,,,

## 2024-08-19 PROCEDURE — 4010F ACE/ARB THERAPY RXD/TAKEN: CPT | Mod: CPTII,,,

## 2024-08-19 PROCEDURE — 1159F MED LIST DOCD IN RCRD: CPT | Mod: CPTII,,,

## 2024-08-19 PROCEDURE — 3078F DIAST BP <80 MM HG: CPT | Mod: CPTII,,,

## 2024-08-19 PROCEDURE — 99214 OFFICE O/P EST MOD 30 MIN: CPT | Mod: S$PBB,,,

## 2024-08-19 PROCEDURE — 3008F BODY MASS INDEX DOCD: CPT | Mod: CPTII,,,

## 2024-08-19 PROCEDURE — 1160F RVW MEDS BY RX/DR IN RCRD: CPT | Mod: CPTII,,,

## 2024-08-19 NOTE — PATIENT INSTRUCTIONS
Follow up in cardiology clinic in 5 months or sooner if needed  Return to clinic for device checks as directed   Follow up with Dr. Degroot as directed  Please notify clinic if any new concerns or any change in symptoms

## 2024-08-19 NOTE — PROGRESS NOTES
CHIEF COMPLAINT:   Chief Complaint   Patient presents with    Follow-up     5 mos f/u denies cardiac targets                                                  HPI:  Ezra Zhang 63 y.o. male  with atrial fibrillation status post LASHELL and cardioversion in February 2017 on Eliquis, nonischemic cardiomyopathy with recovered EF s/p ICD, COPD, hepatitis C, diabetes mellitus type II, and CKD stage III who presents for routine follow up and ongoing care. Patient had his ICD generator changed in August 2018 due to recall. Of note, patient underwent LASHELL (3.22.23) prior to PVI but JOANNE thrombus was seen and PVI was cancelled. He was taken off Eliquis and started on Warfarin.  He completed venous ultrasound of bilateral lower extremities for previous complaints of swelling and discomfort.  Venous US without evidence of DVT in the veins of the BL LE.  He continues to follow with [facility], Dr. Degroot.  Of note, the patient underwent AV node ablation and ICD upgrade on September 11, 2023.  Patient recently saw Dr. Degroot in February 2024, at that time repeat echocardiogram was completed which revealed an improved EF of 40%, grade 1 diastolic dysfunction, but overall much improved from prior.  At last office visit patient stated that he felt well overall and much improved from prior.    Today the patient states that he feels well from a cardiac standpoint.  He denies any significant cardiac complaints.  He reports occasional swelling of ankles maybe once a week but states that it was quickly relieved with Lasix, leg elevation, and low-sodium diet.  He has stable orthopnea, bendopnea, and SOB on occasion.  Otherwise he denies any further such as chest pain, palpitations, PND, lightheadedness, dizziness, syncope, or claudication symptoms.  He was able to complete his ADLs without any issues or ischemic symptoms.  He walks for exercise and states that he was pretty active in his daily life, he was always moving around he uses rolling  walker for ambulation assistance given his history of chronic low back pain secondary to spinal stenosis.  He ambulates independently at home and when walking short distances.  He reports compliance with all his current medications and is tolerating them well.  He denies any tobacco or other illicit drug use.                                                                                                                                                                                                                                                                                                                                                                                                                                  CARDIAC TESTING:  TTE 2.8.24  1. The study quality is average.   2. The left ventricle is normal in size. Global left ventricular systolic function is moderately decreased. The left ventricular ejection fraction is 40%. The left ventricle diastolic function is impaired (Grade I) with normal left atrial pressure.   3. No significant valvular dysfunction noted.  4. A linear echo density is noted in the right ventricle, suggestive of a pacemaker lead.   5. The pulmonary artery systolic pressure is 27 mmHg.     ICD Upgrade 9.11.23    Successful device upgrade.    The patient tolerated procedure well.    The patient left the lab in stable condition.    There were no immediate complications.    Successful AV node ablation.    Upgrade of single chamber ICD to BIV ICD.    3D mapping performed with Ensite.    His bundle recording.    CV Venous US Bilateral Lower Extremities  The contralateral (left) common femoral vein is patent.  There is no evidence of a right lower extremity DVT.  The contralateral (right) common femoral vein is patent.  There is no evidence of a left lower extremity DVT.  There was no deep vein thrombosis identified in the visualized veins of the bilateral lower extremities    TTE  3.22.23  The left ventricle is moderately enlarged with moderate concentric hypertrophy and severely decreased systolic function.  The estimated ejection fraction is 15%.  Normal right ventricular size with normal right ventricular systolic function.  Mild left atrial enlargement.  No interatrial septal defect present.  Normal appearing left atrial appendage. Thrombus is present in the appendage. A small filamentous mass consisted with a thrombus was noted. Abnormal appendage velocities.  Mild right atrial enlargement.  Mild mitral regurgitation.  No tricupsid valve vegetation present.  There is severe left ventricular global hypokinesis.  LVEF is severely reduced.  Global hypokinesis.   There is a thrombus in the JOANNE.   PVI will be cancelled.      Echo 1.25.23  · Atrial fibrillation observed.  · Normal right ventricular size.  · Intermediate central venous pressure (8 mmHg).  · The estimated PA systolic pressure is 20 mmHg.  · IVC is dilated and collapses > 50% with inspiration.  · The left ventricle is normal in size with  · Moderate right atrial enlargement.  · Mild to moderate left atrial enlargement.  · The estimated ejection fraction is 35%.    Patient Active Problem List   Diagnosis    Persistent atrial fibrillation    Anemia    AR (allergic rhinitis)    Asthma with COPD    Bladder wall thickening    Chronic back pain    CKD (chronic kidney disease)    Type 2 diabetes mellitus without retinopathy    Elevated serum creatinine    HA (headache)    Chronic hepatitis C virus infection    Hyperlipidemia LDL goal <70    Hydroureteronephrosis    Joint pain    Plantar wart    Rheumatoid arthritis    Radiculopathy    Renal cyst, right    Umbilical hernia    Colon cancer screening    Hypertension    Smoking    Non-ischemic cardiomyopathy    History of hepatitis C    Chronic systolic heart failure    Diabetes mellitus    Recurrent sinusitis    Shortness of breath    Prostate cancer screening    Vitamin D deficiency     GERD (gastroesophageal reflux disease)    Atrial fibrillation with RVR    Acute on chronic congestive heart failure    Dyspnea    Insomnia    Thrombus of left atrial appendage    CHF (congestive heart failure)    Coronary arteriosclerosis    Dyslipidemia    Hydronephrosis with ureteropelvic junction (UPJ) obstruction    Obesity    Obstructive sleep apnea of adult    Spinal stenosis of lumbar region    Dry eye syndrome of both eyes    Age-related nuclear cataract of both eyes    Hypotension    S/P ICD (internal cardiac defibrillator) procedure    COPD exacerbation    Central sleep apnea syndrome    Abnormal CBC     Past Surgical History:   Procedure Laterality Date    ABLATION N/A 9/11/2023    Procedure: Ablation;  Surgeon: Álvaro Degroot MD;  Location: [facility] CATH LAB;  Service: Cardiology;  Laterality: N/A;  EPS + AV NODE ABLATION + UPGRADE TO BIV ICD (SJM)    CARDIOVERSION      coronary arteriograph      defibillator      ECHOCARDIOGRAM,TRANSESOPHAGEAL N/A 3/22/2023    Procedure: Transesophageal echo (LASHELL) intra-procedure log documentation;  Surgeon: Matthieu Diagnostic Provider;  Location: [facility] CATH LAB;  Service: Cardiology;  Laterality: N/A;    INSERT / REPLACE / REMOVE PACEMAKER      INSERTION OF BIVENTRICULAR IMPLANTABLE CARDIOVERTER-DEFIBRILLATOR (ICD) N/A 9/11/2023    Procedure: INSERTION, ICD, BIVENTRICULAR;  Surgeon: Álvaro Degroot MD;  Location: [facility] CATH LAB;  Service: Cardiology;  Laterality: N/A;    left  heart catherization       Social History     Socioeconomic History    Marital status: Single   Tobacco Use    Smoking status: Never     Passive exposure: Never    Smokeless tobacco: Never    Tobacco comments:     Taking patches   Substance and Sexual Activity    Alcohol use: Never     Alcohol/week: 1.0 standard drink of alcohol     Types: 1 Cans of beer per week     Comment: not often    Drug use: Never    Sexual activity: Yes     Partners: Female     Social Determinants of Health     Financial Resource  Strain: High Risk (6/16/2023)    Overall Financial Resource Strain (CARDIA)     Difficulty of Paying Living Expenses: Very hard   Food Insecurity: Food Insecurity Present (6/16/2023)    Hunger Vital Sign     Worried About Running Out of Food in the Last Year: Often true     Ran Out of Food in the Last Year: Often true   Transportation Needs: No Transportation Needs (6/16/2023)    PRAPARE - Transportation     Lack of Transportation (Medical): No     Lack of Transportation (Non-Medical): No   Physical Activity: Inactive (6/16/2023)    Exercise Vital Sign     Days of Exercise per Week: 0 days     Minutes of Exercise per Session: 0 min   Stress: Stress Concern Present (6/16/2023)    [employer] - Occupational Stress Questionnaire     Feeling of Stress : Very much   Housing Stability: High Risk (6/16/2023)    Housing Stability Vital Sign     Unable to Pay for Housing in the Last Year: Yes     Number of Places Lived in the Last Year: 1     Unstable Housing in the Last Year: No        Family History   Problem Relation Name Age of Onset    Heart failure Mother      Cataracts Mother      Heart disease Mother      Glaucoma Mother      Peripheral vascular disease Mother      Hypertension Father       Review of patient's allergies indicates:  No Known Allergies      ROS:  Review of Systems   Constitutional: Negative.  Negative for malaise/fatigue.   HENT: Negative.     Eyes: Negative.    Respiratory:  Positive for shortness of breath (Occasional SOB, bendopnea, stable).    Cardiovascular:  Positive for orthopnea and leg swelling (Intermittent). Negative for chest pain, palpitations, claudication and PND.        Bendopnea   Gastrointestinal: Negative.    Genitourinary: Negative.    Musculoskeletal:  Positive for back pain and myalgias.        Bilateral lower extremity pain and numbness   Skin: Negative.    Neurological:  Positive for sensory change.   Endo/Heme/Allergies: Negative.   "  Psychiatric/Behavioral: Negative.                                                                                                                                                                                  Negative except as stated in the history of present illness. See HPI for details.    PHYSICAL EXAM:  Visit Vitals  BP 99/75   Pulse 70   Temp 97.8 °F (36.6 °C) (Oral)   Resp 18   Ht 6' 2" (1.88 m)   Wt 93.8 kg (206 lb 12.7 oz)   SpO2 98%   BMI 26.55 kg/m²           Physical Exam  Constitutional:       General: He is not in acute distress.     Appearance: Normal appearance. He is obese. He is not diaphoretic.   HENT:      Head: Normocephalic.      Nose: Nose normal.      Mouth/Throat:      Mouth: Mucous membranes are moist.   Eyes:      Extraocular Movements: Extraocular movements intact.   Neck:      Vascular: No carotid bruit.   Cardiovascular:      Rate and Rhythm: Normal rate and regular rhythm.      Pulses: Normal pulses.      Heart sounds: Normal heart sounds. No murmur heard.  Pulmonary:      Effort: Pulmonary effort is normal. No respiratory distress.      Breath sounds: Normal breath sounds.   Abdominal:      General: There is no distension.      Palpations: Abdomen is soft.   Musculoskeletal:      Cervical back: Normal range of motion.      Right lower leg: No edema.      Left lower leg: No edema.   Skin:     General: Skin is warm and dry.   Neurological:      Mental Status: He is alert and oriented to person, place, and time.   Psychiatric:         Mood and Affect: Mood is depressed.         Current Outpatient Medications   Medication Instructions    ACCU-CHEK FASTCLIX LANCET DRUM Misc Topical (Top), Daily    ACCU-CHEK GUIDE TEST STRIPS Strp USE AS DIRECTED once daily (keep log)    albuterol (PROAIR HFA) 90 mcg/actuation inhaler 1-2 puffs, Inhalation, Every 4 hours PRN, Rescue    albuterol (PROVENTIL) 2.5 mg /3 mL (0.083 %) nebulizer solution Nebulization    albuterol-ipratropium (DUO-NEB) 2.5 " mg-0.5 mg/3 mL nebulizer solution 3 mLs, Nebulization, Every 6 hours PRN, Rescue    alfuzosin (UROXATRAL) 10 mg, Oral, Daily    atorvastatin (LIPITOR) 80 mg, Oral, Daily    azithromycin (Z-VETO) 250 MG tablet     cetirizine (ZYRTEC) 10 mg, Oral, Daily    cholecalciferol (vitamin D3) (VITAMIN D3) 5,000 Units, Oral, Daily    clotrimazole-betamethasone 1-0.05% (LOTRISONE) cream 2 g, Topical (Top), 2 times daily    famotidine (PEPCID) 20 mg, Oral, 2 times daily    fluticasone propionate (FLONASE ALLERGY RELIEF) 100 mcg, Each Nostril, Daily    fluticasone-salmeterol 230-21 mcg/dose (ADVAIR HFA) 230-21 mcg/actuation HFAA inhaler 2 puffs, Inhalation, 2 times daily, Controller    furosemide (LASIX) 40 mg, Oral, Daily, Refilled per Cardio orders.    glipiZIDE (GLUCOTROL) 5 mg, Oral, Daily    HYDROcodone-acetaminophen (NORCO) 5-325 mg per tablet 2 tablets, 2 times daily    lancets-blood glucose strips 30 gauge Cmpk   Accucheck blood glucose test strips and lancets, See Instructions, Check CBG once daily and keep log., # 100 EA, 11 Refill(s), Pharmacy: [facility], 185, cm, Height/Length Dosing, 03/08/22 8:10:00 CST, 96, kg, Weight Dosing, 03/08/22 8:10:00 CST    metoprolol succinate (TOPROL-XL) 100 mg, Oral, Daily    montelukast (SINGULAIR) 10 mg, Oral, Nightly    promethazine-dextromethorphan (PROMETHAZINE-DM) 6.25-15 mg/5 mL Syrp TAKE 5 ml (cc) EVERY 4 HOURS AS NEEDED FOR cough    sacubitriL-valsartan (ENTRESTO) 49-51 mg per tablet 1 tablet, Oral, 2 times daily    traZODone (DESYREL) 50 mg, Oral, Nightly PRN    warfarin (COUMADIN) 5 mg, Oral, Daily        All medications, laboratory studies, cardiac diagnostic imaging reviewed.     Lab Results   Component Value Date    LDL 58.00 04/04/2024    LDL 56.00 03/07/2023    TRIG 70 04/04/2024    TRIG 55 03/07/2023    CREATININE 1.36 (H) 07/29/2024    MG 1.80 07/29/2024    K 3.9 07/29/2024        ASSESSMENT/PLAN:  Paroxysmal Atrial Fibrillation/flutter   - Symptoms stable from  last office visit   - Denies palpitations, lightheadedness, dizziness, or syncope  - Patient underwent AV angie ablation and ICD upgrade September 2023.  States that he is feeling much better following procedure and denies any cardiac complaints  - TE/DCCV aborted after JOANNE thrombus identified (March 2023)  - Continue Warfarin, patient tolerating well  - Follow up with [facility], Dr. Degroot as scheduled- every 6 months- next appointment 9.11.24  - Metoprolol Succinate 100 MG daily per Dr. Degroot; patient tolerating well  - Continue to [facility] with [facility] for Coumadin management     Non ischemic cardiomyopathy  Chronic Systolic HF s/p ICD  EF improved to 40% per TTE February 2024   NYHA III  - Euvolemic and warm today  - Intermittent edema in BLE, reports once weekly approximately.   - Reports occasional shortness of breath and bendopnea   - Continue current medications as above  - Reports that lower extremity edema has improved since increasing dose of Lasix  - EF improved to 40% per Echo 2024.  Patient states that he is feeling much better overall. See full report above   - Patient reports that he takes Lasix 40 mg BID and is doing well on this current dose  - Watch fluid and salt intake  - Will re-assess with Echo as needed     HTN  - BP at goal today 99/75  - Continue Entresto to 49/51 mg BID and Toprol 100 MG daily  - Counseled on importance of following a low-sodium, heart healthy diet and exercise as tolerated    Claudication  - Denies any claudication symptoms today- stable from prior visit   - BL Venous US without evidence of DVT in the veins of the bilateral lower extremities  - Patient does report ongoing bilateral extremity pain however believes that is due to spinal stenosis of lower back      Follow up in cardiology clinic in 5 months or sooner if needed  Return to clinic for device checks as directed   Follow up with Dr. Degroot as directed  Please notify clinic if any new concerns or any change in  symptoms

## 2024-08-20 ENCOUNTER — LAB VISIT (OUTPATIENT)
Dept: LAB | Facility: HOSPITAL | Age: 64
End: 2024-08-20
Attending: INTERNAL MEDICINE
Payer: MEDICAID

## 2024-08-20 DIAGNOSIS — Z12.5 SCREENING FOR PROSTATE CANCER: ICD-10-CM

## 2024-08-20 DIAGNOSIS — I48.20 CHRONIC ATRIAL FIBRILLATION: Primary | ICD-10-CM

## 2024-08-20 DIAGNOSIS — E11.9 TYPE 2 DIABETES MELLITUS WITHOUT RETINOPATHY: ICD-10-CM

## 2024-08-20 DIAGNOSIS — R79.89 ABNORMAL CBC: ICD-10-CM

## 2024-08-20 DIAGNOSIS — R39.11 BENIGN PROSTATIC HYPERPLASIA WITH URINARY HESITANCY: ICD-10-CM

## 2024-08-20 DIAGNOSIS — N40.1 BENIGN PROSTATIC HYPERPLASIA WITH URINARY HESITANCY: ICD-10-CM

## 2024-08-20 LAB
ALBUMIN SERPL-MCNC: 4.1 G/DL (ref 3.4–4.8)
ALBUMIN/GLOB SERPL: 1.6 RATIO (ref 1.1–2)
ALP SERPL-CCNC: 129 UNIT/L (ref 40–150)
ALT SERPL-CCNC: 32 UNIT/L (ref 0–55)
ANION GAP SERPL CALC-SCNC: 9 MEQ/L
AST SERPL-CCNC: 30 UNIT/L (ref 5–34)
BASOPHILS # BLD AUTO: 0.02 X10(3)/MCL
BASOPHILS NFR BLD AUTO: 0.3 %
BILIRUB SERPL-MCNC: 1.5 MG/DL
BUN SERPL-MCNC: 13.5 MG/DL (ref 8.4–25.7)
CALCIUM SERPL-MCNC: 9.7 MG/DL (ref 8.8–10)
CHLORIDE SERPL-SCNC: 105 MMOL/L (ref 98–107)
CO2 SERPL-SCNC: 29 MMOL/L (ref 23–31)
CREAT SERPL-MCNC: 1.49 MG/DL (ref 0.73–1.18)
CREAT UR-MCNC: 49 MG/DL (ref 63–166)
CREAT/UREA NIT SERPL: 9
EOSINOPHIL # BLD AUTO: 0.07 X10(3)/MCL (ref 0–0.9)
EOSINOPHIL NFR BLD AUTO: 1.2 %
ERYTHROCYTE [DISTWIDTH] IN BLOOD BY AUTOMATED COUNT: 14.8 % (ref 11.5–17)
EST. AVERAGE GLUCOSE BLD GHB EST-MCNC: 111.2 MG/DL
GFR SERPLBLD CREATININE-BSD FMLA CKD-EPI: 52 ML/MIN/1.73/M2
GLOBULIN SER-MCNC: 2.6 GM/DL (ref 2.4–3.5)
GLUCOSE SERPL-MCNC: 140 MG/DL (ref 82–115)
HBA1C MFR BLD: 5.5 %
HCT VFR BLD AUTO: 43.9 % (ref 42–52)
HGB BLD-MCNC: 14.9 G/DL (ref 14–18)
IMM GRANULOCYTES # BLD AUTO: 0 X10(3)/MCL (ref 0–0.04)
IMM GRANULOCYTES NFR BLD AUTO: 0 %
INR PPP: 2.8
LYMPHOCYTES # BLD AUTO: 2.03 X10(3)/MCL (ref 0.6–4.6)
LYMPHOCYTES NFR BLD AUTO: 35.2 %
MCH RBC QN AUTO: 34.3 PG (ref 27–31)
MCHC RBC AUTO-ENTMCNC: 33.9 G/DL (ref 33–36)
MCV RBC AUTO: 100.9 FL (ref 80–94)
MICROALBUMIN UR-MCNC: 13.1 UG/ML
MICROALBUMIN/CREAT RATIO PNL UR: 26.7 MG/GM CR (ref 0–30)
MONOCYTES # BLD AUTO: 0.43 X10(3)/MCL (ref 0.1–1.3)
MONOCYTES NFR BLD AUTO: 7.5 %
NEUTROPHILS # BLD AUTO: 3.22 X10(3)/MCL (ref 2.1–9.2)
NEUTROPHILS NFR BLD AUTO: 55.8 %
PLATELET # BLD AUTO: 193 X10(3)/MCL (ref 130–400)
PMV BLD AUTO: 10.7 FL (ref 7.4–10.4)
POTASSIUM SERPL-SCNC: 4.2 MMOL/L (ref 3.5–5.1)
PROT SERPL-MCNC: 6.7 GM/DL (ref 5.8–7.6)
PROTHROMBIN TIME: 27 SECONDS (ref 12.5–14.5)
PSA SERPL-MCNC: 1.61 NG/ML
RBC # BLD AUTO: 4.35 X10(6)/MCL (ref 4.7–6.1)
SODIUM SERPL-SCNC: 143 MMOL/L (ref 136–145)
T4 FREE SERPL-MCNC: 1.19 NG/DL (ref 0.7–1.48)
TSH SERPL-ACNC: 2.18 UIU/ML (ref 0.35–4.94)
WBC # BLD AUTO: 5.77 X10(3)/MCL (ref 4.5–11.5)

## 2024-08-20 PROCEDURE — 36415 COLL VENOUS BLD VENIPUNCTURE: CPT

## 2024-08-20 PROCEDURE — 84443 ASSAY THYROID STIM HORMONE: CPT

## 2024-08-20 PROCEDURE — 82043 UR ALBUMIN QUANTITATIVE: CPT

## 2024-08-20 PROCEDURE — 80053 COMPREHEN METABOLIC PANEL: CPT

## 2024-08-20 PROCEDURE — 84153 ASSAY OF PSA TOTAL: CPT

## 2024-08-20 PROCEDURE — 85610 PROTHROMBIN TIME: CPT

## 2024-08-20 PROCEDURE — 84439 ASSAY OF FREE THYROXINE: CPT

## 2024-08-20 PROCEDURE — 83036 HEMOGLOBIN GLYCOSYLATED A1C: CPT

## 2024-08-20 PROCEDURE — 85025 COMPLETE CBC W/AUTO DIFF WBC: CPT

## 2024-09-18 ENCOUNTER — LAB VISIT (OUTPATIENT)
Dept: LAB | Facility: HOSPITAL | Age: 64
End: 2024-09-18
Attending: INTERNAL MEDICINE
Payer: MEDICAID

## 2024-09-18 DIAGNOSIS — I48.91 ATRIAL FIB/FLUTTER, TRANSIENT: Primary | ICD-10-CM

## 2024-09-18 DIAGNOSIS — I48.92 ATRIAL FIB/FLUTTER, TRANSIENT: Primary | ICD-10-CM

## 2024-09-18 LAB
INR PPP: 1.9
PROTHROMBIN TIME: 18.9 SECONDS (ref 12.5–14.5)

## 2024-09-18 PROCEDURE — 36415 COLL VENOUS BLD VENIPUNCTURE: CPT

## 2024-09-18 PROCEDURE — 85610 PROTHROMBIN TIME: CPT

## 2024-09-26 ENCOUNTER — LAB VISIT (OUTPATIENT)
Dept: LAB | Facility: HOSPITAL | Age: 64
End: 2024-09-26
Attending: INTERNAL MEDICINE
Payer: MEDICAID

## 2024-09-26 DIAGNOSIS — I48.20 CHRONIC ATRIAL FIBRILLATION: Primary | ICD-10-CM

## 2024-09-26 LAB
INR PPP: 2.4
PROTHROMBIN TIME: 23.5 SECONDS (ref 12.5–14.5)

## 2024-09-26 PROCEDURE — 85610 PROTHROMBIN TIME: CPT

## 2024-09-26 PROCEDURE — 36415 COLL VENOUS BLD VENIPUNCTURE: CPT

## 2024-10-15 DIAGNOSIS — N18.9 CHRONIC KIDNEY DISEASE, UNSPECIFIED CKD STAGE: Primary | ICD-10-CM

## 2024-10-16 ENCOUNTER — OFFICE VISIT (OUTPATIENT)
Dept: INTERNAL MEDICINE | Facility: CLINIC | Age: 64
End: 2024-10-16
Payer: MEDICAID

## 2024-10-16 ENCOUNTER — LAB VISIT (OUTPATIENT)
Dept: LAB | Facility: HOSPITAL | Age: 64
End: 2024-10-16
Attending: NURSE PRACTITIONER
Payer: MEDICAID

## 2024-10-16 VITALS
HEART RATE: 75 BPM | DIASTOLIC BLOOD PRESSURE: 77 MMHG | RESPIRATION RATE: 18 BRPM | TEMPERATURE: 98 F | SYSTOLIC BLOOD PRESSURE: 109 MMHG | BODY MASS INDEX: 27.59 KG/M2 | WEIGHT: 215 LBS | HEIGHT: 74 IN

## 2024-10-16 DIAGNOSIS — R79.89 ABNORMAL CBC: ICD-10-CM

## 2024-10-16 DIAGNOSIS — J01.20 ACUTE NON-RECURRENT ETHMOIDAL SINUSITIS: ICD-10-CM

## 2024-10-16 DIAGNOSIS — E11.9 CONTROLLED TYPE 2 DIABETES MELLITUS WITHOUT COMPLICATION, WITHOUT LONG-TERM CURRENT USE OF INSULIN: ICD-10-CM

## 2024-10-16 DIAGNOSIS — Z00.00 WELL ADULT EXAM: ICD-10-CM

## 2024-10-16 DIAGNOSIS — Z12.5 PROSTATE CANCER SCREENING: ICD-10-CM

## 2024-10-16 DIAGNOSIS — N40.1 BENIGN PROSTATIC HYPERPLASIA WITH URINARY HESITANCY: ICD-10-CM

## 2024-10-16 DIAGNOSIS — N18.31 STAGE 3A CHRONIC KIDNEY DISEASE: ICD-10-CM

## 2024-10-16 DIAGNOSIS — G47.00 INSOMNIA, UNSPECIFIED TYPE: ICD-10-CM

## 2024-10-16 DIAGNOSIS — R39.11 BENIGN PROSTATIC HYPERPLASIA WITH URINARY HESITANCY: ICD-10-CM

## 2024-10-16 DIAGNOSIS — E55.9 VITAMIN D DEFICIENCY: ICD-10-CM

## 2024-10-16 DIAGNOSIS — J44.89 ASTHMA WITH COPD: ICD-10-CM

## 2024-10-16 DIAGNOSIS — E11.9 TYPE 2 DIABETES MELLITUS WITHOUT RETINOPATHY: ICD-10-CM

## 2024-10-16 DIAGNOSIS — M48.061 SPINAL STENOSIS OF LUMBAR REGION, UNSPECIFIED WHETHER NEUROGENIC CLAUDICATION PRESENT: Primary | ICD-10-CM

## 2024-10-16 DIAGNOSIS — N18.9 CHRONIC KIDNEY DISEASE, UNSPECIFIED CKD STAGE: ICD-10-CM

## 2024-10-16 LAB
ALBUMIN SERPL-MCNC: 3.8 G/DL (ref 3.4–4.8)
ALBUMIN/GLOB SERPL: 1.3 RATIO (ref 1.1–2)
ALP SERPL-CCNC: 190 UNIT/L (ref 40–150)
ALT SERPL-CCNC: 22 UNIT/L (ref 0–55)
ANION GAP SERPL CALC-SCNC: 7 MEQ/L
AST SERPL-CCNC: 25 UNIT/L (ref 5–34)
BILIRUB SERPL-MCNC: 0.8 MG/DL
BUN SERPL-MCNC: 17.1 MG/DL (ref 8.4–25.7)
CALCIUM SERPL-MCNC: 9.1 MG/DL (ref 8.8–10)
CHLORIDE SERPL-SCNC: 109 MMOL/L (ref 98–107)
CO2 SERPL-SCNC: 28 MMOL/L (ref 23–31)
CREAT SERPL-MCNC: 1.42 MG/DL (ref 0.73–1.18)
CREAT/UREA NIT SERPL: 12
GFR SERPLBLD CREATININE-BSD FMLA CKD-EPI: 55 ML/MIN/1.73/M2
GLOBULIN SER-MCNC: 2.9 GM/DL (ref 2.4–3.5)
GLUCOSE SERPL-MCNC: 95 MG/DL (ref 82–115)
POTASSIUM SERPL-SCNC: 3.8 MMOL/L (ref 3.5–5.1)
PROT SERPL-MCNC: 6.7 GM/DL (ref 5.8–7.6)
PSA SERPL-MCNC: 1.34 NG/ML
SODIUM SERPL-SCNC: 144 MMOL/L (ref 136–145)

## 2024-10-16 PROCEDURE — 80053 COMPREHEN METABOLIC PANEL: CPT

## 2024-10-16 PROCEDURE — 3008F BODY MASS INDEX DOCD: CPT | Mod: CPTII,,, | Performed by: NURSE PRACTITIONER

## 2024-10-16 PROCEDURE — 36415 COLL VENOUS BLD VENIPUNCTURE: CPT

## 2024-10-16 PROCEDURE — 99214 OFFICE O/P EST MOD 30 MIN: CPT | Mod: PBBFAC | Performed by: NURSE PRACTITIONER

## 2024-10-16 PROCEDURE — 84153 ASSAY OF PSA TOTAL: CPT

## 2024-10-16 PROCEDURE — 3044F HG A1C LEVEL LT 7.0%: CPT | Mod: CPTII,,, | Performed by: NURSE PRACTITIONER

## 2024-10-16 PROCEDURE — 3074F SYST BP LT 130 MM HG: CPT | Mod: CPTII,,, | Performed by: NURSE PRACTITIONER

## 2024-10-16 PROCEDURE — 3061F NEG MICROALBUMINURIA REV: CPT | Mod: CPTII,,, | Performed by: NURSE PRACTITIONER

## 2024-10-16 PROCEDURE — 3066F NEPHROPATHY DOC TX: CPT | Mod: CPTII,,, | Performed by: NURSE PRACTITIONER

## 2024-10-16 PROCEDURE — 3078F DIAST BP <80 MM HG: CPT | Mod: CPTII,,, | Performed by: NURSE PRACTITIONER

## 2024-10-16 PROCEDURE — 1159F MED LIST DOCD IN RCRD: CPT | Mod: CPTII,,, | Performed by: NURSE PRACTITIONER

## 2024-10-16 PROCEDURE — 99214 OFFICE O/P EST MOD 30 MIN: CPT | Mod: S$PBB,,, | Performed by: NURSE PRACTITIONER

## 2024-10-16 PROCEDURE — 4010F ACE/ARB THERAPY RXD/TAKEN: CPT | Mod: CPTII,,, | Performed by: NURSE PRACTITIONER

## 2024-10-16 RX ORDER — MONTELUKAST SODIUM 10 MG/1
10 TABLET ORAL NIGHTLY
Qty: 90 TABLET | Refills: 1 | Status: SHIPPED | OUTPATIENT
Start: 2024-10-16 | End: 2025-10-16

## 2024-10-16 RX ORDER — METHYLPREDNISOLONE 4 MG/1
TABLET ORAL
Qty: 21 EACH | Refills: 0 | Status: SHIPPED | OUTPATIENT
Start: 2024-10-16 | End: 2024-11-06

## 2024-10-16 RX ORDER — IPRATROPIUM BROMIDE AND ALBUTEROL SULFATE 2.5; .5 MG/3ML; MG/3ML
3 SOLUTION RESPIRATORY (INHALATION) EVERY 6 HOURS PRN
Qty: 100 ML | Refills: 6 | Status: SHIPPED | OUTPATIENT
Start: 2024-10-16 | End: 2025-10-16

## 2024-10-16 RX ORDER — FLUTICASONE PROPIONATE 50 MCG
2 SPRAY, SUSPENSION (ML) NASAL DAILY
Qty: 18 G | Refills: 6 | Status: SHIPPED | OUTPATIENT
Start: 2024-10-16

## 2024-10-16 RX ORDER — AMOXICILLIN 875 MG/1
875 TABLET, FILM COATED ORAL EVERY 12 HOURS
Qty: 14 TABLET | Refills: 0 | Status: SHIPPED | OUTPATIENT
Start: 2024-10-16 | End: 2024-10-23

## 2024-10-16 RX ORDER — GLIPIZIDE 5 MG/1
5 TABLET ORAL DAILY
Qty: 90 TABLET | Refills: 1 | Status: SHIPPED | OUTPATIENT
Start: 2024-10-16

## 2024-10-16 RX ORDER — ALBUTEROL SULFATE 90 UG/1
1-2 INHALANT RESPIRATORY (INHALATION) EVERY 4 HOURS PRN
Qty: 18 G | Refills: 0 | Status: SHIPPED | OUTPATIENT
Start: 2024-10-16 | End: 2025-10-16

## 2024-10-16 RX ORDER — CETIRIZINE HYDROCHLORIDE 10 MG/1
10 TABLET ORAL DAILY
Qty: 90 TABLET | Refills: 1 | Status: SHIPPED | OUTPATIENT
Start: 2024-10-16

## 2024-10-16 RX ORDER — TRAZODONE HYDROCHLORIDE 50 MG/1
50 TABLET ORAL NIGHTLY PRN
Qty: 90 TABLET | Refills: 1 | Status: SHIPPED | OUTPATIENT
Start: 2024-10-16 | End: 2025-10-16

## 2024-10-16 RX ORDER — ACETAMINOPHEN 500 MG
5000 TABLET ORAL DAILY
Qty: 90 TABLET | Refills: 1 | Status: SHIPPED | OUTPATIENT
Start: 2024-10-16

## 2024-10-16 RX ORDER — FAMOTIDINE 20 MG/1
20 TABLET, FILM COATED ORAL 2 TIMES DAILY
Qty: 60 TABLET | Refills: 11 | Status: SHIPPED | OUTPATIENT
Start: 2024-10-16 | End: 2025-10-16

## 2024-10-16 NOTE — PROGRESS NOTES
Patient ID: [id number]     Chief Complaint: Diabetes        HPI:     HPI      Ezra Zhang is a 63 y.o. male here today for a follow up.         Immunizations:   Immunization History   Administered Date(s) Administered    COVID-19 Vaccine 03/08/2021, 04/05/2021, 12/07/2021    COVID-19, MRNA, LN-S, PF (MODERNA FULL 0.5 ML DOSE) 03/08/2021, 04/05/2021, 12/07/2021    Influenza 10/11/2012, 12/21/2015, 10/15/2020    Influenza - Quadrivalent 12/20/2019, 03/08/2022    Influenza - Quadrivalent - PF *Preferred* (6 months and older) 09/26/2022, 10/17/2023    Influenza - Trivalent - Fluarix, Flulaval, Fluzone, Afluria - PF 12/21/2015    Pneumococcal 10/11/2012    Pneumococcal Polysaccharide - 23 Valent 10/11/2012    Tdap 12/21/2016        -------------------------------------    Atrial fibrillation    CHF (congestive heart failure)    CKD (chronic kidney disease)    COPD (chronic obstructive pulmonary disease)    DM (diabetes mellitus)    Dyslipidemia    Essential (primary) hypertension    Heart failure, unspecified    Rheumatoid arthritis, unspecified    Smoking    Type 2 diabetes mellitus without complications        Past Surgical History:   Procedure Laterality Date    ABLATION N/A 9/11/2023    Procedure: Ablation;  Surgeon: Álvaro Degroot MD;  Location: [facility] CATH LAB;  Service: Cardiology;  Laterality: N/A;  EPS + AV NODE ABLATION + UPGRADE TO BIV ICD (SJM)    CARDIOVERSION      coronary arteriograph      defibillator      ECHOCARDIOGRAM,TRANSESOPHAGEAL N/A 3/22/2023    Procedure: Transesophageal echo (LASHELL) intra-procedure log documentation;  Surgeon: Matthieu Diagnostic Provider;  Location: [facility] CATH LAB;  Service: Cardiology;  Laterality: N/A;    INSERT / REPLACE / REMOVE PACEMAKER      INSERTION OF BIVENTRICULAR IMPLANTABLE CARDIOVERTER-DEFIBRILLATOR (ICD) N/A 9/11/2023    Procedure: INSERTION, ICD, BIVENTRICULAR;  Surgeon: Álvaro Degroot MD;  Location: [facility] CATH LAB;  Service: Cardiology;  Laterality: N/A;    left   heart catherization         Review of patient's allergies indicates:  No Known Allergies    Current Outpatient Medications   Medication Instructions    ACCU-CHEK FASTCLIX LANCET DRUM Misc Daily    ACCU-CHEK GUIDE TEST STRIPS Strp USE AS DIRECTED once daily (keep log)    albuterol (PROAIR HFA) 90 mcg/actuation inhaler 1-2 puffs, Inhalation, Every 4 hours PRN, Rescue    albuterol (PROVENTIL) 2.5 mg /3 mL (0.083 %) nebulizer solution Take by nebulization.    albuterol-ipratropium (DUO-NEB) 2.5 mg-0.5 mg/3 mL nebulizer solution 3 mLs, Nebulization, Every 6 hours PRN, Rescue    alfuzosin (UROXATRAL) 10 mg, Oral, Daily    atorvastatin (LIPITOR) 80 mg, Oral, Daily    cetirizine (ZYRTEC) 10 mg, Oral, Daily    cholecalciferol (vitamin D3) (VITAMIN D3) 5,000 Units, Oral, Daily    clotrimazole-betamethasone 1-0.05% (LOTRISONE) cream 2 g, 2 times daily    famotidine (PEPCID) 20 mg, Oral, 2 times daily    fluticasone propionate (FLONASE ALLERGY RELIEF) 100 mcg, Each Nostril, Daily    fluticasone-salmeterol 230-21 mcg/dose (ADVAIR HFA) 230-21 mcg/actuation HFAA inhaler 2 puffs, Inhalation, 2 times daily, Controller    furosemide (LASIX) 40 mg, Oral, Daily, Refilled per Cardio orders.    glipiZIDE (GLUCOTROL) 5 mg, Oral, Daily    lancets-blood glucose strips 30 [facility]   Accucheck blood glucose test strips and lancets, See Instructions, Check CBG once daily and keep log., # 100 EA, 11 Refill(s), Pharmacy: [facility], 185, cm, Height/Length Dosing, 03/08/22 8:10:00 CST, 96, kg, Weight Dosing, 03/08/22 8:10:00 CST    metoprolol succinate (TOPROL-XL) 100 mg, Oral, Daily    montelukast (SINGULAIR) 10 mg, Oral, Nightly    promethazine-dextromethorphan (PROMETHAZINE-DM) 6.25-15 mg/5 mL Syrp TAKE 5 ml (cc) EVERY 4 HOURS AS NEEDED FOR cough    sacubitriL-valsartan (ENTRESTO) 49-51 mg per tablet 1 tablet, Oral, 2 times daily    traZODone (DESYREL) 50 mg, Oral, Nightly PRN    warfarin (COUMADIN) 5 mg, Oral, Daily       Social  History     Socioeconomic History    Marital status: Single   Tobacco Use    Smoking status: Never     Passive exposure: Never    Smokeless tobacco: Never   Substance and Sexual Activity    Alcohol use: Never     Alcohol/week: 1.0 standard drink of alcohol     Types: 1 Cans of beer per week     Comment: not often    Drug use: Never    Sexual activity: Yes     Partners: Female     Social Drivers of Health     Financial Resource Strain: Low Risk  (10/16/2024)    Overall Financial Resource Strain (CARDIA)     Difficulty of Paying Living Expenses: Not hard at all   Food Insecurity: No Food Insecurity (10/16/2024)    Hunger Vital Sign     Worried About Running Out of Food in the Last Year: Never true     Ran Out of Food in the Last Year: Never true   Transportation Needs: No Transportation Needs (6/16/2023)    PRAPARE - Transportation     Lack of Transportation (Medical): No     Lack of Transportation (Non-Medical): No   Physical Activity: Insufficiently Active (10/16/2024)    Exercise Vital Sign     Days of Exercise per Week: 7 days     Minutes of Exercise per Session: 20 min   Stress: No Stress Concern Present (10/16/2024)    [employer] - Occupational Stress Questionnaire     Feeling of Stress : Not at all   Housing Stability: Low Risk  (10/16/2024)    Housing Stability Vital Sign     Unable to Pay for Housing in the Last Year: No     Homeless in the Last Year: No        Family History   Problem Relation Name Age of Onset    Heart failure Mother      Cataracts Mother      Heart disease Mother      Glaucoma Mother      Peripheral vascular disease Mother      Hypertension Father          Patient Care Team:  Dayami Squires FNP as PCP - General (Family Medicine)  Nadira Buchanan FNP as Nurse Practitioner (Nephrology)  Cory Gan MD as Consulting Physician (Cardiology)  Álvaro Degroot MD as Consulting Physician (Cardiology)  Francisco Jackman MD as Consulting Physician  "(Cardiology)  Braxton Schmitz MD as Consulting Physician (Cardiology)     Subjective:     Review of Systems     See HPI for details    Constitutional: Denies Change in appetite. Denies Chills. Denies Fever. Denies Night sweats.  Eye: Denies Blurred vision. Denies Discharge. Denies Eye pain.  ENT: Denies Decreased hearing. Denies Sore throat. Denies Swollen glands.  Respiratory: Denies Cough. Denies Shortness of breath. Denies Shortness of breath with exertion. Denies Wheezing.  Cardiovascular: DeniesChest pain at rest. Denies Chest pain with exertion. Denies Irregular heartbeat. Denies Palpitations. Denies Edema.  Gastrointestinal: Denies Abdominal pain. DeniesDiarrhea. Denies Nausea. Denies Vomiting. Denies Hematemesis or Hematochezia.  Genitourinary: Denies Dysuria. Denies Urinary frequency. Denies Urinary urgency. Denies Blood in urine.  Endocrine: Denies Cold intolerance. Denies Excessive thirst. Denies Heat intolerance. Denies Weight loss. Denies Weight gain.  Musculoskeletal: Denies Painful joints. Denies Weakness.  Integumentary: Denies Rash. Denies Itching. Denies Dry skin.  Neurologic: Denies Dizziness. Denies Fainting. Denies Headache.  Psychiatric: Denies Depression. Denies Anxiety. Denies Suicidal/Homicidal ideations.    All Other ROS: Negative except as stated in HPI.       Objective:     Visit Vitals  /77 (BP Location: Right arm, Patient Position: Sitting)   Pulse 75   Temp 98 °F (36.7 °C) (Oral)   Resp 18   Ht 6' 2" (1.88 m)   Wt 97.5 kg (215 lb)   BMI 27.60 kg/m²       Physical Exam    General: Alert and oriented, No acute distress.  Head: Normocephalic, Atraumatic.  Eye: Pupils are equal, round and reactive to light, Extraocular movements are intact, Sclera non-icteric.  Ears/Nose/Throat: Normal, Mucosa moist,Clear.  Neck/Thyroid: Supple, Non-tender, No carotid bruit, No lymphadenopathy, No JVD, Full range of motion.  Respiratory: Clear to auscultation bilaterally; No wheezes, rales or " rhonchi,Non-labored respirations, Symmetrical chest wall expansion.  Cardiovascular: Regular rate and rhythm, S1/S2 normal, No murmurs, rubs or gallops.  Gastrointestinal: Soft, Non-tender, Non-distended, Normal bowel sounds, No palpable organomegaly.  Musculoskeletal: Normal range of motion.  Integumentary: Warm, Dry, Intact, No suspicious lesions or rashes.  Extremities: No clubbing, cyanosis or edema  Neurologic: No focal deficits, Cranial Nerves II-XII are grossly intact, Motor strength normal upper and lower extremities, Sensory exam intact.  Psychiatric: Normal interaction, Coherent speech, Euthymic mood, Appropriate affect       Labs Reviewed:     Chemistry:  Lab Results   Component Value Date     10/16/2024    K 3.8 10/16/2024    BUN 17.1 10/16/2024    CREATININE 1.42 (H) 10/16/2024    EGFRNORACEVR 55 10/16/2024    GLUCOSE 95 10/16/2024    CALCIUM 9.1 10/16/2024    ALKPHOS 190 (H) 10/16/2024    LABPROT 6.7 10/16/2024    ALBUMIN 3.8 10/16/2024    BILIDIR 0.2 08/25/2021    IBILI 0.20 08/25/2021    AST 25 10/16/2024    ALT 22 10/16/2024    MG 1.80 07/29/2024    PHOS 3.2 07/06/2023    ZXBOCPJC36RZ 35.4 04/12/2023        Lab Results   Component Value Date    HGBA1C 5.5 08/20/2024        Hematology:  Lab Results   Component Value Date    WBC 5.77 08/20/2024    HGB 14.9 08/20/2024    HCT 43.9 08/20/2024     08/20/2024       Lipid Panel:  Lab Results   Component Value Date    CHOL 106 04/04/2024    HDL 34 (L) 04/04/2024    LDL 58.00 04/04/2024    TRIG 70 04/04/2024    TOTALCHOLEST 3 04/04/2024        Urine:  Lab Results   Component Value Date    APPEARANCEUA Clear 06/26/2024    SGUA 1.025 06/26/2024    PROTEINUA Negative 06/26/2024    KETONESUA Negative 06/26/2024    LEUKOCYTESUR Negative 06/26/2024    RBCUA None Seen 06/26/2024    WBCUA None Seen 06/26/2024    BACTERIA None Seen 06/26/2024    SQEPUA Trace (A) 06/04/2023    HYALINECASTS 3-5 (A) 06/04/2023    CREATRANDUR 49.0 (L) 08/20/2024     PROTEINURINE 15.7 06/26/2024    UPROTCREA 0.1 06/26/2024        Assessment:       ICD-10-CM ICD-9-CM   1. Spinal stenosis of lumbar region, unspecified whether neurogenic claudication present  M48.061 724.02   2. Type 2 diabetes mellitus without retinopathy  E11.9 250.00   3. Abnormal CBC  R79.89 790.6   4. Asthma with COPD  J44.89 493.20   5. Prostate cancer screening  Z12.5 V76.44   6. Well adult exam  Z00.00 V70.0   7. Stage 3a chronic kidney disease  N18.31 585.3   8. Vitamin D deficiency  E55.9 268.9   9. Controlled type 2 diabetes mellitus without complication, without long-term current use of insulin  E11.9 250.00   10. Insomnia, unspecified type  G47.00 780.52        Plan:     1. Spinal stenosis of lumbar region, unspecified whether neurogenic claudication present (Primary)  Pt had CT Lumbar spine done 6-4-23 showing:  CT Lumbar Spine Without Contrast  Order: [id number]  Status: Final result     Visible to patient: Yes (not seen)     Next appt: 07/06/2023 at 03:00 PM in Ophthalmology ([facility], [facility])     0 Result Notes  Details     Reading Physician Reading Date Result Priority   Robson Hughes MD  [phone] 6/4/2023 STAT   Uche Anand MD  [phone] 6/5/2023        Narrative & Impression     Technique:CT of the lumbar spine was performed without intravenous contrast with direct axial as well as sagittal and coronal reconstruction images.     Comparison:Correlation is with CT abdomen study dated 2023-03-31 08:15:01.     Clinical history:Lumbar radiculopathy, symptoms persist with conservative treatment.     Findings:     Bone alignment:Unremarkable with no significant listhesis.     Curvature:There is straightening of the lumbar lordotic curvature.     Bone and bone marrow:The vertebral body heights are maintained.     Intervertebral disc spaces: Decreased disc height is seen at L1-L2 down through L5-S1.     Osteophytes:There are osteophytes at T12 down through S1.     Endplate Sclerosis:  Endplate sclerosis is seen off the opposing endplates at L2-L3 and L5-S1.     Spinal canal:There is moderate spinal canal narrowing at L2 L3 a circumferential disc osteophyte complex with associated moderate left-sided neural foraminal narrowing. There is moderate spinal canal narrowing at L3 L4 circumferential disc osteophyte complex with associated moderate left-sided neural foraminal narrowing. There is moderate spinal canal narrowing at L4 L5 circumferential disc osteophyte complex with associated moderate bilateral neural foraminal narrowing. There is mild - moderate spinal canal narrowing at L5 S1 circumferential disc osteophyte complex with associated moderate bilateral neural foraminal narrowing.     Fractures:No acute fracture dislocation or subluxation is seen.     Visualized sacrum:Large osteophytes are seen in the bilateral sacroiliac joints.     Miscellaneous:Limited view of the abdomen demonstrates a dilated left ureter.     Impression:        1. No acute fracture dislocation or subluxation is seen.     2. There is moderate spinal canal narrowing at L2 L3 a circumferential disc osteophyte complex with associated moderate left-sided neural foraminal narrowing. There is mild spinal canal narrowing at L3 L4 circumferential disc osteophyte complex with associated moderate left-sided neural foraminal narrowing. There is moderate spinal canal narrowing at L4 L5 circumferential disc osteophyte complex with associated moderate bilateral neural foraminal narrowing. There is mild - moderate spinal canal narrowing at L5 S1 circumferential disc osteophyte complex with associated moderate bilateral neural foraminal narrowing.     3. Degenerative changes and other findings as above.     No significant discrepancy with overnight report.        Electronically signed by: Uche Anand  Date:                                            06/05/2023  Time:                                           10:41             Exam  Ended: 06/04/23 19:49 Last Resulted: 06/05/23 10:41            Referred to Neuro surgery in [facility] for eval and mgmt. Pt ambulating with rollator. Cont to use rollator to assist with ambulation. Pt has  services for PT and requesting nursing care. Pt followed by Dr Bonifacio Ash at [facility]. Pt states he was referral to a pain mgmt doctor in [facility] but medicaid transportation will not take him that far. Refer to Dr Francisco Kearney in [place] for pain mgmt eval and txmt. Keep f/u appts with Neuro surgery as well as pain mgmt.     2. Type 2 diabetes mellitus without retinopathy  A1c 6.1. ADA diet and exercise. Cont Glipizide as prescribed. A1c in 5 months. Urine microalbumin 8-20-24.  DM eye exam 7-7-23- pt had Opthal appt 8-27-24 but pt cancelled appt. Pt states he has an eye doctor appt today. DM foot done 7-11-24. Pt states he was following Dr Mazariegos for podiatry but states he no longer takes his insurance. Referred to Dr Conklin in [place] for eval and mgmt for DM foot care.      3. Abnormal CBC  CBC stable. CBC in 3 months.      4. Asthma with COPD  PFT done 9-6-22 Mod obstruction without significant improvement post BD, Decreased DLCO suggest altered Pulm gas exchange surface, possibly emphysema.  UTD with CXR- done 8-26-22 WNL. Cont meds as prescribed. Pt UTD with pneumovax. Pt states he is still having symptoms this time of year due to allergy and asthma. Cont Singulair 10 mg 1 tab po Q hs.     5. Prostate cancer screening  UTD PSA.    6. Well adult exam  Labs in 3 months. UTD PSA. UTD Cologuard done 7-6-22 negative results next due 7-6-23.     7. Stage 3a chronic kidney disease  GFR 55. Low protein low salt diet, avoid NSAIDs, drink plenty of water, maintain good BP and CBG levels to prevent worsening.      8. Sinusitis  Pt states has been having sinus pain and pressure X 2 weeks that is not improving with OTC meds. States it is also affecting his breathing and  making him have to use his nebulizer more often. Cont Advair and Duoneb as prescribed. Will Amoxicillin 875 mg 1 tab po BID X 7 days. Medrol dose pack take as directed.       Follow up in about 3 months (around 1/16/2025) for with labs 1 week prior to appt. . In addition to their scheduled follow up, the patient has also been instructed to follow up on as needed basis.     Future Appointments   Date Time Provider Department Center   10/22/2024 10:30 AM Nadira Buchanan FNP [facility] NEPHR [place]    10/31/2024  9:00 AM [facility], [facility]    11/5/2024  8:15 AM PACEMAKER, [facility] CARDIOLOGY [facility] RM [place] [facility]   1/21/2025  9:45 AM DauterAllyson garsia PA-C [facility] CARD [facility]   2/7/2025 10:00 AM Francisco Weir NP [facility] UROLO [facility]        CARLO Hamlin

## 2024-10-17 ENCOUNTER — LAB VISIT (OUTPATIENT)
Dept: LAB | Facility: HOSPITAL | Age: 64
End: 2024-10-17
Attending: INTERNAL MEDICINE
Payer: MEDICAID

## 2024-10-17 DIAGNOSIS — I48.20 CHRONIC ATRIAL FIBRILLATION: Primary | ICD-10-CM

## 2024-10-17 LAB
INR PPP: 2.6
PROTHROMBIN TIME: 25.2 SECONDS (ref 12.5–14.5)

## 2024-10-17 PROCEDURE — 85610 PROTHROMBIN TIME: CPT

## 2024-10-17 PROCEDURE — 36415 COLL VENOUS BLD VENIPUNCTURE: CPT

## 2024-10-22 ENCOUNTER — OFFICE VISIT (OUTPATIENT)
Dept: NEPHROLOGY | Facility: CLINIC | Age: 64
End: 2024-10-22
Payer: MEDICAID

## 2024-10-22 VITALS
HEART RATE: 70 BPM | SYSTOLIC BLOOD PRESSURE: 123 MMHG | WEIGHT: 216.63 LBS | BODY MASS INDEX: 27.8 KG/M2 | HEIGHT: 74 IN | RESPIRATION RATE: 18 BRPM | OXYGEN SATURATION: 99 % | DIASTOLIC BLOOD PRESSURE: 85 MMHG | TEMPERATURE: 98 F

## 2024-10-22 DIAGNOSIS — N13.30 HYDRONEPHROSIS OF LEFT KIDNEY: ICD-10-CM

## 2024-10-22 DIAGNOSIS — I10 PRIMARY HYPERTENSION: ICD-10-CM

## 2024-10-22 DIAGNOSIS — N18.31 CKD STAGE G3A/A1, GFR 45-59 AND ALBUMIN CREATININE RATIO <30 MG/G: Primary | ICD-10-CM

## 2024-10-22 DIAGNOSIS — Z23 FLU VACCINE NEED: ICD-10-CM

## 2024-10-22 PROCEDURE — 3061F NEG MICROALBUMINURIA REV: CPT | Mod: CPTII,,, | Performed by: NURSE PRACTITIONER

## 2024-10-22 PROCEDURE — 1159F MED LIST DOCD IN RCRD: CPT | Mod: CPTII,,, | Performed by: NURSE PRACTITIONER

## 2024-10-22 PROCEDURE — 4010F ACE/ARB THERAPY RXD/TAKEN: CPT | Mod: CPTII,,, | Performed by: NURSE PRACTITIONER

## 2024-10-22 PROCEDURE — 99214 OFFICE O/P EST MOD 30 MIN: CPT | Mod: S$PBB,,, | Performed by: NURSE PRACTITIONER

## 2024-10-22 PROCEDURE — 3074F SYST BP LT 130 MM HG: CPT | Mod: CPTII,,, | Performed by: NURSE PRACTITIONER

## 2024-10-22 PROCEDURE — 90656 IIV3 VACC NO PRSV 0.5 ML IM: CPT | Mod: PBBFAC

## 2024-10-22 PROCEDURE — 3044F HG A1C LEVEL LT 7.0%: CPT | Mod: CPTII,,, | Performed by: NURSE PRACTITIONER

## 2024-10-22 PROCEDURE — 99215 OFFICE O/P EST HI 40 MIN: CPT | Mod: PBBFAC | Performed by: NURSE PRACTITIONER

## 2024-10-22 PROCEDURE — 3008F BODY MASS INDEX DOCD: CPT | Mod: CPTII,,, | Performed by: NURSE PRACTITIONER

## 2024-10-22 PROCEDURE — 90471 IMMUNIZATION ADMIN: CPT | Mod: PBBFAC

## 2024-10-22 PROCEDURE — 3066F NEPHROPATHY DOC TX: CPT | Mod: CPTII,,, | Performed by: NURSE PRACTITIONER

## 2024-10-22 PROCEDURE — 1160F RVW MEDS BY RX/DR IN RCRD: CPT | Mod: CPTII,,, | Performed by: NURSE PRACTITIONER

## 2024-10-22 PROCEDURE — 3079F DIAST BP 80-89 MM HG: CPT | Mod: CPTII,,, | Performed by: NURSE PRACTITIONER

## 2024-10-22 RX ADMIN — INFLUENZA VIRUS VACCINE 0.5 ML: 15; 15; 15 SUSPENSION INTRAMUSCULAR at 10:10

## 2024-10-22 NOTE — PROGRESS NOTES
[facility]  Nephrology Clinic Note    Chief complaint: Follow-up (RTC, took meds, edema patrick le, ATB for COPD and sinus)    History of present illness:   Ezra Zhang is a 63 y.o. Black or  male with past medical history of  chronic kidney disease,  left hydronephrosis (followed by [facility] and [facility]), diabetes mellitus type 2 (diagnosed in 2008), heart failure with reduced ejection fraction (15% per echo in March of 2023), treated hepatitis C, dyslipidemia, atrial fibrillation (anticoagulated with warfarin), COPD,  RYAN, and past history of tobacco abuse. Patient presents for follow-up appointment in nephrology clinic today.  Denies complaints.    Review of Systems  12 point review of systems conducted, negative except as stated in the history of present illness.    Allergies: Patient has No Known Allergies.     Past Medical History:  has a past medical history of Atrial fibrillation, CHF (congestive heart failure), CKD (chronic kidney disease), COPD (chronic obstructive pulmonary disease), DM (diabetes mellitus), Dyslipidemia, Essential (primary) hypertension, Heart failure, unspecified, Rheumatoid arthritis, unspecified, Smoking, and Type 2 diabetes mellitus without complications.    Procedure History:  has a past surgical history that includes coronary arteriograph; left  heart catherization; Insert / replace / remove pacemaker; Cardioversion; defibillator; echocardiogram,transesophageal (N/A, 3/22/2023); Ablation (N/A, 9/11/2023); and Insertion of biventricular implantable cardioverter-defibrillator (ICD) (N/A, 9/11/2023).    Family History: family history includes Cataracts in his mother; Glaucoma in his mother; Heart disease in his mother; Heart failure in his mother; Hypertension in his father; Peripheral vascular disease in his mother.    Social History:  reports that he has never smoked. He has never been exposed to tobacco smoke. He has never  "used smokeless tobacco. He reports that he does not drink alcohol and does not use drugs.    Physical exam  /85 (BP Location: Right arm, Patient Position: Sitting)   Pulse 70   Temp 98 °F (36.7 °C) (Oral)   Resp 18   Ht 6' 2.02" (1.88 m)   Wt 98.2 kg (216 lb 9.6 oz)   SpO2 99%   BMI 27.80 kg/m²   General appearance: Patient is in no acute distress.  Skin: No rashes or wounds.  HEENT: PERRLA, EOMI, no scleral icterus, no JVD. Neck is supple.  Chest: Respirations are unlabored. Lungs sounds are clear.   Heart: S1, S2.   Abdomen: Benign.  : Deferred.  Extremities: No edema, peripheral pulses are palpable.   Neuro: No focal deficits.     Home Medications:    Current Outpatient Medications:     ACCU-CHEK FASTCLIX LANCET DRUM Misc, Apply topically once daily., Disp: , Rfl:     ACCU-CHEK GUIDE TEST STRIPS Strp, USE AS DIRECTED once daily (keep log), Disp: , Rfl:     albuterol (PROAIR HFA) 90 mcg/actuation inhaler, Inhale 1-2 puffs into the lungs every 4 (four) hours as needed for Wheezing. Rescue, Disp: 18 g, Rfl: 0    albuterol (PROVENTIL) 2.5 mg /3 mL (0.083 %) nebulizer solution, Take by nebulization., Disp: , Rfl:     albuterol-ipratropium (DUO-NEB) 2.5 mg-0.5 mg/3 mL nebulizer solution, Take 3 mLs by nebulization every 6 (six) hours as needed for Wheezing or Shortness of Breath. Rescue, Disp: 100 mL, Rfl: 6    alfuzosin (UROXATRAL) 10 mg Tb24, Take 1 tablet (10 mg total) by mouth once daily., Disp: 90 tablet, Rfl: 3    amoxicillin (AMOXIL) 875 MG tablet, Take 1 tablet (875 mg total) by mouth every 12 (twelve) hours. for 7 days, Disp: 14 tablet, Rfl: 0    atorvastatin (LIPITOR) 80 MG tablet, Take 1 tablet (80 mg total) by mouth once daily., Disp: 90 tablet, Rfl: 3    cetirizine (ZYRTEC) 10 MG tablet, Take 1 tablet (10 mg total) by mouth once daily., Disp: 90 tablet, Rfl: 1    cholecalciferol, vitamin D3, (VITAMIN D3) 125 mcg (5,000 unit) Tab, Take 1 tablet (5,000 Units total) by mouth once daily., Disp: " 90 tablet, Rfl: 1    clotrimazole-betamethasone 1-0.05% (LOTRISONE) cream, Apply 2 g topically 2 (two) times daily., Disp: , Rfl:     famotidine (PEPCID) 20 MG tablet, Take 1 tablet (20 mg total) by mouth 2 (two) times daily., Disp: 60 tablet, Rfl: 11    fluticasone propionate (FLONASE ALLERGY RELIEF) 50 mcg/actuation nasal spray, 2 sprays (100 mcg total) by Each Nostril route Daily., Disp: 18 g, Rfl: 6    fluticasone-salmeterol 230-21 mcg/dose (ADVAIR HFA) 230-21 mcg/actuation HFAA inhaler, Inhale 2 puffs into the lungs 2 (two) times daily. Controller, Disp: 12 g, Rfl: 6    furosemide (LASIX) 40 MG tablet, Take 1 tablet (40 mg total) by mouth once daily. Refilled per Cardio orders., Disp: 90 tablet, Rfl: 3    glipiZIDE (GLUCOTROL) 5 MG tablet, Take 1 tablet (5 mg total) by mouth once daily., Disp: 90 tablet, Rfl: 1    lancets-blood glucose strips 30 gauge Cmpk,  Accucheck blood glucose test strips and lancets, See Instructions, Check CBG once daily and keep log., # 100 EA, 11 Refill(s), Pharmacy: [facility], 185, cm, Height/Length Dosing, 03/08/22 8:10:00 CST, 96, kg, Weight Dosing, 03/08/22 8:10:00 CST, Disp: , Rfl:     metoprolol succinate (TOPROL-XL) 100 MG 24 hr tablet, Take 1 tablet (100 mg total) by mouth once daily., Disp: 90 tablet, Rfl: 3    montelukast (SINGULAIR) 10 mg tablet, Take 1 tablet (10 mg total) by mouth every evening., Disp: 90 tablet, Rfl: 1    promethazine-dextromethorphan (PROMETHAZINE-DM) 6.25-15 mg/5 mL Syrp, TAKE 5 ml (cc) EVERY 4 HOURS AS NEEDED FOR cough, Disp: 240 mL, Rfl: 2    sacubitriL-valsartan (ENTRESTO) 49-51 mg per tablet, Take 1 tablet by mouth 2 (two) times daily., Disp: 60 tablet, Rfl: 11    traZODone (DESYREL) 50 MG tablet, Take 1 tablet (50 mg total) by mouth nightly as needed for Insomnia., Disp: 90 tablet, Rfl: 1    warfarin (COUMADIN) 5 MG tablet, Take 1 tablet (5 mg total) by mouth Daily. (Patient taking differently: Take 5 mg by mouth Daily. Wed Sunday 5 mg; rest  of days 2.5mg), Disp: 90 tablet, Rfl: 3    methylPREDNISolone (MEDROL DOSEPACK) 4 mg tablet, use as directed, Disp: 21 each, Rfl: 0  No current facility-administered medications for this visit.    Facility-Administered Medications Ordered in Other Visits:     0.9%  NaCl infusion, , Intravenous, Once, Álvaro Degroot MD    sodium chloride 0.9% flush 10 mL, 10 mL, Intravenous, PRN, Álvaro Degroot MD    vancomycin injection 1,000 mg, 1,000 mg, Intravenous, On Call Procedure, Álvaro Degroot MD, 1,000 mg at 09/11/23 1046    Laboratory data    Lab Results   Component Value Date    WBC 5.77 08/20/2024    HGB 14.9 08/20/2024    HCT 43.9 08/20/2024     08/20/2024    IRON 83 03/27/2018    TIBC 177 (L) 03/27/2018    LABIRON 46.9 03/27/2018    FERRITIN 295.1 03/27/2018    FOLATE 4.4 (L) 12/02/2023    MTDFXANG37 420 12/02/2023     10/16/2024    K 3.8 10/16/2024    CO2 28 10/16/2024    BUN 17.1 10/16/2024    CREATININE 1.42 (H) 10/16/2024    EGFRNORACEVR 55 10/16/2024    GLUCOSE 95 10/16/2024    CALCIUM 9.1 10/16/2024    ALKPHOS 190 (H) 10/16/2024    LABPROT 25.2 (H) 10/17/2024    ALBUMIN 3.8 10/16/2024    BILIDIR 0.2 08/25/2021    IBILI 0.20 08/25/2021    AST 25 10/16/2024    ALT 22 10/16/2024    MG 1.80 07/29/2024    PHOS 3.2 07/06/2023      Lab Results   Component Value Date    HGBA1C 5.5 08/20/2024    UGOYLOJE83TM 35.4 04/12/2023    HIV Nonreactive 01/09/2018    HEPCAB Reactive (A) 08/23/2019    HEPBCAB Nonreactive 08/23/2019     Urine:  Lab Results   Component Value Date    APPEARANCEUA Clear 06/26/2024    SGUA 1.025 06/26/2024    PROTEINUA Negative 06/26/2024    KETONESUA Negative 06/26/2024    LEUKOCYTESUR Negative 06/26/2024    RBCUA None Seen 06/26/2024    WBCUA None Seen 06/26/2024    BACTERIA None Seen 06/26/2024    SQEPUA Trace (A) 06/04/2023    HYALINECASTS 3-5 (A) 06/04/2023    CREATRANDUR 49.0 (L) 08/20/2024    PROTEINURINE 15.7 06/26/2024    UPROTCREA 0.1 06/26/2024         Imaging  CT Abdomen Pelvis W Wo  Contrast 03/31/2023  Images were reviewed in soft tissue, lung, and bone windows.  Exam quality: adequate for evaluation  Lines/tubes: none visualized  Chest: Stable heart chamber size. No pericardial effusion. No interval change of the visualized vasculature. No airspace consolidation or suspicious lung lesion. No worsening pleural effusion or evidence for loculation.  Hepatobiliary/Pancreas: No new or enlarging hepatic lesion. No suspicious findings of the gallbladder or biliary system. No acute process or suspicious interval change of the pancreas.  Spleen: No interval change.  Adrenal/: No new, enlarging, or otherwise suspicious adrenal or renal lesion.  Small simple appearing bilateral renal cortex cysts are unchanged.  Both kidneys enhance in temporally symmetric fashion.  Persistent moderate to severe left hydroureteronephrosis is appreciated, with similar degree of dilatation throughout the course of the ureter to the level of the ureterovesicular junction.  No new or worsening focal abnormality to indicate etiology of distal obstructive uropathy is identified.  No interval development of right hydronephrosis or ureteral dilatation is identified.  Mural contour of the urinary bladder is unremarkable.  There is no convincing intraluminal filling defect within limitations of lacking intravesicular contrast opacification.  The prostate is similar in size and appearance.  Esophagus/GI tract: The included lower esophagus is unremarkable. No evidence of gastric outlet or small bowel obstruction. No acute inflammatory process or convincing focal lesion.  Musculoskeletal: No significant interval changes appreciated.  Similar degenerative alterations are noted throughout the spinal column and bony pelvis, as well as early stage changes of bilateral femoral head avascular necrosis.  Other findings: No free fluid or air. No drainable collections. Aortoiliac vascular structures are similar in comparison. No  development of pathologic angie enlargement or necrotic adenopathy.    IMPRESSION  1. Grossly stable appearance of moderate to severe left hydroureteronephrosis.  Etiology of distal obstructive uropathy remains indeterminate, but could be secondary to persistent stricture just at the level of the UVJ.  2. No development of asymmetric renal enhancement to suggest new or worsening left renal function.  3. Additional chronic secondary findings are grossly unchanged from the April 2022 CT appearance.    Electronically signed by: Xavier Wisdom  Date:    03/31/2023  Time:    15:31    6/9/2023 NM KIDNEY W FLOW AND FUNCTION W PHARMACOLOGICAL   There was mildly delayed perfusion to the right kidney during flow phase of the exam.  Right kidney cortical plateau was achieved in 5.73 minutes.  There was subsequent accumulation of radioisotope within nondilated right kidney collecting system.  There was some drop of the right kidney excretory curve prior to administration of Lasix.  Post intravenous challenge with Lasix, there was adequate prompt drop of the right kidney excretory curve and time half was achieved within 3.75 minutes.     There was also delayed perfusion to the left kidney.  Left kidney cortical plateau was achieved within 9.38 minutes.  There was left kidney central photopenia with subsequently filled in and is consistent with dilated left kidney collecting system.  There is also left ureteral diffuse dilatation.  There was partial drop of the left kidney excretory curve prior to administration of Lasix and further drop of the left kidney excretory curve post administration of Lasix.  Left kidney time half was achieved within 5.23 minutes     Quantitative analysis of the kidneys was performed and show split function discrepancy. The right kidney total function is 58 % and left kidney 42 %. The right renal normalized effective plasma flow is 30 ml/minute and the left kidney 22 ml/minute.     Impression:  1. Left  hydroureteronephrosis with adequate response post administration of Lasix and drop of the excretory curve.  2. Bilateral kidneys diminished function.    Impression    ICD-10-CM ICD-9-CM   1. CKD stage G3a/A1, GFR 45-59 and albumin creatinine ratio <30 mg/g  N18.31 585.3   2. Primary hypertension  I10 401.9   3. Hydronephrosis of left kidney  N13.30 591   4. Flu vaccine need  Z23 V04.81   5. BMI 27.0-27.9,adult  Z68.27 V85.23        Plan  CKD stage G3a/A1, GFR 45-59 and albumin creatinine ratio <30 mg/g  -     Comprehensive Metabolic Panel; Future; Expected date: 04/14/2025  -     CBC Auto Differential; Future; Expected date: 04/14/2025  -     Protein/Creatinine Ratio, Urine; Future; Expected date: 04/14/2025  -     Urinalysis, Reflex to Urine Culture; Future; Expected date: 04/14/2025  -     PTH, Intact; Future; Expected date: 04/14/2025  Renal indices are relatively stable, there is no significant proteinuria.  Continue renal sparing activities:  -Follow low sodium diet (2 grams a day)  -Control high blood pressure ( goal BP < 130/80, please record BP at home every day and bring log to next office visit)  -Exercise at least 30 minutes a day, 5 days a week.  -Maintain healthy weight.  -Decrease or stop alcohol use  -Do not smoke  -Stay well hydrated  -Receive Pneumovax, Flu, and HBV vaccines if indicated.  -Do not take NSAIDs (Ibuprofen, Naproxen, Aleve, Advil, Toradol, Mobic), may take only Tylenol as needed for pain/headaches.  -Take cholesterol-lowering medications as prescribed (LDL goal <100)  [facility] to [facility] with routine labs in 6 months    Primary hypertension  Blood pressure reading is at goal, continue current antihypertensive regimen and 2 g a day dietary sodium restriction.      Hydronephrosis of left kidney  Follow up with Urology as scheduled.      Flu vaccine need  -     influenza (Flulaval, Fluzone, Fluarix) 45 mcg/0.5 mL IM vaccine (> or = 6 mo) 0.5 mL  Will administer flu vaccine  today.      BMI 27.0-27.9,adult  Lifestyle and dietary interventions discussed, patient encouraged to maintain non-sedentary lifestyle and well-balanced diet.       Follow up in about 6 months (around 4/22/2025).       Nadira Buchanan NP  [facility]

## 2024-10-22 NOTE — PROGRESS NOTES
VSS, patient tolerated vaccine well, Left deltoid; advised of possible pain and to call clinic with any issues.

## 2024-10-29 ENCOUNTER — LAB VISIT (OUTPATIENT)
Dept: LAB | Facility: HOSPITAL | Age: 64
End: 2024-10-29
Attending: INTERNAL MEDICINE
Payer: MEDICAID

## 2024-10-29 DIAGNOSIS — I48.20 CHRONIC ATRIAL FIBRILLATION: Primary | ICD-10-CM

## 2024-10-29 LAB
INR PPP: 2.7
PROTHROMBIN TIME: 26.1 SECONDS (ref 12.5–14.5)

## 2024-10-29 PROCEDURE — 85610 PROTHROMBIN TIME: CPT

## 2024-10-29 PROCEDURE — 36415 COLL VENOUS BLD VENIPUNCTURE: CPT

## 2024-10-31 ENCOUNTER — OFFICE VISIT (OUTPATIENT)
Dept: OPHTHALMOLOGY | Facility: CLINIC | Age: 64
End: 2024-10-31
Payer: MEDICAID

## 2024-10-31 VITALS — HEIGHT: 74 IN | WEIGHT: 216.5 LBS | BODY MASS INDEX: 27.78 KG/M2

## 2024-10-31 DIAGNOSIS — E11.9 TYPE 2 DIABETES MELLITUS WITHOUT RETINOPATHY: Primary | ICD-10-CM

## 2024-10-31 DIAGNOSIS — H25.13 AGE-RELATED NUCLEAR CATARACT OF BOTH EYES: ICD-10-CM

## 2024-10-31 DIAGNOSIS — H04.123 DRY EYE SYNDROME OF BOTH EYES: ICD-10-CM

## 2024-10-31 PROCEDURE — 99213 OFFICE O/P EST LOW 20 MIN: CPT | Mod: PBBFAC,PN,25 | Performed by: STUDENT IN AN ORGANIZED HEALTH CARE EDUCATION/TRAINING PROGRAM

## 2024-10-31 PROCEDURE — 92134 CPTRZ OPH DX IMG PST SGM RTA: CPT | Mod: PBBFAC,PN | Performed by: OPHTHALMOLOGY

## 2024-12-03 ENCOUNTER — CLINICAL SUPPORT (OUTPATIENT)
Dept: CARDIOLOGY | Facility: CLINIC | Age: 64
End: 2024-12-03
Payer: MEDICAID

## 2024-12-03 DIAGNOSIS — Z95.810 AICD (AUTOMATIC CARDIOVERTER/DEFIBRILLATOR) PRESENT: Primary | ICD-10-CM

## 2024-12-03 PROCEDURE — 93284 PRGRMG EVAL IMPLANTABLE DFB: CPT | Mod: PBBFAC | Performed by: INTERNAL MEDICINE

## 2024-12-10 ENCOUNTER — HOSPITAL ENCOUNTER (INPATIENT)
Facility: HOSPITAL | Age: 64
LOS: 1 days | Discharge: HOME HEALTH | DRG: 191 | End: 2024-12-12
Attending: EMERGENCY MEDICINE | Admitting: INTERNAL MEDICINE
Payer: MEDICARE

## 2024-12-10 ENCOUNTER — LAB VISIT (OUTPATIENT)
Dept: LAB | Facility: HOSPITAL | Age: 64
End: 2024-12-10
Attending: INTERNAL MEDICINE
Payer: MEDICARE

## 2024-12-10 DIAGNOSIS — I50.9 CHF (CONGESTIVE HEART FAILURE): ICD-10-CM

## 2024-12-10 DIAGNOSIS — J44.89 ASTHMA WITH COPD: ICD-10-CM

## 2024-12-10 DIAGNOSIS — I50.9 CONGESTIVE HEART FAILURE, UNSPECIFIED HF CHRONICITY, UNSPECIFIED HEART FAILURE TYPE: ICD-10-CM

## 2024-12-10 DIAGNOSIS — I51.3 THROMBUS OF LEFT ATRIAL APPENDAGE: ICD-10-CM

## 2024-12-10 DIAGNOSIS — J44.1 COPD EXACERBATION: ICD-10-CM

## 2024-12-10 DIAGNOSIS — R06.00 DYSPNEA, UNSPECIFIED TYPE: Primary | ICD-10-CM

## 2024-12-10 DIAGNOSIS — N18.9 CHRONIC RENAL IMPAIRMENT, UNSPECIFIED CKD STAGE: ICD-10-CM

## 2024-12-10 DIAGNOSIS — J44.1 COPD WITH ACUTE EXACERBATION: ICD-10-CM

## 2024-12-10 DIAGNOSIS — R06.02 SOB (SHORTNESS OF BREATH): ICD-10-CM

## 2024-12-10 DIAGNOSIS — R79.1 INR (INTERNATIONAL NORMAL RATIO) ABNORMAL: Primary | ICD-10-CM

## 2024-12-10 LAB
ALBUMIN SERPL-MCNC: 3.6 G/DL (ref 3.4–4.8)
ALBUMIN/GLOB SERPL: 1 RATIO (ref 1.1–2)
ALP SERPL-CCNC: 161 UNIT/L (ref 40–150)
ALT SERPL-CCNC: 48 UNIT/L (ref 0–55)
ANION GAP SERPL CALC-SCNC: 8 MEQ/L
AST SERPL-CCNC: 38 UNIT/L (ref 5–34)
B PERT.PT PRMT NPH QL NAA+NON-PROBE: NOT DETECTED
BACTERIA #/AREA URNS AUTO: ABNORMAL /HPF
BASOPHILS # BLD AUTO: 0.02 X10(3)/MCL
BASOPHILS NFR BLD AUTO: 0.1 %
BILIRUB SERPL-MCNC: 0.8 MG/DL
BILIRUB UR QL STRIP.AUTO: NEGATIVE
BNP BLD-MCNC: 325.2 PG/ML
BUN SERPL-MCNC: 15.7 MG/DL (ref 8.4–25.7)
C PNEUM DNA NPH QL NAA+NON-PROBE: NOT DETECTED
CALCIUM SERPL-MCNC: 9.3 MG/DL (ref 8.8–10)
CHLORIDE SERPL-SCNC: 109 MMOL/L (ref 98–107)
CLARITY UR: CLEAR
CO2 SERPL-SCNC: 25 MMOL/L (ref 23–31)
COLOR UR AUTO: YELLOW
CREAT SERPL-MCNC: 1 MG/DL (ref 0.72–1.25)
CREAT/UREA NIT SERPL: 16
EOSINOPHIL # BLD AUTO: 0.04 X10(3)/MCL (ref 0–0.9)
EOSINOPHIL NFR BLD AUTO: 0.3 %
ERYTHROCYTE [DISTWIDTH] IN BLOOD BY AUTOMATED COUNT: 13.3 % (ref 11.5–17)
EST. AVERAGE GLUCOSE BLD GHB EST-MCNC: 108.3 MG/DL
FLUAV AG UPPER RESP QL IA.RAPID: NOT DETECTED
FLUBV AG UPPER RESP QL IA.RAPID: NOT DETECTED
GFR SERPLBLD CREATININE-BSD FMLA CKD-EPI: >60 ML/MIN/1.73/M2
GLOBULIN SER-MCNC: 3.7 GM/DL (ref 2.4–3.5)
GLUCOSE SERPL-MCNC: 88 MG/DL (ref 82–115)
GLUCOSE UR QL STRIP: NORMAL
HADV DNA NPH QL NAA+NON-PROBE: NOT DETECTED
HBA1C MFR BLD: 5.4 %
HCOV 229E RNA NPH QL NAA+NON-PROBE: NOT DETECTED
HCOV HKU1 RNA NPH QL NAA+NON-PROBE: NOT DETECTED
HCOV NL63 RNA NPH QL NAA+NON-PROBE: NOT DETECTED
HCOV OC43 RNA NPH QL NAA+NON-PROBE: NOT DETECTED
HCT VFR BLD AUTO: 44.7 % (ref 42–52)
HGB BLD-MCNC: 15.7 G/DL (ref 14–18)
HGB UR QL STRIP: NEGATIVE
HMPV RNA NPH QL NAA+NON-PROBE: NOT DETECTED
HOLD SPECIMEN: NORMAL
HPIV1 RNA NPH QL NAA+NON-PROBE: NOT DETECTED
HPIV2 RNA NPH QL NAA+NON-PROBE: NOT DETECTED
HPIV3 RNA NPH QL NAA+NON-PROBE: NOT DETECTED
HPIV4 RNA NPH QL NAA+NON-PROBE: NOT DETECTED
HYALINE CASTS #/AREA URNS LPF: ABNORMAL /LPF
IMM GRANULOCYTES # BLD AUTO: 0.05 X10(3)/MCL (ref 0–0.04)
IMM GRANULOCYTES NFR BLD AUTO: 0.4 %
INR PPP: 1.9
INR PPP: 2.1
KETONES UR QL STRIP: NEGATIVE
LEUKOCYTE ESTERASE UR QL STRIP: NEGATIVE
LYMPHOCYTES # BLD AUTO: 1.59 X10(3)/MCL (ref 0.6–4.6)
LYMPHOCYTES NFR BLD AUTO: 11.8 %
M PNEUMO DNA NPH QL NAA+NON-PROBE: NOT DETECTED
MCH RBC QN AUTO: 35.8 PG (ref 27–31)
MCHC RBC AUTO-ENTMCNC: 35.1 G/DL (ref 33–36)
MCV RBC AUTO: 101.8 FL (ref 80–94)
MONOCYTES # BLD AUTO: 1.08 X10(3)/MCL (ref 0.1–1.3)
MONOCYTES NFR BLD AUTO: 8 %
MUCOUS THREADS URNS QL MICRO: ABNORMAL /LPF
NEUTROPHILS # BLD AUTO: 10.69 X10(3)/MCL (ref 2.1–9.2)
NEUTROPHILS NFR BLD AUTO: 79.4 %
NITRITE UR QL STRIP: NEGATIVE
NRBC BLD AUTO-RTO: 0 %
OHS QRS DURATION: 122 MS
OHS QTC CALCULATION: 440 MS
PH UR STRIP: 6 [PH]
PLATELET # BLD AUTO: 295 X10(3)/MCL (ref 130–400)
PMV BLD AUTO: 10.7 FL (ref 7.4–10.4)
POCT GLUCOSE: 313 MG/DL (ref 70–110)
POTASSIUM SERPL-SCNC: 4.2 MMOL/L (ref 3.5–5.1)
PROT SERPL-MCNC: 7.3 GM/DL (ref 5.8–7.6)
PROT UR QL STRIP: ABNORMAL
PROTHROMBIN TIME: 20.4 SECONDS (ref 12.5–14.5)
PROTHROMBIN TIME: 21.8 SECONDS (ref 11.4–14)
RBC # BLD AUTO: 4.39 X10(6)/MCL (ref 4.7–6.1)
RBC #/AREA URNS AUTO: ABNORMAL /HPF
RSV A 5' UTR RNA NPH QL NAA+PROBE: NOT DETECTED
RSV RNA NPH QL NAA+NON-PROBE: NOT DETECTED
RV+EV RNA NPH QL NAA+NON-PROBE: NOT DETECTED
SARS-COV-2 RNA RESP QL NAA+PROBE: NOT DETECTED
SODIUM SERPL-SCNC: 142 MMOL/L (ref 136–145)
SP GR UR STRIP.AUTO: 1.02 (ref 1–1.03)
SQUAMOUS #/AREA URNS LPF: ABNORMAL /HPF
TROPONIN I SERPL-MCNC: 0.02 NG/ML (ref 0–0.04)
UROBILINOGEN UR STRIP-ACNC: NORMAL
WBC # BLD AUTO: 13.47 X10(3)/MCL (ref 4.5–11.5)
WBC #/AREA URNS AUTO: ABNORMAL /HPF

## 2024-12-10 PROCEDURE — 87070 CULTURE OTHR SPECIMN AEROBIC: CPT | Performed by: INTERNAL MEDICINE

## 2024-12-10 PROCEDURE — 63600175 PHARM REV CODE 636 W HCPCS: Performed by: EMERGENCY MEDICINE

## 2024-12-10 PROCEDURE — 85610 PROTHROMBIN TIME: CPT

## 2024-12-10 PROCEDURE — 99900035 HC TECH TIME PER 15 MIN (STAT)

## 2024-12-10 PROCEDURE — 96374 THER/PROPH/DIAG INJ IV PUSH: CPT

## 2024-12-10 PROCEDURE — 27000190 HC CPAP FULL FACE MASK W/VALVE

## 2024-12-10 PROCEDURE — 83036 HEMOGLOBIN GLYCOSYLATED A1C: CPT | Performed by: EMERGENCY MEDICINE

## 2024-12-10 PROCEDURE — G0378 HOSPITAL OBSERVATION PER HR: HCPCS

## 2024-12-10 PROCEDURE — 85025 COMPLETE CBC W/AUTO DIFF WBC: CPT | Performed by: EMERGENCY MEDICINE

## 2024-12-10 PROCEDURE — 36415 COLL VENOUS BLD VENIPUNCTURE: CPT | Performed by: INTERNAL MEDICINE

## 2024-12-10 PROCEDURE — 25000242 PHARM REV CODE 250 ALT 637 W/ HCPCS: Performed by: EMERGENCY MEDICINE

## 2024-12-10 PROCEDURE — 25000242 PHARM REV CODE 250 ALT 637 W/ HCPCS: Performed by: INTERNAL MEDICINE

## 2024-12-10 PROCEDURE — 99285 EMERGENCY DEPT VISIT HI MDM: CPT | Mod: 25

## 2024-12-10 PROCEDURE — 25000003 PHARM REV CODE 250: Performed by: INTERNAL MEDICINE

## 2024-12-10 PROCEDURE — 80053 COMPREHEN METABOLIC PANEL: CPT | Performed by: EMERGENCY MEDICINE

## 2024-12-10 PROCEDURE — 84484 ASSAY OF TROPONIN QUANT: CPT | Performed by: EMERGENCY MEDICINE

## 2024-12-10 PROCEDURE — 96372 THER/PROPH/DIAG INJ SC/IM: CPT | Performed by: INTERNAL MEDICINE

## 2024-12-10 PROCEDURE — 94640 AIRWAY INHALATION TREATMENT: CPT | Mod: XB

## 2024-12-10 PROCEDURE — 93005 ELECTROCARDIOGRAM TRACING: CPT

## 2024-12-10 PROCEDURE — 94640 AIRWAY INHALATION TREATMENT: CPT

## 2024-12-10 PROCEDURE — 94660 CPAP INITIATION&MGMT: CPT

## 2024-12-10 PROCEDURE — 0241U COVID/RSV/FLU A&B PCR: CPT | Performed by: EMERGENCY MEDICINE

## 2024-12-10 PROCEDURE — 87486 CHLMYD PNEUM DNA AMP PROBE: CPT | Performed by: INTERNAL MEDICINE

## 2024-12-10 PROCEDURE — 63600175 PHARM REV CODE 636 W HCPCS: Performed by: INTERNAL MEDICINE

## 2024-12-10 PROCEDURE — 83880 ASSAY OF NATRIURETIC PEPTIDE: CPT | Performed by: EMERGENCY MEDICINE

## 2024-12-10 PROCEDURE — 36415 COLL VENOUS BLD VENIPUNCTURE: CPT

## 2024-12-10 PROCEDURE — 94761 N-INVAS EAR/PLS OXIMETRY MLT: CPT

## 2024-12-10 PROCEDURE — 87040 BLOOD CULTURE FOR BACTERIA: CPT | Mod: 91 | Performed by: INTERNAL MEDICINE

## 2024-12-10 PROCEDURE — 85610 PROTHROMBIN TIME: CPT | Performed by: EMERGENCY MEDICINE

## 2024-12-10 PROCEDURE — 82785 ASSAY OF IGE: CPT | Performed by: INTERNAL MEDICINE

## 2024-12-10 PROCEDURE — 81001 URINALYSIS AUTO W/SCOPE: CPT | Performed by: EMERGENCY MEDICINE

## 2024-12-10 RX ORDER — ATORVASTATIN CALCIUM 40 MG/1
80 TABLET, FILM COATED ORAL DAILY
Status: DISCONTINUED | OUTPATIENT
Start: 2024-12-11 | End: 2024-12-12 | Stop reason: HOSPADM

## 2024-12-10 RX ORDER — PREDNISONE 20 MG/1
60 TABLET ORAL 2 TIMES DAILY
Status: DISCONTINUED | OUTPATIENT
Start: 2024-12-10 | End: 2024-12-11

## 2024-12-10 RX ORDER — WARFARIN SODIUM 5 MG/1
5 TABLET ORAL DAILY
Status: DISCONTINUED | OUTPATIENT
Start: 2024-12-10 | End: 2024-12-12 | Stop reason: HOSPADM

## 2024-12-10 RX ORDER — METOPROLOL SUCCINATE 50 MG/1
100 TABLET, EXTENDED RELEASE ORAL DAILY
Status: DISCONTINUED | OUTPATIENT
Start: 2024-12-11 | End: 2024-12-12 | Stop reason: HOSPADM

## 2024-12-10 RX ORDER — SODIUM CHLORIDE 0.9 % (FLUSH) 0.9 %
10 SYRINGE (ML) INJECTION EVERY 12 HOURS PRN
Status: DISCONTINUED | OUTPATIENT
Start: 2024-12-10 | End: 2024-12-12 | Stop reason: HOSPADM

## 2024-12-10 RX ORDER — GLUCAGON 1 MG
1 KIT INJECTION
Status: DISCONTINUED | OUTPATIENT
Start: 2024-12-10 | End: 2024-12-10

## 2024-12-10 RX ORDER — TAMSULOSIN HYDROCHLORIDE 0.4 MG/1
0.4 CAPSULE ORAL DAILY
Status: DISCONTINUED | OUTPATIENT
Start: 2024-12-11 | End: 2024-12-12 | Stop reason: HOSPADM

## 2024-12-10 RX ORDER — METHYLPREDNISOLONE SOD SUCC 125 MG
125 VIAL (EA) INJECTION
Status: COMPLETED | OUTPATIENT
Start: 2024-12-10 | End: 2024-12-10

## 2024-12-10 RX ORDER — TRAZODONE HYDROCHLORIDE 50 MG/1
50 TABLET ORAL NIGHTLY PRN
Status: DISCONTINUED | OUTPATIENT
Start: 2024-12-10 | End: 2024-12-12 | Stop reason: HOSPADM

## 2024-12-10 RX ORDER — IBUPROFEN 200 MG
24 TABLET ORAL
Status: DISCONTINUED | OUTPATIENT
Start: 2024-12-10 | End: 2024-12-10

## 2024-12-10 RX ORDER — BENZONATATE 100 MG/1
100 CAPSULE ORAL 3 TIMES DAILY PRN
Status: DISCONTINUED | OUTPATIENT
Start: 2024-12-10 | End: 2024-12-11

## 2024-12-10 RX ORDER — ACETAMINOPHEN 325 MG/1
650 TABLET ORAL EVERY 6 HOURS PRN
Status: DISCONTINUED | OUTPATIENT
Start: 2024-12-10 | End: 2024-12-12 | Stop reason: HOSPADM

## 2024-12-10 RX ORDER — CHOLECALCIFEROL (VITAMIN D3) 25 MCG
5000 TABLET ORAL DAILY
Status: DISCONTINUED | OUTPATIENT
Start: 2024-12-11 | End: 2024-12-12 | Stop reason: HOSPADM

## 2024-12-10 RX ORDER — SACUBITRIL AND VALSARTAN 24; 26 MG/1; MG/1
2 TABLET, FILM COATED ORAL 2 TIMES DAILY
Status: DISCONTINUED | OUTPATIENT
Start: 2024-12-10 | End: 2024-12-12 | Stop reason: HOSPADM

## 2024-12-10 RX ORDER — FAMOTIDINE 20 MG/1
20 TABLET, FILM COATED ORAL 2 TIMES DAILY
Status: DISCONTINUED | OUTPATIENT
Start: 2024-12-10 | End: 2024-12-11

## 2024-12-10 RX ORDER — IBUPROFEN 200 MG
16 TABLET ORAL
Status: DISCONTINUED | OUTPATIENT
Start: 2024-12-10 | End: 2024-12-11

## 2024-12-10 RX ORDER — IBUPROFEN 200 MG
16 TABLET ORAL
Status: DISCONTINUED | OUTPATIENT
Start: 2024-12-10 | End: 2024-12-10

## 2024-12-10 RX ORDER — GLUCAGON 1 MG
1 KIT INJECTION
Status: DISCONTINUED | OUTPATIENT
Start: 2024-12-10 | End: 2024-12-11

## 2024-12-10 RX ORDER — IBUPROFEN 200 MG
24 TABLET ORAL
Status: DISCONTINUED | OUTPATIENT
Start: 2024-12-10 | End: 2024-12-11

## 2024-12-10 RX ORDER — FUROSEMIDE 20 MG/1
40 TABLET ORAL DAILY
Status: DISCONTINUED | OUTPATIENT
Start: 2024-12-11 | End: 2024-12-12 | Stop reason: HOSPADM

## 2024-12-10 RX ORDER — IPRATROPIUM BROMIDE AND ALBUTEROL SULFATE 2.5; .5 MG/3ML; MG/3ML
3 SOLUTION RESPIRATORY (INHALATION)
Status: DISCONTINUED | OUTPATIENT
Start: 2024-12-10 | End: 2024-12-12 | Stop reason: HOSPADM

## 2024-12-10 RX ORDER — LEVOFLOXACIN 750 MG/1
750 TABLET ORAL DAILY
Status: DISCONTINUED | OUTPATIENT
Start: 2024-12-10 | End: 2024-12-12 | Stop reason: HOSPADM

## 2024-12-10 RX ORDER — IPRATROPIUM BROMIDE AND ALBUTEROL SULFATE 2.5; .5 MG/3ML; MG/3ML
3 SOLUTION RESPIRATORY (INHALATION)
Status: COMPLETED | OUTPATIENT
Start: 2024-12-10 | End: 2024-12-10

## 2024-12-10 RX ORDER — INSULIN ASPART 100 [IU]/ML
0-10 INJECTION, SOLUTION INTRAVENOUS; SUBCUTANEOUS
Status: DISCONTINUED | OUTPATIENT
Start: 2024-12-10 | End: 2024-12-11

## 2024-12-10 RX ADMIN — IPRATROPIUM BROMIDE AND ALBUTEROL SULFATE 3 ML: .5; 3 SOLUTION RESPIRATORY (INHALATION) at 03:12

## 2024-12-10 RX ADMIN — WARFARIN SODIUM 5 MG: 5 TABLET ORAL at 05:12

## 2024-12-10 RX ADMIN — IPRATROPIUM BROMIDE AND ALBUTEROL SULFATE 3 ML: .5; 3 SOLUTION RESPIRATORY (INHALATION) at 02:12

## 2024-12-10 RX ADMIN — FAMOTIDINE 20 MG: 20 TABLET, FILM COATED ORAL at 09:12

## 2024-12-10 RX ADMIN — INSULIN ASPART 4 UNITS: 100 INJECTION, SOLUTION INTRAVENOUS; SUBCUTANEOUS at 09:12

## 2024-12-10 RX ADMIN — METHYLPREDNISOLONE SODIUM SUCCINATE 125 MG: 125 INJECTION, POWDER, FOR SOLUTION INTRAMUSCULAR; INTRAVENOUS at 02:12

## 2024-12-10 RX ADMIN — LEVOFLOXACIN 750 MG: 750 TABLET, FILM COATED ORAL at 05:12

## 2024-12-10 RX ADMIN — SACUBITRIL AND VALSARTAN 2 TABLET: 24; 26 TABLET, FILM COATED ORAL at 09:12

## 2024-12-10 RX ADMIN — IPRATROPIUM BROMIDE AND ALBUTEROL SULFATE 3 ML: 2.5; .5 SOLUTION RESPIRATORY (INHALATION) at 08:12

## 2024-12-10 RX ADMIN — PREDNISONE 60 MG: 20 TABLET ORAL at 05:12

## 2024-12-10 NOTE — H&P
[facility] Internal Medicine History and Physical     Patient Name: Ezra Zhang  MRN: [id number]  Admission Date: 12/10/2024  Current Hospital Day: 1   Code Status: Full Code  Attending Provider: Abner Saravia MD    Chief Complaint:      Shortness of Breath (Sob (x)2 days. Also states history of chf. States chest pain only when coughing. Afebrile and tachypnic. Sp02=95% on room air. 12 lead EKG obtained.)      Subjective:     History of Present Illness:  Ezra Zhang is a 64 y.o. male with PMH of paroxysmal afib and on chronic anticoagulation, CHF with recent EF 35%, COPD on maintenance LABA-ICS inhaler,DM, RA and ICD in place . The patient presented to ED on 12/10/2024 with a primary complaint of dyspnea, worsened since past 2 days. Patient reported that he has been sick recently and his PCP prescribed him amoxicillin and medrol dose pack few days back which improved his symptoms initially improved but he stated that his symptoms worsened again last 2 days and it did not improve with nebulization that he usually uses at home. He denied any home oxygen usage. But stated that he uses CPAP at home nightly for his RYAN. He is also complaining of cough which is dry going on for two days and also reported chills and sweats, but he never checked his temp at home, He denied smoking actively currently and the entresto he is on for a while now not a recent addition. Per chart review, the patient is on coumadin for a while now, which apparently was recommended by the cardiology as he developed a clot on one of his valves despite being on eliquis at that time.     He reported chest pain ,which the attributed due to the cough and not really like an MI or angina, troponin wnl   In the Ed, he has tachypnea, afebrile, normotensive, saturating at 94 on room air, he was wheezing and received IV methylprednisolone and duo nebs, but as the wheeze is still persistent despite the treatment he received in the ED, IM consulted for  further management.     Review of Systems:  Pertinent positives and negatives listed in HPI. All other systems are reviewed and are negative.    Past Medical History: See above    Past Medical History:  He  has a past medical history of Atrial fibrillation, CHF (congestive heart failure), CKD (chronic kidney disease), COPD (chronic obstructive pulmonary disease), DM (diabetes mellitus), Dyslipidemia, Essential (primary) hypertension, Heart failure, unspecified, Rheumatoid arthritis, unspecified, Smoking, and Type 2 diabetes mellitus without complications.    Past Surgical History:  He  has a past surgical history that includes coronary arteriograph; left  heart catherization; Insert / replace / remove pacemaker; Cardioversion; defibillator; echocardiogram,transesophageal (N/A, 3/22/2023); Ablation (N/A, 9/11/2023); and Insertion of biventricular implantable cardioverter-defibrillator (ICD) (N/A, 9/11/2023).    Allergies:  Review of patient's allergies indicates:  No Known Allergies    Family History:  His family history includes Cataracts in his mother; Glaucoma in his mother; Heart disease in his mother; Heart failure in his mother; Hypertension in his father; Peripheral vascular disease in his mother.    Social History:  He  reports that he has never smoked. He has never been exposed to tobacco smoke. He has never used smokeless tobacco. He reports that he does not drink alcohol and does not use drugs.    Home Medications:  He has a current medication list which includes the following prescription(s): accu-chek fastclix lancet drum, accu-chek guide test strips, albuterol, albuterol, albuterol-ipratropium, alfuzosin, atorvastatin, cetirizine, cholecalciferol (vitamin d3), clotrimazole-betamethasone 1-0.05%, famotidine, fluticasone propionate, fluticasone-salmeterol 230-21 mcg/dose, furosemide, glipizide, lancets-blood glucose strips, metoprolol succinate, montelukast, promethazine-dextromethorphan, entresto,  "trazodone, and warfarin, and the following Facility-Administered Medications: 0.9% nacl, albuterol-ipratropium, [START ON 12/11/2024] atorvastatin, dextrose 10%, dextrose 10%, famotidine, [START ON 12/11/2024] furosemide, glucagon (human recombinant), glucose, glucose, [START ON 12/11/2024] metoprolol succinate, sacubitril-valsartan, sodium chloride 0.9%, sodium chloride 0.9%, [START ON 12/11/2024] tamsulosin, trazodone, vancomycin, [START ON 12/11/2024] vitamin d, and warfarin.        Objective:   Vitals  Vitals  BP: (!) 135/94  Temp: 97.8 °F (36.6 °C)  Temp Source: Oral  Pulse: 78  Resp: (!) 22  SpO2: 95 %  Height: 6' 2" (188 cm)  Weight: 104.3 kg (230 lb)    Physical Examination:  General: Awake, alert, & oriented to person, place & time. No acute distress  Psychiatric: Mood and affect normal  HEENT: Normocephalic, atraumatic. Face symmetric.  Cardiovascular: Regular rate & rhythm, Normal S1 & S2 w/out murmurs, rubs or gallops  Pulmonary: Bilateral symmetric chest rise, No rhonchi or crackles are appreciated. B./L wheezing appreciated   Abdominal:  nondistended, soft   Extremities: No clubbing, cyanosis. Trace edema.  Skin:  Exposed skin is warm & dry.  Neuro:   Patient moves all extremities equally. Sensation intact bilateraly.    Laboratory:  CMP:  Recent Labs   Lab 12/10/24  1456      K 4.2   CO2 25   BUN 15.7   CREATININE 1.00   GLUCOSE 88   CALCIUM 9.3   ALBUMIN 3.6   BILITOT 0.8   AST 38*   ALT 48   ALKPHOS 161*       CBC:  Recent Labs   Lab 12/10/24  1456   WBC 13.47*   ABSNEUTRO 10.69*   RBC 4.39*   HGB 15.7   HCT 44.7      .8*   RDW 13.3       REVIEWED      Radiology:  X-Ray Chest AP Portable    Result Date: 12/10/2024  Increase interstitial markings at both bases with more confluent interstitial markings and opacities at the left base infiltrate cannot be completely excluded Electronically signed by: Sincere Hunter Date:    12/10/2024 Time:    15:16       Assessment & Plan:   COPD " exacerbation, moderate   Hospitalized for COPD exacerbation in the past, likely class E per recent Gold guidelines  In the ED, WBC 13.47 likely due to to medrol dose pack he was prescribed recently   Afebrile and saturating 94% on room air in the ED   Received duo nebs 3 ml every 5 min for 3 doses in the ED   CXR done in the ED showed increased interstitial markings at both bases and opacities which could not be excluded   COVID/FLU/RSV normal   Continue dounebs q4h scheduled   Continue systemic steroids, oral prednisone 60mg BID   Started him on levofloxacin 750mg oral daily ,    Resp panel, resp cultures, blood cultures pending   Patient is on LABA- ICS inhaler outpatient, consider LABA- LAMA inhaler on discharge, considering his hospitalization history and class E classification   Ordered CBC with differential and igE to rule out any elevated eosinophil count    Hx of CHF   Paroxysmal afib on chronic anticoagulation  ICD in place   BNP done  in the ED is 325.2, his prior Bnp 4months back is 198.7, troponin wnl  Continue entresto, metoprolol, lasix   Continue warfarin, patient reported to be taking 5 mg on Wednesday and Sunday and 2.5 mg on the rest of the days   PT- INR done in the ED is 21.9 and 1.9 respectively   Strict I&o     CKD stage 3  Left hydronephrosis, stable   Continue his home afluzosin for his urinary issues, patient reported taht his symptoms are better controlled now   Per chart review the patient was referred to [facility] for a stent placement for his hydronephrosis but could not go there due to transportation issues.  His Cr is 1.00, BUN 15.7 initially     Type 2Dm  On glipizide at home   A1c done today is 5.4  Will monitor his POCT since he will be receiving steroids for COPD exacerbation      CODE STATUS: Full Code  Access: PIV  Antimicrobials: levofloxacin   Diet: Diet Renal Non-Dialysis   DVT Prophylaxis: Warfarin  GI Prophylaxis: famotidine   Fluids:  none     Disposition: admitted  for further management of COPD exacerbation, continue systemic steroids, duo nebs and discharge him once his condition improves     KALEB KISER MD  Internal Medicine - PGY-1

## 2024-12-10 NOTE — ED PROVIDER NOTES
Encounter Date: 12/10/2024       History     Chief Complaint   Patient presents with    Shortness of Breath     Sob (x)2 days. Also states history of chf. States chest pain only when coughing. Afebrile and tachypnic. Sp02=95% on room air. 12 lead EKG obtained.     Complex past history including COPD, still smokes, CHF, diabetes, chronic renal insufficiency, chronic anticoagulation, pacemaker, atrial fibrillation, multiple others as listed.  He has been recently treated with about a week of either amoxicillin or Augmentin by report plus a course of prednisone empirically by phone via primary care for a nonspecific respiratory condition, he reports that this improved.  Subsequently he had now has dyspnea with cough and yellow sputum production for the last 2 days, incompletely responsive to his metered-dose inhalers and nebulizers which he is using about every 6 hours.  He reports good compliance with medications and has not run out of anything.  Worsening orthopnea, dyspnea on exertion, dyspnea even with talking.  No definite fever but some chills and sweats.  No genitourinary or GI complaints.  No chest pain.  He has generalized back pain that is worse with coughing.  Has been hospitalized under similar circumstances, feel this is his COPD acting up.  No other complaints.    The history is provided by the patient. No  was used.     Review of patient's allergies indicates:  No Known Allergies  Past Medical History:   Diagnosis Date    Atrial fibrillation     CHF (congestive heart failure)     CKD (chronic kidney disease)     COPD (chronic obstructive pulmonary disease)     DM (diabetes mellitus)     Dyslipidemia     Essential (primary) hypertension     Heart failure, unspecified     Rheumatoid arthritis, unspecified     Smoking     Type 2 diabetes mellitus without complications      Past Surgical History:   Procedure Laterality Date    ABLATION N/A 9/11/2023    Procedure: Ablation;  Surgeon: Zane  Álvaro WILKINS MD;  Location: [facility] CATH LAB;  Service: Cardiology;  Laterality: N/A;  EPS + AV NODE ABLATION + UPGRADE TO BIV ICD (SJM)    CARDIOVERSION      coronary arteriograph      defibillator      ECHOCARDIOGRAM,TRANSESOPHAGEAL N/A 3/22/2023    Procedure: Transesophageal echo (LASHELL) intra-procedure log documentation;  Surgeon: Matthieu Diagnostic Provider;  Location: [facility] CATH LAB;  Service: Cardiology;  Laterality: N/A;    INSERT / REPLACE / REMOVE PACEMAKER      INSERTION OF BIVENTRICULAR IMPLANTABLE CARDIOVERTER-DEFIBRILLATOR (ICD) N/A 9/11/2023    Procedure: INSERTION, ICD, BIVENTRICULAR;  Surgeon: Álvaro Degroot MD;  Location: [facility] CATH LAB;  Service: Cardiology;  Laterality: N/A;    left  heart catherization       Family History   Problem Relation Name Age of Onset    Heart failure Mother      Cataracts Mother      Heart disease Mother      Glaucoma Mother      Peripheral vascular disease Mother      Hypertension Father       Social History     Tobacco Use    Smoking status: Never     Passive exposure: Never    Smokeless tobacco: Never   Substance Use Topics    Alcohol use: Never     Alcohol/week: 1.0 standard drink of alcohol     Types: 1 Cans of beer per week     Comment: not often    Drug use: Never     Review of Systems   Constitutional:  Positive for chills.   Respiratory:  Positive for cough, shortness of breath and wheezing.        Physical Exam     Initial Vitals [12/10/24 1426]   BP Pulse Resp Temp SpO2   (!) 148/78 94 (!) 34 97.8 °F (36.6 °C) 95 %      MAP       --         Physical Exam    Nursing note and vitals reviewed.  Constitutional: He appears well-developed and well-nourished. He is not diaphoretic. He appears distressed.   Mild tachypnea, mild dyspnea   HENT:   Head: Normocephalic and atraumatic. Mouth/Throat: Oropharynx is clear and moist. No oropharyngeal exudate.   Eyes: Conjunctivae and EOM are normal. Pupils are equal, round, and reactive to light. Right eye exhibits no discharge. Left eye  exhibits no discharge. No scleral icterus.   Neck: Neck supple. No thyromegaly present. No tracheal deviation present. No JVD present.   Normal range of motion.  Cardiovascular:  Normal rate, regular rhythm and normal heart sounds.     Exam reveals no gallop and no friction rub.       No murmur heard.  Pulmonary/Chest: He is in respiratory distress. He has wheezes. He has no rhonchi. He has no rales. He exhibits no tenderness.   Moderate cough, mild dyspnea, mild tachypnea, decreased air entry throughout, moderate diffuse expiratory wheezing with prolonged expiratory phase.  No rales.  Unremarkable pacemaker site.   Abdominal: Abdomen is soft. Bowel sounds are normal. He exhibits no distension and no mass. There is no abdominal tenderness. There is no rebound and no guarding.   Musculoskeletal:         General: No tenderness or edema. Normal range of motion.      Cervical back: Normal range of motion and neck supple.     Lymphadenopathy:     He has no cervical adenopathy.   Neurological: He is alert and oriented to person, place, and time. He has normal strength. No cranial nerve deficit.   Skin: Skin is warm and dry. No rash noted. No erythema.   Psychiatric: He has a normal mood and affect. His behavior is normal. Judgment and thought content normal.         ED Course   Procedures  Labs Reviewed   COMPREHENSIVE METABOLIC PANEL - Abnormal       Result Value    Sodium 142      Potassium 4.2      Chloride 109 (*)     CO2 25      Glucose 88      Blood Urea Nitrogen 15.7      Creatinine 1.00      Calcium 9.3      Protein Total 7.3      Albumin 3.6      Globulin 3.7 (*)     Albumin/Globulin Ratio 1.0 (*)     Bilirubin Total 0.8       (*)     ALT 48      AST 38 (*)     eGFR >60      Anion Gap 8.0      BUN/Creatinine Ratio 16     B-TYPE NATRIURETIC PEPTIDE - Abnormal    Natriuretic Peptide 325.2 (*)    PROTIME-INR - Abnormal    PT 21.8 (*)     INR 1.9 (*)    CBC WITH DIFFERENTIAL - Abnormal    WBC 13.47 (*)     RBC  4.39 (*)     Hgb 15.7      Hct 44.7      .8 (*)     MCH 35.8 (*)     MCHC 35.1      RDW 13.3      Platelet 295      MPV 10.7 (*)     Neut % 79.4      Lymph % 11.8      Mono % 8.0      Eos % 0.3      Basophil % 0.1      Lymph # 1.59      Neut # 10.69 (*)     Mono # 1.08      Eos # 0.04      Baso # 0.02      IG# 0.05 (*)     IG% 0.4      NRBC% 0.0     TROPONIN I - Normal    Troponin-I 0.017     COVID/RSV/FLU A&B PCR - Normal    Influenza A PCR Not Detected      Influenza B PCR Not Detected      Respiratory Syncytial Virus PCR Not Detected      SARS-CoV-2 PCR Not Detected      Narrative:     The [employer] Xpress SARS-CoV-2/FLU/RSV plus is a rapid, multiplexed real-time PCR test intended for the simultaneous qualitative detection and differentiation of SARS-CoV-2, Influenza A, Influenza B, and respiratory syncytial virus (RSV) viral RNA in either nasopharyngeal swab or nasal swab specimens.         CBC W/ AUTO DIFFERENTIAL    Narrative:     The following orders were created for panel order CBC auto differential.  Procedure                               Abnormality         Status                     ---------                               -----------         ------                     CBC with Differential[[id number]]       Abnormal            Final result                 Please view results for these tests on the individual orders.   HEMOGLOBIN A1C    Hemoglobin A1c 5.4      Estimated Average Glucose 108.3     EXTRA TUBES    Narrative:     The following orders were created for panel order EXTRA TUBES.  Procedure                               Abnormality         Status                     ---------                               -----------         ------                     Light Blue Top Hold[[id number]]                                                        Gold Top Hold[[id number]]                                   Final result                 Please view results for these tests on the individual orders.   GOLD TOP  HOLD    Extra Tube Hold for add-ons.     URINALYSIS, REFLEX TO URINE CULTURE   EXTRA TUBES    Narrative:     The following orders were created for panel order EXTRA TUBES.  Procedure                               Abnormality         Status                     ---------                               -----------         ------                     Red Top Hold[[id number]]                                    In process                   Please view results for these tests on the individual orders.   RED TOP HOLD     EKG Readings: (Independently Interpreted)   Initial Reading: No STEMI. Rhythm: Paced Rhythm. Heart Rate: 73. Ectopy: No Ectopy.   100% ventricular paced rhythm     ECG Results              EKG 12-lead (Shortness of Breath) Age > 50 (In process)        Collection Time Result Time QRS Duration OHS QTC Calculation    12/10/24 14:25:00 12/10/24 14:47:19 122 440                     In process by Interface, Lab In [facility] (12/10/24 14:47:23)                   Narrative:    Test Reason : R06.02,    Vent. Rate :  73 BPM     Atrial Rate : 416 BPM     P-R Int :    ms          QRS Dur : 122 ms      QT Int : 400 ms       P-R-T Axes :    -79  95 degrees    QTcB Int : 440 ms    Ventricular-paced rhythm  Abnormal ECG  No previous ECGs available    Referred By:            Confirmed By:                                   Imaging Results              X-Ray Chest AP Portable (Final result)  Result time 12/10/24 15:16:29      Final result by Sincere Hunter MD (12/10/24 15:16:29)                   Impression:      Increase interstitial markings at both bases with more confluent interstitial markings and opacities at the left base infiltrate cannot be completely excluded      Electronically signed by: Sincere Hunter  Date:    12/10/2024  Time:    15:16               Narrative:    EXAMINATION:  XR CHEST AP PORTABLE    CLINICAL HISTORY:  Asthma;    TECHNIQUE:  Single frontal view of the chest was  performed.    COMPARISON:  July 29, 2024    FINDINGS:  Examination reveals mediastinal silhouette to be within normal limits cardiac silhouette is not enlarged some increase interstitial markings identified in both bases with slightly more confluent interstitial markings and confluent opacities at the left base infiltrate cannot be completely excluded.    No other focal consolidative changes are identified no other significant change                                       Medications   albuterol-ipratropium 2.5 mg-0.5 mg/3 mL nebulizer solution 3 mL (3 mLs Nebulization Given 12/10/24 1501)   methylPREDNISolone sodium succinate injection 125 mg (125 mg Intravenous Given 12/10/24 1723)       4:00 PM Minimal improvement.  No new complaints or problems.  Still dyspneic and tachypneic with frequent cough, slightly improved wheezing, not meaningfully improved air entry.  Counseled regarding findings, recommend observation admission as likelihood of return visit for continued attempts at outpatient management is high.  Consult Internal Medicine.      Medical Decision Making  Dyspnea, cough, wheezing not responding to usual home therapy, clinically has COPD exacerbation incompletely improved with initial aggressive ER treatment, consulting Internal Medicine.    Problems Addressed:  COPD with acute exacerbation: chronic illness or injury with exacerbation, progression, or side effects of treatment    Amount and/or Complexity of Data Reviewed  Labs: ordered. Decision-making details documented in ED Course.  Radiology: ordered. Decision-making details documented in ED Course.  ECG/medicine tests: ordered and independent interpretation performed. Decision-making details documented in ED Course.    Risk  Prescription drug management.  Decision regarding hospitalization.      Additional MDM:   Differential Diagnosis:   COPD exacerbation, pneumonia, congestive heart failure exacerbation among others                                     Clinical Impression:  Final diagnoses:  [R06.02] SOB (shortness of breath)  [R06.00] Dyspnea, unspecified type (Primary)  [J44.1] COPD with acute exacerbation  [I50.9] Congestive heart failure, unspecified HF chronicity, unspecified heart failure type  [N18.9] Chronic renal impairment, unspecified CKD stage          ED Disposition Condition    Observation Stable                Khari Devlin MD  12/10/24 1601       Khari Devlin MD  12/10/24 [id number]

## 2024-12-11 LAB
ALBUMIN SERPL-MCNC: 2.9 G/DL (ref 3.4–4.8)
ALBUMIN/GLOB SERPL: 0.9 RATIO (ref 1.1–2)
ALP SERPL-CCNC: 133 UNIT/L (ref 40–150)
ALT SERPL-CCNC: 40 UNIT/L (ref 0–55)
ANION GAP SERPL CALC-SCNC: 8 MEQ/L
APICAL FOUR CHAMBER EJECTION FRACTION: 73 %
AST SERPL-CCNC: 26 UNIT/L (ref 5–34)
AV INDEX (PROSTH): 0.7
AV MEAN GRADIENT: 1.8 MMHG
AV PEAK GRADIENT: 3.2 MMHG
AV VALVE AREA BY VELOCITY RATIO: 2.3 CM²
AV VALVE AREA: 2.9 CM²
AV VELOCITY RATIO: 0.56
BASOPHILS # BLD AUTO: 0 X10(3)/MCL
BASOPHILS NFR BLD AUTO: 0 %
BILIRUB SERPL-MCNC: 0.5 MG/DL
BSA FOR ECHO PROCEDURE: 2.32 M2
BUN SERPL-MCNC: 18.7 MG/DL (ref 8.4–25.7)
CALCIUM SERPL-MCNC: 8.7 MG/DL (ref 8.8–10)
CHLORIDE SERPL-SCNC: 111 MMOL/L (ref 98–107)
CO2 SERPL-SCNC: 22 MMOL/L (ref 23–31)
CREAT SERPL-MCNC: 0.98 MG/DL (ref 0.72–1.25)
CREAT/UREA NIT SERPL: 19
CV ECHO LV RWT: 0.55 CM
DOP CALC AO PEAK VEL: 0.9 M/S
DOP CALC AO VTI: 12.8 CM
DOP CALC LVOT AREA: 4.2 CM2
DOP CALC LVOT DIAMETER: 2.3 CM
DOP CALC LVOT PEAK VEL: 0.5 M/S
DOP CALC LVOT STROKE VOLUME: 37 CM3
DOP CALC MV VTI: 13.2 CM
DOP CALCLVOT PEAK VEL VTI: 8.9 CM
E WAVE DECELERATION TIME: 225.76 MSEC
E/A RATIO: 2.19
E/E' RATIO: 5.18 M/S
ECHO LV POSTERIOR WALL: 1.1 CM (ref 0.6–1.1)
EOSINOPHIL # BLD AUTO: 0 X10(3)/MCL (ref 0–0.9)
EOSINOPHIL NFR BLD AUTO: 0 %
ERYTHROCYTE [DISTWIDTH] IN BLOOD BY AUTOMATED COUNT: 13.2 % (ref 11.5–17)
FOLATE SERPL-MCNC: 12.7 NG/ML (ref 7–31.4)
FRACTIONAL SHORTENING: 37.5 % (ref 28–44)
GFR SERPLBLD CREATININE-BSD FMLA CKD-EPI: >60 ML/MIN/1.73/M2
GLOBULIN SER-MCNC: 3.3 GM/DL (ref 2.4–3.5)
GLUCOSE SERPL-MCNC: 224 MG/DL (ref 82–115)
HCT VFR BLD AUTO: 39.2 % (ref 42–52)
HGB BLD-MCNC: 13.6 G/DL (ref 14–18)
HOLD SPECIMEN: NORMAL
HR MV ECHO: 82 BPM
IMM GRANULOCYTES # BLD AUTO: 0.05 X10(3)/MCL (ref 0–0.04)
IMM GRANULOCYTES NFR BLD AUTO: 0.5 %
INR PPP: 1.9
INTERVENTRICULAR SEPTUM: 1.1 CM (ref 0.6–1.1)
LEFT ATRIUM SIZE: 4.22 CM
LEFT INTERNAL DIMENSION IN SYSTOLE: 2.5 CM (ref 2.1–4)
LEFT VENTRICLE DIASTOLIC VOLUME INDEX: 29.76 ML/M2
LEFT VENTRICLE DIASTOLIC VOLUME: 68.14 ML
LEFT VENTRICLE END DIASTOLIC VOLUME APICAL 4 CHAMBER: 96.74 ML
LEFT VENTRICLE MASS INDEX: 63.6 G/M2
LEFT VENTRICLE SYSTOLIC VOLUME INDEX: 10 ML/M2
LEFT VENTRICLE SYSTOLIC VOLUME: 22.85 ML
LEFT VENTRICULAR INTERNAL DIMENSION IN DIASTOLE: 4 CM (ref 3.5–6)
LEFT VENTRICULAR MASS: 145.6 G
LV LATERAL E/E' RATIO: 5.7 M/S
LV SEPTAL E/E' RATIO: 4.75 M/S
LVED V (TEICH): 68.14 ML
LVES V (TEICH): 22.85 ML
LVOT MG: 0.7 MMHG
LVOT MV: 0.4 CM/S
LYMPHOCYTES # BLD AUTO: 0.67 X10(3)/MCL (ref 0.6–4.6)
LYMPHOCYTES NFR BLD AUTO: 7.2 %
MAGNESIUM SERPL-MCNC: 2.1 MG/DL (ref 1.6–2.6)
MCH RBC QN AUTO: 35.3 PG (ref 27–31)
MCHC RBC AUTO-ENTMCNC: 34.7 G/DL (ref 33–36)
MCV RBC AUTO: 101.8 FL (ref 80–94)
MONOCYTES # BLD AUTO: 0.09 X10(3)/MCL (ref 0.1–1.3)
MONOCYTES NFR BLD AUTO: 1 %
MV MEAN GRADIENT: 2 MMHG
MV PEAK A VEL: 0.26 M/S
MV PEAK E VEL: 0.57 M/S
MV PEAK GRADIENT: 4 MMHG
MV STENOSIS PRESSURE HALF TIME: 65.47 MS
MV VALVE AREA BY CONTINUITY EQUATION: 2.8 CM2
MV VALVE AREA P 1/2 METHOD: 3.36 CM2
NEUTROPHILS # BLD AUTO: 8.53 X10(3)/MCL (ref 2.1–9.2)
NEUTROPHILS NFR BLD AUTO: 91.3 %
NRBC BLD AUTO-RTO: 0 %
PHOSPHATE SERPL-MCNC: 2.8 MG/DL (ref 2.3–4.7)
PISA TR MAX VEL: 1.74 M/S
PLATELET # BLD AUTO: 276 X10(3)/MCL (ref 130–400)
PMV BLD AUTO: 10.3 FL (ref 7.4–10.4)
POCT GLUCOSE: 148 MG/DL (ref 70–110)
POCT GLUCOSE: 156 MG/DL (ref 70–110)
POCT GLUCOSE: 165 MG/DL (ref 70–110)
POCT GLUCOSE: 215 MG/DL (ref 70–110)
POTASSIUM SERPL-SCNC: 4.3 MMOL/L (ref 3.5–5.1)
PROT SERPL-MCNC: 6.2 GM/DL (ref 5.8–7.6)
PROTHROMBIN TIME: 22.2 SECONDS (ref 11.4–14)
RA PRESSURE ESTIMATED: 8 MMHG
RBC # BLD AUTO: 3.85 X10(6)/MCL (ref 4.7–6.1)
RIGHT VENTRICULAR END-DIASTOLIC DIMENSION: 3.35 CM
RV TB RVSP: 10 MMHG
SODIUM SERPL-SCNC: 141 MMOL/L (ref 136–145)
TDI LATERAL: 0.1 M/S
TDI SEPTAL: 0.12 M/S
TDI: 0.11 M/S
TR MAX PG: 12 MMHG
TV REST PULMONARY ARTERY PRESSURE: 20 MMHG
VIT B12 SERPL-MCNC: 536 PG/ML (ref 213–816)
WBC # BLD AUTO: 9.34 X10(3)/MCL (ref 4.5–11.5)
Z-SCORE OF LEFT VENTRICULAR DIMENSION IN END DIASTOLE: -7.83
Z-SCORE OF LEFT VENTRICULAR DIMENSION IN END SYSTOLE: -5.91

## 2024-12-11 PROCEDURE — 85025 COMPLETE CBC W/AUTO DIFF WBC: CPT | Performed by: INTERNAL MEDICINE

## 2024-12-11 PROCEDURE — 82607 VITAMIN B-12: CPT | Performed by: STUDENT IN AN ORGANIZED HEALTH CARE EDUCATION/TRAINING PROGRAM

## 2024-12-11 PROCEDURE — 94640 AIRWAY INHALATION TREATMENT: CPT | Mod: XB

## 2024-12-11 PROCEDURE — 25000003 PHARM REV CODE 250: Performed by: INTERNAL MEDICINE

## 2024-12-11 PROCEDURE — 99900035 HC TECH TIME PER 15 MIN (STAT)

## 2024-12-11 PROCEDURE — 36415 COLL VENOUS BLD VENIPUNCTURE: CPT | Performed by: INTERNAL MEDICINE

## 2024-12-11 PROCEDURE — 11000001 HC ACUTE MED/SURG PRIVATE ROOM

## 2024-12-11 PROCEDURE — 85610 PROTHROMBIN TIME: CPT | Performed by: INTERNAL MEDICINE

## 2024-12-11 PROCEDURE — 25000242 PHARM REV CODE 250 ALT 637 W/ HCPCS: Performed by: INTERNAL MEDICINE

## 2024-12-11 PROCEDURE — 80053 COMPREHEN METABOLIC PANEL: CPT | Performed by: INTERNAL MEDICINE

## 2024-12-11 PROCEDURE — 94761 N-INVAS EAR/PLS OXIMETRY MLT: CPT

## 2024-12-11 PROCEDURE — 36415 COLL VENOUS BLD VENIPUNCTURE: CPT | Mod: 91 | Performed by: STUDENT IN AN ORGANIZED HEALTH CARE EDUCATION/TRAINING PROGRAM

## 2024-12-11 PROCEDURE — 5A09357 ASSISTANCE WITH RESPIRATORY VENTILATION, LESS THAN 24 CONSECUTIVE HOURS, CONTINUOUS POSITIVE AIRWAY PRESSURE: ICD-10-PCS | Performed by: INTERNAL MEDICINE

## 2024-12-11 PROCEDURE — 27000221 HC OXYGEN, UP TO 24 HOURS

## 2024-12-11 PROCEDURE — 82746 ASSAY OF FOLIC ACID SERUM: CPT | Performed by: STUDENT IN AN ORGANIZED HEALTH CARE EDUCATION/TRAINING PROGRAM

## 2024-12-11 PROCEDURE — 94660 CPAP INITIATION&MGMT: CPT

## 2024-12-11 PROCEDURE — 63600175 PHARM REV CODE 636 W HCPCS: Performed by: STUDENT IN AN ORGANIZED HEALTH CARE EDUCATION/TRAINING PROGRAM

## 2024-12-11 PROCEDURE — 25000003 PHARM REV CODE 250

## 2024-12-11 PROCEDURE — 63600175 PHARM REV CODE 636 W HCPCS: Performed by: INTERNAL MEDICINE

## 2024-12-11 PROCEDURE — 83735 ASSAY OF MAGNESIUM: CPT | Performed by: INTERNAL MEDICINE

## 2024-12-11 PROCEDURE — 84100 ASSAY OF PHOSPHORUS: CPT | Performed by: INTERNAL MEDICINE

## 2024-12-11 RX ORDER — GUAIFENESIN AND DEXTROMETHORPHAN HYDROBROMIDE 10; 100 MG/5ML; MG/5ML
5 SYRUP ORAL EVERY 4 HOURS PRN
Status: DISCONTINUED | OUTPATIENT
Start: 2024-12-11 | End: 2024-12-12 | Stop reason: HOSPADM

## 2024-12-11 RX ORDER — PROMETHAZINE HYDROCHLORIDE AND DEXTROMETHORPHAN HYDROBROMIDE 6.25; 15 MG/5ML; MG/5ML
5 SYRUP ORAL EVERY 4 HOURS PRN
Status: DISCONTINUED | OUTPATIENT
Start: 2024-12-11 | End: 2024-12-11

## 2024-12-11 RX ORDER — CARBOXYMETHYLCELLULOSE SODIUM 10 MG/ML
1 GEL OPHTHALMIC 3 TIMES DAILY
Status: DISCONTINUED | OUTPATIENT
Start: 2024-12-11 | End: 2024-12-12 | Stop reason: HOSPADM

## 2024-12-11 RX ORDER — INSULIN GLARGINE 100 [IU]/ML
7 INJECTION, SOLUTION SUBCUTANEOUS NIGHTLY
Status: DISCONTINUED | OUTPATIENT
Start: 2024-12-11 | End: 2024-12-12 | Stop reason: HOSPADM

## 2024-12-11 RX ORDER — INSULIN ASPART 100 [IU]/ML
0-10 INJECTION, SOLUTION INTRAVENOUS; SUBCUTANEOUS
Status: DISCONTINUED | OUTPATIENT
Start: 2024-12-11 | End: 2024-12-12 | Stop reason: HOSPADM

## 2024-12-11 RX ORDER — GLUCAGON 1 MG
1 KIT INJECTION
Status: DISCONTINUED | OUTPATIENT
Start: 2024-12-11 | End: 2024-12-12 | Stop reason: HOSPADM

## 2024-12-11 RX ORDER — CARBOXYMETHYLCELLULOSE SODIUM 10 MG/ML
1 GEL OPHTHALMIC DAILY
Status: DISCONTINUED | OUTPATIENT
Start: 2024-12-12 | End: 2024-12-11

## 2024-12-11 RX ORDER — PANTOPRAZOLE SODIUM 40 MG/1
40 TABLET, DELAYED RELEASE ORAL DAILY
Status: DISCONTINUED | OUTPATIENT
Start: 2024-12-11 | End: 2024-12-12 | Stop reason: HOSPADM

## 2024-12-11 RX ORDER — IBUPROFEN 200 MG
16 TABLET ORAL
Status: DISCONTINUED | OUTPATIENT
Start: 2024-12-11 | End: 2024-12-12 | Stop reason: HOSPADM

## 2024-12-11 RX ORDER — IBUPROFEN 200 MG
24 TABLET ORAL
Status: DISCONTINUED | OUTPATIENT
Start: 2024-12-11 | End: 2024-12-12 | Stop reason: HOSPADM

## 2024-12-11 RX ADMIN — FAMOTIDINE 20 MG: 20 TABLET, FILM COATED ORAL at 08:12

## 2024-12-11 RX ADMIN — WARFARIN SODIUM 5 MG: 5 TABLET ORAL at 05:12

## 2024-12-11 RX ADMIN — INSULIN GLARGINE 7 UNITS: 100 INJECTION, SOLUTION SUBCUTANEOUS at 09:12

## 2024-12-11 RX ADMIN — CHOLECALCIFEROL TAB 25 MCG (1000 UNIT) 5000 UNITS: 25 TAB at 08:12

## 2024-12-11 RX ADMIN — METOPROLOL SUCCINATE 100 MG: 50 TABLET, EXTENDED RELEASE ORAL at 08:12

## 2024-12-11 RX ADMIN — METHYLPREDNISOLONE SODIUM SUCCINATE 60 MG: 40 INJECTION, POWDER, FOR SOLUTION INTRAMUSCULAR; INTRAVENOUS at 03:12

## 2024-12-11 RX ADMIN — FUROSEMIDE 40 MG: 20 TABLET ORAL at 09:12

## 2024-12-11 RX ADMIN — PREDNISONE 60 MG: 20 TABLET ORAL at 08:12

## 2024-12-11 RX ADMIN — SACUBITRIL AND VALSARTAN 2 TABLET: 24; 26 TABLET, FILM COATED ORAL at 09:12

## 2024-12-11 RX ADMIN — CARBOXYMETHYLCELLULOSE SODIUM 1 DROP: 10 GEL OPHTHALMIC at 09:12

## 2024-12-11 RX ADMIN — IPRATROPIUM BROMIDE AND ALBUTEROL SULFATE 3 ML: 2.5; .5 SOLUTION RESPIRATORY (INHALATION) at 07:12

## 2024-12-11 RX ADMIN — IPRATROPIUM BROMIDE AND ALBUTEROL SULFATE 3 ML: 2.5; .5 SOLUTION RESPIRATORY (INHALATION) at 11:12

## 2024-12-11 RX ADMIN — SACUBITRIL AND VALSARTAN 2 TABLET: 24; 26 TABLET, FILM COATED ORAL at 08:12

## 2024-12-11 RX ADMIN — LEVOFLOXACIN 750 MG: 750 TABLET, FILM COATED ORAL at 08:12

## 2024-12-11 RX ADMIN — ATORVASTATIN CALCIUM 80 MG: 40 TABLET, FILM COATED ORAL at 08:12

## 2024-12-11 RX ADMIN — INSULIN ASPART 1 UNITS: 100 INJECTION, SOLUTION INTRAVENOUS; SUBCUTANEOUS at 09:12

## 2024-12-11 RX ADMIN — PANTOPRAZOLE 40 MG: 40 TABLET, DELAYED RELEASE ORAL at 10:12

## 2024-12-11 RX ADMIN — METHYLPREDNISOLONE SODIUM SUCCINATE 60 MG: 40 INJECTION, POWDER, FOR SOLUTION INTRAMUSCULAR; INTRAVENOUS at 09:12

## 2024-12-11 RX ADMIN — TAMSULOSIN HYDROCHLORIDE 0.4 MG: 0.4 CAPSULE ORAL at 08:12

## 2024-12-11 NOTE — PROGRESS NOTES
Inpatient Nutrition Assessment    Admit Date: 12/10/2024   Total duration of encounter: 1 day   Patient Age: 64 y.o.    Nutrition Recommendation/Prescription     Changed diet to diabetic/ cardiac/coumadin diet  Pt requesting boost supplement; will order vanilla boost glucose control bid; Boost Glucose Control (provides 190 kcal, 16 g protein per serving)   MVI/fe  Biweekly wt  Pt education completed on diet tx  Will monitor nutrition status     Communication of Recommendations: reviewed with nurse and reviewed with patient    Nutrition Assessment     Malnutrition Assessment/Nutrition-Focused Physical Exam       Malnutrition Level: other (see comments) (Does not meet criteria) (12/11/24 1119)  Energy Intake (Malnutrition): other (see comments) (Does not meet criteria) (12/11/24 1119)  Weight Loss (Malnutrition): other (see comments) (Does not meet criteria) (12/11/24 1119)     Orbital Region (Subcutaneous Fat Loss): well nourished              Clavicle Bone Region (Muscle Loss): well nourished                    Fluid Accumulation (Malnutrition): mild (12/11/24 1119)     Hand  Strength, Right (Malnutrition): Unable to assess (12/11/24 1119)  A minimum of two characteristics is recommended for diagnosis of either severe or non-severe malnutrition.    Chart Review    Reason Seen: continuous nutrition monitoring    Malnutrition Screening Tool Results   Have you recently lost weight without trying?: No  Have you been eating poorly because of a decreased appetite?: No   MST Score: 0   Diagnosis:  COPD, Hx CHF, Afib, ICD, CKD, L hydronephrosis, DM     Relevant Medical History: Afib/anticoagulation, CHF, COPD, DM, RA    Scheduled Medications:  albuterol-ipratropium, 3 mL, Q4H WAKE  atorvastatin, 80 mg, Daily  furosemide, 40 mg, Daily  insulin glargine U-100, 7 Units, QHS  levoFLOXacin, 750 mg, Daily  methylPREDNISolone injection (PEDS and ADULTS), 60 mg, TID  metoprolol succinate, 100 mg, Daily  pantoprazole, 40 mg,  "Daily  sacubitriL-valsartan, 2 tablet, BID  tamsulosin, 0.4 mg, Daily  vitamin D, 5,000 Units, Daily  warfarin, 5 mg, Daily    Continuous Infusions:   PRN Medications:  acetaminophen, 650 mg, Q6H PRN  dextrose 10%, 12.5 g, PRN  dextrose 10%, 25 g, PRN  glucagon (human recombinant), 1 mg, PRN  glucose, 16 g, PRN  glucose, 24 g, PRN  insulin aspart U-100, 0-10 Units, QID (AC + HS) PRN  promethazine-dextromethorphan, 5 mL, Q4H PRN  sodium chloride 0.9%, 10 mL, Q12H PRN  traZODone, 50 mg, Nightly PRN    Calorie Containing IV Medications: no significant kcals from medications at this time    Recent Labs   Lab 12/10/24  1456 12/11/24  0439    141   K 4.2 4.3   CALCIUM 9.3 8.7*   PHOS  --  2.8   MG  --  2.10   * 111*   CO2 25 22*   BUN 15.7 18.7   CREATININE 1.00 0.98   EGFRNORACEVR >60 >60   GLUCOSE 88 224*   BILITOT 0.8 0.5   ALKPHOS 161* 133   ALT 48 40   AST 38* 26   ALBUMIN 3.6 2.9*   HGBA1C 5.4  --    WBC 13.47* 9.34   HGB 15.7 13.6*   HCT 44.7 39.2*     Nutrition Orders:  Diet diabetic Cardiac (Low Na/Chol); 2000 Calories (up to 75 gm per meal)      Appetite/Oral Intake: good/% of meals  Factors Affecting Nutritional Intake: shortness of breath  Social Needs Impacting Access to Food: none identified  Food/Mandaen/Cultural Preferences: none reported  Food Allergies: none reported  Last Bowel Movement: 12/10/24  Wound(s):  none    Comments    (12/11) Pt reported SOB improving; appetite good; hungry with steroid use; asking for boost supplement or snack; will order boost glucose control. Wt elevated approx 10#/ + mild leg edema; on diuretic. Discussed diabetic/cardiac/coumadin diet; copy of diet provided for reference.     Anthropometrics    Height: 6' 2" (188 cm), Height Method: Stated  Last Weight: 103 kg (227 lb) (12/11/24 0921), Weight Method: Standard Scale  BMI (Calculated): 29.1  BMI Classification: overweight (BMI 25-29.9)        Ideal Body Weight (IBW), Male: 190 lb     % Ideal Body Weight, " Male (lb): 119.47 %                 Usual Body Weight (UBW), k.2 kg  % Usual Body Weight: 105.07     Usual Weight Provided By: patient and EMR weight history    Wt Readings from Last 5 Encounters:   24 103 kg (227 lb)   10/31/24 98.2 kg (216 lb 7.9 oz)   10/22/24 98.2 kg (216 lb 9.6 oz)   10/16/24 97.5 kg (215 lb)   24 93.8 kg (206 lb 12.7 oz)     Weight Change(s) Since Admission: no wt loss   Wt Readings from Last 1 Encounters:   24 0921 103 kg (227 lb)   24 0600 103 kg (227 lb)   12/10/24 1426 104.3 kg (230 lb)   Admit Weight: 104.3 kg (230 lb) (12/10/24 1426), Weight Method: Standard Scale    Estimated Needs    Weight Used For Calorie Calculations: 103 kg (227 lb 1.2 oz)  Energy Calorie Requirements (kcal): 2060 kcal/d; 20 dhruv/kg /obese  Energy Need Method: Kcal/kg  Weight Used For Protein Calculations: 86.3 kg (190 lb 4.1 oz) (based on IBW/obese)  Protein Requirements: 103 gm protein/d; 1.2 gm/kg hx CKD  Fluid Requirements (mL): 2060 ml/d; 1ml/dhruv  CHO Requirement: 231 gm CHO/d     Enteral Nutrition     Patient not receiving enteral nutrition at this time.    Parenteral Nutrition     Patient not receiving parenteral nutrition support at this time.    Evaluation of Received Nutrient Intake    Calories: meeting estimated needs  Protein: meeting estimated needs    Patient Education     Education Provided: diabetic diet, heart healthy diet, and coumadin   Teaching Method: explanation and printed materials  Comprehension: verbalizes understanding  Barriers to Learning: none evident  Expected Compliance: fair  Comments: All questions were answered and dietitian's contact information was provided.     Nutrition Diagnosis     PES: Food and nutrition related knowledge deficit related to lack of prior nutrition-related education as evidenced by verbalized limited understanding of diet . (new)     PES:            Nutrition Interventions     Intervention(s): modified composition of meals/snacks,  commercial beverage, multivitamin/mineral supplement therapy, purpose of nutrition education, and collaboration with other providers  Intervention(s):      Goal: Meet greater than 80% of nutritional needs by follow-up. (new)  Goal: Verbalize understanding of diet by discharge. (new)    Nutrition Goals & Monitoring     Dietitian will monitor: food and beverage intake, weight, food/nutrition knowledge skill, and glucose/endocrine profile  Discharge planning: continue diabetic/ coumadin  diet  Nutrition Risk/Follow-Up: low (follow-up in 5-7 days)   Please consult if re-assessment needed sooner.

## 2024-12-11 NOTE — PLAN OF CARE
12/11/24 1111   Discharge Assessment   Assessment Type Discharge Planning Assessment   Confirmed/corrected address, phone number and insurance Yes   Confirmed Demographics Correct on Facesheet   Source of Information patient   When was your last doctors appointment?   (PCP: Dayami Squires)   Does patient/caregiver understand observation status Yes   Communicated SIVAKUMAR with patient/caregiver Yes   Reason For Admission R06.02 SOB (shortness of breath)  J44.1 COPD with acute exacerbation  R06.00 Dyspnea, unspecified type  N18.9 Chronic renal impairment, unspecified CKD stage  I50.9 Congestive heart failure, unspecified HF chronicity, unspecified heart failure type   People in Home alone   Facility Arrived From: Home   Do you expect to return to your current living situation? Yes   Do you have help at home or someone to help you manage your care at home? Yes   Who are your caregiver(s) and their phone number(s)? Cheryl Harding, sister, P: [phone]   Prior to hospitilization cognitive status: Alert/Oriented;No Deficits   Current cognitive status: Alert/Oriented;No Deficits   Walking or Climbing Stairs Difficulty yes   Walking or Climbing Stairs ambulation difficulty, requires equipment   Mobility Management Rollator   Dressing/Bathing Difficulty no   Home Accessibility wheelchair accessible   Home Layout Able to live on 1st floor   Equipment Currently Used at Home CPAP;nebulizer;rollator;cane, quad   Readmission within 30 days? No   Patient currently being followed by outpatient case management? No   Do you currently have service(s) that help you manage your care at home? No   Do you take prescription medications? Yes  ([facility] in [place])   Do you have prescription coverage? Yes   Coverage Medicare/Medicaid   Do you have any problems affording any of your prescribed medications? TBD   Is the patient taking medications as prescribed? yes   Who is going to help you get home at discharge? Family   How  do you get to doctors appointments? family or friend will provide   Are you on dialysis? No   Do you take coumadin? No   Discharge Plan A Home;Home Health   DME Needed Upon Discharge  other (see comments)  (TBD)   Discharge Plan discussed with: Patient   Transition of Care Barriers None

## 2024-12-11 NOTE — PLAN OF CARE
Problem: Adult Inpatient Plan of Care  Goal: Plan of Care Review  Outcome: Progressing  Goal: Patient-Specific Goal (Individualized)  Outcome: Progressing  Goal: Absence of Hospital-Acquired Illness or Injury  Outcome: Progressing  Goal: Optimal Comfort and Wellbeing  Outcome: Progressing  Goal: Readiness for Transition of Care  Outcome: Progressing     Problem: Diabetes Comorbidity  Goal: Blood Glucose Level Within Targeted Range  Outcome: Progressing     Problem: Fall Injury Risk  Goal: Absence of Fall and Fall-Related Injury  Outcome: Progressing     Problem: COPD (Chronic Obstructive Pulmonary Disease)  Goal: Optimal Chronic Illness Coping  Outcome: Progressing  Goal: Optimal Level of Functional Enterprise  Outcome: Progressing  Goal: Absence of Infection Signs and Symptoms  Outcome: Progressing  Goal: Improved Oral Intake  Outcome: Progressing  Goal: Effective Oxygenation and Ventilation  Outcome: Progressing     Problem: Heart Failure  Goal: Optimal Coping  Outcome: Progressing  Goal: Optimal Cardiac Output  Outcome: Progressing  Goal: Stable Heart Rate and Rhythm  Outcome: Progressing  Goal: Optimal Functional Ability  Outcome: Progressing  Goal: Fluid and Electrolyte Balance  Outcome: Progressing  Goal: Improved Oral Intake  Outcome: Progressing  Goal: Effective Oxygenation and Ventilation  Outcome: Progressing  Goal: Effective Breathing Pattern During Sleep  Outcome: Progressing

## 2024-12-11 NOTE — PROGRESS NOTES
[facility] Progress Note    Patient Name: Ezra Zhang  MRN: [id number]  Admission Date: 12/10/2024  Current Hospital Day: 2   Code Status: Full Code  Attending Provider: Abner Saravia MD    Subjective:   Brief HPI:  Ezra Zhang is a 64 y.o. male with PMH of paroxysmal afib and on chronic anticoagulation, CHF with recent EF 35%, COPD on maintenance LABA-ICS inhaler,DM, RA and ICD in place . The patient presented to ED on 12/10/2024 with a primary complaint of dyspnea, worsened since past 2 days. Patient reported that he has been sick recently and his PCP prescribed him amoxicillin and medrol dose pack few days back which improved his symptoms initially improved but he stated that his symptoms worsened again last 2 days and it did not improve with nebulization that he usually uses at home. He denied any home oxygen usage. But stated that he uses CPAP at home nightly for his RYAN. He is also complaining of cough which is dry going on for two days and also reported chills and sweats, but he never checked his temp at home, He denied smoking actively currently and the entresto he is on for a while now not a recent addition. Per chart review, the patient is on coumadin for a while now, which apparently was recommended by the cardiology as he developed a clot on one of his valves despite being on eliquis at that time.      He reported chest pain ,which the attributed due to the cough and not really like an MI or angina, troponin wnl   In the Ed, he has tachypnea, afebrile, normotensive, saturating at 94 on room air, he was wheezing and received IV methylprednisolone and duo nebs, but as the wheeze is still persistent despite the treatment he received in the ED, IM consulted for further management.     Interval History:   This morning he denies fevers, headache, chest pain, , N/V/D and abdominal pain. He stated that he is feeling a bit better today and he still has SOB but better than yesterday, slept  "fine with CPAP last night. Wheezing still present bilaterally. No trouble with urination. Normal resp rate. Saturating fine on room air     ROS:  Pertinent positives and negatives listed in HPI. All other systems are reviewed and are negative.    Objective:   Vitals:  Vitals:    12/11/24 0725 12/11/24 0801 12/11/24 0854 12/11/24 0921   BP:  (!) 127/93 (!) 127/93 (!) 127/93   BP Location:  Left arm     Patient Position:  Lying     Pulse: 72 76 76    Resp: 20 18     Temp:  97.4 °F (36.3 °C)     TempSrc:  Oral     SpO2: 99% 96%     Weight:    103 kg (227 lb)   Height:    6' 2" (1.88 m)       Physical Examination:  General: Awake, alert, & oriented to person, place & time. No acute distress  Psychiatric: Mood and affect normal  HEENT: Normocephalic, atraumatic. Face symmetric.  Cardiovascular: Regular rate & rhythm, Normal S1 & S2 w/out murmurs, rubs or gallops  Pulmonary: Bilateral symmetric chest rise, Non-labored,no crackles are appreciated. B/l wheeze still present  Abdominal:  non distended, soft   Extremities: No clubbing, cyanosis or edema.  Skin:  Exposed skin is warm & dry.  Neuro:   Patient moves all extremities equally. Sensation intact bilateraly.    Laboratory:  CMP:  Recent Labs   Lab 12/10/24  1456 12/11/24  0439    141   K 4.2 4.3   CO2 25 22*   BUN 15.7 18.7   CREATININE 1.00 0.98   GLUCOSE 88 224*   CALCIUM 9.3 8.7*   ALBUMIN 3.6 2.9*   BILITOT 0.8 0.5   AST 38* 26   ALT 48 40   ALKPHOS 161* 133       CBC:  Recent Labs   Lab 12/10/24  1456 12/11/24  0439   WBC 13.47* 9.34   ABSNEUTRO 10.69* 8.53   RBC 4.39* 3.85*   HGB 15.7 13.6*   HCT 44.7 39.2*    276   .8* 101.8*   RDW 13.3 13.2       Trended Cardiac Data:   Recent Labs   Lab 12/10/24  1456   TROPONINI 0.017       Thyroid: No results for input(s): "TSH", "[id number]", "K3UMEVS", "O9TULMZ", "THYROIDAB" in the last 168 hours.    Invalid input(s): "[id number]"    Urinalysis:   Recent Labs   Lab 12/10/24  1903   APPEARANCEUA Clear "   SGUA 1.021   PROTEINUA 1+*   KETONESUA Negative   BILIRUBINUA Negative   UROBILINOGEN Normal   LEUKOCYTESUR Negative   WBCUA 0-5   BACTERIA None Seen   SQEPUA None Seen   MUCOUSUA Trace*   HYALINECASTS None Seen   RBCUA 0-5       REVIEWED    Radiology:  X-Ray Chest AP Portable    Result Date: 12/10/2024  Increase interstitial markings at both bases with more confluent interstitial markings and opacities at the left base infiltrate cannot be completely excluded Electronically signed by: Sincere Hunter Date:    12/10/2024 Time:    15:16       Assessment & Plan:   COPD exacerbation, moderate   Hospitalized for COPD exacerbation in the past, likely class E per recent Gold guidelines  In the ED, WBC 13.47 likely due to to medrol dose pack he was prescribed recently   Afebrile and saturating 94% on room air in the ED   Received duo nebs 3 ml every 5 min for 3 doses in the ED   CXR done in the ED showed increased interstitial markings at both bases and opacities which could not be excluded   COVID/FLU/RSV negative   Continue dounebs q4h scheduled   Continue systemic steroids, IV solumedrol 60mg TID  Started him on levofloxacin 750mg oral daily ,    Resp panel negative , resp cultures, blood cultures pending   Patient is on LABA- ICS inhaler outpatient, consider LABA- LAMA inhaler on discharge, considering his hospitalization history and class E classification   Eosinophil count normal , IgE pending        Hx of CHF   Paroxysmal afib on chronic anticoagulation  ICD in place   BNP done  in the ED is 325.2, his prior Bnp 4months back is 198.7, troponin wnl  Continue entresto, metoprolol, lasix   Continue warfarin, patient reported to be taking 5 mg on Wednesday and Sunday and 2.5 mg on the rest of the days   PT- INR done in the ED is 21.9 and 1.9 respectively   Strict I&o        CKD stage 3  Left hydronephrosis, stable   Continue his home afluzosin for his urinary issues, patient reported taht his symptoms are  better controlled now   Per chart review the patient was referred to [facility] for a stent placement for his hydronephrosis but could not go there due to transportation issues.  His Cr is 1.00, BUN 15.7 initially        Type 2Dm  On glipizide at home   A1c done today is 5.4  Will monitor his POCT since he will be receiving steroids for COPD exacerbation  Will start basal insulin 7 units as his sugars are bit elevated since the initiation of systemic steroids      CODE STATUS: Full Code  Access: PIV  Antimicrobials: levofloxacin   Diet: Diet Adult Regular , coumadin, heart healthy and diabetic diet  DVT Prophylaxis: Warfarin  GI Prophylaxis: protonix  Fluids:  none    Disposition:  admitted for further management of COPD exacerbation, continue systemic steroids, duo nebs and discharge him once his condition improves    KALEB KISER MD  Internal Medicine - PGY-1  [employer] [facility]

## 2024-12-11 NOTE — PLAN OF CARE
Problem: Adult Inpatient Plan of Care  Goal: Plan of Care Review  Outcome: Progressing  Goal: Patient-Specific Goal (Individualized)  Outcome: Progressing  Goal: Absence of Hospital-Acquired Illness or Injury  Outcome: Progressing  Goal: Optimal Comfort and Wellbeing  Outcome: Progressing  Goal: Readiness for Transition of Care  Outcome: Progressing     Problem: Diabetes Comorbidity  Goal: Blood Glucose Level Within Targeted Range  Outcome: Progressing     Problem: Fall Injury Risk  Goal: Absence of Fall and Fall-Related Injury  Outcome: Progressing     Problem: COPD (Chronic Obstructive Pulmonary Disease)  Goal: Optimal Chronic Illness Coping  Outcome: Progressing  Goal: Optimal Level of Functional [place]  Outcome: Progressing  Goal: Absence of Infection Signs and Symptoms  Outcome: Progressing  Goal: Improved Oral Intake  Outcome: Progressing  Goal: Effective Oxygenation and Ventilation  Outcome: Progressing     Problem: Heart Failure  Goal: Optimal Coping  Outcome: Progressing  Goal: Optimal Cardiac Output  Outcome: Progressing  Goal: Stable Heart Rate and Rhythm  Outcome: Progressing  Goal: Optimal Functional Ability  Outcome: Progressing  Goal: Fluid and Electrolyte Balance  Outcome: Progressing  Goal: Improved Oral Intake  Outcome: Progressing  Goal: Effective Oxygenation and Ventilation  Outcome: Progressing  Goal: Effective Breathing Pattern During Sleep  Outcome: Progressing

## 2024-12-11 NOTE — PLAN OF CARE
Problem: Adult Inpatient Plan of Care  Goal: Plan of Care Review  12/11/2024 1601 by Suyapa Foster LPN  Outcome: Progressing  12/11/2024 1007 by Suyapa Foster LPN  Outcome: Progressing  12/11/2024 1007 by Suyapa Foster LPN  Outcome: Progressing  Goal: Patient-Specific Goal (Individualized)  12/11/2024 1601 by Suyapa Foster LPN  Outcome: Progressing  12/11/2024 1007 by Suyapa Foster LPN  Outcome: Progressing  12/11/2024 1007 by Suyapa Foster LPN  Outcome: Progressing  Goal: Absence of Hospital-Acquired Illness or Injury  12/11/2024 1601 by Suyapa Foster LPN  Outcome: Progressing  12/11/2024 1007 by Suyapa Foster LPN  Outcome: Progressing  12/11/2024 1007 by Suyapa Foster LPN  Outcome: Progressing  Goal: Optimal Comfort and Wellbeing  12/11/2024 1601 by Suyapa Foster LPN  Outcome: Progressing  12/11/2024 1007 by Suyapa Foster LPN  Outcome: Progressing  12/11/2024 1007 by Suyapa Foster LPN  Outcome: Progressing  Goal: Readiness for Transition of Care  12/11/2024 1601 by Suyapa Foster LPN  Outcome: Progressing  12/11/2024 1007 by Suypaa Foster LPN  Outcome: Progressing  12/11/2024 1007 by Suyapa Foster LPN  Outcome: Progressing     Problem: Diabetes Comorbidity  Goal: Blood Glucose Level Within Targeted Range  12/11/2024 1601 by Suyapa Foster LPN  Outcome: Progressing  12/11/2024 1007 by Suyapa Foster LPN  Outcome: Progressing  12/11/2024 1007 by Suyapa Foster LPN  Outcome: Progressing     Problem: Fall Injury Risk  Goal: Absence of Fall and Fall-Related Injury  12/11/2024 1601 by Suyapa Foster LPN  Outcome: Progressing  12/11/2024 1007 by Suyapa Foster LPN  Outcome: Progressing  12/11/2024 1007 by Suyapa Foster LPN  Outcome: Progressing     Problem: COPD (Chronic Obstructive Pulmonary Disease)  Goal: Optimal Chronic Illness Coping  12/11/2024 1601 by Suyapa Foster LPN  Outcome: Progressing  12/11/2024 1007 by Suyapa Foster  LPN  Outcome: Progressing  12/11/2024 1007 by Suyapa Foster, LPN  Outcome: Progressing  Goal: Optimal Level of Functional [place]  12/11/2024 1601 by Suyapa Foster, LPN  Outcome: Progressing  12/11/2024 1007 by Suyapa Foster, LPN  Outcome: Progressing  12/11/2024 1007 by Suyapa Foster, LPN  Outcome: Progressing  Goal: Absence of Infection Signs and Symptoms  12/11/2024 1601 by Suyapa Foster, LPN  Outcome: Progressing  12/11/2024 1007 by Suyapa Foster, LPN  Outcome: Progressing  12/11/2024 1007 by Suyapa Foster, LPN  Outcome: Progressing  Goal: Improved Oral Intake  12/11/2024 1601 by Suyapa Foster, LPN  Outcome: Progressing  12/11/2024 1007 by Suyapa Foster, LPN  Outcome: Progressing  12/11/2024 1007 by Suyapa Foster, LPN  Outcome: Progressing  Goal: Effective Oxygenation and Ventilation  12/11/2024 1601 by Suyapa Foster, LPN  Outcome: Progressing  12/11/2024 1007 by Suyapa Foster, LPN  Outcome: Progressing  12/11/2024 1007 by Suyapa Foster, LPN  Outcome: Progressing     Problem: Heart Failure  Goal: Optimal Coping  12/11/2024 1601 by Suyapa Foster, LPN  Outcome: Progressing  12/11/2024 1007 by Suyapa Foster, LPN  Outcome: Progressing  12/11/2024 1007 by Suyapa Foster, LPN  Outcome: Progressing  Goal: Optimal Cardiac Output  12/11/2024 1601 by Suyapa Foster, LPN  Outcome: Progressing  12/11/2024 1007 by Suyapa Foster, LPN  Outcome: Progressing  12/11/2024 1007 by Suyapa Foster, LPN  Outcome: Progressing  Goal: Stable Heart Rate and Rhythm  12/11/2024 1601 by Suyapa Foster, LPN  Outcome: Progressing  12/11/2024 1007 by Suyapa Foster, LPN  Outcome: Progressing  12/11/2024 1007 by Suyapa Foster, LPN  Outcome: Progressing  Goal: Optimal Functional Ability  12/11/2024 1601 by Suyapa Foster, LPN  Outcome: Progressing  12/11/2024 1007 by Suyapa Foster, LPN  Outcome: Progressing  12/11/2024 1007 by Suyapa Foster LPN  Outcome: Progressing  Goal:  Fluid and Electrolyte Balance  12/11/2024 1601 by Suyapa Foster LPN  Outcome: Progressing  12/11/2024 1007 by Suyapa Foster LPN  Outcome: Progressing  12/11/2024 1007 by Suyapa Foster LPN  Outcome: Progressing  Goal: Improved Oral Intake  12/11/2024 1601 by Suyapa Foster LPN  Outcome: Progressing  12/11/2024 1007 by Suyapa Foster LPN  Outcome: Progressing  12/11/2024 1007 by Suyapa Foster LPN  Outcome: Progressing  Goal: Effective Oxygenation and Ventilation  12/11/2024 1601 by Suyapa Foster LPN  Outcome: Progressing  12/11/2024 1007 by Suyapa Foster LPN  Outcome: Progressing  12/11/2024 1007 by Suyapa Foster LPN  Outcome: Progressing  Goal: Effective Breathing Pattern During Sleep  12/11/2024 1601 by Suyapa Foster LPN  Outcome: Progressing  12/11/2024 1007 by Suyapa Foster LPN  Outcome: Progressing  12/11/2024 1007 by Suyapa Foster LPN  Outcome: Progressing     Problem: Adult Inpatient Plan of Care  Goal: Plan of Care Review  12/11/2024 1601 by Suyapa Foster LPN  Outcome: Progressing  12/11/2024 1007 by Suyapa Foster LPN  Outcome: Progressing  12/11/2024 1007 by Suyapa Foster LPN  Outcome: Progressing  Goal: Patient-Specific Goal (Individualized)  12/11/2024 1601 by Suyapa Foster LPN  Outcome: Progressing  12/11/2024 1007 by Suyapa Foster LPN  Outcome: Progressing  12/11/2024 1007 by Suyapa Foster LPN  Outcome: Progressing  Goal: Absence of Hospital-Acquired Illness or Injury  12/11/2024 1601 by Suyapa Foster LPN  Outcome: Progressing  12/11/2024 1007 by Suyapa Foster LPN  Outcome: Progressing  12/11/2024 1007 by Suyapa Foster LPN  Outcome: Progressing  Goal: Optimal Comfort and Wellbeing  12/11/2024 1601 by Suyapa Foster LPN  Outcome: Progressing  12/11/2024 1007 by Suyapa Foster LPN  Outcome: Progressing  12/11/2024 1007 by Suyapa Foster LPN  Outcome: Progressing  Goal: Readiness for Transition of Care  12/11/2024 1602  by Suyapa Foster, LPN  Outcome: Progressing  12/11/2024 1007 by Suyapa Foster, LPN  Outcome: Progressing  12/11/2024 1007 by Suyapa Foster, LPN  Outcome: Progressing     Problem: Diabetes Comorbidity  Goal: Blood Glucose Level Within Targeted Range  12/11/2024 1601 by Suyapa Foster, LPN  Outcome: Progressing  12/11/2024 1007 by Suyapa Foster, LPN  Outcome: Progressing  12/11/2024 1007 by Suyapa Foster, LPN  Outcome: Progressing     Problem: Fall Injury Risk  Goal: Absence of Fall and Fall-Related Injury  12/11/2024 1601 by Suyapa Foster, LPN  Outcome: Progressing  12/11/2024 1007 by Suyapa Foster, LPN  Outcome: Progressing  12/11/2024 1007 by Suyapa Foster, LPN  Outcome: Progressing     Problem: COPD (Chronic Obstructive Pulmonary Disease)  Goal: Optimal Chronic Illness Coping  12/11/2024 1601 by Suyapa Foster, LPN  Outcome: Progressing  12/11/2024 1007 by Suyapa Foster, LPN  Outcome: Progressing  12/11/2024 1007 by Suyapa Foster LPN  Outcome: Progressing  Goal: Optimal Level of Functional [place]  12/11/2024 1601 by Suyapa Foster, LPN  Outcome: Progressing  12/11/2024 1007 by Suyapa Foster, LPN  Outcome: Progressing  12/11/2024 1007 by Suyapa Foster, LPN  Outcome: Progressing  Goal: Absence of Infection Signs and Symptoms  12/11/2024 1601 by Suyapa Foster, LPN  Outcome: Progressing  12/11/2024 1007 by Suyapa Foster, LPN  Outcome: Progressing  12/11/2024 1007 by Suyapa Foster, LPN  Outcome: Progressing  Goal: Improved Oral Intake  12/11/2024 1601 by Suyapa Foster, LPN  Outcome: Progressing  12/11/2024 1007 by Suyapa Foster, LPN  Outcome: Progressing  12/11/2024 1007 by Suyapa Foster, LPN  Outcome: Progressing  Goal: Effective Oxygenation and Ventilation  12/11/2024 1601 by Suyapa Foster, LPN  Outcome: Progressing  12/11/2024 1007 by Suyapa Foster, LPN  Outcome: Progressing  12/11/2024 1007 by Suyapa Foster, LPN  Outcome: Progressing      Problem: Heart Failure  Goal: Optimal Coping  12/11/2024 1601 by Suyapa Foster, LPN  Outcome: Progressing  12/11/2024 1007 by Suyapa Foster, LPN  Outcome: Progressing  12/11/2024 1007 by Suyapa Foster, LPN  Outcome: Progressing  Goal: Optimal Cardiac Output  12/11/2024 1601 by Suyapa Foster, LPN  Outcome: Progressing  12/11/2024 1007 by Suyapa Foster, LPN  Outcome: Progressing  12/11/2024 1007 by Suyapa Foster, LPN  Outcome: Progressing  Goal: Stable Heart Rate and Rhythm  12/11/2024 1601 by Suyapa Foster, LPN  Outcome: Progressing  12/11/2024 1007 by Suyapa Foster, LPN  Outcome: Progressing  12/11/2024 1007 by Suyapa Foster, LPN  Outcome: Progressing  Goal: Optimal Functional Ability  12/11/2024 1601 by Suyapa Foster, LPN  Outcome: Progressing  12/11/2024 1007 by Suyapa Foster, LPN  Outcome: Progressing  12/11/2024 1007 by Suyapa Foster, LPN  Outcome: Progressing  Goal: Fluid and Electrolyte Balance  12/11/2024 1601 by Suyapa Foster, LPN  Outcome: Progressing  12/11/2024 1007 by Suyapa Foster, LPN  Outcome: Progressing  12/11/2024 1007 by Suyapa Foster, LPN  Outcome: Progressing  Goal: Improved Oral Intake  12/11/2024 1601 by Suyapa Foster, LPN  Outcome: Progressing  12/11/2024 1007 by Suyapa Foster, LPN  Outcome: Progressing  12/11/2024 1007 by Suyapa Foster, LPN  Outcome: Progressing  Goal: Effective Oxygenation and Ventilation  12/11/2024 1601 by Suyapa Foster, LPN  Outcome: Progressing  12/11/2024 1007 by Suyapa Foster, LPN  Outcome: Progressing  12/11/2024 1007 by Suyapa Foster, LPN  Outcome: Progressing  Goal: Effective Breathing Pattern During Sleep  12/11/2024 1601 by Suyapa Foster, LPN  Outcome: Progressing  12/11/2024 1007 by Suyapa Foster, LPN  Outcome: Progressing  12/11/2024 1007 by Suyapa Foster, LPN  Outcome: Progressing

## 2024-12-11 NOTE — PLAN OF CARE
Problem: Adult Inpatient Plan of Care  Goal: Plan of Care Review  12/11/2024 1007 by Suyapa Foster LPN  Outcome: Progressing  12/11/2024 1007 by Suyapa Foster LPN  Outcome: Progressing  Goal: Patient-Specific Goal (Individualized)  12/11/2024 1007 by Suyapa Foster LPN  Outcome: Progressing  12/11/2024 1007 by Suyapa Foster LPN  Outcome: Progressing  Goal: Absence of Hospital-Acquired Illness or Injury  12/11/2024 1007 by Suyapa Foster LPN  Outcome: Progressing  12/11/2024 1007 by Suyapa Foster LPN  Outcome: Progressing  Goal: Optimal Comfort and Wellbeing  12/11/2024 1007 by Suyapa Foster LPN  Outcome: Progressing  12/11/2024 1007 by Suyapa Foster LPN  Outcome: Progressing  Goal: Readiness for Transition of Care  12/11/2024 1007 by Suyapa Foster LPN  Outcome: Progressing  12/11/2024 1007 by Suyapa Foster LPN  Outcome: Progressing     Problem: Diabetes Comorbidity  Goal: Blood Glucose Level Within Targeted Range  12/11/2024 1007 by Suyapa Foster LPN  Outcome: Progressing  12/11/2024 1007 by Suyapa Foster LPN  Outcome: Progressing     Problem: Fall Injury Risk  Goal: Absence of Fall and Fall-Related Injury  12/11/2024 1007 by Suyapa Foster LPN  Outcome: Progressing  12/11/2024 1007 by Suypaa Foster LPN  Outcome: Progressing     Problem: COPD (Chronic Obstructive Pulmonary Disease)  Goal: Optimal Chronic Illness Coping  12/11/2024 1007 by Suyapa Foster LPN  Outcome: Progressing  12/11/2024 1007 by Suyapa Foster LPN  Outcome: Progressing  Goal: Optimal Level of Functional [place]  12/11/2024 1007 by Suyapa Foster LPN  Outcome: Progressing  12/11/2024 1007 by Suyapa Foster LPN  Outcome: Progressing  Goal: Absence of Infection Signs and Symptoms  12/11/2024 1007 by Suyapa Foster LPN  Outcome: Progressing  12/11/2024 1007 by Suyapa Foster LPN  Outcome: Progressing  Goal: Improved Oral Intake  12/11/2024 1007 by Suyapa Foster  LPN  Outcome: Progressing  12/11/2024 1007 by Suyapa Foster LPN  Outcome: Progressing  Goal: Effective Oxygenation and Ventilation  12/11/2024 1007 by Suyapa Fosetr LPN  Outcome: Progressing  12/11/2024 1007 by Suyapa Foster LPN  Outcome: Progressing     Problem: Heart Failure  Goal: Optimal Coping  12/11/2024 1007 by Suyapa Foster LPN  Outcome: Progressing  12/11/2024 1007 by Suyapa Foster LPN  Outcome: Progressing  Goal: Optimal Cardiac Output  12/11/2024 1007 by Suyapa Foster LPN  Outcome: Progressing  12/11/2024 1007 by Suyapa Foster LPN  Outcome: Progressing  Goal: Stable Heart Rate and Rhythm  12/11/2024 1007 by Suyapa Foster LPN  Outcome: Progressing  12/11/2024 1007 by Suyapa Foster LPN  Outcome: Progressing  Goal: Optimal Functional Ability  12/11/2024 1007 by Suyapa Foster LPN  Outcome: Progressing  12/11/2024 1007 by Suyapa Foster LPN  Outcome: Progressing  Goal: Fluid and Electrolyte Balance  12/11/2024 1007 by Suyapa Foster LPN  Outcome: Progressing  12/11/2024 1007 by Suyapa Foster LPN  Outcome: Progressing  Goal: Improved Oral Intake  12/11/2024 1007 by Suyapa Foster LPN  Outcome: Progressing  12/11/2024 1007 by Suyapa Foster LPN  Outcome: Progressing  Goal: Effective Oxygenation and Ventilation  12/11/2024 1007 by Suyapa Foster LPN  Outcome: Progressing  12/11/2024 1007 by Suyapa Foster LPN  Outcome: Progressing  Goal: Effective Breathing Pattern During Sleep  12/11/2024 1007 by Suyapa Foster LPN  Outcome: Progressing  12/11/2024 1007 by Suyapa Foster LPN  Outcome: Progressing     Problem: Adult Inpatient Plan of Care  Goal: Plan of Care Review  12/11/2024 1007 by Suyapa Foster LPN  Outcome: Progressing  12/11/2024 1007 by Suyapa Foster LPN  Outcome: Progressing  Goal: Patient-Specific Goal (Individualized)  12/11/2024 1007 by Suyapa Foster LPN  Outcome: Progressing  12/11/2024 1007 by Suyapa Foster  LPN  Outcome: Progressing  Goal: Absence of Hospital-Acquired Illness or Injury  12/11/2024 1007 by Suyapa Foster LPN  Outcome: Progressing  12/11/2024 1007 by Suyapa Foster LPN  Outcome: Progressing  Goal: Optimal Comfort and Wellbeing  12/11/2024 1007 by Suyapa Foster LPN  Outcome: Progressing  12/11/2024 1007 by Suyapa Foster LPN  Outcome: Progressing  Goal: Readiness for Transition of Care  12/11/2024 1007 by Suyapa Foster LPN  Outcome: Progressing  12/11/2024 1007 by Suyapa Foster LPN  Outcome: Progressing     Problem: Diabetes Comorbidity  Goal: Blood Glucose Level Within Targeted Range  12/11/2024 1007 by Suyapa Foster LPN  Outcome: Progressing  12/11/2024 1007 by Suyapa Foster LPN  Outcome: Progressing     Problem: Fall Injury Risk  Goal: Absence of Fall and Fall-Related Injury  12/11/2024 1007 by Suyapa Foster LPN  Outcome: Progressing  12/11/2024 1007 by Suyapa Foster LPN  Outcome: Progressing     Problem: COPD (Chronic Obstructive Pulmonary Disease)  Goal: Optimal Chronic Illness Coping  12/11/2024 1007 by Suyapa Foster LPN  Outcome: Progressing  12/11/2024 1007 by Suyapa Foster LPN  Outcome: Progressing  Goal: Optimal Level of Functional [place]  12/11/2024 1007 by Suyapa Foster LPN  Outcome: Progressing  12/11/2024 1007 by Suyapa Foster LPN  Outcome: Progressing  Goal: Absence of Infection Signs and Symptoms  12/11/2024 1007 by Suyapa Foster LPN  Outcome: Progressing  12/11/2024 1007 by Suyapa Foster LPN  Outcome: Progressing  Goal: Improved Oral Intake  12/11/2024 1007 by Suyapa Foster LPN  Outcome: Progressing  12/11/2024 1007 by Suyapa Foster LPN  Outcome: Progressing  Goal: Effective Oxygenation and Ventilation  12/11/2024 1007 by Suyapa Foster LPN  Outcome: Progressing  12/11/2024 1007 by Suyapa Foster LPN  Outcome: Progressing     Problem: Heart Failure  Goal: Optimal Coping  12/11/2024 1007 by Suyapa Foster  LPN  Outcome: Progressing  12/11/2024 1007 by Suyapa Foster LPN  Outcome: Progressing  Goal: Optimal Cardiac Output  12/11/2024 1007 by Suyapa Foster LPN  Outcome: Progressing  12/11/2024 1007 by Suyapa Foster LPN  Outcome: Progressing  Goal: Stable Heart Rate and Rhythm  12/11/2024 1007 by Suyapa Foster, LPN  Outcome: Progressing  12/11/2024 1007 by Suyapa Foster LPN  Outcome: Progressing  Goal: Optimal Functional Ability  12/11/2024 1007 by Suyapa Foster LPN  Outcome: Progressing  12/11/2024 1007 by Suyapa Foster LPN  Outcome: Progressing  Goal: Fluid and Electrolyte Balance  12/11/2024 1007 by Suyapa Foster LPN  Outcome: Progressing  12/11/2024 1007 by Suyapa Foster LPN  Outcome: Progressing  Goal: Improved Oral Intake  12/11/2024 1007 by Suyapa Foster LPN  Outcome: Progressing  12/11/2024 1007 by Suyapa Foster LPN  Outcome: Progressing  Goal: Effective Oxygenation and Ventilation  12/11/2024 1007 by Suyapa Foster LPN  Outcome: Progressing  12/11/2024 1007 by Suyapa Foster LPN  Outcome: Progressing  Goal: Effective Breathing Pattern During Sleep  12/11/2024 1007 by Suyapa Foster LPN  Outcome: Progressing  12/11/2024 1007 by Suyapa Foster LPN  Outcome: Progressing

## 2024-12-11 NOTE — CONSULTS
Consult for home health noted. Patient is in agreement, but would like to discuss his provider choices with his sister. Provider list from [email or web address] has been printed and provided to patient. CM will follow up regarding patient's decision tomorrow.

## 2024-12-12 ENCOUNTER — TELEPHONE (OUTPATIENT)
Dept: INTERNAL MEDICINE | Facility: CLINIC | Age: 64
End: 2024-12-12
Payer: MEDICARE

## 2024-12-12 VITALS
BODY MASS INDEX: 29.13 KG/M2 | RESPIRATION RATE: 18 BRPM | SYSTOLIC BLOOD PRESSURE: 130 MMHG | HEART RATE: 74 BPM | HEIGHT: 74 IN | TEMPERATURE: 98 F | WEIGHT: 227 LBS | OXYGEN SATURATION: 98 % | DIASTOLIC BLOOD PRESSURE: 86 MMHG

## 2024-12-12 LAB
ALBUMIN SERPL-MCNC: 2.9 G/DL (ref 3.4–4.8)
ALBUMIN/GLOB SERPL: 0.9 RATIO (ref 1.1–2)
ALP SERPL-CCNC: 127 UNIT/L (ref 40–150)
ALT SERPL-CCNC: 42 UNIT/L (ref 0–55)
ANION GAP SERPL CALC-SCNC: 7 MEQ/L
AST SERPL-CCNC: 25 UNIT/L (ref 5–34)
B-LACTAMASE USUAL SUSC ISLT: POSITIVE
BACTERIA SPEC CULT: ABNORMAL
BASOPHILS # BLD AUTO: 0.02 X10(3)/MCL
BASOPHILS NFR BLD AUTO: 0.1 %
BILIRUB SERPL-MCNC: 0.3 MG/DL
BUN SERPL-MCNC: 22.1 MG/DL (ref 8.4–25.7)
CALCIUM SERPL-MCNC: 8.6 MG/DL (ref 8.8–10)
CHLORIDE SERPL-SCNC: 112 MMOL/L (ref 98–107)
CO2 SERPL-SCNC: 22 MMOL/L (ref 23–31)
CREAT SERPL-MCNC: 1.05 MG/DL (ref 0.72–1.25)
CREAT/UREA NIT SERPL: 21
EOSINOPHIL # BLD AUTO: 0 X10(3)/MCL (ref 0–0.9)
EOSINOPHIL NFR BLD AUTO: 0 %
ERYTHROCYTE [DISTWIDTH] IN BLOOD BY AUTOMATED COUNT: 13.3 % (ref 11.5–17)
GFR SERPLBLD CREATININE-BSD FMLA CKD-EPI: >60 ML/MIN/1.73/M2
GLOBULIN SER-MCNC: 3.3 GM/DL (ref 2.4–3.5)
GLUCOSE SERPL-MCNC: 170 MG/DL (ref 82–115)
GRAM STN SPEC: ABNORMAL
HCT VFR BLD AUTO: 40.1 % (ref 42–52)
HGB BLD-MCNC: 13.7 G/DL (ref 14–18)
IMM GRANULOCYTES # BLD AUTO: 0.15 X10(3)/MCL (ref 0–0.04)
IMM GRANULOCYTES NFR BLD AUTO: 0.8 %
INR PPP: 2.3
LYMPHOCYTES # BLD AUTO: 0.81 X10(3)/MCL (ref 0.6–4.6)
LYMPHOCYTES NFR BLD AUTO: 4.5 %
MAGNESIUM SERPL-MCNC: 2.3 MG/DL (ref 1.6–2.6)
MCH RBC QN AUTO: 34.9 PG (ref 27–31)
MCHC RBC AUTO-ENTMCNC: 34.2 G/DL (ref 33–36)
MCV RBC AUTO: 102.3 FL (ref 80–94)
MONOCYTES # BLD AUTO: 0.58 X10(3)/MCL (ref 0.1–1.3)
MONOCYTES NFR BLD AUTO: 3.2 %
NEUTROPHILS # BLD AUTO: 16.48 X10(3)/MCL (ref 2.1–9.2)
NEUTROPHILS NFR BLD AUTO: 91.4 %
NRBC BLD AUTO-RTO: 0 %
PHADIOTOP IGE QN: 157 KU/L
PHOSPHATE SERPL-MCNC: 3.1 MG/DL (ref 2.3–4.7)
PLATELET # BLD AUTO: 319 X10(3)/MCL (ref 130–400)
PMV BLD AUTO: 10 FL (ref 7.4–10.4)
POTASSIUM SERPL-SCNC: 4.2 MMOL/L (ref 3.5–5.1)
PROT SERPL-MCNC: 6.2 GM/DL (ref 5.8–7.6)
PROTHROMBIN TIME: 25.4 SECONDS (ref 11.4–14)
RBC # BLD AUTO: 3.92 X10(6)/MCL (ref 4.7–6.1)
SODIUM SERPL-SCNC: 141 MMOL/L (ref 136–145)
WBC # BLD AUTO: 18.04 X10(3)/MCL (ref 4.5–11.5)

## 2024-12-12 PROCEDURE — 94761 N-INVAS EAR/PLS OXIMETRY MLT: CPT

## 2024-12-12 PROCEDURE — 94660 CPAP INITIATION&MGMT: CPT

## 2024-12-12 PROCEDURE — 83735 ASSAY OF MAGNESIUM: CPT | Performed by: INTERNAL MEDICINE

## 2024-12-12 PROCEDURE — 63600175 PHARM REV CODE 636 W HCPCS: Performed by: INTERNAL MEDICINE

## 2024-12-12 PROCEDURE — 94640 AIRWAY INHALATION TREATMENT: CPT

## 2024-12-12 PROCEDURE — 36415 COLL VENOUS BLD VENIPUNCTURE: CPT | Performed by: INTERNAL MEDICINE

## 2024-12-12 PROCEDURE — 85610 PROTHROMBIN TIME: CPT | Performed by: INTERNAL MEDICINE

## 2024-12-12 PROCEDURE — 25000003 PHARM REV CODE 250: Performed by: INTERNAL MEDICINE

## 2024-12-12 PROCEDURE — 85025 COMPLETE CBC W/AUTO DIFF WBC: CPT | Performed by: INTERNAL MEDICINE

## 2024-12-12 PROCEDURE — 25000003 PHARM REV CODE 250

## 2024-12-12 PROCEDURE — 80053 COMPREHEN METABOLIC PANEL: CPT | Performed by: INTERNAL MEDICINE

## 2024-12-12 PROCEDURE — 25000242 PHARM REV CODE 250 ALT 637 W/ HCPCS: Performed by: INTERNAL MEDICINE

## 2024-12-12 PROCEDURE — 99900035 HC TECH TIME PER 15 MIN (STAT)

## 2024-12-12 PROCEDURE — 84100 ASSAY OF PHOSPHORUS: CPT | Performed by: INTERNAL MEDICINE

## 2024-12-12 RX ORDER — METHYLPREDNISOLONE 4 MG/1
TABLET ORAL
Qty: 21 EACH | Refills: 0 | Status: SHIPPED | OUTPATIENT
Start: 2024-12-12 | End: 2025-01-02

## 2024-12-12 RX ORDER — TIOTROPIUM BROMIDE INHALATION SPRAY 3.12 UG/1
2 SPRAY, METERED RESPIRATORY (INHALATION) DAILY
Qty: 4 G | Refills: 2 | Status: SHIPPED | OUTPATIENT
Start: 2024-12-12 | End: 2024-12-12

## 2024-12-12 RX ORDER — LEVOFLOXACIN 750 MG/1
750 TABLET ORAL DAILY
Qty: 2 TABLET | Refills: 0 | Status: SHIPPED | OUTPATIENT
Start: 2024-12-13 | End: 2024-12-12

## 2024-12-12 RX ORDER — IPRATROPIUM BROMIDE AND ALBUTEROL SULFATE 2.5; .5 MG/3ML; MG/3ML
3 SOLUTION RESPIRATORY (INHALATION) EVERY 4 HOURS PRN
Qty: 100 ML | Refills: 6 | Status: SHIPPED | OUTPATIENT
Start: 2024-12-12 | End: 2025-12-12

## 2024-12-12 RX ORDER — METHYLPREDNISOLONE 4 MG/1
TABLET ORAL
Qty: 21 EACH | Refills: 0 | Status: SHIPPED | OUTPATIENT
Start: 2024-12-12 | End: 2024-12-12

## 2024-12-12 RX ORDER — LEVOFLOXACIN 750 MG/1
750 TABLET ORAL DAILY
Qty: 2 TABLET | Refills: 0 | Status: SHIPPED | OUTPATIENT
Start: 2024-12-13 | End: 2024-12-15

## 2024-12-12 RX ORDER — TIOTROPIUM BROMIDE INHALATION SPRAY 3.12 UG/1
2 SPRAY, METERED RESPIRATORY (INHALATION) DAILY
Qty: 4 G | Refills: 2 | Status: SHIPPED | OUTPATIENT
Start: 2024-12-12 | End: 2025-03-12

## 2024-12-12 RX ADMIN — PANTOPRAZOLE 40 MG: 40 TABLET, DELAYED RELEASE ORAL at 09:12

## 2024-12-12 RX ADMIN — SACUBITRIL AND VALSARTAN 2 TABLET: 24; 26 TABLET, FILM COATED ORAL at 09:12

## 2024-12-12 RX ADMIN — CHOLECALCIFEROL TAB 25 MCG (1000 UNIT) 5000 UNITS: 25 TAB at 09:12

## 2024-12-12 RX ADMIN — CARBOXYMETHYLCELLULOSE SODIUM 1 DROP: 10 GEL OPHTHALMIC at 09:12

## 2024-12-12 RX ADMIN — METOPROLOL SUCCINATE 100 MG: 50 TABLET, EXTENDED RELEASE ORAL at 09:12

## 2024-12-12 RX ADMIN — TAMSULOSIN HYDROCHLORIDE 0.4 MG: 0.4 CAPSULE ORAL at 09:12

## 2024-12-12 RX ADMIN — METHYLPREDNISOLONE SODIUM SUCCINATE 60 MG: 40 INJECTION, POWDER, FOR SOLUTION INTRAMUSCULAR; INTRAVENOUS at 10:12

## 2024-12-12 RX ADMIN — IPRATROPIUM BROMIDE AND ALBUTEROL SULFATE 3 ML: 2.5; .5 SOLUTION RESPIRATORY (INHALATION) at 06:12

## 2024-12-12 RX ADMIN — ATORVASTATIN CALCIUM 80 MG: 40 TABLET, FILM COATED ORAL at 09:12

## 2024-12-12 RX ADMIN — FUROSEMIDE 40 MG: 20 TABLET ORAL at 09:12

## 2024-12-12 RX ADMIN — LEVOFLOXACIN 750 MG: 750 TABLET, FILM COATED ORAL at 09:12

## 2024-12-12 RX ADMIN — IPRATROPIUM BROMIDE AND ALBUTEROL SULFATE 3 ML: 2.5; .5 SOLUTION RESPIRATORY (INHALATION) at 10:12

## 2024-12-12 NOTE — DISCHARGE SUMMARY
[facility] Internal Medicine Discharge Summary    Admitting Physician: Abner Saravia MD  Attending Physician: Abner Saravia MD  Date of Admit: 12/10/2024  Date of Discharge: 12/12/2024    Condition: Stable  Outcome: Patient tolerated treatment/procedure well without complication and is now ready for discharge.  DISPOSITION: Home-Health Care [facility] [place]    Discharge Diagnoses     Patient Active Problem List   Diagnosis    Persistent atrial fibrillation    Anemia    AR (allergic rhinitis)    Asthma with COPD    Bladder wall thickening    Chronic back pain    CKD (chronic kidney disease)    Type 2 diabetes mellitus without retinopathy    Elevated serum creatinine    HA (headache)    Chronic hepatitis C virus infection    Hyperlipidemia LDL goal <70    Hydroureteronephrosis    Joint pain    Plantar wart    Rheumatoid arthritis    Radiculopathy    Renal cyst, right    Umbilical hernia    Well adult exam    Colon cancer screening    Hypertension    Smoking    Non-ischemic cardiomyopathy    History of hepatitis C    Chronic systolic heart failure    Diabetes mellitus    Recurrent sinusitis    Shortness of breath    Prostate cancer screening    Vitamin D deficiency    GERD (gastroesophageal reflux disease)    Atrial fibrillation with RVR    Acute on chronic congestive heart failure    Dyspnea    Insomnia    Thrombus of left atrial appendage    CHF (congestive heart failure)    Coronary arteriosclerosis    Dyslipidemia    Hydronephrosis with ureteropelvic junction (UPJ) obstruction    Obesity    Obstructive sleep apnea of adult    Spinal stenosis of lumbar region    Dry eye syndrome of both eyes    Age-related nuclear cataract of both eyes    Hypotension    S/P ICD (internal cardiac defibrillator) procedure    COPD exacerbation    Central sleep apnea syndrome    Abnormal CBC    Acute ethmoidal sinusitis       Principal Problem:  COPD exacerbation    Consultants and Procedures     Consultants:  Consults (From admission, onward)           Status Ordering Provider     Inpatient consult to Social Work/Case Management  Once        Provider:  (Not yet assigned)    Completed SHARON BOOTH     Inpatient consult to Internal Medicine  Once        Provider:  Sharon Booth MD    Acknowledged MARSHA ZUÑIGA             Procedures:   * No surgery found *     Brief History of Present Illness    Ezra Zhang is a 64 y.o. male with PMH of paroxysmal afib and on chronic anticoagulation, CHF with recent EF 35%, COPD on maintenance LABA-ICS inhaler,DM, RA and ICD in place . The patient presented to ED on 12/10/2024 with a primary complaint of dyspnea, worsened since past 2 days. Patient reported that he has been sick recently and his PCP prescribed him amoxicillin and medrol dose pack few days back which improved his symptoms initially improved but he stated that his symptoms worsened again last 2 days and it did not improve with nebulization that he usually uses at home. He denied any home oxygen usage. But stated that he uses CPAP at home nightly for his RYAN. He is also complaining of cough which is dry going on for two days and also reported chills and sweats, but he never checked his temp at home, He denied smoking actively currently and the entresto he is on for a while now not a recent addition. Per chart review, the patient is on coumadin for a while now, which apparently was recommended by the cardiology as he developed a clot on one of his valves despite being on eliquis at that time.      He reported chest pain ,which the attributed due to the cough and not really like an MI or angina, troponin wnl   In the Ed, he has tachypnea, afebrile, normotensive, saturating at 94 on room air, he was wheezing and received IV methylprednisolone and duo nebs, but as the wheeze is still persistent despite the treatment he received in the ED, IM consulted for further management.       Hospital Course with Pertinent Findings   During this  "admission, the patient was treated for COPD exacerbation. He received scheduled duo nebs treatment and systemic steroids for his exacerbation and continued CPAP at night as he uses at home for his RYAN. He also received levofloxacin,as there is concern for LRTI; per CXR opacities could not be excluded. Resp panel was negative. Over the period of hospital stay, his wheezing improved and is being discharged home with home health. Adding LAMA inhaler to his regimen for his COPD, initially tried to get him a combination inhaler instead of multiple inhalers due to financial issues. Patient to follow up with PCP and also referred him to pulmonologist , patient to get PFT with bronchodilator done before he visits the pulmonologist.     Vitals  BP: 122/87  Temp: 97.6 °F (36.4 °C)  Temp Source: Oral  Pulse: 71  Resp: 16  SpO2: (!) 90 %  Height: 6' 2" (188 cm)  Weight: 103 kg (227 lb)    Physical Exam  Constitutional:       Appearance: He is obese.   HENT:      Head: Normocephalic.      Right Ear: Tympanic membrane normal.      Left Ear: Tympanic membrane normal.      Mouth/Throat:      Mouth: Mucous membranes are moist.   Eyes:      Conjunctiva/sclera: Conjunctivae normal.      Pupils: Pupils are equal, round, and reactive to light.   Cardiovascular:      Rate and Rhythm: Normal rate and regular rhythm.      Pulses: Normal pulses.      Heart sounds: Normal heart sounds. No murmur heard.  Pulmonary:      Effort: Pulmonary effort is normal.      Breath sounds: Normal breath sounds.      Comments: Wheeze, improved  Abdominal:      General: Bowel sounds are normal.      Palpations: Abdomen is soft.   Musculoskeletal:      Right lower leg: No edema.      Left lower leg: No edema.   Skin:     General: Skin is warm.   Neurological:      General: No focal deficit present.      Mental Status: He is alert and oriented to person, place, and time.   Psychiatric:         Mood and Affect: Mood normal.       TIME SPENT ON DISCHARGE: 60 " minutes    Discharge Medications        Medication List        START taking these medications      levoFLOXacin 750 MG tablet  Commonly known as: LEVAQUIN  Take 1 tablet (750 mg total) by mouth once daily. for 2 days  Start taking on: December 13, 2024     methylPREDNISolone 4 mg tablet  Commonly known as: MEDROL DOSEPACK  use as directed     SPIRIVA RESPIMAT 2.5 mcg/actuation inhaler  Generic drug: tiotropium bromide  Inhale 2 puffs into the lungs Daily. Controller            CHANGE how you take these medications      albuterol 90 mcg/actuation inhaler  Commonly known as: PROAIR HFA  Inhale 1-2 puffs into the lungs every 4 (four) hours as needed for Wheezing. Rescue  What changed: Another medication with the same name was removed. Continue taking this medication, and follow the directions you see here.     albuterol-ipratropium 2.5 mg-0.5 mg/3 mL nebulizer solution  Commonly known as: DUO-NEB  Take 3 mLs by nebulization every 4 (four) hours as needed for Wheezing or Shortness of Breath. Rescue  What changed: when to take this     warfarin 5 MG tablet  Commonly known as: COUMADIN  Take 1 tablet (5 mg total) by mouth Daily.  What changed: additional instructions            CONTINUE taking these medications      ACCU-CHEK FASTCLIX LANCET DRUM Misc  Generic drug: lancets     ACCU-CHEK GUIDE TEST STRIPS Strp  Generic drug: blood sugar diagnostic     alfuzosin 10 mg Tb24  Commonly known as: UROXATRAL  Take 1 tablet (10 mg total) by mouth once daily.     atorvastatin 80 MG tablet  Commonly known as: LIPITOR  Take 1 tablet (80 mg total) by mouth once daily.     cetirizine 10 MG tablet  Commonly known as: ZYRTEC  Take 1 tablet (10 mg total) by mouth once daily.     cholecalciferol (vitamin D3) 125 mcg (5,000 unit) Tab  Commonly known as: VITAMIN D3  Take 1 tablet (5,000 Units total) by mouth once daily.     clotrimazole-betamethasone 1-0.05% cream  Commonly known as: LOTRISONE     ENTRESTO 49-51 mg per tablet  Generic drug:  sacubitriL-valsartan  Take 1 tablet by mouth 2 (two) times daily.     famotidine 20 MG tablet  Commonly known as: PEPCID  Take 1 tablet (20 mg total) by mouth 2 (two) times daily.     fluticasone propionate 50 mcg/actuation nasal spray  Commonly known as: FLONASE ALLERGY RELIEF  2 sprays (100 mcg total) by Each Nostril route Daily.     furosemide 40 MG tablet  Commonly known as: LASIX  Take 1 tablet (40 mg total) by mouth once daily. Refilled per Cardio orders.     glipiZIDE 5 MG tablet  Commonly known as: GLUCOTROL  Take 1 tablet (5 mg total) by mouth once daily.     lancets-blood glucose strips 30 gauge Cmpk     metoprolol succinate 100 MG 24 hr tablet  Commonly known as: TOPROL-XL  Take 1 tablet (100 mg total) by mouth once daily.     montelukast 10 mg tablet  Commonly known as: SINGULAIR  Take 1 tablet (10 mg total) by mouth every evening.     promethazine-dextromethorphan 6.25-15 mg/5 mL Syrp  Commonly known as: PROMETHAZINE-DM  TAKE 5 ml (cc) EVERY 4 HOURS AS NEEDED FOR cough     traZODone 50 MG tablet  Commonly known as: DESYREL  Take 1 tablet (50 mg total) by mouth nightly as needed for Insomnia.            STOP taking these medications      fluticasone-salmeterol 230-21 mcg/dose 230-21 mcg/actuation Hfaa inhaler  Commonly known as: ADVAIR HFA               Where to Get Your Medications        These medications were sent to [address] [address]      Phone: [phone]   albuterol-ipratropium 2.5 mg-0.5 mg/3 mL nebulizer solution  levoFLOXacin 750 MG tablet  methylPREDNISolone 4 mg tablet  SPIRIVA RESPIMAT 2.5 mcg/actuation inhaler         Discharge Information:   Ezra VAN Vicente is being discharged .    Discharge Procedure Orders   Ambulatory referral/consult to Pulmonology   Standing Status: Future   Referral Priority: Routine Referral Type: Consultation   Referral Reason: Specialty Services Required    Requested Specialty: Pulmonary Disease   Number of Visits Requested: 1        Follow-Up Appointments:   Follow-up Information       Dayami Squires FNP Follow up in 1 week(s).    Specialty: Family Medicine  Contact information:  [facility] [address] [phone]                                   At this time, Ezra Zhang is determined to have maximized benefits of IP hospitalization. he is discharged in stable condition with OP f/u recommendations and instructions. All questions answered, and patient verbalized agreement with the plan of care. They were given return precautions prior to d/c including symptoms that should prompt return to ED or to call PCP.       KALEB KISER MD  Internal Medicine - PGY-1

## 2024-12-12 NOTE — PLAN OF CARE
Problem: Adult Inpatient Plan of Care  Goal: Plan of Care Review  12/12/2024 1253 by Olinda Hicks RN  Outcome: Adequate for Care Transition  12/12/2024 1253 by Olinda Hicks RN  Outcome: Adequate for Care Transition  Goal: Patient-Specific Goal (Individualized)  12/12/2024 1253 by Olinda Hicks RN  Outcome: Adequate for Care Transition  12/12/2024 1253 by Olinda Hicks RN  Outcome: Adequate for Care Transition  Goal: Absence of Hospital-Acquired Illness or Injury  12/12/2024 1253 by Olinda Hicks RN  Outcome: Adequate for Care Transition  12/12/2024 1253 by Olinda Hicks RN  Outcome: Adequate for Care Transition  Goal: Optimal Comfort and Wellbeing  12/12/2024 1253 by Olinda Hicks RN  Outcome: Adequate for Care Transition  12/12/2024 1253 by Olinda Hicks RN  Outcome: Adequate for Care Transition  Goal: Readiness for Transition of Care  12/12/2024 1253 by Olinda Hicks RN  Outcome: Adequate for Care Transition  12/12/2024 1253 by Olinda Hicks RN  Outcome: Adequate for Care Transition     Problem: Diabetes Comorbidity  Goal: Blood Glucose Level Within Targeted Range  12/12/2024 1253 by Olinda Hicks RN  Outcome: Adequate for Care Transition  12/12/2024 1253 by Olinda Hicks RN  Outcome: Adequate for Care Transition     Problem: Fall Injury Risk  Goal: Absence of Fall and Fall-Related Injury  12/12/2024 1253 by Olinda Hicks RN  Outcome: Adequate for Care Transition  12/12/2024 1253 by Olinda Hicks RN  Outcome: Adequate for Care Transition     Problem: COPD (Chronic Obstructive Pulmonary Disease)  Goal: Optimal Chronic Illness Coping  12/12/2024 1253 by Olinda Hicks RN  Outcome: Adequate for Care Transition  12/12/2024 1253 by Olinda Hicks RN  Outcome: Adequate for Care Transition  Goal: Optimal Level of Functional [place]  12/12/2024 1253 by Olinda Hicks RN  Outcome: Adequate for Care Transition  12/12/2024 1253 by  Olinda Hicks RN  Outcome: Adequate for Care Transition  Goal: Absence of Infection Signs and Symptoms  12/12/2024 1253 by Olinda Hicks RN  Outcome: Adequate for Care Transition  12/12/2024 1253 by Olinda Hicks RN  Outcome: Adequate for Care Transition  Goal: Improved Oral Intake  12/12/2024 1253 by Olinda Hicks, RN  Outcome: Adequate for Care Transition  12/12/2024 1253 by Olinda Hicks, RN  Outcome: Adequate for Care Transition  Goal: Effective Oxygenation and Ventilation  12/12/2024 1253 by Olinda Hicks RN  Outcome: Adequate for Care Transition  12/12/2024 1253 by Olinda Hicks, RN  Outcome: Adequate for Care Transition     Problem: Heart Failure  Goal: Optimal Coping  12/12/2024 1253 by Olinda Hicks RN  Outcome: Adequate for Care Transition  12/12/2024 1253 by Olinda Hicks, RN  Outcome: Adequate for Care Transition  Goal: Optimal Cardiac Output  12/12/2024 1253 by Olinda Hicks RN  Outcome: Adequate for Care Transition  12/12/2024 1253 by Olinda Hicks, RN  Outcome: Adequate for Care Transition  Goal: Stable Heart Rate and Rhythm  12/12/2024 1253 by Olinda Hicks RN  Outcome: Adequate for Care Transition  12/12/2024 1253 by Olinda Hicks, RN  Outcome: Adequate for Care Transition  Goal: Optimal Functional Ability  12/12/2024 1253 by Olinda Hicks RN  Outcome: Adequate for Care Transition  12/12/2024 1253 by Olinda Hicks RN  Outcome: Adequate for Care Transition  Goal: Fluid and Electrolyte Balance  12/12/2024 1253 by Olinda Hicks RN  Outcome: Adequate for Care Transition  12/12/2024 1253 by Olinda Hicks RN  Outcome: Adequate for Care Transition  Goal: Improved Oral Intake  12/12/2024 1253 by Olinda Hicks, RN  Outcome: Adequate for Care Transition  12/12/2024 1253 by Olinda Hicks, RN  Outcome: Adequate for Care Transition  Goal: Effective Oxygenation and Ventilation  12/12/2024 1253 by Olinda Hicks, RN  Outcome:  Adequate for Care Transition  12/12/2024 1253 by Olinda Hicks, RN  Outcome: Adequate for Care Transition  Goal: Effective Breathing Pattern During Sleep  12/12/2024 1253 by Olinda Hicks, RN  Outcome: Adequate for Care Transition  12/12/2024 1253 by Olinda Hicks, RN  Outcome: Adequate for Care Transition

## 2024-12-12 NOTE — PLAN OF CARE
12/12/24 1229   Medicare Message   Important Message from Medicare regarding Discharge Appeal Rights Given to patient/caregiver;Explained to patient/caregiver;Signed/date by patient/caregiver   Date IMM was signed 12/12/24   Time IMM was signed 2450

## 2024-12-12 NOTE — TELEPHONE ENCOUNTER
[facility] called to report that the pt's insurance will no longer pay for Advair. Acceptable meds are Symbicort and Breo Ellipta. Pt is currently an inpatient since 12/10.

## 2024-12-12 NOTE — PLAN OF CARE
Received call from patient's sister, Cheryl, P: [phone], stating that they have chosen [facility] . FOC form signed. Referral packet, DC summary, and HH order have been sent to [facility] via [employer] and [employer] Patient has been accepted.

## 2024-12-12 NOTE — TELEPHONE ENCOUNTER
Hospital F/U Appointment Date: 12/18/24    Discharge Date:12/12/24    Discharge Facility: [facility]     If External Facility, is Discharge paperwork available:     Discharge Diagnosis: Dyspnea, unspecified type  - Primary, SOB (shortness of breath), COPD with acute exacerbation, Congestive heart failure, unspecified HF chronicity, unspecified heart failure type, Chronic renal impairment, unspecified CKD stage, Thrombus of left atrial appendage, COPD exacerbation, CHF (congestive heart failure), and Asthma with COPD.    Specialty Referrals / Appointments?Follow up with Dayami Squires FNP (Family Medicine) on 12/18/2024; @ 1:00 pm  Follow up with [facility] (Home Health Services); This is your Home Health Agency.    Home Health Services or DME? Follow up with [facility] (Home Health Services); This is your Home Health Agency.    Discharged Medication Reviewed?  yes    Questions regarding medications? no    Advised to bring ALL medications for visit: yes    Is the patient experiencing any symptoms today? yes; pt d/c on today    Clinic Phone number given to patient? yes

## 2024-12-12 NOTE — PLAN OF CARE
12/12/24 1230   Final Note   Assessment Type Final Discharge Note   Anticipated Discharge Disposition Home-Health   Post-Acute Status   Post-Acute Authorization Home Health   Home Health Status Set-up Complete/Auth obtained   Coverage Medicare A & B/ Medicaid of [place]   Discharge Delays None known at this time     Patient is discharging home with Neto RAMIREZ.

## 2024-12-13 ENCOUNTER — PATIENT OUTREACH (OUTPATIENT)
Dept: ADMINISTRATIVE | Facility: CLINIC | Age: 64
End: 2024-12-13
Payer: MEDICARE

## 2024-12-13 NOTE — PROGRESS NOTES
C3 nurse spoke with Ezra Zhang  for a TCC post hospital discharge follow up call. The patient has a scheduled [facility] appointment with Dayami Squires FNP (Family Medicine) on 12/18/2024; @ 1:00 pm

## 2024-12-15 LAB
BACTERIA BLD CULT: NORMAL
BACTERIA BLD CULT: NORMAL

## 2024-12-18 ENCOUNTER — OFFICE VISIT (OUTPATIENT)
Dept: INTERNAL MEDICINE | Facility: CLINIC | Age: 64
End: 2024-12-18
Payer: MEDICARE

## 2024-12-18 VITALS
SYSTOLIC BLOOD PRESSURE: 100 MMHG | HEART RATE: 74 BPM | WEIGHT: 213 LBS | DIASTOLIC BLOOD PRESSURE: 72 MMHG | BODY MASS INDEX: 27.34 KG/M2 | HEIGHT: 74 IN | RESPIRATION RATE: 18 BRPM | OXYGEN SATURATION: 98 % | TEMPERATURE: 98 F

## 2024-12-18 DIAGNOSIS — J44.1 COPD EXACERBATION: Primary | ICD-10-CM

## 2024-12-18 PROCEDURE — 99215 OFFICE O/P EST HI 40 MIN: CPT | Mod: PBBFAC | Performed by: NURSE PRACTITIONER

## 2024-12-18 NOTE — PROGRESS NOTES
Patient ID: [id number]     Chief Complaint: Post ortiz for COPD exacerbation        HPI:     HPI      Ezra Zhang is a 64 y.o. male here today for a Post ortiz follow up for COPD exacerbation. Pt was Admitted 12-10-24 and D/C 12-12-24. Per Hospital discharge noted:  Attestation signed by Abner Saravia MD at 12/13/2024  8:58 AM     I have seen the patient, reviewed the Resident's assessment and plan. I have personally interviewed and examined the patient at bedside and: agree with the findings.. Date of Service:  12/13/2024 8:58 AM        Abner Saravia MD  Internal Medicine                           Expand All Collapse All    [facility] Discharge Summary     Admitting Physician: Abner Saravia MD  Attending Physician: Abner Saravia MD  Date of Admit: 12/10/2024  Date of Discharge: 12/12/2024     Condition: Stable  Outcome: Patient tolerated treatment/procedure well without complication and is now ready for discharge.  DISPOSITION: Home-Health Care Svc     Discharge Diagnoses      Problem List       Patient Active Problem List   Diagnosis    Persistent atrial fibrillation    Anemia    AR (allergic rhinitis)    Asthma with COPD    Bladder wall thickening    Chronic back pain    CKD (chronic kidney disease)    Type 2 diabetes mellitus without retinopathy    Elevated serum creatinine    HA (headache)    Chronic hepatitis C virus infection    Hyperlipidemia LDL goal <70    Hydroureteronephrosis    Joint pain    Plantar wart    Rheumatoid arthritis    Radiculopathy    Renal cyst, right    Umbilical hernia    Well adult exam    Colon cancer screening    Hypertension    Smoking    Non-ischemic cardiomyopathy    History of hepatitis C    Chronic systolic heart failure    Diabetes mellitus    Recurrent sinusitis    Shortness of breath    Prostate cancer screening    Vitamin D deficiency    GERD (gastroesophageal reflux disease)    Atrial fibrillation with RVR    Acute on chronic congestive  heart failure    Dyspnea    Insomnia    Thrombus of left atrial appendage    CHF (congestive heart failure)    Coronary arteriosclerosis    Dyslipidemia    Hydronephrosis with ureteropelvic junction (UPJ) obstruction    Obesity    Obstructive sleep apnea of adult    Spinal stenosis of lumbar region    Dry eye syndrome of both eyes    Age-related nuclear cataract of both eyes    Hypotension    S/P ICD (internal cardiac defibrillator) procedure    COPD exacerbation    Central sleep apnea syndrome    Abnormal CBC    Acute ethmoidal sinusitis            Principal Problem:  COPD exacerbation     Consultants and Procedures      Consultants:  Consults (From admission, onward)            Status Ordering Provider       Inpatient consult to Social Work/Case Management  Once        Provider:  (Not yet assigned)    Completed SHARON BOOTH       Inpatient consult to Internal Medicine  Once        Provider:  Sharon Booth MD    Acknowledged MARSHA ZUÑIGA                Procedures:   * No surgery found *      Brief History of Present Illness    Ezra Zhang is a 64 y.o. male with PMH of paroxysmal afib and on chronic anticoagulation, CHF with recent EF 35%, COPD on maintenance LABA-ICS inhaler,DM, RA and ICD in place . The patient presented to ED on 12/10/2024 with a primary complaint of dyspnea, worsened since past 2 days. Patient reported that he has been sick recently and his PCP prescribed him amoxicillin and medrol dose pack few days back which improved his symptoms initially improved but he stated that his symptoms worsened again last 2 days and it did not improve with nebulization that he usually uses at home. He denied any home oxygen usage. But stated that he uses CPAP at home nightly for his RYAN. He is also complaining of cough which is dry going on for two days and also reported chills and sweats, but he never checked his temp at home, He denied smoking actively currently and the entresto he is  on for a while now not a recent addition. Per chart review, the patient is on coumadin for a while now, which apparently was recommended by the cardiology as he developed a clot on one of his valves despite being on eliquis at that time.      He reported chest pain ,which the attributed due to the cough and not really like an MI or angina, troponin wnl   In the Ed, he has tachypnea, afebrile, normotensive, saturating at 94 on room air, he was wheezing and received IV methylprednisolone and duo nebs, but as the wheeze is still persistent despite the treatment he received in the ED, IM consulted for further management.         Hospital Course with Pertinent Findings   During this admission, the patient was treated for COPD exacerbation. He received scheduled duo nebs treatment and systemic steroids for his exacerbation and continued CPAP at night as he uses at home for his RYAN. He also received levofloxacin,as there is concern for LRTI; per CXR opacities could not be excluded. Resp panel was negative. Over the period of hospital stay, his wheezing improved and is being discharged home with home health. Adding LAMA inhaler to his regimen for his COPD, initially tried to get him a combination inhaler instead of multiple inhalers due to financial issues. Patient to follow up with PCP and also referred him to pulmonologist , patient to get PFT with bronchodilator done before he visits the pulmonologist.               Immunizations:   Immunization History   Administered Date(s) Administered    COVID-19 Vaccine 03/08/2021, 04/05/2021, 12/07/2021    COVID-19, MRNA, LN-S, PF (MODERNA FULL 0.5 ML DOSE) 03/08/2021, 04/05/2021, 12/07/2021    Influenza 10/11/2012, 12/21/2015, 10/15/2020    Influenza - Quadrivalent 12/20/2019, 03/08/2022    Influenza - Quadrivalent - PF *Preferred* (6 months and older) 09/26/2022, 10/17/2023    Influenza - Trivalent - Fluarix, Flulaval, Fluzone, Afluria - PF 12/21/2015, 10/22/2024    Pneumococcal  10/11/2012    Pneumococcal Polysaccharide - 23 Valent 10/11/2012    Tdap 12/21/2016        -------------------------------------    Atrial fibrillation    CHF (congestive heart failure)    CKD (chronic kidney disease)    COPD (chronic obstructive pulmonary disease)    DM (diabetes mellitus)    Dyslipidemia    Essential (primary) hypertension    Heart failure, unspecified    Rheumatoid arthritis, unspecified    Smoking    Type 2 diabetes mellitus without complications        Past Surgical History:   Procedure Laterality Date    ABLATION N/A 9/11/2023    Procedure: Ablation;  Surgeon: Álvaro Degroot MD;  Location: [facility] CATH LAB;  Service: Cardiology;  Laterality: N/A;  EPS + AV NODE ABLATION + UPGRADE TO BIV ICD (SJM)    CARDIOVERSION      coronary arteriograph      defibillator      ECHOCARDIOGRAM,TRANSESOPHAGEAL N/A 3/22/2023    Procedure: Transesophageal echo (LASHELL) intra-procedure log documentation;  Surgeon: Matthieu Diagnostic Provider;  Location: [facility] CATH LAB;  Service: Cardiology;  Laterality: N/A;    INSERT / REPLACE / REMOVE PACEMAKER      INSERTION OF BIVENTRICULAR IMPLANTABLE CARDIOVERTER-DEFIBRILLATOR (ICD) N/A 9/11/2023    Procedure: INSERTION, ICD, BIVENTRICULAR;  Surgeon: Álvaro Degroot MD;  Location: [facility] CATH LAB;  Service: Cardiology;  Laterality: N/A;    left  heart catherization         Review of patient's allergies indicates:  No Known Allergies    Current Outpatient Medications   Medication Instructions    ACCU-CHEK FASTCLIX LANCET DRUM Misc Daily    ACCU-CHEK GUIDE TEST STRIPS Strp USE AS DIRECTED once daily (keep log)    albuterol (PROAIR HFA) 90 mcg/actuation inhaler 1-2 puffs, Inhalation, Every 4 hours PRN, Rescue    albuterol-ipratropium (DUO-NEB) 2.5 mg-0.5 mg/3 mL nebulizer solution 3 mLs, Nebulization, Every 4 hours PRN, Rescue    alfuzosin (UROXATRAL) 10 mg, Oral, Daily    atorvastatin (LIPITOR) 80 mg, Oral, Daily    cetirizine (ZYRTEC) 10 mg, Oral, Daily    cholecalciferol (vitamin D3)  (VITAMIN D3) 5,000 Units, Oral, Daily    clotrimazole-betamethasone 1-0.05% (LOTRISONE) cream 2 g, 2 times daily    famotidine (PEPCID) 20 mg, Oral, 2 times daily    fluticasone propionate (FLONASE ALLERGY RELIEF) 100 mcg, Each Nostril, Daily    furosemide (LASIX) 40 mg, Oral, Daily, Refilled per Cardio orders.    glipiZIDE (GLUCOTROL) 5 mg, Oral, Daily    lancets-blood glucose strips 30 gauge Cmpk   Accucheck blood glucose test strips and lancets, See Instructions, Check CBG once daily and keep log., # 100 EA, 11 Refill(s), Pharmacy: [facility], 185, cm, Height/Length Dosing, 03/08/22 8:10:00 CST, 96, kg, Weight Dosing, 03/08/22 8:10:00 CST    methylPREDNISolone (MEDROL DOSEPACK) 4 mg tablet use as directed    metoprolol succinate (TOPROL-XL) 100 mg, Oral, Daily    montelukast (SINGULAIR) 10 mg, Oral, Nightly    promethazine-dextromethorphan (PROMETHAZINE-DM) 6.25-15 mg/5 mL Syrp TAKE 5 ml (cc) EVERY 4 HOURS AS NEEDED FOR cough    sacubitriL-valsartan (ENTRESTO) 49-51 mg per tablet 1 tablet, Oral, 2 times daily    tiotropium bromide (SPIRIVA RESPIMAT) 2.5 mcg/actuation inhaler 2 puffs, Inhalation, Daily, Controller    traZODone (DESYREL) 50 mg, Oral, Nightly PRN    warfarin (COUMADIN) 5 mg, Oral, Daily       Social History     Socioeconomic History    Marital status: Single   Tobacco Use    Smoking status: Never     Passive exposure: Never    Smokeless tobacco: Never   Substance and Sexual Activity    Alcohol use: Never     Alcohol/week: 1.0 standard drink of alcohol     Types: 1 Cans of beer per week     Comment: not often    Drug use: Never    Sexual activity: Yes     Partners: Female     Social Drivers of Health     Financial Resource Strain: Low Risk  (12/10/2024)    Overall Financial Resource Strain (CARDIA)     Difficulty of Paying Living Expenses: Not hard at all   Food Insecurity: No Food Insecurity (12/10/2024)    Hunger Vital Sign     Worried About Running Out of Food in the Last Year: Never true      Ran Out of Food in the Last Year: Never true   Transportation Needs: No Transportation Needs (12/10/2024)    TRANSPORTATION NEEDS     Transportation : No   Physical Activity: Insufficiently Active (10/16/2024)    Exercise Vital Sign     Days of Exercise per Week: 7 days     Minutes of Exercise per Session: 20 min   Stress: No Stress Concern Present (12/10/2024)    [employer] [place] - Occupational Stress Questionnaire     Feeling of Stress : Only a little   Housing Stability: Low Risk  (12/10/2024)    Housing Stability Vital Sign     Unable to Pay for Housing in the Last Year: No     Homeless in the Last Year: No        Family History   Problem Relation Name Age of Onset    Heart failure Mother      Cataracts Mother      Heart disease Mother      Glaucoma Mother      Peripheral vascular disease Mother      Hypertension Father          Patient Care Team:  Dayami Squires FNP as PCP - General (Family Medicine)  Nadira Buchanan FNP as Nurse Practitioner (Nephrology)  Cory Gan MD as Consulting Physician (Cardiology)  Álvaro Degroot MD as Consulting Physician (Cardiology)  Francisco Jackman MD as Consulting Physician (Cardiology)  Braxton Schmitz MD as Consulting Physician (Cardiology)     Subjective:     Review of Systems     See HPI for details    Constitutional: Denies Change in appetite. Denies Chills. Denies Fever. Denies Night sweats.  Eye: Denies Blurred vision. Denies Discharge. Denies Eye pain.  ENT: Denies Decreased hearing. Denies Sore throat. Denies Swollen glands.  Respiratory: Denies Cough. Denies Shortness of breath. Denies Shortness of breath with exertion. Denies Wheezing.  Cardiovascular: DeniesChest pain at rest. Denies Chest pain with exertion. Denies Irregular heartbeat. Denies Palpitations. Denies Edema.  Gastrointestinal: Denies Abdominal pain. DeniesDiarrhea. Denies Nausea. Denies Vomiting. Denies Hematemesis or Hematochezia.  Genitourinary: Denies  "Dysuria. Denies Urinary frequency. Denies Urinary urgency. Denies Blood in urine.  Endocrine: Denies Cold intolerance. Denies Excessive thirst. Denies Heat intolerance. Denies Weight loss. Denies Weight gain.  Musculoskeletal: Denies Painful joints. Denies Weakness.  Integumentary: Denies Rash. Denies Itching. Denies Dry skin.  Neurologic: Denies Dizziness. Denies Fainting. Denies Headache.  Psychiatric: Denies Depression. Denies Anxiety. Denies Suicidal/Homicidal ideations.    All Other ROS: Negative except as stated in HPI.       Objective:     Visit Vitals  /72 (BP Location: Right arm, Patient Position: Sitting)   Pulse 74   Temp 98.2 °F (36.8 °C) (Oral)   Resp 18   Ht 6' 2" (1.88 m)   Wt 96.6 kg (213 lb)   SpO2 98%   BMI 27.35 kg/m²       Physical Exam    General: Alert and oriented, No acute distress.  Head: Normocephalic, Atraumatic.  Eye: Pupils are equal, round and reactive to light, Extraocular movements are intact, Sclera non-icteric.  Ears/Nose/Throat: Normal, Mucosa moist,Clear.  Neck/Thyroid: Supple, Non-tender, No carotid bruit, No lymphadenopathy, No JVD, Full range of motion.  Respiratory: Clear to auscultation bilaterally; No wheezes, rales or rhonchi,Non-labored respirations, Symmetrical chest wall expansion.  Cardiovascular: Regular rate and rhythm, S1/S2 normal, No murmurs, rubs or gallops.  Gastrointestinal: Soft, Non-tender, Non-distended, Normal bowel sounds, No palpable organomegaly.  Musculoskeletal: Normal range of motion.  Integumentary: Warm, Dry, Intact, No suspicious lesions or rashes.  Extremities: No clubbing, cyanosis or edema  Neurologic: No focal deficits, Cranial Nerves II-XII are grossly intact, Motor strength normal upper and lower extremities, Sensory exam intact.  Psychiatric: Normal interaction, Coherent speech, Euthymic mood, Appropriate affect       Labs Reviewed:     Chemistry:  Lab Results   Component Value Date     12/12/2024    K 4.2 12/12/2024    BUN 22.1 " 12/12/2024    CREATININE 1.05 12/12/2024    EGFRNORACEVR >60 12/12/2024    GLUCOSE 170 (H) 12/12/2024    CALCIUM 8.6 (L) 12/12/2024    ALKPHOS 127 12/12/2024    LABPROT 6.2 12/12/2024    LABPROT 25.4 (H) 12/12/2024    ALBUMIN 2.9 (L) 12/12/2024    BILIDIR 0.2 08/25/2021    IBILI 0.20 08/25/2021    AST 25 12/12/2024    ALT 42 12/12/2024    MG 2.30 12/12/2024    PHOS 3.1 12/12/2024    ZVWEJKVV30RS 35.4 04/12/2023        Lab Results   Component Value Date    HGBA1C 5.4 12/10/2024        Hematology:  Lab Results   Component Value Date    WBC 18.04 (H) 12/12/2024    HGB 13.7 (L) 12/12/2024    HCT 40.1 (L) 12/12/2024     12/12/2024       Lipid Panel:  Lab Results   Component Value Date    CHOL 106 04/04/2024    HDL 34 (L) 04/04/2024    LDL 58.00 04/04/2024    TRIG 70 04/04/2024    TOTALCHOLEST 3 04/04/2024        Urine:  Lab Results   Component Value Date    APPEARANCEUA Clear 12/10/2024    SGUA 1.021 12/10/2024    PROTEINUA 1+ (A) 12/10/2024    KETONESUA Negative 12/10/2024    LEUKOCYTESUR Negative 12/10/2024    RBCUA 0-5 12/10/2024    WBCUA 0-5 12/10/2024    BACTERIA None Seen 12/10/2024    SQEPUA None Seen 12/10/2024    HYALINECASTS None Seen 12/10/2024    CREATRANDUR 49.0 (L) 08/20/2024    PROTEINURINE 15.7 06/26/2024    UPROTCREA 0.1 06/26/2024        Assessment:       ICD-10-CM ICD-9-CM   1. COPD exacerbation  J44.1 491.21        Plan:     1. COPD exacerbation (Primary)  Pt states he is doing much better since hospital discharge. Pt completed Levaquin as prescribed. Per discharge note- pt stated Advair was not controlling symptoms so was discontinued and started on Spiriva. Pt referred to Pulm cl for further eval and mgmt- appt pending. PFT to be done prior to appt with Pulm cl- PFT scheduled 1-9-25- keep appt. States today is last day to completed medrol dose pack. ER precautions given.          Follow up for Keep f/u appt 1-28-25 as scheduled with labs 1 week prior to appt. . In addition to their scheduled  follow up, the patient has also been instructed to follow up on as needed basis.     Future Appointments   Date Time Provider Department Center   1/9/2025 10:00 AM PFT, [facility] PULMONARY FUNCTION SERVICES [facility] PFS [place]    1/21/2025  9:45 AM Allyson Guillen PA-C [facility] CARD [place]    1/28/2025  9:20 AM Dayami Squires FNP [facility] INTMED [place]    2/7/2025 10:00 AM Francisco Weir, SOFIA [facility] UROLO [place]    3/18/2025  9:30 AM PACEMAKER, [facility] CARDIOLOGY [facility] CARD [place]    4/24/2025  8:45 AM Nadira Buchanan FNP [facility] NEPHR [place]    10/31/2025  9:00 AM PROVIDERS, [facility] OPHCARLO [place]

## 2024-12-31 ENCOUNTER — LAB VISIT (OUTPATIENT)
Dept: LAB | Facility: HOSPITAL | Age: 64
End: 2024-12-31
Attending: INTERNAL MEDICINE
Payer: MEDICARE

## 2024-12-31 DIAGNOSIS — I48.20 CHRONIC ATRIAL FIBRILLATION: Primary | ICD-10-CM

## 2024-12-31 LAB
INR PPP: 5.7
PROTHROMBIN TIME: 52.9 SECONDS (ref 12.5–14.5)

## 2024-12-31 PROCEDURE — 36415 COLL VENOUS BLD VENIPUNCTURE: CPT

## 2024-12-31 PROCEDURE — 85610 PROTHROMBIN TIME: CPT

## 2025-01-09 ENCOUNTER — HOSPITAL ENCOUNTER (OUTPATIENT)
Dept: PULMONOLOGY | Facility: HOSPITAL | Age: 65
Discharge: HOME | End: 2025-01-09
Attending: NURSE PRACTITIONER
Payer: MEDICARE

## 2025-01-09 VITALS — OXYGEN SATURATION: 97 % | HEART RATE: 74 BPM | RESPIRATION RATE: 19 BRPM

## 2025-01-09 DIAGNOSIS — J44.1 COPD WITH ACUTE EXACERBATION: ICD-10-CM

## 2025-01-09 DIAGNOSIS — E78.5 HYPERLIPIDEMIA LDL GOAL <70: Primary | ICD-10-CM

## 2025-01-09 LAB
DLCO SINGLE BREATH LLN: 24.61
DLCO SINGLE BREATH PRE REF: 52.3 %
DLCO SINGLE BREATH REF: 31.54
DLCOC SBVA LLN: 2.92
DLCOC SBVA REF: 3.97
DLCOC SINGLE BREATH LLN: 24.61
DLCOC SINGLE BREATH REF: 31.54
DLCOVA LLN: 2.92
DLCOVA PRE REF: 61.1 %
DLCOVA PRE: 2.43 ML/(MIN*MMHG*L) (ref 2.92–5.02)
DLCOVA REF: 3.97
ERV LLN: -16448.77
ERV PRE REF: 121.4 %
ERV REF: 1.23
FEF 25 75 CHG: -6.7 %
FEF 25 75 LLN: 1.84
FEF 25 75 POST REF: 22.4 %
FEF 25 75 PRE REF: 24 %
FEF 25 75 REF: 3.55
FET100 CHG: 24.1 %
FEV1 CHG: 2 %
FEV1 FVC CHG: -2.2 %
FEV1 FVC LLN: 64
FEV1 FVC POST REF: 67.6 %
FEV1 FVC PRE REF: 69.2 %
FEV1 FVC REF: 77
FEV1 LLN: 2.31
FEV1 POST REF: 73.4 %
FEV1 PRE REF: 71.9 %
FEV1 REF: 3.28
FRCPLETH LLN: 2.91
FRCPLETH PREREF: 135.4 %
FRCPLETH REF: 3.89
FVC CHG: 4.3 %
FVC LLN: 3.14
FVC POST REF: 108.2 %
FVC PRE REF: 103.7 %
FVC REF: 4.29
IVC PRE: 4.69 L (ref 3.14–5.46)
IVC SINGLE BREATH LLN: 3.14
IVC SINGLE BREATH PRE REF: 109.3 %
IVC SINGLE BREATH REF: 4.29
MVV LLN: 126
MVV PRE REF: 47.7 %
MVV REF: 148
PEF CHG: 8.1 %
PEF LLN: 6.26
PEF POST REF: 43.8 %
PEF PRE REF: 40.5 %
PEF REF: 9.23
POST FEF 25 75: 0.8 L/S (ref 1.84–5.26)
POST FET 100: 15.59 SEC
POST FEV1 FVC: 51.82 % (ref 64.45–87.36)
POST FEV1: 2.4 L (ref 2.31–4.17)
POST FVC: 4.64 L (ref 3.14–5.46)
POST PEF: 4.04 L/S (ref 6.26–12.19)
PRE DLCO: 16.5 ML/(MIN*MMHG) (ref 24.61–38.47)
PRE ERV: 1.49 L (ref -16448.77–16451.23)
PRE FEF 25 75: 0.85 L/S (ref 1.84–5.26)
PRE FET 100: 12.56 SEC
PRE FEV1 FVC: 52.99 % (ref 64.45–87.36)
PRE FEV1: 2.36 L (ref 2.31–4.17)
PRE FRC PL: 5.27 L (ref 2.91–4.88)
PRE FVC: 4.45 L (ref 3.14–5.46)
PRE MVV: 70.76 L/MIN (ref 126.05–170.53)
PRE PEF: 3.74 L/S (ref 6.26–12.19)
PRE RV: 3.78 L (ref 1.99–3.34)
PRE TLC: 8.38 L (ref 6.79–9.09)
RAW LLN: 3.06
RAW PRE REF: 182.8 %
RAW PRE: 5.59 CMH2O*S/L (ref 3.06–3.06)
RAW REF: 3.06
RV LLN: 1.99
RV PRE REF: 141.9 %
RV REF: 2.66
RVTLC LLN: 30
RVTLC PRE REF: 114.6 %
RVTLC PRE: 45.04 % (ref 30.33–48.29)
RVTLC REF: 39
TLC LLN: 6.79
TLC PRE REF: 105.6 %
TLC REF: 7.94
VA PRE: 6.8 L (ref 7.79–7.79)
VA SINGLE BREATH LLN: 7.79
VA SINGLE BREATH PRE REF: 87.3 %
VA SINGLE BREATH REF: 7.79
VC LLN: 3.14
VC PRE REF: 107.4 %
VC PRE: 4.61 L (ref 3.14–5.46)
VC REF: 4.29

## 2025-01-09 PROCEDURE — 94726 PLETHYSMOGRAPHY LUNG VOLUMES: CPT

## 2025-01-09 PROCEDURE — 94729 DIFFUSING CAPACITY: CPT

## 2025-01-09 PROCEDURE — 94060 EVALUATION OF WHEEZING: CPT

## 2025-01-09 PROCEDURE — 94640 AIRWAY INHALATION TREATMENT: CPT | Mod: XB

## 2025-01-09 PROCEDURE — 94640 AIRWAY INHALATION TREATMENT: CPT

## 2025-01-09 RX ORDER — ATORVASTATIN CALCIUM 80 MG/1
TABLET, FILM COATED ORAL
Qty: 90 TABLET | Refills: 3 | Status: SHIPPED | OUTPATIENT
Start: 2025-01-09

## 2025-01-09 RX ORDER — ALBUTEROL SULFATE 0.83 MG/ML
2.5 SOLUTION RESPIRATORY (INHALATION) EVERY 4 HOURS PRN
Status: DISPENSED | OUTPATIENT
Start: 2025-01-09

## 2025-01-09 NOTE — PROGRESS NOTES
Good cooperation and effort given. Albuterol 2.5 mg given HR 74/78, SAT 97%, BBS exp. Wheezing  PFT completed

## 2025-01-10 ENCOUNTER — LAB VISIT (OUTPATIENT)
Dept: LAB | Facility: HOSPITAL | Age: 65
End: 2025-01-10
Attending: INTERNAL MEDICINE
Payer: MEDICARE

## 2025-01-10 DIAGNOSIS — I48.20 CHRONIC ATRIAL FIBRILLATION: Primary | ICD-10-CM

## 2025-01-10 LAB
INR PPP: 2.1
PROTHROMBIN TIME: 20.4 SECONDS (ref 12.5–14.5)

## 2025-01-10 PROCEDURE — 85610 PROTHROMBIN TIME: CPT

## 2025-01-10 PROCEDURE — 36415 COLL VENOUS BLD VENIPUNCTURE: CPT

## 2025-02-07 ENCOUNTER — LAB VISIT (OUTPATIENT)
Dept: LAB | Facility: HOSPITAL | Age: 65
End: 2025-02-07
Attending: NURSE PRACTITIONER
Payer: MEDICARE

## 2025-02-07 ENCOUNTER — OFFICE VISIT (OUTPATIENT)
Dept: INTERNAL MEDICINE | Facility: CLINIC | Age: 65
End: 2025-02-07
Payer: MEDICARE

## 2025-02-07 VITALS
TEMPERATURE: 98 F | DIASTOLIC BLOOD PRESSURE: 69 MMHG | WEIGHT: 219.63 LBS | RESPIRATION RATE: 18 BRPM | HEART RATE: 78 BPM | SYSTOLIC BLOOD PRESSURE: 102 MMHG | OXYGEN SATURATION: 98 % | BODY MASS INDEX: 28.19 KG/M2 | HEIGHT: 74 IN

## 2025-02-07 DIAGNOSIS — E55.9 VITAMIN D DEFICIENCY: ICD-10-CM

## 2025-02-07 DIAGNOSIS — E11.9 CONTROLLED TYPE 2 DIABETES MELLITUS WITHOUT COMPLICATION, WITHOUT LONG-TERM CURRENT USE OF INSULIN: ICD-10-CM

## 2025-02-07 DIAGNOSIS — E11.9 TYPE 2 DIABETES MELLITUS WITHOUT COMPLICATION, WITHOUT LONG-TERM CURRENT USE OF INSULIN: ICD-10-CM

## 2025-02-07 DIAGNOSIS — I48.20 CHRONIC ATRIAL FIBRILLATION: Primary | ICD-10-CM

## 2025-02-07 DIAGNOSIS — N40.1 BENIGN PROSTATIC HYPERPLASIA WITH URINARY HESITANCY: ICD-10-CM

## 2025-02-07 DIAGNOSIS — R39.11 BENIGN PROSTATIC HYPERPLASIA WITH URINARY HESITANCY: ICD-10-CM

## 2025-02-07 DIAGNOSIS — F51.01 PRIMARY INSOMNIA: ICD-10-CM

## 2025-02-07 DIAGNOSIS — Z00.00 WELL ADULT EXAM: ICD-10-CM

## 2025-02-07 DIAGNOSIS — E11.9 TYPE 2 DIABETES MELLITUS WITHOUT RETINOPATHY: ICD-10-CM

## 2025-02-07 DIAGNOSIS — J44.89 ASTHMA WITH COPD: Primary | ICD-10-CM

## 2025-02-07 DIAGNOSIS — R79.89 ABNORMAL CBC: ICD-10-CM

## 2025-02-07 DIAGNOSIS — J30.9 ALLERGIC RHINITIS, UNSPECIFIED SEASONALITY, UNSPECIFIED TRIGGER: ICD-10-CM

## 2025-02-07 DIAGNOSIS — G47.00 INSOMNIA, UNSPECIFIED TYPE: ICD-10-CM

## 2025-02-07 LAB
EST. AVERAGE GLUCOSE BLD GHB EST-MCNC: 108.3 MG/DL
HBA1C MFR BLD: 5.4 %
INR PPP: 2
PROTHROMBIN TIME: 23.1 SECONDS (ref 11.4–14)
PSA SERPL-MCNC: 1.82 NG/ML

## 2025-02-07 PROCEDURE — 1126F AMNT PAIN NOTED NONE PRSNT: CPT | Mod: CPTII,,, | Performed by: NURSE PRACTITIONER

## 2025-02-07 PROCEDURE — 3008F BODY MASS INDEX DOCD: CPT | Mod: CPTII,,, | Performed by: NURSE PRACTITIONER

## 2025-02-07 PROCEDURE — 85610 PROTHROMBIN TIME: CPT

## 2025-02-07 PROCEDURE — 36415 COLL VENOUS BLD VENIPUNCTURE: CPT

## 2025-02-07 PROCEDURE — 99214 OFFICE O/P EST MOD 30 MIN: CPT | Mod: PBBFAC | Performed by: NURSE PRACTITIONER

## 2025-02-07 PROCEDURE — 83036 HEMOGLOBIN GLYCOSYLATED A1C: CPT

## 2025-02-07 PROCEDURE — 3288F FALL RISK ASSESSMENT DOCD: CPT | Mod: CPTII,,, | Performed by: NURSE PRACTITIONER

## 2025-02-07 PROCEDURE — 1101F PT FALLS ASSESS-DOCD LE1/YR: CPT | Mod: CPTII,,, | Performed by: NURSE PRACTITIONER

## 2025-02-07 PROCEDURE — 99214 OFFICE O/P EST MOD 30 MIN: CPT | Mod: S$PBB,,, | Performed by: NURSE PRACTITIONER

## 2025-02-07 PROCEDURE — 3078F DIAST BP <80 MM HG: CPT | Mod: CPTII,,, | Performed by: NURSE PRACTITIONER

## 2025-02-07 PROCEDURE — 3044F HG A1C LEVEL LT 7.0%: CPT | Mod: CPTII,,, | Performed by: NURSE PRACTITIONER

## 2025-02-07 PROCEDURE — 1159F MED LIST DOCD IN RCRD: CPT | Mod: CPTII,,, | Performed by: NURSE PRACTITIONER

## 2025-02-07 PROCEDURE — 3074F SYST BP LT 130 MM HG: CPT | Mod: CPTII,,, | Performed by: NURSE PRACTITIONER

## 2025-02-07 PROCEDURE — 84153 ASSAY OF PSA TOTAL: CPT

## 2025-02-07 RX ORDER — CETIRIZINE HYDROCHLORIDE 10 MG/1
10 TABLET ORAL DAILY
Qty: 90 TABLET | Refills: 1 | Status: SHIPPED | OUTPATIENT
Start: 2025-02-07

## 2025-02-07 RX ORDER — MONTELUKAST SODIUM 10 MG/1
10 TABLET ORAL NIGHTLY
Qty: 90 TABLET | Refills: 1 | Status: SHIPPED | OUTPATIENT
Start: 2025-02-07 | End: 2026-02-07

## 2025-02-07 RX ORDER — TRAZODONE HYDROCHLORIDE 50 MG/1
50 TABLET ORAL NIGHTLY PRN
Qty: 90 TABLET | Refills: 1 | Status: SHIPPED | OUTPATIENT
Start: 2025-02-07 | End: 2026-02-07

## 2025-02-07 RX ORDER — GLIPIZIDE 5 MG/1
5 TABLET ORAL DAILY
Qty: 90 TABLET | Refills: 1 | Status: SHIPPED | OUTPATIENT
Start: 2025-02-07

## 2025-02-07 RX ORDER — ALBUTEROL SULFATE 90 UG/1
1-2 INHALANT RESPIRATORY (INHALATION) EVERY 4 HOURS PRN
Qty: 18 G | Refills: 0 | Status: SHIPPED | OUTPATIENT
Start: 2025-02-07 | End: 2026-02-07

## 2025-02-07 RX ORDER — ACETAMINOPHEN 500 MG
5000 TABLET ORAL DAILY
Qty: 90 TABLET | Refills: 1 | Status: SHIPPED | OUTPATIENT
Start: 2025-02-07

## 2025-02-07 NOTE — PROGRESS NOTES
Patient ID: [id number]     Chief Complaint: Follow-up (Lab results)        HPI:     HPI      Ezra Zhang is a 64 y.o. male here today for a follow up.           Immunizations:   Immunization History   Administered Date(s) Administered    COVID-19 Vaccine 03/08/2021, 04/05/2021, 12/07/2021    COVID-19, MRNA, LN-S, PF (MODERNA FULL 0.5 ML DOSE) 03/08/2021, 04/05/2021, 12/07/2021    Influenza 10/11/2012, 12/21/2015, 10/15/2020    Influenza - Quadrivalent 12/20/2019, 03/08/2022    Influenza - Quadrivalent - PF *Preferred* (6 months and older) 09/26/2022, 10/17/2023    Influenza - Trivalent - Fluarix, Flulaval, Fluzone, Afluria - PF 12/21/2015, 10/22/2024    Pneumococcal 10/11/2012    Pneumococcal Polysaccharide - 23 Valent 10/11/2012    Tdap 12/21/2016        -------------------------------------    Atrial fibrillation    CHF (congestive heart failure)    CKD (chronic kidney disease)    COPD (chronic obstructive pulmonary disease)    DM (diabetes mellitus)    Dyslipidemia    Essential (primary) hypertension    Heart failure, unspecified    Rheumatoid arthritis, unspecified    Smoking    Type 2 diabetes mellitus without complications        Past Surgical History:   Procedure Laterality Date    ABLATION N/A 9/11/2023    Procedure: Ablation;  Surgeon: Álvaro Degroot MD;  Location: [facility] CATH LAB;  Service: Cardiology;  Laterality: N/A;  EPS + AV NODE ABLATION + UPGRADE TO BIV ICD ([facility])    CARDIOVERSION      coronary arteriograph      defibillator      ECHOCARDIOGRAM,TRANSESOPHAGEAL N/A 3/22/2023    Procedure: Transesophageal echo (LASHELL) intra-procedure log documentation;  Surgeon: Matthieu Diagnostic Provider;  Location: [facility] CATH LAB;  Service: Cardiology;  Laterality: N/A;    INSERT / REPLACE / REMOVE PACEMAKER      INSERTION OF BIVENTRICULAR IMPLANTABLE CARDIOVERTER-DEFIBRILLATOR (ICD) N/A 9/11/2023    Procedure: INSERTION, ICD, BIVENTRICULAR;  Surgeon: Álvaro Degroot MD;  Location: [facility] CATH LAB;  Service: Cardiology;   Laterality: N/A;    left  heart catherization         Review of patient's allergies indicates:  No Known Allergies    Current Outpatient Medications   Medication Instructions    ACCU-CHEK FASTCLIX LANCET DRUM Misc Daily    ACCU-CHEK GUIDE TEST STRIPS Strp USE AS DIRECTED once daily (keep log)    albuterol (PROAIR HFA) 90 mcg/actuation inhaler 1-2 puffs, Inhalation, Every 4 hours PRN, Rescue    albuterol-ipratropium (DUO-NEB) 2.5 mg-0.5 mg/3 mL nebulizer solution 3 mLs, Nebulization, Every 4 hours PRN, Rescue    alfuzosin (UROXATRAL) 10 mg, Oral, Daily    atorvastatin (LIPITOR) 80 MG tablet TAKE ONE (1) TABLET BY MOUTH ONCE DAILY    cetirizine (ZYRTEC) 10 mg, Oral, Daily    cholecalciferol (vitamin D3) (VITAMIN D3) 5,000 Units, Oral, Daily    clotrimazole-betamethasone 1-0.05% (LOTRISONE) cream 2 g, 2 times daily    famotidine (PEPCID) 20 mg, Oral, 2 times daily    furosemide (LASIX) 40 mg, Oral, Daily, Refilled per Cardio orders.    glipiZIDE (GLUCOTROL) 5 mg, Oral, Daily    lancets-blood glucose strips 30 gauge Cmpk   Accucheck blood glucose test strips and lancets, See Instructions, Check CBG once daily and keep log., # 100 EA, 11 Refill(s), Pharmacy: [facility], 185, cm, Height/Length Dosing, 03/08/22 8:10:00 CST, 96, kg, Weight Dosing, 03/08/22 8:10:00 CST    metoprolol succinate (TOPROL-XL) 100 mg, Oral, Daily    montelukast (SINGULAIR) 10 mg, Oral, Nightly    promethazine-dextromethorphan (PROMETHAZINE-DM) 6.25-15 mg/5 mL Syrp TAKE 5 ml (cc) EVERY 4 HOURS AS NEEDED FOR cough    sacubitriL-valsartan (ENTRESTO) 49-51 mg per tablet 1 tablet, Oral, 2 times daily    tiotropium bromide (SPIRIVA RESPIMAT) 2.5 mcg/actuation inhaler 2 puffs, Inhalation, Daily, Controller    traZODone (DESYREL) 50 mg, Oral, Nightly PRN    warfarin (COUMADIN) 5 mg, Oral, Daily       Social History     Socioeconomic History    Marital status: Single   Tobacco Use    Smoking status: Never     Passive exposure: Never    Smokeless  tobacco: Never   Substance and Sexual Activity    Alcohol use: Never     Alcohol/week: 1.0 standard drink of alcohol     Types: 1 Cans of beer per week     Comment: not often    Drug use: Never    Sexual activity: Yes     Partners: Female     Social Drivers of Health     Financial Resource Strain: Low Risk  (12/10/2024)    Overall Financial Resource Strain (CARDIA)     Difficulty of Paying Living Expenses: Not hard at all   Food Insecurity: No Food Insecurity (12/10/2024)    Hunger Vital Sign     Worried About Running Out of Food in the Last Year: Never true     Ran Out of Food in the Last Year: Never true   Transportation Needs: No Transportation Needs (12/10/2024)    TRANSPORTATION NEEDS     Transportation : No   Physical Activity: Insufficiently Active (10/16/2024)    Exercise Vital Sign     Days of Exercise per Week: 7 days     Minutes of Exercise per Session: 20 min   Stress: No Stress Concern Present (12/10/2024)    [employer] - Occupational Stress Questionnaire     Feeling of Stress : Only a little   Housing Stability: Low Risk  (12/10/2024)    Housing Stability Vital Sign     Unable to Pay for Housing in the Last Year: No     Homeless in the Last Year: No        Family History   Problem Relation Name Age of Onset    Heart failure Mother      Cataracts Mother      Heart disease Mother      Glaucoma Mother      Peripheral vascular disease Mother      Hypertension Father          Patient Care Team:  Dayami Squires FNP as PCP - General (Family Medicine)  Nadira Buchanan FNP as Nurse Practitioner (Nephrology)  Cory Gan MD as Consulting Physician (Cardiology)  Álvaro Degroot MD as Consulting Physician (Cardiology)  Francisco Jackman MD as Consulting Physician (Cardiology)  Braxton Schmitz MD as Consulting Physician (Cardiology)     Subjective:     Review of Systems     See HPI for details    Constitutional: Denies Change in appetite. Denies Chills. Denies Fever.  "Denies Night sweats.  Eye: Denies Blurred vision. Denies Discharge. Denies Eye pain.  ENT: Denies Decreased hearing. Denies Sore throat. Denies Swollen glands.  Respiratory: Denies Cough. Denies Shortness of breath. Denies Shortness of breath with exertion. Denies Wheezing.  Cardiovascular: DeniesChest pain at rest. Denies Chest pain with exertion. Denies Irregular heartbeat. Denies Palpitations. Denies Edema.  Gastrointestinal: Denies Abdominal pain. DeniesDiarrhea. Denies Nausea. Denies Vomiting. Denies Hematemesis or Hematochezia.  Genitourinary: Denies Dysuria. Denies Urinary frequency. Denies Urinary urgency. Denies Blood in urine.  Endocrine: Denies Cold intolerance. Denies Excessive thirst. Denies Heat intolerance. Denies Weight loss. Denies Weight gain.  Musculoskeletal: Denies Painful joints. Denies Weakness.  Integumentary: Denies Rash. Denies Itching. Denies Dry skin.  Neurologic: Denies Dizziness. Denies Fainting. Denies Headache.  Psychiatric: Denies Depression. Denies Anxiety. Denies Suicidal/Homicidal ideations.    All Other ROS: Negative except as stated in HPI.       Objective:     Visit Vitals  /69 (BP Location: Right arm, Patient Position: Sitting)   Pulse 78   Temp 98 °F (36.7 °C) (Oral)   Resp 18   Ht 6' 2" (1.88 m)   Wt 99.6 kg (219 lb 9.6 oz)   SpO2 98%   BMI 28.19 kg/m²       Physical Exam    General: Alert and oriented, No acute distress.  Head: Normocephalic, Atraumatic.  Eye: Pupils are equal, round and reactive to light, Extraocular movements are intact, Sclera non-icteric.  Ears/Nose/Throat: Normal, Mucosa moist,Clear.  Neck/Thyroid: Supple, Non-tender, No carotid bruit, No lymphadenopathy, No JVD, Full range of motion.  Respiratory: Clear to auscultation bilaterally; No wheezes, rales or rhonchi,Non-labored respirations, Symmetrical chest wall expansion.  Cardiovascular: Regular rate and rhythm, S1/S2 normal, No murmurs, rubs or gallops.  Gastrointestinal: Soft, Non-tender, " Non-distended, Normal bowel sounds, No palpable organomegaly.  Musculoskeletal: Normal range of motion.  Integumentary: Warm, Dry, Intact, No suspicious lesions or rashes.  Extremities: No clubbing, cyanosis or edema  Neurologic: No focal deficits, Cranial Nerves II-XII are grossly intact, Motor strength normal upper and lower extremities, Sensory exam intact.  Psychiatric: Normal interaction, Coherent speech, Euthymic mood, Appropriate affect       Labs Reviewed:     Chemistry:  Lab Results   Component Value Date     12/12/2024    K 4.2 12/12/2024    BUN 22.1 12/12/2024    CREATININE 1.05 12/12/2024    EGFRNORACEVR >60 12/12/2024    GLUCOSE 170 (H) 12/12/2024    CALCIUM 8.6 (L) 12/12/2024    ALKPHOS 127 12/12/2024    LABPROT 20.4 (H) 01/10/2025    LABPROT 6.2 12/12/2024    ALBUMIN 2.9 (L) 12/12/2024    BILIDIR 0.2 08/25/2021    IBILI 0.20 08/25/2021    AST 25 12/12/2024    ALT 42 12/12/2024    MG 2.30 12/12/2024    PHOS 3.1 12/12/2024    YWGZDXCO17GU 35.4 04/12/2023        Lab Results   Component Value Date    HGBA1C 5.4 02/07/2025        Hematology:  Lab Results   Component Value Date    WBC 18.04 (H) 12/12/2024    HGB 13.7 (L) 12/12/2024    HCT 40.1 (L) 12/12/2024     12/12/2024       Lipid Panel:  Lab Results   Component Value Date    CHOL 106 04/04/2024    HDL 34 (L) 04/04/2024    LDL 58.00 04/04/2024    TRIG 70 04/04/2024    TOTALCHOLEST 3 04/04/2024        Urine:  Lab Results   Component Value Date    APPEARANCEUA Clear 12/10/2024    SGUA 1.021 12/10/2024    PROTEINUA 1+ (A) 12/10/2024    KETONESUA Negative 12/10/2024    LEUKOCYTESUR Negative 12/10/2024    RBCUA 0-5 12/10/2024    WBCUA 0-5 12/10/2024    BACTERIA None Seen 12/10/2024    SQEPUA None Seen 12/10/2024    HYALINECASTS None Seen 12/10/2024    CREATRANDUR 49.0 (L) 08/20/2024    PROTEINURINE 15.7 06/26/2024    UPROTCREA 0.1 06/26/2024        Assessment:       ICD-10-CM ICD-9-CM   1. Asthma with COPD  J44.89 493.20   2. Type 2 diabetes  mellitus without complication, without long-term current use of insulin  E11.9 250.00   3. Abnormal CBC  R79.89 790.6   4. Well adult exam  Z00.00 V70.0   5. Primary insomnia  F51.01 307.42   6. Vitamin D deficiency  E55.9 268.9   7. Allergic rhinitis, unspecified seasonality, unspecified trigger  J30.9 477.9   8. Controlled type 2 diabetes mellitus without complication, without long-term current use of insulin  E11.9 250.00   9. Insomnia, unspecified type  G47.00 780.52        Plan:     1. Asthma with COPD (Primary)  Avoid triggers. Cont Proair, Spiriva, Duoneb as prescribed. Pt was referred to Pulm cl on hospital discharge 12-12-24 however referral was denied per Pulm cl and informed to f/u with PCP. PFT done 1-9-25 showing:  Mild airflow obstruction, NO significant BD response, no restriction, + evidence of air trapping on lung volume measurement. The Diffusion capacity reduced when corrected for alveolar volume.   CXR done 12-10-24 showing:  X-Ray Chest AP Portable  Order: [id number]  Status: Final result       Visible to patient: Yes (not seen)       Next appt: Today at 10:00 AM in Urology (SO SOLORZANO NP)    0 Result Notes  Details    Reading Physician Reading Date Result Priority   Sincere Hunter MD  [phone] 12/10/2024 STAT     Narrative & Impression  EXAMINATION:  XR CHEST AP PORTABLE     CLINICAL HISTORY:  Asthma;     TECHNIQUE:  Single frontal view of the chest was performed.     COMPARISON:  July 29, 2024     FINDINGS:  Examination reveals mediastinal silhouette to be within normal limits cardiac silhouette is not enlarged some increase interstitial markings identified in both bases with slightly more confluent interstitial markings and confluent opacities at the left base infiltrate cannot be completely excluded.     No other focal consolidative changes are identified no other significant change     Impression:     Increase interstitial markings at both bases with more confluent interstitial  markings and opacities at the left base infiltrate cannot be completely excluded        Electronically signed by:Sincere Hunter  Date:                                            12/10/2024  Time:                                           15:16        Exam Ended: 12/10/24 15:13 CST Last Resulted: 12/10/24 15:16 CST     2. Type 2 diabetes mellitus without complication, without long-term current use of insulin  A1c 5.4. ADA diet and exercise. Cont Glipizide as prescribed. A1c in 6 months. Urine microalbumin 8-20-24.  DM eye exam 11-1-24. DM foot done 7-11-24. Pt states he was following Dr Mazariegos for podiatry but states he no longer takes his insurance. Referred to Dr Conklin in [place] for eval and [facility] for DM foot care.      3. Abnormal CBC  CBC stable. CBC in 3 months.    4. Well adult exam  Labs in 3 months. UTD PSA. Cologuard done 7-6-22 negative results- next due 7/2025.     5. Primary insomnia  Cont Trazodone as prescribed.     6. Vitamin D deficiency  Cont Vit D3 supplement as prescribed.     7. Allergic rhinitis, unspecified seasonality, unspecified trigger  Avoid triggers. Cont Zyrtec, Singulair as prescribed.          Follow up in about 3 months (around 5/7/2025) for with labs 1 week prior to appt. . In addition to their scheduled follow up, the patient has also been instructed to follow up on as needed basis.     Future Appointments   Date Time Provider Department Center   2/7/2025 10:00 AM Francisco Wier NP [facility] UROLO [facility]   2/14/2025  9:15 AM DaAllyson jones PA-C [facility] CARD [facility]   3/18/2025  9:30 AM PACEMAKER, [facility] CARDIOLOGY [facility] CARD [facility]    4/24/2025  8:45 AM Nadira Buchanan FNP [facility] NEPHR [facility]    10/31/2025  9:00 AM PROVIDERS, [facility]

## 2025-02-11 ENCOUNTER — EXTERNAL HOME HEALTH (OUTPATIENT)
Dept: HOME HEALTH SERVICES | Facility: HOSPITAL | Age: 65
End: 2025-02-11
Payer: MEDICARE

## 2025-02-13 RX ORDER — FUROSEMIDE 40 MG/1
40 TABLET ORAL DAILY
Qty: 90 TABLET | Refills: 0 | Status: SHIPPED | OUTPATIENT
Start: 2025-02-13 | End: 2025-05-14

## 2025-02-14 ENCOUNTER — OFFICE VISIT (OUTPATIENT)
Dept: CARDIOLOGY | Facility: CLINIC | Age: 65
End: 2025-02-14
Payer: MEDICARE

## 2025-02-14 VITALS
TEMPERATURE: 98 F | SYSTOLIC BLOOD PRESSURE: 120 MMHG | DIASTOLIC BLOOD PRESSURE: 80 MMHG | HEART RATE: 72 BPM | WEIGHT: 221.13 LBS | BODY MASS INDEX: 28.38 KG/M2 | OXYGEN SATURATION: 98 % | HEIGHT: 74 IN | RESPIRATION RATE: 18 BRPM

## 2025-02-14 DIAGNOSIS — I95.9 HYPOTENSION, UNSPECIFIED HYPOTENSION TYPE: ICD-10-CM

## 2025-02-14 DIAGNOSIS — I50.9 CONGESTIVE HEART FAILURE, UNSPECIFIED HF CHRONICITY, UNSPECIFIED HEART FAILURE TYPE: ICD-10-CM

## 2025-02-14 DIAGNOSIS — I51.3 THROMBUS OF LEFT ATRIAL APPENDAGE: Primary | ICD-10-CM

## 2025-02-14 DIAGNOSIS — I10 PRIMARY HYPERTENSION: ICD-10-CM

## 2025-02-14 DIAGNOSIS — G47.33 OBSTRUCTIVE SLEEP APNEA OF ADULT: ICD-10-CM

## 2025-02-14 DIAGNOSIS — I48.91 ATRIAL FIBRILLATION WITH RVR: ICD-10-CM

## 2025-02-14 DIAGNOSIS — I42.8 NON-ISCHEMIC CARDIOMYOPATHY: ICD-10-CM

## 2025-02-14 DIAGNOSIS — I25.10 CORONARY ARTERIOSCLEROSIS: ICD-10-CM

## 2025-02-14 DIAGNOSIS — E78.5 HYPERLIPIDEMIA LDL GOAL <70: ICD-10-CM

## 2025-02-14 DIAGNOSIS — Z95.810 S/P ICD (INTERNAL CARDIAC DEFIBRILLATOR) PROCEDURE: ICD-10-CM

## 2025-02-14 DIAGNOSIS — I50.22 CHRONIC SYSTOLIC HEART FAILURE: ICD-10-CM

## 2025-02-14 DIAGNOSIS — E78.5 DYSLIPIDEMIA: ICD-10-CM

## 2025-02-14 DIAGNOSIS — F17.200 SMOKING: ICD-10-CM

## 2025-02-14 DIAGNOSIS — I50.9 ACUTE ON CHRONIC CONGESTIVE HEART FAILURE, UNSPECIFIED HEART FAILURE TYPE: ICD-10-CM

## 2025-02-14 DIAGNOSIS — I48.19 PERSISTENT ATRIAL FIBRILLATION: ICD-10-CM

## 2025-02-14 PROCEDURE — 99215 OFFICE O/P EST HI 40 MIN: CPT | Mod: PBBFAC

## 2025-02-14 NOTE — PROGRESS NOTES
CHIEF COMPLAINT:   No chief complaint on file.                                                 HPI:  Ezra Zhang 64 y.o. male  with atrial fibrillation status post LASHELL and cardioversion in February 2017 on Eliquis, nonischemic cardiomyopathy with recovered EF s/p ICD, COPD, hepatitis C, diabetes mellitus type II, and CKD stage III who presents for routine follow up and ongoing care. Patient had his ICD generator changed in August 2018 due to recall. Of note, patient underwent LASHELL (3.22.23) prior to PVI but JOANNE thrombus was seen and PVI was cancelled. He was taken off Eliquis and started on Warfarin.  He completed venous ultrasound of bilateral lower extremities for previous complaints of swelling and discomfort.  Venous US without evidence of DVT in the veins of the BL LE.  He continues to follow with [facility], Dr. Degroot.  Of note, the patient underwent AV node ablation and ICD upgrade on September 11, 2023.  Patient recently saw Dr. Degroot in February 2024, at that time repeat echocardiogram was completed which revealed an improved EF of 40%, grade 1 diastolic dysfunction, but overall much improved from prior.  At last office visit patient stated that well cardiac standpoint.  His only complaint at that time was occasional ankle swelling that was easily resolved with Lasix.    Today the patient again states that he feels well a cardiac standpoint.  Similar last appointment, he deals with occasional lower extremity/ankle swelling when he does not take his Lasix.  He states that he has been out as it for few days and he does have some mild edema on exam.  No other signs of fluid overload or congestion.  He denies any further cardiac complaints such as chest pain, palpitations, PND, orthopnea, lightheadedness, dizziness, syncope, claudication symptoms.  He has some SOB/DONALDSON over exertion or with carrying something that has a good weight to it and walking around.  He is able to complete his ADLs without any issues or  ischemic symptoms.  He states that he walks for exercise and that he is pretty active in his daily life.  Since last office visit, he has been able to ambulate more independently.  He no longer uses rolling walker for ambulation assistance, unless absolutely necessary.  He reports compliance with all his current medications and he is tolerating them well.  He denies any tobacco or other illicit drug use.                                                                                                                                                                                                                                                                                                                                                                                                                     CARDIAC TESTING:  Echo 12.11.24    Left Ventricle: The left ventricle is normal in size. Normal wall thickness. There is normal systolic function with a visually estimated ejection fraction of 65 - 70%.    Right Ventricle: Normal right ventricular cavity size. Systolic function is normal.    Left Atrium: Left atrium is mildly dilated.    Aortic Valve: The aortic valve is a trileaflet valve. Aortic valve peak velocity is 0.9 m/s. Mean gradient is 1.8 mmHg.    Mitral Valve: The mitral valve is structurally normal. The mean pressure gradient across the mitral valve is 2 mmHg at a heart rate of 82 bpm.    Pulmonary Artery: The estimated pulmonary artery systolic pressure is 20 mmHg.    IVC/SVC: Intermediate venous pressure at 8 mmHg.    TTE 2.8.24  1. The study quality is average.   2. The left ventricle is normal in size. Global left ventricular systolic function is moderately decreased. The left ventricular ejection fraction is 40%. The left ventricle diastolic function is impaired (Grade I) with normal left atrial pressure.   3. No significant valvular dysfunction noted.  4. A linear echo density is noted in the right  ventricle, suggestive of a pacemaker lead.   5. The pulmonary artery systolic pressure is 27 mmHg.     ICD Upgrade 9.11.23    Successful device upgrade.    The patient tolerated procedure well.    The patient left the lab in stable condition.    There were no immediate complications.    Successful AV node ablation.    Upgrade of single chamber ICD to BIV ICD.    3D mapping performed with Ensite.    His bundle recording.    CV Venous US Bilateral Lower Extremities  The contralateral (left) common femoral vein is patent.  There is no evidence of a right lower extremity DVT.  The contralateral (right) common femoral vein is patent.  There is no evidence of a left lower extremity DVT.  There was no deep vein thrombosis identified in the visualized veins of the bilateral lower extremities    TTE 3.22.23  The left ventricle is moderately enlarged with moderate concentric hypertrophy and severely decreased systolic function.  The estimated ejection fraction is 15%.  Normal right ventricular size with normal right ventricular systolic function.  Mild left atrial enlargement.  No interatrial septal defect present.  Normal appearing left atrial appendage. Thrombus is present in the appendage. A small filamentous mass consisted with a thrombus was noted. Abnormal appendage velocities.  Mild right atrial enlargement.  Mild mitral regurgitation.  No tricupsid valve vegetation present.  There is severe left ventricular global hypokinesis.  LVEF is severely reduced.  Global hypokinesis.   There is a thrombus in the JOANNE.   PVI will be cancelled.      Echo 1.25.23  · Atrial fibrillation observed.  · Normal right ventricular size.  · Intermediate central venous pressure (8 mmHg).  · The estimated PA systolic pressure is 20 mmHg.  · IVC is dilated and collapses > 50% with inspiration.  · The left ventricle is normal in size with  · Moderate right atrial enlargement.  · Mild to moderate left atrial enlargement.  · The estimated  ejection fraction is 35%.    Patient Active Problem List   Diagnosis    Persistent atrial fibrillation    Anemia    AR (allergic rhinitis)    Asthma with COPD    Bladder wall thickening    Chronic back pain    CKD (chronic kidney disease)    Type 2 diabetes mellitus without retinopathy    Elevated serum creatinine    HA (headache)    Chronic hepatitis C virus infection    Hyperlipidemia LDL goal <70    Hydroureteronephrosis    Joint pain    Plantar wart    Rheumatoid arthritis    Radiculopathy    Renal cyst, right    Umbilical hernia    Well adult exam    Colon cancer screening    Hypertension    Smoking    Non-ischemic cardiomyopathy    History of hepatitis C    Chronic systolic heart failure    Diabetes mellitus    Recurrent sinusitis    Shortness of breath    Prostate cancer screening    Vitamin D deficiency    GERD (gastroesophageal reflux disease)    Atrial fibrillation with RVR    Acute on chronic congestive heart failure    Dyspnea    Insomnia    Thrombus of left atrial appendage    CHF (congestive heart failure)    Coronary arteriosclerosis    Dyslipidemia    Hydronephrosis with ureteropelvic junction (UPJ) obstruction    Obesity    Obstructive sleep apnea of adult    Spinal stenosis of lumbar region    Dry eye syndrome of both eyes    Age-related nuclear cataract of both eyes    Hypotension    S/P ICD (internal cardiac defibrillator) procedure    COPD with acute exacerbation    Central sleep apnea syndrome    Abnormal CBC    Acute ethmoidal sinusitis     Past Surgical History:   Procedure Laterality Date    ABLATION N/A 9/11/2023    Procedure: Ablation;  Surgeon: Álvaro Degroot MD;  Location: [facility] CATH LAB;  Service: Cardiology;  Laterality: N/A;  EPS + AV NODE ABLATION + UPGRADE TO BIV ICD (SJM)    CARDIOVERSION      coronary arteriograph      defibillator      ECHOCARDIOGRAM,TRANSESOPHAGEAL N/A 3/22/2023    Procedure: Transesophageal echo (LASHELL) intra-procedure log documentation;  Surgeon: Matthieu Diagnostic  Provider;  Location: [facility] CATH LAB;  Service: Cardiology;  Laterality: N/A;    INSERT / REPLACE / REMOVE PACEMAKER      INSERTION OF BIVENTRICULAR IMPLANTABLE CARDIOVERTER-DEFIBRILLATOR (ICD) N/A 9/11/2023    Procedure: INSERTION, ICD, BIVENTRICULAR;  Surgeon: Álvaro Degroot MD;  Location: [facility] CATH LAB;  Service: Cardiology;  Laterality: N/A;    left  heart catherization       Social History     Socioeconomic History    Marital status: Single   Tobacco Use    Smoking status: Never     Passive exposure: Never    Smokeless tobacco: Never   Substance and Sexual Activity    Alcohol use: Never     Alcohol/week: 1.0 standard drink of alcohol     Types: 1 Cans of beer per week     Comment: not often    Drug use: Never    Sexual activity: Yes     Partners: Female     Social Drivers of Health     Financial Resource Strain: Low Risk  (12/10/2024)    Overall Financial Resource Strain (CARDIA)     Difficulty of Paying Living Expenses: Not hard at all   Food Insecurity: No Food Insecurity (12/10/2024)    Hunger Vital Sign     Worried About Running Out of Food in the Last Year: Never true     Ran Out of Food in the Last Year: Never true   Transportation Needs: No Transportation Needs (12/10/2024)    TRANSPORTATION NEEDS     Transportation : No   Physical Activity: Insufficiently Active (10/16/2024)    Exercise Vital Sign     Days of Exercise per Week: 7 days     Minutes of Exercise per Session: 20 min   Stress: No Stress Concern Present (12/10/2024)    [employer] [place] [employer] - Occupational Stress Questionnaire     Feeling of Stress : Only a little   Housing Stability: Unknown (12/10/2024)    Housing Stability Vital Sign     Unable to Pay for Housing in the Last Year: No     Homeless in the Last Year: No        Family History   Problem Relation Name Age of Onset    Heart failure Mother      Cataracts Mother      Heart disease Mother      Glaucoma Mother      Peripheral vascular disease Mother      Hypertension  Father       Review of patient's allergies indicates:  No Known Allergies      ROS:  Review of Systems   Constitutional: Negative.  Negative for malaise/fatigue.   HENT: Negative.     Eyes: Negative.    Respiratory:  Positive for shortness of breath (Occasional SOB, bendopnea, stable).    Cardiovascular:  Positive for orthopnea and leg swelling (Intermittent). Negative for chest pain, palpitations, claudication and PND.        Bendopnea   Gastrointestinal: Negative.    Genitourinary: Negative.    Musculoskeletal:  Positive for back pain and myalgias.        Bilateral lower extremity pain and numbness   Skin: Negative.    Neurological:  Positive for sensory change.   Endo/Heme/Allergies: Negative.    Psychiatric/Behavioral: Negative.                                                                                                                                                                                  Negative except as stated in the history of present illness. See HPI for details.    PHYSICAL EXAM:  There were no vitals taken for this visit.          Physical Exam  Constitutional:       General: He is not in acute distress.     Appearance: Normal appearance. He is obese. He is not diaphoretic.   HENT:      Head: Normocephalic.      Nose: Nose normal.      Mouth/Throat:      Mouth: Mucous membranes are moist.   Eyes:      Extraocular Movements: Extraocular movements intact.   Neck:      Vascular: No carotid bruit.   Cardiovascular:      Rate and Rhythm: Normal rate and regular rhythm.      Pulses: Normal pulses.      Heart sounds: Normal heart sounds. No murmur heard.  Pulmonary:      Effort: Pulmonary effort is normal. No respiratory distress.      Breath sounds: Normal breath sounds.   Abdominal:      General: There is no distension.      Palpations: Abdomen is soft.   Musculoskeletal:      Cervical back: Normal range of motion.      Right lower leg: No edema.      Left lower leg: No edema.   Skin:      General: Skin is warm and dry.   Neurological:      Mental Status: He is alert and oriented to person, place, and time.   Psychiatric:         Mood and Affect: Mood is depressed.       Current Outpatient Medications   Medication Instructions    ACCU-CHEK FASTCLIX LANCET DRUM Misc Daily    ACCU-CHEK GUIDE TEST STRIPS Strp USE AS DIRECTED once daily (keep log)    albuterol (PROAIR HFA) 90 mcg/actuation inhaler 1-2 puffs, Inhalation, Every 4 hours PRN, Rescue    albuterol-ipratropium (DUO-NEB) 2.5 mg-0.5 mg/3 mL nebulizer solution 3 mLs, Nebulization, Every 4 hours PRN, Rescue    alfuzosin (UROXATRAL) 10 mg, Oral, Daily    atorvastatin (LIPITOR) 80 MG tablet TAKE ONE (1) TABLET BY MOUTH ONCE DAILY    cetirizine (ZYRTEC) 10 mg, Oral, Daily    cholecalciferol (vitamin D3) (VITAMIN D3) 5,000 Units, Oral, Daily    clotrimazole-betamethasone 1-0.05% (LOTRISONE) cream 2 g, 2 times daily    famotidine (PEPCID) 20 mg, Oral, 2 times daily    furosemide (LASIX) 40 mg, Oral, Daily, Refilled per Cardio orders.    glipiZIDE (GLUCOTROL) 5 mg, Oral, Daily    lancets-blood glucose strips 30 gauge Cmpk   Accucheck blood glucose test strips and lancets, See Instructions, Check CBG once daily and keep log., # 100 EA, 11 Refill(s), Pharmacy: [facility], 185, cm, Height/Length Dosing, 03/08/22 8:10:00 CST, 96, kg, Weight Dosing, 03/08/22 8:10:00 CST    metoprolol succinate (TOPROL-XL) 100 mg, Oral, Daily    montelukast (SINGULAIR) 10 mg, Oral, Nightly    promethazine-dextromethorphan (PROMETHAZINE-DM) 6.25-15 mg/5 mL Syrp TAKE 5 ml (cc) EVERY 4 HOURS AS NEEDED FOR cough    sacubitriL-valsartan (ENTRESTO) 49-51 mg per tablet 1 tablet, Oral, 2 times daily    tiotropium bromide (SPIRIVA RESPIMAT) 2.5 mcg/actuation inhaler 2 puffs, Inhalation, Daily, Controller    traZODone (DESYREL) 50 mg, Oral, Nightly PRN    warfarin (COUMADIN) 5 mg, Oral, Daily        All medications, laboratory studies, cardiac diagnostic imaging reviewed.     Lab  Results   Component Value Date    LDL 58.00 04/04/2024    LDL 56.00 03/07/2023    TRIG 70 04/04/2024    TRIG 55 03/07/2023    CREATININE 1.05 12/12/2024    MG 2.30 12/12/2024    K 4.2 12/12/2024        ASSESSMENT/PLAN:  Paroxysmal Atrial Fibrillation/flutter   - Symptoms stable from last office visit - see HPI  - Denies palpitations, lightheadedness, dizziness, or syncope  - Patient underwent AV angie ablation and ICD upgrade September 2023.  States that he is feeling much better following procedure and denies any cardiac complaints  - TE/DCCV aborted after JOANNE thrombus identified (March 2023)  - Continue Warfarin, patient tolerating well  - Follow up with [facility], Dr. Degroot as scheduled- every 6 months- next appointment 3.26.25  - Metoprolol Succinate 100 MG daily per Dr. Degroot; patient tolerating well  - Continue to follow up with [facility] for Coumadin management     Non ischemic cardiomyopathy  Chronic Systolic HF s/p ICD  EF 65-70% per TTE December 2024   NYHA II  - Euvolemic and warm today  - Intermittent edema in BLE, reports once weekly approximately- improves easily with Lasix   - Reports occasional shortness of breath and bendopnea   - Continue current medications as above  - Reports that lower extremity edema has improved since increasing dose of Lasix  - EF improved to 65-70% per TTE December 2024.  Patient states that he is feeling much better overall. See full report above   - Patient reports that he takes Lasix 40 mg BID and is doing well on this current dose  - Watch fluid and salt intake  - Recent device check in December 2024 revealed no significant events-he will continue follow up in device clinic every 3 months.  He is also doing remote checks with Dr. Degroot    HTN  - BP at goal today 120/80  - Continue Entresto to 49/51 mg BID and Toprol 100 MG daily  - Counseled on importance of following a low-sodium, heart healthy diet and exercise as tolerated    Claudication  - Denies any claudication  symptoms today- stable from prior visit - see HPI   - BL Venous US without evidence of DVT in the veins of the bilateral lower extremities  - Patient does report ongoing bilateral extremity pain however believes that is due to spinal stenosis of lower back        Follow up in cardiology clinic in 5 months or sooner if needed   Follow up with PCP as directed  Return to clinic for device check as directed  Follow up with Dr. Degroot as directed  Please notify clinic if any new concerns or any change in symptoms

## 2025-02-14 NOTE — PATIENT INSTRUCTIONS
Follow up in cardiology clinic in 5 months or sooner if needed   Follow up with PCP as directed  Return to clinic for device check as directed  Follow up with Dr. Degroot as directed  Please notify clinic if any new concerns or any change in symptoms

## 2025-02-18 ENCOUNTER — DOCUMENT SCAN (OUTPATIENT)
Dept: HOME HEALTH SERVICES | Facility: HOSPITAL | Age: 65
End: 2025-02-18
Payer: MEDICARE

## 2025-03-07 ENCOUNTER — LAB VISIT (OUTPATIENT)
Dept: LAB | Facility: HOSPITAL | Age: 65
End: 2025-03-07
Attending: INTERNAL MEDICINE
Payer: MEDICARE

## 2025-03-07 DIAGNOSIS — I48.20 CHRONIC ATRIAL FIBRILLATION: Primary | ICD-10-CM

## 2025-03-07 LAB
INR PPP: 3.1
PROTHROMBIN TIME: 29.1 SECONDS (ref 12.5–14.5)

## 2025-03-07 PROCEDURE — 36415 COLL VENOUS BLD VENIPUNCTURE: CPT

## 2025-03-07 PROCEDURE — 85610 PROTHROMBIN TIME: CPT

## 2025-03-24 ENCOUNTER — HOSPITAL ENCOUNTER (EMERGENCY)
Facility: HOSPITAL | Age: 65
Discharge: HOME | End: 2025-03-24
Attending: INTERNAL MEDICINE
Payer: MEDICARE

## 2025-03-24 VITALS
SYSTOLIC BLOOD PRESSURE: 128 MMHG | OXYGEN SATURATION: 99 % | WEIGHT: 215 LBS | BODY MASS INDEX: 27.59 KG/M2 | HEART RATE: 71 BPM | RESPIRATION RATE: 18 BRPM | HEIGHT: 74 IN | TEMPERATURE: 98 F | DIASTOLIC BLOOD PRESSURE: 85 MMHG

## 2025-03-24 DIAGNOSIS — S93.401A SPRAIN OF RIGHT ANKLE, UNSPECIFIED LIGAMENT, INITIAL ENCOUNTER: Primary | ICD-10-CM

## 2025-03-24 DIAGNOSIS — M25.579 PAIN, ANKLE: ICD-10-CM

## 2025-03-24 DIAGNOSIS — I50.9 CHRONIC CONGESTIVE HEART FAILURE, UNSPECIFIED HEART FAILURE TYPE: ICD-10-CM

## 2025-03-24 DIAGNOSIS — R06.02 SOB (SHORTNESS OF BREATH): ICD-10-CM

## 2025-03-24 PROCEDURE — 99284 EMERGENCY DEPT VISIT MOD MDM: CPT | Mod: 25

## 2025-03-24 RX ORDER — HYDROCODONE BITARTRATE AND ACETAMINOPHEN 5; 325 MG/1; MG/1
1 TABLET ORAL EVERY 8 HOURS PRN
Qty: 10 TABLET | Refills: 0 | Status: SHIPPED | OUTPATIENT
Start: 2025-03-24

## 2025-03-24 NOTE — ED PROVIDER NOTES
"Encounter Date: 3/24/2025       History     Chief Complaint   Patient presents with    Ankle Pain     Reports right ankle pain and swelling that started last night. States "it twisted a little outwards on Saturday when I stood up but everything was good". Otherwise denies trauma to ankle. Slight swelling noted to right ankle in triage     63 yo male presents to emergency department with R ankle pain x 2 days. He states he got off the couch and he twisted his ankle. Pt is unable to bear weight. Noted significant swelling compared to L ankle, no bruising. Pt notes to be on coumadin. Pt states when he tries to walk he gets an associated shortness of breath. Pt denies fever, cp. Pt states pain does not radiate and no associated knee pain. Sister is present with patient.     The history is provided by the patient.     Review of patient's allergies indicates:  No Known Allergies  Past Medical History:   Diagnosis Date    Atrial fibrillation     CHF (congestive heart failure)     CKD (chronic kidney disease)     COPD (chronic obstructive pulmonary disease)     DM (diabetes mellitus)     Dyslipidemia     Essential (primary) hypertension     Heart failure, unspecified     Rheumatoid arthritis, unspecified     Smoking     Type 2 diabetes mellitus without complications      Past Surgical History:   Procedure Laterality Date    ABLATION N/A 9/11/2023    Procedure: Ablation;  Surgeon: Álvaro Degroot MD;  Location: [facility] CATH LAB;  Service: Cardiology;  Laterality: N/A;  EPS + AV NODE ABLATION + UPGRADE TO BIV ICD (SJM)    CARDIOVERSION      coronary arteriograph      defibillator      ECHOCARDIOGRAM,TRANSESOPHAGEAL N/A 3/22/2023    Procedure: Transesophageal echo (LASHELL) intra-procedure log documentation;  Surgeon: Matthieu Diagnostic Provider;  Location: [facility] CATH LAB;  Service: Cardiology;  Laterality: N/A;    INSERT / REPLACE / REMOVE PACEMAKER      INSERTION OF BIVENTRICULAR IMPLANTABLE CARDIOVERTER-DEFIBRILLATOR (ICD) N/A 9/11/2023 "    Procedure: INSERTION, ICD, BIVENTRICULAR;  Surgeon: Álvaro Degroot MD;  Location: [facility] CATH LAB;  Service: Cardiology;  Laterality: N/A;    left  heart catherization       Family History   Problem Relation Name Age of Onset    Heart failure Mother      Cataracts Mother      Heart disease Mother      Glaucoma Mother      Peripheral vascular disease Mother      Hypertension Father       Social History[1]  Review of Systems   Constitutional:  Negative for chills and fever.   HENT:  Negative for dental problem and sore throat.    Eyes:  Negative for visual disturbance.   Respiratory:  Positive for shortness of breath. Negative for cough.    Cardiovascular:  Negative for chest pain.   Gastrointestinal:  Negative for abdominal pain, diarrhea and vomiting.   Genitourinary: Negative.    Musculoskeletal:  Positive for arthralgias, gait problem and joint swelling. Negative for back pain and myalgias.   Skin:  Negative for color change and rash.   Neurological:  Negative for weakness and headaches.       Physical Exam     Initial Vitals [03/24/25 0904]   BP Pulse Resp Temp SpO2   130/89 73 18 97.7 °F (36.5 °C) 98 %      MAP       --         Physical Exam    Vitals reviewed.  Constitutional: He appears well-developed.   HENT:   Head: Normocephalic and atraumatic.   Eyes: Conjunctivae and EOM are normal. Pupils are equal, round, and reactive to light.   Neck: Neck supple.   Normal range of motion.  Cardiovascular:  Regular rhythm, normal heart sounds and intact distal pulses.           Pulmonary/Chest: Breath sounds normal. No respiratory distress.   Abdominal: Abdomen is soft. Bowel sounds are normal. He exhibits no distension. There is no abdominal tenderness. There is no rebound and no guarding.   Musculoskeletal:         General: Tenderness present. No edema.      Cervical back: Normal range of motion and neck supple.      Right ankle: Swelling present. No deformity or ecchymosis. Tenderness present over the lateral  malleolus and medial malleolus. Decreased range of motion. Normal pulse.     Neurological: He is alert and oriented to person, place, and time. He has normal strength.   Skin: Skin is warm and dry. Capillary refill takes less than 2 seconds. No rash noted.   Psychiatric: His behavior is normal.         ED Course   Procedures  Labs Reviewed - No data to display       Imaging Results              X-Ray Ankle Complete Right (Final result)  Result time 03/24/25 12:05:50      Final result by Rosenda Mathias MD (03/24/25 12:05:50)                   Impression:      No acute bony abnormality.      Electronically signed by: Rosenda Mathias  Date:    03/24/2025  Time:    12:05               Narrative:    EXAMINATION:  XR ANKLE COMPLETE 3 VIEW RIGHT    CLINICAL HISTORY:  Pain in unspecified ankle and joints of unspecified foot    COMPARISON:  None.    FINDINGS:  There is no acute fracture or malalignment.  The soft tissues are unremarkable.                                       X-Ray Chest PA And Lateral (Final result)  Result time 03/24/25 11:11:48      Final result by Khari Chao MD (03/24/25 11:11:48)                   Impression:      No acute cardiopulmonary abnormality.      Electronically signed by: Khari Chao  Date:    03/24/2025  Time:    11:11               Narrative:    EXAMINATION:  XR CHEST PA AND LATERAL    CLINICAL HISTORY:  Shortness of breath    COMPARISON:  10 December 2024    FINDINGS:  PA and lateral views of the chest were obtained. Heart and mediastinum unchanged.  The lungs are clear.  There is no pneumothorax.                                       Medications - No data to display  Medical Decision Making  65 yo male presented with R ankle pain     Amount and/or Complexity of Data Reviewed  Radiology: ordered.    Risk  Prescription drug management.      Additional MDM:   Differential Diagnosis:   Other: The following diagnoses were also considered and will be evaluated: sprain, fracture and  gout.                                   Clinical Impression:  Final diagnoses:  [R06.02] SOB (shortness of breath)  [M25.579] Pain, ankle  [S93.401A] Sprain of right ankle, unspecified ligament, initial encounter (Primary)  [I50.9] Chronic congestive heart failure, unspecified heart failure type          ED Disposition Condition    Discharge Stable          ED Prescriptions       Medication Sig Dispense Start Date End Date Auth. Provider    HYDROcodone-acetaminophen (NORCO) 5-325 mg per tablet Take 1 tablet by mouth every 8 (eight) hours as needed for Pain. 10 tablet 3/24/2025 -- Victorino Baptiste MD          Follow-up Information       Follow up With Specialties Details Why Contact Info    [facility] In 2 weeks  [address] [facility] [id number] [phone]      [facility] Emergency Medicine  If symptoms worsen [address] [facility] [address] [phone]                 [1]   Social History  Tobacco Use    Smoking status: Never     Passive exposure: Never    Smokeless tobacco: Never   Substance Use Topics    Alcohol use: Never     Alcohol/week: 1.0 standard drink of alcohol     Types: 1 Cans of beer per week     Comment: not often    Drug use: Never        Victorino Baptiste MD  03/25/25 [id number]

## 2025-04-01 ENCOUNTER — LAB VISIT (OUTPATIENT)
Dept: LAB | Facility: HOSPITAL | Age: 65
End: 2025-04-01
Attending: NURSE PRACTITIONER
Payer: MEDICARE

## 2025-04-01 ENCOUNTER — CLINICAL SUPPORT (OUTPATIENT)
Dept: CARDIOLOGY | Facility: CLINIC | Age: 65
End: 2025-04-01
Payer: MEDICARE

## 2025-04-01 DIAGNOSIS — N18.31 CKD STAGE G3A/A1, GFR 45-59 AND ALBUMIN CREATININE RATIO <30 MG/G: ICD-10-CM

## 2025-04-01 DIAGNOSIS — E11.9 TYPE 2 DIABETES MELLITUS WITHOUT COMPLICATION, WITHOUT LONG-TERM CURRENT USE OF INSULIN: ICD-10-CM

## 2025-04-01 DIAGNOSIS — R79.89 ABNORMAL CBC: ICD-10-CM

## 2025-04-01 DIAGNOSIS — Z95.810 S/P ICD (INTERNAL CARDIAC DEFIBRILLATOR) PROCEDURE: Primary | ICD-10-CM

## 2025-04-01 LAB
ALBUMIN SERPL-MCNC: 3.7 G/DL (ref 3.4–4.8)
ALBUMIN/GLOB SERPL: 1.1 RATIO (ref 1.1–2)
ALP SERPL-CCNC: 193 UNIT/L (ref 40–150)
ALT SERPL-CCNC: 28 UNIT/L (ref 0–55)
ANION GAP SERPL CALC-SCNC: 2 MEQ/L
AST SERPL-CCNC: 29 UNIT/L (ref 11–45)
BACTERIA #/AREA URNS AUTO: ABNORMAL /HPF
BASOPHILS # BLD AUTO: 0.07 X10(3)/MCL
BASOPHILS NFR BLD AUTO: 0.8 %
BILIRUB SERPL-MCNC: 0.3 MG/DL
BILIRUB UR QL STRIP.AUTO: NEGATIVE
BUN SERPL-MCNC: 22.6 MG/DL (ref 8.4–25.7)
CALCIUM SERPL-MCNC: 8.9 MG/DL (ref 8.8–10)
CHLORIDE SERPL-SCNC: 113 MMOL/L (ref 98–107)
CHOLEST SERPL-MCNC: 102 MG/DL
CHOLEST/HDLC SERPL: 3 {RATIO} (ref 0–5)
CLARITY UR: CLEAR
CO2 SERPL-SCNC: 28 MMOL/L (ref 23–31)
COLOR UR AUTO: ABNORMAL
CREAT SERPL-MCNC: 1.76 MG/DL (ref 0.72–1.25)
CREAT UR-MCNC: 163.1 MG/DL (ref 63–166)
CREAT/UREA NIT SERPL: 13
EOSINOPHIL # BLD AUTO: 0.14 X10(3)/MCL (ref 0–0.9)
EOSINOPHIL NFR BLD AUTO: 1.6 %
ERYTHROCYTE [DISTWIDTH] IN BLOOD BY AUTOMATED COUNT: 13.2 % (ref 11.5–17)
GFR SERPLBLD CREATININE-BSD FMLA CKD-EPI: 42 ML/MIN/1.73/M2
GLOBULIN SER-MCNC: 3.5 GM/DL (ref 2.4–3.5)
GLUCOSE SERPL-MCNC: 69 MG/DL (ref 82–115)
GLUCOSE UR QL STRIP: NORMAL
HCT VFR BLD AUTO: 45.9 % (ref 42–52)
HDLC SERPL-MCNC: 33 MG/DL (ref 35–60)
HGB BLD-MCNC: 15.4 G/DL (ref 14–18)
HGB UR QL STRIP: NEGATIVE
HYALINE CASTS #/AREA URNS LPF: ABNORMAL /LPF
IMM GRANULOCYTES # BLD AUTO: 0.02 X10(3)/MCL (ref 0–0.04)
IMM GRANULOCYTES NFR BLD AUTO: 0.2 %
KETONES UR QL STRIP: NEGATIVE
LDLC SERPL CALC-MCNC: 54 MG/DL (ref 50–140)
LEUKOCYTE ESTERASE UR QL STRIP: NEGATIVE
LYMPHOCYTES # BLD AUTO: 2.75 X10(3)/MCL (ref 0.6–4.6)
LYMPHOCYTES NFR BLD AUTO: 31.3 %
MCH RBC QN AUTO: 34.4 PG (ref 27–31)
MCHC RBC AUTO-ENTMCNC: 33.6 G/DL (ref 33–36)
MCV RBC AUTO: 102.5 FL (ref 80–94)
MONOCYTES # BLD AUTO: 0.8 X10(3)/MCL (ref 0.1–1.3)
MONOCYTES NFR BLD AUTO: 9.1 %
MUCOUS THREADS URNS QL MICRO: ABNORMAL /LPF
NEUTROPHILS # BLD AUTO: 5 X10(3)/MCL (ref 2.1–9.2)
NEUTROPHILS NFR BLD AUTO: 57 %
NITRITE UR QL STRIP: NEGATIVE
NRBC BLD AUTO-RTO: 0 %
PH UR STRIP: 5.5 [PH]
PLATELET # BLD AUTO: 322 X10(3)/MCL (ref 130–400)
PMV BLD AUTO: 10.1 FL (ref 7.4–10.4)
POTASSIUM SERPL-SCNC: 4.2 MMOL/L (ref 3.5–5.1)
PROT SERPL-MCNC: 7.2 GM/DL (ref 5.8–7.6)
PROT UR QL STRIP: NEGATIVE
PROT UR STRIP-MCNC: 11.4 MG/DL
PTH-INTACT SERPL-MCNC: 113.1 PG/ML (ref 8.7–77)
RBC # BLD AUTO: 4.48 X10(6)/MCL (ref 4.7–6.1)
RBC #/AREA URNS AUTO: ABNORMAL /HPF
SODIUM SERPL-SCNC: 143 MMOL/L (ref 136–145)
SP GR UR STRIP.AUTO: 1.02 (ref 1–1.03)
SQUAMOUS #/AREA URNS LPF: ABNORMAL /HPF
TRIGL SERPL-MCNC: 74 MG/DL (ref 34–140)
URINE PROTEIN/CREATININE RATIO (OLG): 0.1
UROBILINOGEN UR STRIP-ACNC: NORMAL
VLDLC SERPL CALC-MCNC: 15 MG/DL
WBC # BLD AUTO: 8.78 X10(3)/MCL (ref 4.5–11.5)
WBC #/AREA URNS AUTO: ABNORMAL /HPF

## 2025-04-01 PROCEDURE — 85025 COMPLETE CBC W/AUTO DIFF WBC: CPT

## 2025-04-01 PROCEDURE — 36415 COLL VENOUS BLD VENIPUNCTURE: CPT

## 2025-04-01 PROCEDURE — 83970 ASSAY OF PARATHORMONE: CPT

## 2025-04-01 PROCEDURE — 93284 PRGRMG EVAL IMPLANTABLE DFB: CPT | Mod: PBBFAC

## 2025-04-01 PROCEDURE — 82570 ASSAY OF URINE CREATININE: CPT

## 2025-04-01 PROCEDURE — 81015 MICROSCOPIC EXAM OF URINE: CPT

## 2025-04-01 PROCEDURE — 80061 LIPID PANEL: CPT

## 2025-04-01 PROCEDURE — 80053 COMPREHEN METABOLIC PANEL: CPT

## 2025-04-04 ENCOUNTER — LAB VISIT (OUTPATIENT)
Dept: LAB | Facility: HOSPITAL | Age: 65
End: 2025-04-04
Attending: INTERNAL MEDICINE
Payer: MEDICARE

## 2025-04-04 DIAGNOSIS — I48.20 CHRONIC ATRIAL FIBRILLATION: Primary | ICD-10-CM

## 2025-04-04 LAB
INR PPP: 2.5
PROTHROMBIN TIME: 24.3 SECONDS (ref 12.5–14.5)

## 2025-04-04 PROCEDURE — 36415 COLL VENOUS BLD VENIPUNCTURE: CPT

## 2025-04-04 PROCEDURE — 85610 PROTHROMBIN TIME: CPT

## 2025-04-07 RX ORDER — METOPROLOL SUCCINATE 100 MG/1
100 TABLET, EXTENDED RELEASE ORAL
Qty: 90 TABLET | Refills: 3 | Status: SHIPPED | OUTPATIENT
Start: 2025-04-07

## 2025-04-09 DIAGNOSIS — I42.8 NON-ISCHEMIC CARDIOMYOPATHY: ICD-10-CM

## 2025-04-09 DIAGNOSIS — I50.20 HFREF (HEART FAILURE WITH REDUCED EJECTION FRACTION): ICD-10-CM

## 2025-04-09 RX ORDER — SACUBITRIL AND VALSARTAN 49; 51 MG/1; MG/1
1 TABLET, FILM COATED ORAL 2 TIMES DAILY
Qty: 60 TABLET | Refills: 11 | Status: SHIPPED | OUTPATIENT
Start: 2025-04-09 | End: 2026-04-09

## 2025-04-24 ENCOUNTER — OFFICE VISIT (OUTPATIENT)
Dept: NEPHROLOGY | Facility: CLINIC | Age: 65
End: 2025-04-24
Payer: MEDICARE

## 2025-04-24 DIAGNOSIS — N25.81 SECONDARY HYPERPARATHYROIDISM OF RENAL ORIGIN: ICD-10-CM

## 2025-04-24 DIAGNOSIS — I50.22 CHRONIC SYSTOLIC HEART FAILURE: ICD-10-CM

## 2025-04-24 DIAGNOSIS — I10 PRIMARY HYPERTENSION: ICD-10-CM

## 2025-04-24 DIAGNOSIS — N18.31 CKD STAGE G3A/A1, GFR 45-59 AND ALBUMIN CREATININE RATIO <30 MG/G: Primary | ICD-10-CM

## 2025-04-24 DIAGNOSIS — N17.9 AKI (ACUTE KIDNEY INJURY): ICD-10-CM

## 2025-04-24 DIAGNOSIS — N18.31 TYPE 2 DIABETES MELLITUS WITH STAGE 3A CHRONIC KIDNEY DISEASE, WITHOUT LONG-TERM CURRENT USE OF INSULIN: ICD-10-CM

## 2025-04-24 DIAGNOSIS — E11.22 TYPE 2 DIABETES MELLITUS WITH STAGE 3A CHRONIC KIDNEY DISEASE, WITHOUT LONG-TERM CURRENT USE OF INSULIN: ICD-10-CM

## 2025-04-24 DIAGNOSIS — Q62.11 HYDRONEPHROSIS WITH URETEROPELVIC JUNCTION (UPJ) OBSTRUCTION: ICD-10-CM

## 2025-04-24 RX ORDER — FLUTICASONE PROPIONATE 50 MCG
SPRAY, SUSPENSION (ML) NASAL
COMMUNITY
Start: 2025-04-09

## 2025-04-24 NOTE — PROGRESS NOTES
Audio Only Telehealth Visit     The patient location is: Home  The chief complaint leading to consultation is: CKD management  Visit type: Virtual visit with audio only (telephone)  Total time spent in medical discussion with patient: 12 minutes  Total time spent on date of the encounter: 30 minutes       The reason for the audio only service rather than synchronous audio and video virtual visit was related to technical difficulties or patient preference/necessity.       Each patient to whom I provide medical services by telemedicine is:  (1) informed of the relationship between the physician and patient and the respective role of any other health care provider with respect to management of the patient; and (2) notified that they may decline to receive medical services by telemedicine and may withdraw from such care at any time. Patient verbally consented to receive this service via voice-only telephone call.       This service was not originating from a related E/M service provided within the previous 7 days nor will  to an E/M service or procedure within the next 24 hours or my soonest available appointment.  Prevailing standard of care was able to be met in this audio-only visit.          [facility]  Nephrology Clinic Note    Chief complaint: CKD management     History of present illness:   Ezra Zhang is a 64 y.o. Black or  male with past medical history of chronic kidney disease,  left hydronephrosis (followed by [facility] and [facility]), diabetes mellitus type 2 (diagnosed in 2008), heart failure with recovered ejection fraction (65% per echo in December of 2024), treated hepatitis C, dyslipidemia, atrial fibrillation (anticoagulated with warfarin), COPD,  RYAN, and past history of tobacco abuse. Patient presents for follow-up appointment in nephrology clinic today. Denies complaints.     Review of Systems  12 point review of systems conducted,  negative except as stated in the history of present illness.    Allergies: Patient has no known allergies.     Past Medical History:  has a past medical history of Atrial fibrillation, CHF (congestive heart failure), CKD (chronic kidney disease), COPD (chronic obstructive pulmonary disease), DM (diabetes mellitus), Dyslipidemia, Essential (primary) hypertension, Heart failure, unspecified, Rheumatoid arthritis, unspecified, Smoking, and Type 2 diabetes mellitus without complications.    Procedure History:  has a past surgical history that includes coronary arteriograph; left  heart catherization; Insert / replace / remove pacemaker; Cardioversion; defibillator; echocardiogram,transesophageal (N/A, 3/22/2023); Ablation (N/A, 9/11/2023); and Insertion of biventricular implantable cardioverter-defibrillator (ICD) (N/A, 9/11/2023).    Family History: family history includes Cataracts in his mother; Glaucoma in his mother; Heart disease in his mother; Heart failure in his mother; Hypertension in his father; Peripheral vascular disease in his mother.    Social History:  reports that he has never smoked. He has never been exposed to tobacco smoke. He has never used smokeless tobacco. He reports current alcohol use of about 1.0 standard drink of alcohol per week. He reports that he does not use drugs.    Home Medications:  Current Medications[1]    Laboratory data    Lab Results   Component Value Date    WBC 8.78 04/01/2025    HGB 15.4 04/01/2025    HCT 45.9 04/01/2025     04/01/2025    IRON 83 03/27/2018    TIBC 177 (L) 03/27/2018    LABIRON 46.9 03/27/2018    FERRITIN 295.1 03/27/2018    FOLATE 12.7 12/11/2024    [id number] 536 12/11/2024     04/01/2025    K 4.2 04/01/2025    CO2 28 04/01/2025    BUN 22.6 04/01/2025    CREATININE 1.76 (H) 04/01/2025    EGFRNORACEVR 42 04/01/2025    GLUCOSE 69 (L) 04/01/2025    CALCIUM 8.9 04/01/2025    ALKPHOS 193 (H) 04/01/2025    LABPROT 24.3 (H) 04/04/2025    ALBUMIN 3.7  04/01/2025    BILIDIR 0.2 08/25/2021    IBILI 0.20 08/25/2021    AST 29 04/01/2025    ALT 28 04/01/2025    MG 2.30 12/12/2024    PHOS 3.1 12/12/2024      Lab Results   Component Value Date    HGBA1C 5.4 02/07/2025    .1 (H) 04/01/2025    SAXCKKLX09GG 35.4 04/12/2023    HIV Nonreactive 01/09/2018    HEPCAB Reactive (A) 08/23/2019    HEPBCAB Nonreactive 08/23/2019     Urine:  Lab Results   Component Value Date    APPEARANCEUA Clear 04/01/2025    SGUA 1.018 04/01/2025    PROTEINUA Negative 04/01/2025    KETONESUA Negative 04/01/2025    LEUKOCYTESUR Negative 04/01/2025    RBCUA 0-5 04/01/2025    WBCUA 0-5 04/01/2025    BACTERIA None Seen 04/01/2025    SQEPUA None Seen 04/01/2025    HYALINECASTS None Seen 04/01/2025    CREATRANDUR 163.1 04/01/2025    PROTEINURINE 11.4 04/01/2025    UPROTCREA 0.1 04/01/2025         Imaging  CT Abdomen Pelvis W Wo Contrast 03/31/2023  Images were reviewed in soft tissue, lung, and bone windows.  Exam quality: adequate for evaluation  Lines/tubes: none visualized  Chest: Stable heart chamber size. No pericardial effusion. No interval change of the visualized vasculature. No airspace consolidation or suspicious lung lesion. No worsening pleural effusion or evidence for loculation.  Hepatobiliary/Pancreas: No new or enlarging hepatic lesion. No suspicious findings of the gallbladder or biliary system. No acute process or suspicious interval change of the pancreas.  Spleen: No interval change.  Adrenal/: No new, enlarging, or otherwise suspicious adrenal or renal lesion.  Small simple appearing bilateral renal cortex cysts are unchanged.  Both kidneys enhance in temporally symmetric fashion.  Persistent moderate to severe left hydroureteronephrosis is appreciated, with similar degree of dilatation throughout the course of the ureter to the level of the ureterovesicular junction.  No new or worsening focal abnormality to indicate etiology of distal obstructive uropathy is identified.   No interval development of right hydronephrosis or ureteral dilatation is identified.  Mural contour of the urinary bladder is unremarkable.  There is no convincing intraluminal filling defect within limitations of lacking intravesicular contrast opacification.  The prostate is similar in size and appearance.  Esophagus/GI tract: The included lower esophagus is unremarkable. No evidence of gastric outlet or small bowel obstruction. No acute inflammatory process or convincing focal lesion.  Musculoskeletal: No significant interval changes appreciated.  Similar degenerative alterations are noted throughout the spinal column and bony pelvis, as well as early stage changes of bilateral femoral head avascular necrosis.  Other findings: No free fluid or air. No drainable collections. Aortoiliac vascular structures are similar in comparison. No development of pathologic angie enlargement or necrotic adenopathy.    IMPRESSION  1. Grossly stable appearance of moderate to severe left hydroureteronephrosis.  Etiology of distal obstructive uropathy remains indeterminate, but could be secondary to persistent stricture just at the level of the UVJ.  2. No development of asymmetric renal enhancement to suggest new or worsening left renal function.  3. Additional chronic secondary findings are grossly unchanged from the April 2022 CT appearance.    Electronically signed by: Xavier Wisdom  Date:    03/31/2023  Time:    15:31    6/9/2023 NM KIDNEY W FLOW AND FUNCTION W PHARMACOLOGICAL   There was mildly delayed perfusion to the right kidney during flow phase of the exam.  Right kidney cortical plateau was achieved in 5.73 minutes.  There was subsequent accumulation of radioisotope within nondilated right kidney collecting system.  There was some drop of the right kidney excretory curve prior to administration of Lasix.  Post intravenous challenge with Lasix, there was adequate prompt drop of the right kidney excretory curve and time  half was achieved within 3.75 minutes.     There was also delayed perfusion to the left kidney.  Left kidney cortical plateau was achieved within 9.38 minutes.  There was left kidney central photopenia with subsequently filled in and is consistent with dilated left kidney collecting system.  There is also left ureteral diffuse dilatation.  There was partial drop of the left kidney excretory curve prior to administration of Lasix and further drop of the left kidney excretory curve post administration of Lasix.  Left kidney time half was achieved within 5.23 minutes     Quantitative analysis of the kidneys was performed and show split function discrepancy. The right kidney total function is 58 % and left kidney 42 %. The right renal normalized effective plasma flow is 30 ml/minute and the left kidney 22 ml/minute.     Impression:  1. Left hydroureteronephrosis with adequate response post administration of Lasix and drop of the excretory curve.  2. Bilateral kidneys diminished function.    Impression    ICD-10-CM ICD-9-CM   1. CKD stage G3a/A1, GFR 45-59 and albumin creatinine ratio <30 mg/g  N18.31 585.3   2. ISABELA (acute kidney injury)  N17.9 584.9   3. Primary hypertension  I10 401.9   4. Type 2 diabetes mellitus with stage 3a chronic kidney disease, without long-term current use of insulin  E11.22 250.40    N18.31 585.3   5. Chronic systolic heart failure  I50.22 428.22   6. Secondary hyperparathyroidism of renal origin  N25.81 588.81   7. Hydronephrosis with ureteropelvic junction (UPJ) obstruction  Q62.11 591        Plan  CKD stage G3a/A1, GFR 45-59 and albumin creatinine ratio <30 mg/g  There is no significant proteinuria or hematuria. SCr has increased significantly from baseline, which increases suspicion for obstructive uropathy. Will order CT of abdomen and pelvis with and without contrast to evaluate for worsening hydronephrosis.   Will follow up with results by phone and refer back to Urology if appropriate.      ISABELA (acute kidney injury)   SCr has increased significantly from baseline, which increases suspicion for obstructive uropathy. Will order CT of abdomen and pelvis with and without contrast to evaluate for worsening hydronephrosis.   Will follow up with results by phone and refer back to Urology if appropriate.     Primary hypertension  Blood pressure reading is at goal, continue current antihypertensive regimen and 2 g a day dietary sodium restriction.      Type 2 diabetes mellitus with stage 3a chronic kidney disease, without long-term current use of insulin  A1C is at goal. Continue ARB.     Chronic systolic heart failure  Patient is asymptomatic. Continue ARNI.     Secondary hyperparathyroidism of renal origin  There are no indications for active vitamin-D analogs at this time.  Will continue to monitor intact PTH, calcium, and phosphorus levels periodically.        Follow up in about 3 months (around 7/24/2025).     Orders Placed This Encounter   Procedures    CT Abdomen Pelvis W Wo Contrast     Please give 1 liter of NS after contrast administration     Standing Status:   Future     Expected Date:   4/24/2025     Expiration Date:   4/24/2026     Is the patient allergic to iodine contrast?:   No     Does this patient have impaired renal function?:   Yes     May the Radiologist modify the order per protocol to meet the clinical needs of the patient?:   Yes     Will this service be billed to a Worker's Comp policy?:   No     Oral/Rectal Contrast instructions::   NO Oral Contrast     Special CT ABD Protocol Request?:   Routine     [facility] ONLY: Performing/Resulting Location:   [facility]    Comprehensive Metabolic Panel     Standing Status:   Future     Expected Date:   7/14/2025     Expiration Date:   7/30/2025    Microalbumin/Creatinine Ratio, Urine     Standing Status:   Future     Expected Date:   7/14/2025     Expiration Date:   7/30/2025     Specimen Source:   Urine     Send normal result to  authorizing provider's In Basket if patient is active on MyChart::   Yes    Urinalysis, Reflex to Urine Culture     Standing Status:   Future     Expected Date:   7/14/2025     Expiration Date:   7/30/2025     Specimen Source:   Urine        Nadira Buchanan NP  [facility]            [1]   Current Outpatient Medications:     ACCU-CHEK FASTCLIX LANCET DRUM Misc, Apply topically once daily., Disp: , Rfl:     ACCU-CHEK GUIDE TEST STRIPS Strp, USE AS DIRECTED once daily (keep log), Disp: , Rfl:     albuterol (PROAIR HFA) 90 mcg/actuation inhaler, Inhale 1-2 puffs into the lungs every 4 (four) hours as needed for Wheezing. Rescue, Disp: 18 g, Rfl: 0    albuterol-ipratropium (DUO-NEB) 2.5 mg-0.5 mg/3 mL nebulizer solution, Take 3 mLs by nebulization every 4 (four) hours as needed for Wheezing or Shortness of Breath. Rescue, Disp: 100 mL, Rfl: 6    alfuzosin (UROXATRAL) 10 mg Tb24, Take 1 tablet (10 mg total) by mouth once daily., Disp: 90 tablet, Rfl: 3    atorvastatin (LIPITOR) 80 MG tablet, TAKE ONE (1) TABLET BY MOUTH ONCE DAILY, Disp: 90 tablet, Rfl: 3    cetirizine (ZYRTEC) 10 MG tablet, Take 1 tablet (10 mg total) by mouth once daily., Disp: 90 tablet, Rfl: 1    cholecalciferol, vitamin D3, (VITAMIN D3) 125 mcg (5,000 unit) Tab, Take 1 tablet (5,000 Units total) by mouth once daily., Disp: 90 tablet, Rfl: 1    clotrimazole-betamethasone 1-0.05% (LOTRISONE) cream, Apply 2 g topically 2 (two) times daily., Disp: , Rfl:     ENTRESTO 49-51 mg per tablet, TAKE 1 TABLET BY MOUTH 2 (TWO) TIMES DAILY., Disp: 60 tablet, Rfl: 11    famotidine (PEPCID) 20 MG tablet, Take 1 tablet (20 mg total) by mouth 2 (two) times daily., Disp: 60 tablet, Rfl: 11    fluticasone propionate (FLONASE) 50 mcg/actuation nasal spray, as needed for Rhinitis or Allergies., Disp: , Rfl:     furosemide (LASIX) 40 MG tablet, Take 1 tablet (40 mg total) by mouth once daily. Refilled per Cardio orders., Disp: 90 tablet, Rfl: 0    glipiZIDE  (GLUCOTROL) 5 MG tablet, Take 1 tablet (5 mg total) by mouth once daily., Disp: 90 tablet, Rfl: 1    lancets-blood glucose strips 30 gauge Cmpk,  Accucheck blood glucose test strips and lancets, See Instructions, Check CBG once daily and keep log., # 100 EA, 11 Refill(s), Pharmacy: [facility], 185, cm, Height/Length Dosing, 03/08/22 8:10:00 CST, 96, kg, Weight Dosing, 03/08/22 8:10:00 CST, Disp: , Rfl:     metoprolol succinate (TOPROL-XL) 100 MG 24 hr tablet, TAKE 1 TABLET BY MOUTH ONCE DAILY., Disp: 90 tablet, Rfl: 3    montelukast (SINGULAIR) 10 mg tablet, Take 1 tablet (10 mg total) by mouth every evening., Disp: 90 tablet, Rfl: 1    traZODone (DESYREL) 50 MG tablet, Take 1 tablet (50 mg total) by mouth nightly as needed for Insomnia., Disp: 90 tablet, Rfl: 1    warfarin (COUMADIN) 5 MG tablet, Take 1 tablet (5 mg total) by mouth Daily. (Patient taking differently: Take 5 mg by mouth Daily. 5 mg, 2.5 resr of daysWed Sun), Disp: 90 tablet, Rfl: 3    Current Facility-Administered Medications:     albuterol nebulizer solution 2.5 mg, 2.5 mg, Nebulization, Q4H PRN, Dayami Squires FNP    Facility-Administered Medications Ordered in Other Visits:     0.9%  NaCl infusion, , Intravenous, Once, Álvaro Degroot MD    sodium chloride 0.9% flush 10 mL, 10 mL, Intravenous, PRN, Álvaro Degroot MD    vancomycin injection 1,000 mg, 1,000 mg, Intravenous, On Call Procedure, Álvaro Degroot MD, 1,000 mg at 09/11/23 1046

## 2025-05-06 ENCOUNTER — HOSPITAL ENCOUNTER (OUTPATIENT)
Dept: RADIOLOGY | Facility: HOSPITAL | Age: 65
Discharge: HOME | End: 2025-05-06
Attending: NURSE PRACTITIONER
Payer: MEDICARE

## 2025-05-06 DIAGNOSIS — I50.20 HFREF (HEART FAILURE WITH REDUCED EJECTION FRACTION): Primary | ICD-10-CM

## 2025-05-06 DIAGNOSIS — N17.9 AKI (ACUTE KIDNEY INJURY): ICD-10-CM

## 2025-05-06 DIAGNOSIS — Q62.11 HYDRONEPHROSIS WITH URETEROPELVIC JUNCTION (UPJ) OBSTRUCTION: ICD-10-CM

## 2025-05-06 LAB
CREAT SERPL-MCNC: 1.83 MG/DL (ref 0.72–1.25)
GFR SERPLBLD CREATININE-BSD FMLA CKD-EPI: 40 ML/MIN/1.73/M2

## 2025-05-06 PROCEDURE — 25500020 PHARM REV CODE 255: Performed by: NURSE PRACTITIONER

## 2025-05-06 PROCEDURE — 74178 CT ABD&PLV WO CNTR FLWD CNTR: CPT | Mod: TC

## 2025-05-06 PROCEDURE — 82565 ASSAY OF CREATININE: CPT | Performed by: NURSE PRACTITIONER

## 2025-05-06 RX ADMIN — IOHEXOL 100 ML: 350 INJECTION, SOLUTION INTRAVENOUS at 11:05

## 2025-05-07 RX ORDER — FUROSEMIDE 40 MG/1
TABLET ORAL
Qty: 90 TABLET | Refills: 0 | Status: SHIPPED | OUTPATIENT
Start: 2025-05-07

## 2025-05-08 ENCOUNTER — LAB VISIT (OUTPATIENT)
Dept: LAB | Facility: HOSPITAL | Age: 65
End: 2025-05-08
Attending: INTERNAL MEDICINE
Payer: MEDICARE

## 2025-05-08 DIAGNOSIS — I48.20 CHRONIC ATRIAL FIBRILLATION: Primary | ICD-10-CM

## 2025-05-08 LAB
INR PPP: 6.5
PROTHROMBIN TIME: 58.6 SECONDS (ref 12.5–14.5)

## 2025-05-08 PROCEDURE — 85610 PROTHROMBIN TIME: CPT

## 2025-05-08 PROCEDURE — 36415 COLL VENOUS BLD VENIPUNCTURE: CPT

## 2025-05-19 ENCOUNTER — LAB VISIT (OUTPATIENT)
Dept: LAB | Facility: HOSPITAL | Age: 65
End: 2025-05-19
Attending: INTERNAL MEDICINE
Payer: MEDICARE

## 2025-05-19 DIAGNOSIS — J44.89 ASTHMA WITH COPD: ICD-10-CM

## 2025-05-19 DIAGNOSIS — I48.20 CHRONIC ATRIAL FIBRILLATION: Primary | ICD-10-CM

## 2025-05-19 LAB
INR PPP: 2.2
PROTHROMBIN TIME: 21.5 SECONDS (ref 12.5–14.5)

## 2025-05-19 PROCEDURE — 36415 COLL VENOUS BLD VENIPUNCTURE: CPT

## 2025-05-19 PROCEDURE — 85610 PROTHROMBIN TIME: CPT

## 2025-05-27 RX ORDER — IPRATROPIUM BROMIDE AND ALBUTEROL SULFATE 2.5; .5 MG/3ML; MG/3ML
SOLUTION RESPIRATORY (INHALATION)
Qty: 90 ML | Refills: 6 | Status: SHIPPED | OUTPATIENT
Start: 2025-05-27

## 2025-06-05 RX ORDER — FLUTICASONE PROPIONATE 50 MCG
SPRAY, SUSPENSION (ML) NASAL
Qty: 16 ML | Refills: 6 | OUTPATIENT
Start: 2025-06-05

## 2025-06-11 ENCOUNTER — OFFICE VISIT (OUTPATIENT)
Dept: INTERNAL MEDICINE | Facility: CLINIC | Age: 65
End: 2025-06-11
Payer: MEDICARE

## 2025-06-11 ENCOUNTER — LAB VISIT (OUTPATIENT)
Dept: LAB | Facility: HOSPITAL | Age: 65
End: 2025-06-11
Attending: INTERNAL MEDICINE
Payer: MEDICARE

## 2025-06-11 VITALS
HEIGHT: 74 IN | SYSTOLIC BLOOD PRESSURE: 101 MMHG | DIASTOLIC BLOOD PRESSURE: 68 MMHG | RESPIRATION RATE: 16 BRPM | TEMPERATURE: 98 F | HEART RATE: 74 BPM | BODY MASS INDEX: 28.07 KG/M2 | WEIGHT: 218.69 LBS

## 2025-06-11 DIAGNOSIS — B18.2 CHRONIC HEPATITIS C WITHOUT HEPATIC COMA: ICD-10-CM

## 2025-06-11 DIAGNOSIS — Z87.39 HISTORY OF RHEUMATOID ARTHRITIS: ICD-10-CM

## 2025-06-11 DIAGNOSIS — S96.911D STRAIN OF RIGHT ANKLE, SUBSEQUENT ENCOUNTER: ICD-10-CM

## 2025-06-11 DIAGNOSIS — E55.9 VITAMIN D DEFICIENCY: ICD-10-CM

## 2025-06-11 DIAGNOSIS — Z12.11 SCREENING FOR COLON CANCER: ICD-10-CM

## 2025-06-11 DIAGNOSIS — G47.00 INSOMNIA, UNSPECIFIED TYPE: ICD-10-CM

## 2025-06-11 DIAGNOSIS — I48.91 A-FIB: Primary | ICD-10-CM

## 2025-06-11 DIAGNOSIS — J44.89 ASTHMA WITH COPD: Primary | ICD-10-CM

## 2025-06-11 DIAGNOSIS — M06.9 RHEUMATOID ARTHRITIS, INVOLVING UNSPECIFIED SITE, UNSPECIFIED WHETHER RHEUMATOID FACTOR PRESENT: ICD-10-CM

## 2025-06-11 DIAGNOSIS — R74.8 ELEVATED LIVER ENZYMES: ICD-10-CM

## 2025-06-11 DIAGNOSIS — Z00.00 WELL ADULT EXAM: ICD-10-CM

## 2025-06-11 DIAGNOSIS — E11.9 CONTROLLED TYPE 2 DIABETES MELLITUS WITHOUT COMPLICATION, WITHOUT LONG-TERM CURRENT USE OF INSULIN: ICD-10-CM

## 2025-06-11 DIAGNOSIS — Z86.19 HISTORY OF HEPATITIS C: ICD-10-CM

## 2025-06-11 DIAGNOSIS — E11.9 TYPE 2 DIABETES MELLITUS WITHOUT COMPLICATION, WITHOUT LONG-TERM CURRENT USE OF INSULIN: ICD-10-CM

## 2025-06-11 PROBLEM — S96.911A RIGHT ANKLE STRAIN: Status: ACTIVE | Noted: 2025-06-11

## 2025-06-11 LAB
CRP SERPL-MCNC: 5.3 MG/L
ERYTHROCYTE [SEDIMENTATION RATE] IN BLOOD: 2 MM/HR (ref 0–15)
INR PPP: 3.5
PROTHROMBIN TIME: 36 SECONDS (ref 11.4–14)
RHEUMATOID FACT SERPL-ACNC: 98 IU/ML

## 2025-06-11 PROCEDURE — 3074F SYST BP LT 130 MM HG: CPT | Mod: CPTII,,, | Performed by: NURSE PRACTITIONER

## 2025-06-11 PROCEDURE — 3066F NEPHROPATHY DOC TX: CPT | Mod: CPTII,,, | Performed by: NURSE PRACTITIONER

## 2025-06-11 PROCEDURE — 1159F MED LIST DOCD IN RCRD: CPT | Mod: CPTII,,, | Performed by: NURSE PRACTITIONER

## 2025-06-11 PROCEDURE — 36415 COLL VENOUS BLD VENIPUNCTURE: CPT

## 2025-06-11 PROCEDURE — 3288F FALL RISK ASSESSMENT DOCD: CPT | Mod: CPTII,,, | Performed by: NURSE PRACTITIONER

## 2025-06-11 PROCEDURE — 86431 RHEUMATOID FACTOR QUANT: CPT | Mod: 59

## 2025-06-11 PROCEDURE — 86200 CCP ANTIBODY: CPT

## 2025-06-11 PROCEDURE — 99214 OFFICE O/P EST MOD 30 MIN: CPT | Mod: S$PBB,,, | Performed by: NURSE PRACTITIONER

## 2025-06-11 PROCEDURE — 86039 ANTINUCLEAR ANTIBODIES (ANA): CPT

## 2025-06-11 PROCEDURE — 3008F BODY MASS INDEX DOCD: CPT | Mod: CPTII,,, | Performed by: NURSE PRACTITIONER

## 2025-06-11 PROCEDURE — 3044F HG A1C LEVEL LT 7.0%: CPT | Mod: CPTII,,, | Performed by: NURSE PRACTITIONER

## 2025-06-11 PROCEDURE — 86431 RHEUMATOID FACTOR QUANT: CPT

## 2025-06-11 PROCEDURE — 87522 HEPATITIS C REVRS TRNSCRPJ: CPT

## 2025-06-11 PROCEDURE — 3078F DIAST BP <80 MM HG: CPT | Mod: CPTII,,, | Performed by: NURSE PRACTITIONER

## 2025-06-11 PROCEDURE — 1101F PT FALLS ASSESS-DOCD LE1/YR: CPT | Mod: CPTII,,, | Performed by: NURSE PRACTITIONER

## 2025-06-11 PROCEDURE — 99215 OFFICE O/P EST HI 40 MIN: CPT | Mod: PBBFAC | Performed by: NURSE PRACTITIONER

## 2025-06-11 PROCEDURE — 86140 C-REACTIVE PROTEIN: CPT

## 2025-06-11 PROCEDURE — 4010F ACE/ARB THERAPY RXD/TAKEN: CPT | Mod: CPTII,,, | Performed by: NURSE PRACTITIONER

## 2025-06-11 PROCEDURE — 85652 RBC SED RATE AUTOMATED: CPT

## 2025-06-11 PROCEDURE — 85610 PROTHROMBIN TIME: CPT

## 2025-06-11 RX ORDER — GLIPIZIDE 5 MG/1
5 TABLET ORAL DAILY
Qty: 90 TABLET | Refills: 1 | Status: SHIPPED | OUTPATIENT
Start: 2025-06-11

## 2025-06-11 RX ORDER — ALBUTEROL SULFATE 90 UG/1
1-2 INHALANT RESPIRATORY (INHALATION) EVERY 4 HOURS PRN
Qty: 18 G | Refills: 0 | Status: SHIPPED | OUTPATIENT
Start: 2025-06-11 | End: 2026-06-11

## 2025-06-11 RX ORDER — CETIRIZINE HYDROCHLORIDE 10 MG/1
10 TABLET ORAL DAILY
Qty: 90 TABLET | Refills: 1 | Status: SHIPPED | OUTPATIENT
Start: 2025-06-11

## 2025-06-11 RX ORDER — PROMETHAZINE HYDROCHLORIDE AND DEXTROMETHORPHAN HYDROBROMIDE 6.25; 15 MG/5ML; MG/5ML
5 SYRUP ORAL EVERY 4 HOURS PRN
Qty: 240 ML | Refills: 1 | Status: SHIPPED | OUTPATIENT
Start: 2025-06-11 | End: 2026-06-11

## 2025-06-11 RX ORDER — ACETAMINOPHEN 500 MG
5000 TABLET ORAL DAILY
Qty: 90 TABLET | Refills: 1 | Status: SHIPPED | OUTPATIENT
Start: 2025-06-11

## 2025-06-11 RX ORDER — MONTELUKAST SODIUM 10 MG/1
10 TABLET ORAL NIGHTLY
Qty: 90 TABLET | Refills: 1 | Status: SHIPPED | OUTPATIENT
Start: 2025-06-11 | End: 2026-06-11

## 2025-06-11 RX ORDER — TRAZODONE HYDROCHLORIDE 50 MG/1
50 TABLET ORAL NIGHTLY PRN
Qty: 90 TABLET | Refills: 1 | Status: SHIPPED | OUTPATIENT
Start: 2025-06-11 | End: 2026-06-11

## 2025-06-11 RX ORDER — IPRATROPIUM BROMIDE AND ALBUTEROL SULFATE 2.5; .5 MG/3ML; MG/3ML
3 SOLUTION RESPIRATORY (INHALATION)
Qty: 100 EACH | Refills: 6 | Status: SHIPPED | OUTPATIENT
Start: 2025-06-11

## 2025-06-11 NOTE — PROGRESS NOTES
Patient ID: [id number]     Chief Complaint: Follow-up (Lab review )        HPI:     HPI      Ezra Zhang is a 64 y.o. male here today for a follow up. Pt states he twisted his right ankle 3-24-25- was seen in ED and had XR done that was normal. Pt currently in right LE boot and states he still gets swelling to right ankle daily. Pt still having pain with walking. Pt currently ambulating with walker. Pt request referral to Ortho.         Immunizations:   Immunization History   Administered Date(s) Administered    COVID-19 Vaccine 03/08/2021, 04/05/2021, 12/07/2021    COVID-19, MRNA, LN-S, PF (MODERNA FULL 0.5 ML DOSE) 03/08/2021, 04/05/2021, 12/07/2021    Influenza 10/11/2012, 12/21/2015, 10/15/2020    Influenza - Quadrivalent 12/20/2019, 03/08/2022    Influenza - Quadrivalent - PF *Preferred* (6 months and older) 09/26/2022, 10/17/2023    Influenza - Trivalent - Fluarix, Flulaval, Fluzone, Afluria - PF 12/21/2015, 10/22/2024    Pneumococcal 10/11/2012    Pneumococcal Polysaccharide - 23 Valent 10/11/2012    Tdap 12/21/2016        -------------------------------------    Atrial fibrillation    CHF (congestive heart failure)    CKD (chronic kidney disease)    COPD (chronic obstructive pulmonary disease)    DM (diabetes mellitus)    Dyslipidemia    Essential (primary) hypertension    Heart failure, unspecified    Rheumatoid arthritis, unspecified    Smoking    Type 2 diabetes mellitus without complications        Past Surgical History:   Procedure Laterality Date    ABLATION N/A 9/11/2023    Procedure: Ablation;  Surgeon: Álvaro Degroot MD;  Location: [facility] CATH LAB;  Service: Cardiology;  Laterality: N/A;  EPS + AV NODE ABLATION + UPGRADE TO BIV ICD (SJM)    CARDIOVERSION      coronary arteriograph      defibillator      ECHOCARDIOGRAM,TRANSESOPHAGEAL N/A 3/22/2023    Procedure: Transesophageal echo (LASHELL) intra-procedure log documentation;  Surgeon: Matthieu Diagnostic Provider;  Location: [facility] CATH LAB;  Service:  Cardiology;  Laterality: N/A;    INSERT / REPLACE / REMOVE PACEMAKER      INSERTION OF BIVENTRICULAR IMPLANTABLE CARDIOVERTER-DEFIBRILLATOR (ICD) N/A 9/11/2023    Procedure: INSERTION, ICD, BIVENTRICULAR;  Surgeon: Álvaro Degroot MD;  Location: [facility] CATH LAB;  Service: Cardiology;  Laterality: N/A;    left  heart catherization         Review of patient's allergies indicates:  No Known Allergies    Current Outpatient Medications   Medication Instructions    ACCU-CHEK FASTCLIX LANCET DRUM Misc Daily    ACCU-CHEK GUIDE TEST STRIPS Strp USE AS DIRECTED once daily (keep log)    albuterol (PROAIR HFA) 90 mcg/actuation inhaler 1-2 puffs, Inhalation, Every 4 hours PRN, Rescue    albuterol-ipratropium (DUO-NEB) 2.5 mg-0.5 mg/3 mL nebulizer solution INHALE THE CONTENTS OF ONE VIAL VIA NEBULIZER EVERY 6 (SIX) HOURS AS NEEDED FOR WHEEZING OR SHORTNESS OF BREATH. (RESCUE)    alfuzosin (UROXATRAL) 10 mg, Oral, Daily    atorvastatin (LIPITOR) 80 MG tablet TAKE ONE (1) TABLET BY MOUTH ONCE DAILY    cetirizine (ZYRTEC) 10 mg, Oral, Daily    cholecalciferol (vitamin D3) (VITAMIN D3) 5,000 Units, Oral, Daily    clotrimazole-betamethasone 1-0.05% (LOTRISONE) cream 2 g, 2 times daily    ENTRESTO 49-51 mg per tablet 1 tablet, Oral, 2 times daily    famotidine (PEPCID) 20 mg, Oral, 2 times daily    fluticasone propionate (FLONASE) 50 mcg/actuation nasal spray As needed (PRN)    furosemide (LASIX) 40 MG tablet TAKE ONE (1) TABLET BY MOUTH DAILY    glipiZIDE (GLUCOTROL) 5 mg, Oral, Daily    lancets-blood glucose strips 30 gauge Cmpk   Accucheck blood glucose test strips and lancets, See Instructions, Check CBG once daily and keep log., # 100 EA, 11 Refill(s), Pharmacy: [facility], 185, cm, Height/Length Dosing, 03/08/22 8:10:00 CST, 96, kg, Weight Dosing, 03/08/22 8:10:00 CST    metoprolol succinate (TOPROL-XL) 100 mg, Oral    montelukast (SINGULAIR) 10 mg, Oral, Nightly    traZODone (DESYREL) 50 mg, Oral, Nightly PRN    warfarin  (COUMADIN) 5 mg, Oral, Daily       Social History[1]     Family History   Problem Relation Name Age of Onset    Heart failure Mother      Cataracts Mother      Heart disease Mother      Glaucoma Mother      Peripheral vascular disease Mother      Hypertension Father          Patient Care Team:  Dayami Squires FNP as PCP - General (Family Medicine)  Nadira Buchanan FNP as Nurse Practitioner (Nephrology)  Cory Gan MD as Consulting Physician (Cardiology)  Álvaro Degroot MD as Consulting Physician (Cardiology)  Francisco Jackman MD as Consulting Physician (Cardiology)  Braxton Schmitz MD as Consulting Physician (Cardiology)     Subjective:     Review of Systems     See HPI for details    Constitutional: Denies Change in appetite. Denies Chills. Denies Fever. Denies Night sweats.  Eye: Denies Blurred vision. Denies Discharge. Denies Eye pain.  ENT: Denies Decreased hearing. Denies Sore throat. Denies Swollen glands.  Respiratory: Denies Cough. Denies Shortness of breath. Denies Shortness of breath with exertion. Denies Wheezing.  Cardiovascular: DeniesChest pain at rest. Denies Chest pain with exertion. Denies Irregular heartbeat. Denies Palpitations. Denies Edema.  Gastrointestinal: Denies Abdominal pain. DeniesDiarrhea. Denies Nausea. Denies Vomiting. Denies Hematemesis or Hematochezia.  Genitourinary: Denies Dysuria. Denies Urinary frequency. Denies Urinary urgency. Denies Blood in urine.  Endocrine: Denies Cold intolerance. Denies Excessive thirst. Denies Heat intolerance. Denies Weight loss. Denies Weight gain.  Musculoskeletal: Denies Painful joints. Denies Weakness.  Integumentary: Denies Rash. Denies Itching. Denies Dry skin.  Neurologic: Denies Dizziness. Denies Fainting. Denies Headache.  Psychiatric: Denies Depression. Denies Anxiety. Denies Suicidal/Homicidal ideations.    All Other ROS: Negative except as stated in HPI.       Objective:     Visit Vitals  /68 (BP Location: Left  "arm, Patient Position: Sitting)   Pulse 74   Temp 97.7 °F (36.5 °C)   Resp 16   Ht 6' 2" (1.88 m)   Wt 99.2 kg (218 lb 11.2 oz)   BMI 28.08 kg/m²       Physical Exam    General: Alert and oriented, No acute distress.  Head: Normocephalic, Atraumatic.  Eye: Pupils are equal, round and reactive to light, Extraocular movements are intact, Sclera non-icteric.  Ears/Nose/Throat: Normal, Mucosa moist,Clear.  Neck/Thyroid: Supple, Non-tender, No carotid bruit, No lymphadenopathy, No JVD, Full range of motion.  Respiratory: Clear to auscultation bilaterally; No wheezes, rales or rhonchi,Non-labored respirations, Symmetrical chest wall expansion.  Cardiovascular: Regular rate and rhythm, S1/S2 normal, No murmurs, rubs or gallops.  Gastrointestinal: Soft, Non-tender, Non-distended, Normal bowel sounds, No palpable organomegaly.  Musculoskeletal: + swelling noted to right ankle laterally. + 1 pitting noted. No bruising or discoloration noted.   Integumentary: Warm, Dry, Intact, No suspicious lesions or rashes.  Extremities: No clubbing, cyanosis or edema  Neurologic: No focal deficits, Cranial Nerves II-XII are grossly intact, Motor strength normal upper and lower extremities, Sensory exam intact.  Psychiatric: Normal interaction, Coherent speech, Euthymic mood, Appropriate affect       Labs Reviewed:     Chemistry:  Lab Results   Component Value Date     04/01/2025    K 4.2 04/01/2025    BUN 22.6 04/01/2025    CREATININE 1.83 (H) 05/06/2025    EGFRNORACEVR 40 05/06/2025    CALCIUM 8.9 04/01/2025    ALKPHOS 193 (H) 04/01/2025    ALBUMIN 3.7 04/01/2025    BILIDIR 0.2 08/25/2021    IBILI 0.20 08/25/2021    AST 29 04/01/2025    ALT 28 04/01/2025    MG 2.30 12/12/2024    PHOS 3.1 12/12/2024    XUALBQUC54PU 35.4 04/12/2023        Lab Results   Component Value Date    HGBA1C 5.4 02/07/2025        Hematology:  Lab Results   Component Value Date    WBC 8.78 04/01/2025    HGB 15.4 04/01/2025    HCT 45.9 04/01/2025     " 04/01/2025       Lipid Panel:  Lab Results   Component Value Date    CHOL 102 04/01/2025    HDL 33 (L) 04/01/2025    LDL 54.00 04/01/2025    TRIG 74 04/01/2025    TOTALCHOLEST 3 04/01/2025        Urine:  Lab Results   Component Value Date    APPEARANCEUA Clear 04/01/2025    SGUA 1.018 04/01/2025    PROTEINUA Negative 04/01/2025    KETONESUA Negative 04/01/2025    LEUKOCYTESUR Negative 04/01/2025    RBCUA 0-5 04/01/2025    WBCUA 0-5 04/01/2025    BACTERIA None Seen 04/01/2025    SQEPUA None Seen 04/01/2025    HYALINECASTS None Seen 04/01/2025    CREATRANDUR 163.1 04/01/2025    PROTEINURINE 11.4 04/01/2025    UPROTCREA 0.1 04/01/2025        Assessment:       ICD-10-CM ICD-9-CM   1. Asthma with COPD  J44.89 493.20   2. Type 2 diabetes mellitus without complication, without long-term current use of insulin  E11.9 250.00   3. Elevated liver enzymes  R74.8 790.5   4. Well adult exam  Z00.00 V70.0   5. Strain of right ankle, subsequent encounter  S96.911D V58.89     845.00   6. History of rheumatoid arthritis  Z87.39 V13.4   7. History of hepatitis C  Z86.19 V12.09   8. Chronic hepatitis C without hepatic coma  B18.2 070.54   9. Rheumatoid arthritis, involving unspecified site, unspecified whether rheumatoid factor present  M06.9 714.0        Plan:     1. Asthma with COPD (Primary)  Avoid triggers. Cont Proair, Spiriva, Duoneb as prescribed. Pt was referred to Pulm cl on hospital discharge 12-12-24 however referral was denied per Pulm cl and informed to f/u with PCP. PFT done 1-9-25 showing:  Mild airflow obstruction, NO significant BD response, no restriction, + evidence of air trapping on lung volume measurement. The Diffusion capacity reduced when corrected for alveolar volume.   CXR done 12-10-24 showing:  X-Ray Chest AP Portable  Order: [id number]  Status: Final result       Visible to patient: Yes (not seen)       Next appt: Today at 10:00 AM in Urology (SO SOLORZANO NP)    0 Result Notes  Details     Reading  Physician Reading Date Result Priority   Sincere Hunter MD  [phone] 12/10/2024 STAT      Narrative & Impression  EXAMINATION:  XR CHEST AP PORTABLE     CLINICAL HISTORY:  Asthma;     TECHNIQUE:  Single frontal view of the chest was performed.     COMPARISON:  July 29, 2024     FINDINGS:  Examination reveals mediastinal silhouette to be within normal limits cardiac silhouette is not enlarged some increase interstitial markings identified in both bases with slightly more confluent interstitial markings and confluent opacities at the left base infiltrate cannot be completely excluded.     No other focal consolidative changes are identified no other significant change     Impression:     Increase interstitial markings at both bases with more confluent interstitial markings and opacities at the left base infiltrate cannot be completely excluded        Electronically signed by:Sincere Hunter  Date:                                            12/10/2024  Time:                                           15:16              Exam Ended: 12/10/24 15:13 CST Last Resulted: 12/10/24 15:16 CST       2. Type 2 diabetes mellitus without complication, without long-term current use of insulin  A1c 5.4. ADA diet and exercise. Cont Glipizide as prescribed. A1c in 4 months. Urine microalbumin 8-20-24.  DM eye exam 11-1-24. DM foot done 7-11-24. Pt states he was following Dr Mazariegos for podiatry but states he no longer takes his insurance. Referred to Dr Conklin in [place] for eval and [facility] for DM foot care.      3. Elevated liver enzymes  Alk Phos 193 up from 127. Low fat diet and exercise. CMP in 4 months.     4. Well adult exam  Labs in 4 months. Cologuard 7-6-20 negative results- next due 7/2025. UTD PSA.     5. History of Hep C  Pt states he received treatment for Hep C approx 40 years ago. Will get Hep C Quantitative today.  Pt had CT Abd and pelvis done 5-6-25 showing hepatic steatosis.     6. History of RA  Pt states  he has hx of RA but has never seen a Rheum MD in the past as they never had one here at this facility at time of diagnosis. Will get auto immune labs today and refer to Rheum cl pending results.     7. Right ankle strain  Pt sprained his right ankle in March and was seen in ED in which XR was normal. Pt still having swelling and pain to outer aspect of right ankle and is currently ambulating with walking boot and walker. Pt request referral to Ortho for further eval. Referral to ortho sent as requested.     Follow up in about 4 months (around 10/11/2025) for with labs 1 week prior to appt. . In addition to their scheduled follow up, the patient has also been instructed to follow up on as needed basis.     Future Appointments   Date Time Provider Department Center   7/24/2025  9:00 AM Nadira Buchanan FNP [facility] [place]    7/25/2025  9:15 AM Allyson Guillen PA-C [facility] [place]    10/31/2025  9:00 AM PROVIDERS, CARLO Serrano             [1]   Social History  Socioeconomic History    Marital status: Single   Tobacco Use    Smoking status: Never     Passive exposure: Never    Smokeless tobacco: Never   Substance and Sexual Activity    Alcohol use: Yes     Alcohol/week: 1.0 standard drink of alcohol     Types: 1 Cans of beer per week     Comment: not often    Drug use: Never    Sexual activity: Yes     Partners: Female     Social Drivers of Health     Financial Resource Strain: Low Risk  (12/10/2024)    Overall Financial Resource Strain (CARDIA)     Difficulty of Paying Living Expenses: Not hard at all   Food Insecurity: No Food Insecurity (12/10/2024)    Hunger Vital Sign     Worried About Running Out of Food in the Last Year: Never true     Ran Out of Food in the Last Year: Never true   Transportation Needs: No Transportation Needs (12/10/2024)    TRANSPORTATION NEEDS     Transportation : No   Physical Activity: Insufficiently Active (10/16/2024)     Exercise Vital Sign     Days of Exercise per Week: 7 days     Minutes of Exercise per Session: 20 min   Stress: No Stress Concern Present (12/10/2024)    [employer] - Occupational Stress Questionnaire     Feeling of Stress : Only a little   Housing Stability: Unknown (12/10/2024)    Housing Stability Vital Sign     Unable to Pay for Housing in the Last Year: No     Homeless in the Last Year: No

## 2025-06-12 LAB
ANA SER QL HEP2 SUBST: NORMAL
CYCLIC CITRULLINATED PEPTIDE (CCP) (OHS): 297 U/ML
HCV RNA SERPL NAA+PROBE-ACNC: NOT DETECTED K[IU]/ML
HCV RNA SERPL NAA+PROBE-LOG IU: NOT DETECTED {LOG_IU}/ML
RHEUMATOID FACTOR IGA QUANTITATIVE (OLG): 117 IU/ML
RHEUMATOID FACTOR IGM QUANTITATIVE (OLG): 93 IU/ML

## 2025-06-17 ENCOUNTER — RESULTS FOLLOW-UP (OUTPATIENT)
Dept: INTERNAL MEDICINE | Facility: CLINIC | Age: 65
End: 2025-06-17

## 2025-06-17 DIAGNOSIS — R76.8 ELEVATED RHEUMATOID FACTOR: Primary | ICD-10-CM

## 2025-06-30 ENCOUNTER — HOSPITAL ENCOUNTER (OUTPATIENT)
Dept: RADIOLOGY | Facility: HOSPITAL | Age: 65
Discharge: HOME | End: 2025-06-30
Payer: MEDICARE

## 2025-06-30 ENCOUNTER — OFFICE VISIT (OUTPATIENT)
Dept: ORTHOPEDICS | Facility: CLINIC | Age: 65
End: 2025-06-30
Payer: MEDICARE

## 2025-06-30 VITALS
HEIGHT: 74 IN | DIASTOLIC BLOOD PRESSURE: 77 MMHG | WEIGHT: 216.25 LBS | BODY MASS INDEX: 27.75 KG/M2 | SYSTOLIC BLOOD PRESSURE: 111 MMHG | TEMPERATURE: 98 F | HEART RATE: 73 BPM

## 2025-06-30 DIAGNOSIS — S96.911D STRAIN OF RIGHT ANKLE, SUBSEQUENT ENCOUNTER: ICD-10-CM

## 2025-06-30 DIAGNOSIS — M21.42 BILATERAL PES PLANUS: ICD-10-CM

## 2025-06-30 DIAGNOSIS — M76.71 PERONEAL TENDINITIS OF RIGHT LOWER EXTREMITY: ICD-10-CM

## 2025-06-30 DIAGNOSIS — M21.41 BILATERAL PES PLANUS: ICD-10-CM

## 2025-06-30 DIAGNOSIS — S93.491A SPRAIN OF ANTERIOR TALOFIBULAR LIGAMENT OF RIGHT ANKLE, INITIAL ENCOUNTER: Primary | ICD-10-CM

## 2025-06-30 PROCEDURE — 73610 X-RAY EXAM OF ANKLE: CPT | Mod: TC,RT

## 2025-06-30 PROCEDURE — 99215 OFFICE O/P EST HI 40 MIN: CPT | Mod: PBBFAC,25

## 2025-06-30 RX ORDER — DICLOFENAC SODIUM 10 MG/G
2 GEL TOPICAL 4 TIMES DAILY
Qty: 100 G | Refills: 3 | Status: SHIPPED | OUTPATIENT
Start: 2025-06-30

## 2025-06-30 NOTE — PROGRESS NOTES
"  Subjective:   Patient ID: Ezra Zhang is a 64 y.o. male who presented to [facility] for new visit.    Chief Complaint: Pain of the Right Ankle    History of Present Illness:  Ezra Zhang presents to the clinic today for right ankle pain after an inversion injury x3mo. Pain is located posterior lateral malleolus and anterior ankle joint, and rated 3/10 at rest but increases with activity. Quality of pain is described as Sharp, Dull, and Aching. Inciting event: Inversion injury when rising from the couch and stepping on his foot and normally, DOI: 3/22/25. Pain is aggravated by any weight bearing, standing, and walking. Patient has had no prior ankle problems. Evaluation to date: plain films, PCP evaluation, and ER evaluation. Treatment to date: avoidance of activity, bracing, topical analgesics, oral analgesics.  When he was evaluated in the ED the patient was placed in a walking boot which he has been using since then.  He typically removes the boot 1-2 times per week and wears sneakers at that time but overall has been in the boot for over a month now without much relief once he tries to transitioned out of the boot.  Expectations for today's visit: Further evaluation. PCP:  CARLO Moe.    Ankle/Foot Review of Systems:  Swelling?  yes  Instability?  no  Mechanical sx?  no  <30 min AM stiffness? yes  Limited ROM? yes  Fever/Chills? no    Comorbid Conditions:  Rheumatoid arthritis  HTN   CHF   Chronic anticoagulation - warfarin  CKD  Type 2 DM    Objective:     Physical Exam:  /77 (Patient Position: Sitting)   Pulse 73   Temp 97.6 °F (36.4 °C)   Ht 6' 2" (1.88 m)   Wt 98.1 kg (216 lb 4.3 oz)   BMI 27.77 kg/m²     Appearance:  antalgic - FWB  Soft tissue swelling: Left: no Right: yes  Effusion: Left:  Negative Right: Negative  Erythema: Left no Right: no  Ecchymosis: Left: no Right: no  Atrophy: Left: no Right: " no    Palpation:  Ankle/Foot Tenderness: Left: non tender Right: posterior lateral  malleolus and anterior talofibular ligament (ATFL).    Range of motion:  Ankle dorsiflexion (20 degrees):     Left: 20 Right: 20  Ankle Plantar flexion (50 degrees): Left 50 Right: 50  Subtalar inversion (5 degrees): Left: 5 Right 5  Subtalar eversion (5 degrees):  Left: 5 Right 5  Transverse/midtarsal: adduction (20 degrees): Left: 20 Right: 20  Transverse/midtarsal abduction (10 degrees): Left: 10 Right: 10    Strength:  Foot inversion/dorsiflexion (Deep Peroneal N. L4):Left: 5/5  Pain: no Right: 5/5  Pain: yes  1st toe Dorsiflexion (Deep Peroneal N. L5): Left: 5/5  Pain: no Right: 5/5  Pain: no  Foot plantarflexion (Tibial N. S1): Left: 5/5  Pain: no Right: 5/5  Pain: no  Foot eversion (superficial peroneal L4-S1): Left: 5/5  Pain: no Right: 5/5  Pain: yes    Special Tests:  [place]:  Left: Not performed  Right: Not performed   Anterior drawer: Left: Negative    Right: Negative   Talar tilt: Left: Negative     Right: Negative   External rotation stress (Kleiger's): Left: Negative  Right: Negative  Eversion stress: Left: Negative Right: Negative   Squeeze: Left: Negative  Right: Negative  Heel rise: Left: Not performed Right: Not performed  Too many toes sign: Left: Positive Right: Positive    General appearance: NAD  Peripheral pulses: normal bilaterally   Reflexes: Left: normal Right normal   Sensation: normal    Labs:  Last A1c: 5.4     Imaging:   Previous images reviewed.  X-rays ordered and performed today: yes  # of views: 3 Laterality: right  My Interpretation:  No acute fractures or dislocations noted, generalized soft tissue swelling, old lateral distal tibial injury noted, stieda's process noted posteriorly     Assessment:     Encounter Diagnoses   Code Name Primary?    S93.491A Sprain of anterior talofibular ligament of right ankle, initial encounter Yes    M76.71 Peroneal tendinitis of right lower extremity     M21.41,  M21.42 Bilateral pes planus        Plan:     MDM: Prior external referring provider notes reviewed. Prior external referring provider studies reviewed.   Dx:  Acute ankle sprain of the ATFL with peroneal tendinitis - new problem  Treatment Plan: Discussed with patient diagnosis, prognosis, and treatment recommendations.  Discussed with the patient but he has already completed immobilization with walking boot at this time.  Plan to transition the patient to a lace-up ankle brace and begin ROM.  Formal PT given to the patient during visit today and discussed the importance to help prevent future ankle injuries.  Bilateral pes planus also noted on exam, patient has orthotics in his shoes currently.  Patient also has quite a few comorbidities therefore limiting oral analgesic options, we will prescribe Voltaren gel as an adjunct analgesics.  We will have the patient follow up in 3 months for re-evaluation.  Imaging: radiological studies ordered and independently reviewed; discussed with patient; pending radiologist interpretation.   Weight Management: is paramount. Recommend at least 10 pounds weight loss if your BMI is 25-29.9. A BMI of <24.9 may provide further relief..   Procedure: Discussed CSI/VSI as treatment options; patient not a candidate for injections at this time.  Activity: Activity as tolerated; HEP to include aerobic conditioning and strength training with non-painful activity. ROM/STG exercises. Proper footware; assistive devises to avoid limping.   Therapy: Physical Therapy  Medication: START Voltaren Gel 1% as prescribed to affected area. Please see your primary care physician for further refills.  RTC: 3 months.      This note is dictated using the M*Modal Fluency Direct word recognition program. There are word recognition mistakes that are occasionally missed on review.     Myesha Munroe MD  Sports Medicine Fellow

## 2025-07-04 NOTE — PROGRESS NOTES
Faculty Attestation: Ezra Zhang  was seen in Sports Medicine Clinic. Services were furnished in a primary care sports medicine center in the outpatient department at a [facility]. Discussed with Dr. Munroe at the time of the visit. History of Present Illness, Physical Exam, and Assessment and Plan reviewed.  I participated in the management of the patient and was immediately available throughout the encounter. Treatment plan is reasonable and appropriate. Compliance with treatment recommendations is important.    Radiology images independently reviewed and agree with radiologist interpretation. No procedure was performed.     Candy Dia MD  Sports Medicine      Insurance: Medicare  Patient Type: New patient to [facility]  Problem Type: New condition to [facility]  Condition: New Condition

## 2025-07-15 ENCOUNTER — PATIENT MESSAGE (OUTPATIENT)
Facility: CLINIC | Age: 65
End: 2025-07-15
Payer: MEDICARE

## 2025-07-24 ENCOUNTER — LAB VISIT (OUTPATIENT)
Dept: LAB | Facility: HOSPITAL | Age: 65
End: 2025-07-24
Attending: INTERNAL MEDICINE
Payer: MEDICARE

## 2025-07-24 ENCOUNTER — OFFICE VISIT (OUTPATIENT)
Dept: NEPHROLOGY | Facility: CLINIC | Age: 65
End: 2025-07-24
Payer: MEDICARE

## 2025-07-24 VITALS
OXYGEN SATURATION: 96 % | HEART RATE: 73 BPM | WEIGHT: 211.81 LBS | SYSTOLIC BLOOD PRESSURE: 90 MMHG | DIASTOLIC BLOOD PRESSURE: 64 MMHG | RESPIRATION RATE: 20 BRPM | BODY MASS INDEX: 27.18 KG/M2 | TEMPERATURE: 98 F | HEIGHT: 74 IN

## 2025-07-24 DIAGNOSIS — I48.20 CHRONIC ATRIAL FIBRILLATION: Primary | ICD-10-CM

## 2025-07-24 DIAGNOSIS — N25.81 SECONDARY HYPERPARATHYROIDISM OF RENAL ORIGIN: ICD-10-CM

## 2025-07-24 DIAGNOSIS — M25.572 CHRONIC PAIN OF LEFT ANKLE: ICD-10-CM

## 2025-07-24 DIAGNOSIS — N18.31 TYPE 2 DIABETES MELLITUS WITH STAGE 3A CHRONIC KIDNEY DISEASE, WITHOUT LONG-TERM CURRENT USE OF INSULIN: ICD-10-CM

## 2025-07-24 DIAGNOSIS — I10 PRIMARY HYPERTENSION: ICD-10-CM

## 2025-07-24 DIAGNOSIS — N18.31 CKD STAGE G3A/A2, GFR 45-59 AND ALBUMIN CREATININE RATIO 30-299 MG/G: Primary | ICD-10-CM

## 2025-07-24 DIAGNOSIS — E11.22 TYPE 2 DIABETES MELLITUS WITH STAGE 3A CHRONIC KIDNEY DISEASE, WITHOUT LONG-TERM CURRENT USE OF INSULIN: ICD-10-CM

## 2025-07-24 DIAGNOSIS — G89.29 CHRONIC PAIN OF LEFT ANKLE: ICD-10-CM

## 2025-07-24 DIAGNOSIS — N18.31 CKD STAGE G3A/A1, GFR 45-59 AND ALBUMIN CREATININE RATIO <30 MG/G: ICD-10-CM

## 2025-07-24 DIAGNOSIS — I50.32 HEART FAILURE WITH RECOVERED EJECTION FRACTION (HFRECEF): ICD-10-CM

## 2025-07-24 LAB
ALBUMIN SERPL-MCNC: 4 G/DL (ref 3.4–4.8)
ALBUMIN/CREAT UR: 48.6 MG/GM CR (ref 0–30)
ALBUMIN/GLOB SERPL: 1.1 RATIO (ref 1.1–2)
ALP SERPL-CCNC: 181 UNIT/L (ref 40–150)
ALT SERPL-CCNC: 33 UNIT/L (ref 0–55)
ANION GAP SERPL CALC-SCNC: 8 MEQ/L
AST SERPL-CCNC: 29 UNIT/L (ref 11–45)
BACTERIA #/AREA URNS AUTO: ABNORMAL /HPF
BILIRUB SERPL-MCNC: 1.3 MG/DL
BILIRUB UR QL STRIP.AUTO: NEGATIVE
BUN SERPL-MCNC: 22.5 MG/DL (ref 8.4–25.7)
CALCIUM SERPL-MCNC: 9.3 MG/DL (ref 8.8–10)
CHLORIDE SERPL-SCNC: 106 MMOL/L (ref 98–107)
CLARITY UR: CLEAR
CO2 SERPL-SCNC: 27 MMOL/L (ref 23–31)
COLOR UR AUTO: ABNORMAL
CREAT SERPL-MCNC: 1.5 MG/DL (ref 0.72–1.25)
CREAT UR-MCNC: 65.4 MG/DL (ref 63–166)
CREAT/UREA NIT SERPL: 15
GFR SERPLBLD CREATININE-BSD FMLA CKD-EPI: 51 ML/MIN/1.73/M2
GLOBULIN SER-MCNC: 3.7 GM/DL (ref 2.4–3.5)
GLUCOSE SERPL-MCNC: 51 MG/DL (ref 82–115)
GLUCOSE UR QL STRIP: NORMAL
HGB UR QL STRIP: ABNORMAL
HYALINE CASTS #/AREA URNS LPF: ABNORMAL /LPF
INR PPP: 3.5
KETONES UR QL STRIP: NEGATIVE
LEUKOCYTE ESTERASE UR QL STRIP: 500
MICROALBUMIN UR-MCNC: 31.8 UG/ML
MUCOUS THREADS URNS QL MICRO: ABNORMAL /LPF
NITRITE UR QL STRIP: NEGATIVE
PH UR STRIP: 5.5 [PH]
POTASSIUM SERPL-SCNC: 3.7 MMOL/L (ref 3.5–5.1)
PROT SERPL-MCNC: 7.7 GM/DL (ref 5.8–7.6)
PROT UR QL STRIP: NEGATIVE
PROTHROMBIN TIME: 34.1 SECONDS (ref 11.4–14)
RBC #/AREA URNS AUTO: ABNORMAL /HPF
SODIUM SERPL-SCNC: 141 MMOL/L (ref 136–145)
SP GR UR STRIP.AUTO: 1.01 (ref 1–1.03)
SQUAMOUS #/AREA URNS LPF: ABNORMAL /HPF
UROBILINOGEN UR STRIP-ACNC: NORMAL
WBC #/AREA URNS AUTO: ABNORMAL /HPF

## 2025-07-24 PROCEDURE — 87086 URINE CULTURE/COLONY COUNT: CPT

## 2025-07-24 PROCEDURE — 99215 OFFICE O/P EST HI 40 MIN: CPT | Mod: PBBFAC | Performed by: NURSE PRACTITIONER

## 2025-07-24 PROCEDURE — 82043 UR ALBUMIN QUANTITATIVE: CPT

## 2025-07-24 PROCEDURE — 85610 PROTHROMBIN TIME: CPT

## 2025-07-24 PROCEDURE — 81001 URINALYSIS AUTO W/SCOPE: CPT

## 2025-07-24 PROCEDURE — 80053 COMPREHEN METABOLIC PANEL: CPT

## 2025-07-24 PROCEDURE — 36415 COLL VENOUS BLD VENIPUNCTURE: CPT

## 2025-07-24 RX ORDER — FLUTICASONE PROPIONATE AND SALMETEROL XINAFOATE 115; 21 UG/1; UG/1
2 AEROSOL, METERED RESPIRATORY (INHALATION) EVERY 12 HOURS
COMMUNITY

## 2025-07-24 RX ORDER — AMIODARONE HYDROCHLORIDE 200 MG/1
TABLET ORAL DAILY
COMMUNITY

## 2025-07-24 RX ORDER — MELOXICAM 7.5 MG/1
7.5 TABLET ORAL DAILY
Qty: 15 TABLET | Refills: 0 | Status: SHIPPED | OUTPATIENT
Start: 2025-07-24 | End: 2025-08-08

## 2025-07-24 NOTE — PROGRESS NOTES
[facility]  Nephrology Clinic      MRN: [id number]    Patient's demographics: Ezra Zhang is a 64 y.o. Black or  male born on 1960    Chief Complaint: Chronic Kidney Disease (Follow up)    History of present illness:   The patient presents to Nephrology clinic today for ongoing management of chronic kidney disease. The patient's pertinent chronic problems include left hydronephrosis (followed by [facility] and [facility]), diabetes mellitus type 2 (diagnosed in 2008), heart failure with recovered ejection fraction (65% per echo in December of 2024), treated hepatitis C, dyslipidemia, atrial fibrillation (anticoagulated with warfarin), COPD, RYAN, and past history of tobacco abuse.  Patient is complaining of left ankle swelling and pain after twisting it about 4 months ago.  He has been referred to physical therapy by his PCP but has not scheduled an appointment yet.      Review of Systems  12 point review of systems conducted, negative except as stated in the history of present illness.    Allergies: Patient has no known allergies.     Past Medical History:  has a past medical history of Atrial fibrillation, CHF (congestive heart failure), CKD (chronic kidney disease), COPD (chronic obstructive pulmonary disease), DM (diabetes mellitus), Dyslipidemia, Essential (primary) hypertension, Heart failure, unspecified, Rheumatoid arthritis, unspecified, Smoking, and Type 2 diabetes mellitus without complications.    Procedure History:  has a past surgical history that includes coronary arteriograph; left  heart catherization; Insert / replace / remove pacemaker; Cardioversion; defibillator; echocardiogram,transesophageal (N/A, 3/22/2023); Ablation (N/A, 9/11/2023); and Insertion of biventricular implantable cardioverter-defibrillator (ICD) (N/A, 9/11/2023).    Family History: family history includes Cataracts in his mother; Glaucoma in his mother; Heart  "disease in his mother; Heart failure in his mother; Hypertension in his father; Peripheral vascular disease in his mother.    Social History:  reports that he has never smoked. He has never been exposed to tobacco smoke. He has never used smokeless tobacco. He reports that he does not currently use alcohol after a past usage of about 1.0 standard drink of alcohol per week. He reports that he does not use drugs.    Physical exam  BP 90/64 (BP Location: Right arm, Patient Position: Sitting)   Pulse 73   Temp 97.7 °F (36.5 °C) (Oral)   Resp 20   Ht 6' 2" (1.88 m)   Wt 96.1 kg (211 lb 12.8 oz)   SpO2 96%   BMI 27.19 kg/m²   General appearance: Patient is in no acute distress.  Skin: No rashes or wounds.  HEENT: PERRLA, EOMI, no scleral icterus, no JVD. Neck is supple.  Chest: Respirations are unlabored. Lungs sounds are clear.   Heart: S1, S2.   Abdomen: Benign.  : Deferred.  Extremities: Right ankle edema/brace in place, no LE edema peripheral pulses are palpable.   Neuro: No focal deficits.     Home Medications:  Current Medications[1]    Laboratory data    Lab Results   Component Value Date    WBC 8.78 04/01/2025    HGB 15.4 04/01/2025    HCT 45.9 04/01/2025     04/01/2025    IRON 83 03/27/2018    TIBC 177 (L) 03/27/2018    LABIRON 46.9 03/27/2018    FERRITIN 295.1 03/27/2018    FOLATE 12.7 12/11/2024    DZRHTCTC23 536 12/11/2024     07/24/2025    K 3.7 07/24/2025    CO2 27 07/24/2025    BUN 22.5 07/24/2025    CREATININE 1.50 (H) 07/24/2025    EGFRNORACEVR 51 07/24/2025    CALCIUM 9.3 07/24/2025    ALKPHOS 181 (H) 07/24/2025    ALBUMIN 4.0 07/24/2025    BILIDIR 0.2 08/25/2021    IBILI 0.20 08/25/2021    AST 29 07/24/2025    ALT 33 07/24/2025    MG 2.30 12/12/2024    PHOS 3.1 12/12/2024      Lab Results   Component Value Date    HGBA1C 5.4 02/07/2025    .1 (H) 04/01/2025    [id number] 35.4 04/12/2023    ANAHS <1:80 (Negative) 06/11/2025    HIV Nonreactive 01/09/2018    HEPCAB Reactive " (A) 08/23/2019    HEPBCAB Nonreactive 08/23/2019     Urine:  Lab Results   Component Value Date    APPEARANCEUA Clear 07/24/2025    SGUA 1.008 07/24/2025    PROTEINUA Negative 07/24/2025    KETONESUA Negative 07/24/2025    LEUKOCYTESUR 500 (A) 07/24/2025    RBCUA 0-5 07/24/2025    WBCUA 21-50 (A) 07/24/2025    BACTERIA None Seen 07/24/2025    SQEPUA Trace (A) 07/24/2025    HYALINECASTS None Seen 07/24/2025    CREATRANDUR 65.4 07/24/2025    PROTEINURINE 11.4 04/01/2025    UPROTCREA 0.1 04/01/2025    MICALBCREAT 48.6 (H) 07/24/2025      Microbiology Results (Last 14 Days)       Procedure Component Value Units Date/Time    Urine culture [[id number]] Collected: 07/24/25 0814    Order Status: Sent Specimen: Urine Updated: 07/24/25 0840    Cologuard Screening (Multitarget Stool DNA) [[id number]] Resulted: 07/23/25 0834    Order Status: Sent Specimen: Stool from Rectum Updated: 07/23/25 2129          Imaging  NM Kidney W Flow and Function W Pharmacological 6/13/2025   TECHNIQUE:   After the patient received an intravenous injection of 7.43 mCi of 99mTc-MAG3, dynamic flow images were acquired in 2 second intervals for 60 seconds and then at 2 minute intervals for 60 minutes. At  20 minutes after the MAG3 injection, the patient was given 40 mg of Lasix intravenously.   FINDINGS:   Initial uptake of the left kidney        44.15 %.   Initial uptake of the right kidney      55.85 %.   LEFT KIDNEY   Time to peak:                                   8  minutes.   T-1/2:                                                17  minutes.   Diuretic T-1/2:                                  6  minutes.   ERPF:                                               58.64  ml/minute.   RIGHT KIDNEY   Time to peak:                                     4  minutes.   T-1/2                                                   7 minutes   Diuretic T-1/2:                                    13 minutes   ERPF:                                                 74.20   ml/minute   Global ERPF:                                       132.84  ml/minute   Global MAG3 clearance:                     73.07  ml/minute   Other findings:   Around the 10 minute chrissy the left kidney show decreased clearance which resolved after Lasix injection.   ERPF and differential function as described above.   Left hydronephrosis with partial obstruction versus dilated nonobstructive of the left ureter.     Impression    ICD-10-CM ICD-9-CM   1. CKD stage G3a/A2, GFR 45-59 and albumin creatinine ratio  mg/g  N18.31 585.3   2. Primary hypertension  I10 401.9   3. Type 2 diabetes mellitus with stage 3a chronic kidney disease, without long-term current use of insulin  E11.22 250.40    N18.31 585.3   4. Heart failure with recovered ejection fraction (HFrecEF)  I50.32 428.32   5. Secondary hyperparathyroidism of renal origin  N25.81 588.81   6. BMI 27.0-27.9,adult  Z68.27 V85.23   7. Chronic pain of left ankle  M25.572 719.47    G89.29 338.29        Plan  CKD stage G3a/A2, GFR 45-59 and albumin creatinine ratio  mg/g  -     Comprehensive Metabolic Panel; Future; Expected date: 11/25/2025  -     Microalbumin/Creatinine Ratio, Urine; Future; Expected date: 11/25/2025  -     Urinalysis, Reflex to Urine Culture; Future; Expected date: 11/25/2025  -     Uric Acid; Future; Expected date: 11/25/2025  Renal indices have stabilized.  Patient was seen with [facility]rology [facility], nuclear medicine renal scan revealed partial obstruction versus dilated nonobstructive left ureter.  No interventions were recommended by Urology team, patient has a scheduled follow up in 1 year.  Continue renal protective activities, STEVIE blockade, good glycemic and blood pressure control.    Primary hypertension  Blood pressure reading is at goal.  Continue current medication regimen.  Patient was advised on lifestyle modifications to manage hypertension, including adopting a low-sodium diet (limit processed foods and salt intake),  engaging in regular physical activity (at least 150 minutes of moderate exercise per week), maintaining a healthy weight, and moderating alcohol consumption. Continue periodic blood pressure monitoring at home and on follow up visits.      Type 2 diabetes mellitus with stage 3a chronic kidney disease, without long-term current use of insulin  A1C is at goal.  Continue current medication regimen.    Heart failure with recovered ejection fraction (HFrecEF)  Continue ARNI.   Patient is euvolemic on exam.     Secondary hyperparathyroidism of renal origin  There are no indications for active vitamin-D analogs at this time.  Will continue to monitor intact PTH, calcium, and phosphorus levels periodically.      BMI 27.0-27.9,adult  Lifestyle and dietary interventions discussed, patient encouraged to maintain non-sedentary lifestyle and well-balanced diet.       Chronic pain of left ankle  -     meloxicam (MOBIC) 7.5 MG tablet; Take 1 tablet (7.5 mg total) by mouth once daily. for 15 days  Dispense: 15 tablet; Refill: 0  Will treat with a short course of meloxicam.  Follow up with physical therapy as ordered.      Follow up in about 4 months (around 11/24/2025).       Nadira Buchanan NP  [facility]            [1]   Current Outpatient Medications:     ACCU-CHEK FASTCLIX LANCET DRUM Misc, Apply topically once daily., Disp: , Rfl:     ACCU-CHEK GUIDE TEST STRIPS Strp, USE AS DIRECTED once daily (keep log), Disp: , Rfl:     albuterol (PROAIR HFA) 90 mcg/actuation inhaler, Inhale 1-2 puffs into the lungs every 4 (four) hours as needed for Wheezing. Rescue, Disp: 18 g, Rfl: 0    albuterol-ipratropium (DUO-NEB) 2.5 mg-0.5 mg/3 mL nebulizer solution, Take 3 mLs by nebulization every 6 (six) hours while awake. Rescue, Disp: 100 each, Rfl: 6    alfuzosin (UROXATRAL) 10 mg Tb24, Take 1 tablet (10 mg total) by mouth once daily., Disp: 90 tablet, Rfl: 3    amiodarone (PACERONE) 200 MG Tab, Take by mouth once daily., Disp: , Rfl:      atorvastatin (LIPITOR) 80 MG tablet, TAKE ONE (1) TABLET BY MOUTH ONCE DAILY, Disp: 90 tablet, Rfl: 3    cetirizine (ZYRTEC) 10 MG tablet, Take 1 tablet (10 mg total) by mouth once daily., Disp: 90 tablet, Rfl: 1    cholecalciferol, vitamin D3, (VITAMIN D3) 125 mcg (5,000 unit) Tab, Take 1 tablet (5,000 Units total) by mouth once daily., Disp: 90 tablet, Rfl: 1    clotrimazole-betamethasone 1-0.05% (LOTRISONE) cream, Apply 2 g topically 2 (two) times daily., Disp: , Rfl:     ENTRESTO 49-51 mg per tablet, TAKE 1 TABLET BY MOUTH 2 (TWO) TIMES DAILY., Disp: 60 tablet, Rfl: 11    famotidine (PEPCID) 20 MG tablet, Take 1 tablet (20 mg total) by mouth 2 (two) times daily., Disp: 60 tablet, Rfl: 11    fluticasone propion-salmeterol 115-21 mcg/dose (ADVAIR HFA) 115-21 mcg/actuation HFAA inhaler, Inhale 2 puffs into the lungs every 12 (twelve) hours. Controller, Disp: , Rfl:     fluticasone propionate (FLONASE) 50 mcg/actuation nasal spray, as needed for Rhinitis or Allergies., Disp: , Rfl:     furosemide (LASIX) 40 MG tablet, TAKE ONE (1) TABLET BY MOUTH DAILY (Patient taking differently: Take 40 mg by mouth once daily.), Disp: 90 tablet, Rfl: 0    glipiZIDE (GLUCOTROL) 5 MG tablet, Take 1 tablet (5 mg total) by mouth once daily., Disp: 90 tablet, Rfl: 1    lancets-blood glucose strips 30 gauge Cmpk,  Accucheck blood glucose test strips and lancets, See Instructions, Check CBG once daily and keep log., # 100 EA, 11 Refill(s), Pharmacy: [facility], 185, cm, Height/Length Dosing, 03/08/22 8:10:00 CST, 96, kg, Weight Dosing, 03/08/22 8:10:00 CST, Disp: , Rfl:     metoprolol succinate (TOPROL-XL) 100 MG 24 hr tablet, TAKE 1 TABLET BY MOUTH ONCE DAILY., Disp: 90 tablet, Rfl: 3    montelukast (SINGULAIR) 10 mg tablet, Take 1 tablet (10 mg total) by mouth every evening., Disp: 90 tablet, Rfl: 1    promethazine-dextromethorphan (PROMETHAZINE-DM) 6.25-15 mg/5 mL Syrp, Take 5 mLs by mouth every 4 (four) hours as needed  (cough)., Disp: 240 mL, Rfl: 1    traZODone (DESYREL) 50 MG tablet, Take 1 tablet (50 mg total) by mouth nightly as needed for Insomnia., Disp: 90 tablet, Rfl: 1    warfarin (COUMADIN) 5 MG tablet, Take 1 tablet (5 mg total) by mouth Daily., Disp: 90 tablet, Rfl: 3    diclofenac sodium (VOLTAREN ARTHRITIS PAIN) 1 % Gel, Apply 2 g topically 4 (four) times daily. Do not exceed 32 grams/day: do not to exceed 8 grams/day/single joint of upper extremities; do not to exceed 16 grams/day/single joint of lower extremities.  Please request refill of this medication from your PCP., Disp: 100 g, Rfl: 3    meloxicam (MOBIC) 7.5 MG tablet, Take 1 tablet (7.5 mg total) by mouth once daily. for 15 days, Disp: 15 tablet, Rfl: 0    Current Facility-Administered Medications:     albuterol nebulizer solution 2.5 mg, 2.5 mg, Nebulization, Q4H PRN, Dayami Squires FNP    Facility-Administered Medications Ordered in Other Visits:     0.9%  NaCl infusion, , Intravenous, Once, Álvaro Degroot MD    sodium chloride 0.9% flush 10 mL, 10 mL, Intravenous, PRN, Álvaro Degroot MD    vancomycin injection 1,000 mg, 1,000 mg, Intravenous, On Call Procedure, Álvaro Degroot MD, 1,000 mg at 09/11/23 2020

## 2025-07-26 LAB — BACTERIA UR CULT: NO GROWTH

## 2025-08-12 DIAGNOSIS — I50.20 HFREF (HEART FAILURE WITH REDUCED EJECTION FRACTION): ICD-10-CM

## 2025-08-13 RX ORDER — FUROSEMIDE 40 MG/1
40 TABLET ORAL DAILY
OUTPATIENT
Start: 2025-08-13

## 2025-08-14 DIAGNOSIS — I50.20 HFREF (HEART FAILURE WITH REDUCED EJECTION FRACTION): ICD-10-CM

## 2025-08-14 RX ORDER — FUROSEMIDE 40 MG/1
TABLET ORAL
Qty: 90 TABLET | Refills: 0 | Status: SHIPPED | OUTPATIENT
Start: 2025-08-14

## 2025-09-04 DIAGNOSIS — N40.1 BENIGN PROSTATIC HYPERPLASIA WITH URINARY HESITANCY: ICD-10-CM

## 2025-09-04 DIAGNOSIS — R39.11 BENIGN PROSTATIC HYPERPLASIA WITH URINARY HESITANCY: ICD-10-CM

## 2025-09-04 RX ORDER — ALFUZOSIN HYDROCHLORIDE 10 MG/1
10 TABLET, EXTENDED RELEASE ORAL DAILY
Qty: 90 TABLET | Refills: 3 | Status: SHIPPED | OUTPATIENT
Start: 2025-09-04

## (undated) DEVICE — Device

## (undated) DEVICE — DRESSING TRANS 4X4 TEGADERM

## (undated) DEVICE — SUT VICRYL 2-0 36 CT-1

## (undated) DEVICE — GUIDEWIRD CPS COURIER FIRM

## (undated) DEVICE — BENZOIN TINCTURE 0.66ML

## (undated) DEVICE — CATH FLEXABILITY F-J 8FR 115CM

## (undated) DEVICE — CLOSURE SKIN STERI STRIP 1/2X4

## (undated) DEVICE — SHEATH BRITE TIP INTRO 11CM 9F

## (undated) DEVICE — SYR MEDALLION 10ML

## (undated) DEVICE — DRESSING TEGADERM 4.4X5IN

## (undated) DEVICE — ILLUMINATOR PHOTONBLADE 8/11IN

## (undated) DEVICE — KIT ENSITE ELECTRODE SURFACE

## (undated) DEVICE — CATH SUPREME QPLR CRD-2 6F 120

## (undated) DEVICE — CONTRAST ISOVUE 370 500ML MULT

## (undated) DEVICE — PAD DEFIB RADIOLUCENT ADULT

## (undated) DEVICE — PACK PACER PERMANENT

## (undated) DEVICE — ADAPTER DBL MALE LL CLR POLY

## (undated) DEVICE — SHEATH CPS DIRECT 115D 47CM

## (undated) DEVICE — ELECTRODE REM PLYHSV RETURN 9

## (undated) DEVICE — SET TUBING COOL POINT IRR

## (undated) DEVICE — SUT SILK 0 SH 30IN BLK BR

## (undated) DEVICE — PAD DEFIB RADIOTRANSPARENT

## (undated) DEVICE — SUT CTD VICRYL PLUS 4/0

## (undated) DEVICE — INTRO 8.5FR 63CM SRO

## (undated) DEVICE — CATH BLLN COR SINUS VENOGRAPHY

## (undated) DEVICE — DEVICE FLUSH SQZ CONT 1ML/HR

## (undated) DEVICE — GUIDEWIRE TORQUE 3CM/180CML

## (undated) DEVICE — INTRODUCER SHEATH 7F 11CM 35MM

## (undated) DEVICE — SYR SLIP TIP 10ML SHIELD